# Patient Record
Sex: FEMALE | Race: WHITE | NOT HISPANIC OR LATINO | Employment: OTHER | URBAN - METROPOLITAN AREA
[De-identification: names, ages, dates, MRNs, and addresses within clinical notes are randomized per-mention and may not be internally consistent; named-entity substitution may affect disease eponyms.]

---

## 2017-01-04 ENCOUNTER — OFFICE VISIT (OUTPATIENT)
Dept: FAMILY MEDICINE | Facility: CLINIC | Age: 42
End: 2017-01-04
Payer: COMMERCIAL

## 2017-01-04 VITALS
WEIGHT: 176 LBS | OXYGEN SATURATION: 98 % | BODY MASS INDEX: 26.67 KG/M2 | DIASTOLIC BLOOD PRESSURE: 62 MMHG | TEMPERATURE: 98 F | RESPIRATION RATE: 12 BRPM | HEART RATE: 82 BPM | SYSTOLIC BLOOD PRESSURE: 110 MMHG | HEIGHT: 68 IN

## 2017-01-04 DIAGNOSIS — R23.2 HOT FLASHES: ICD-10-CM

## 2017-01-04 DIAGNOSIS — R61 GENERALIZED HYPERHIDROSIS: Primary | ICD-10-CM

## 2017-01-04 PROCEDURE — 99214 OFFICE O/P EST MOD 30 MIN: CPT | Performed by: FAMILY MEDICINE

## 2017-01-04 RX ORDER — PROPRANOLOL HCL 60 MG
60 CAPSULE, EXTENDED RELEASE 24HR ORAL DAILY
Qty: 30 CAPSULE | Refills: 1 | Status: SHIPPED | OUTPATIENT
Start: 2017-01-04 | End: 2017-03-02

## 2017-01-04 NOTE — PROGRESS NOTES
"  SUBJECTIVE:                                                    Deandra Lewis is a 41 year old female who presents to clinic today for the following health issues:      Concern - Consult /Perspiration - diffuse hyperhidrosis that causes her to sometimes leave work - worse in the summertime - 3x/week where she drenches her clothing and becomes quite odorous.  Strongly interfering with her work-life now that she's working outside the home.  Worse with emotional stress as well.  Doesn't eat anything with spice at all.         Stressful job as a [employer] - family/home  .  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  Was in HR previously and had the same problem with sweating since her early childhood.        We reviewed together today notes from Dr. Islas - endocrinology , and from ob/gyn specialists as well. All 24 hr urines for cortisol, metanephrines and catecholamines = negative.        Onset: Since a teen   \"runs Hot\" - not just hot, but primary complaint is actually generalized sweating where pt will soak her clothing multiple times a week.      Has seen Endocrinology - Dr. Islas and had normal 24 hr urine for  Catecholamines, metanephrines, cortisol.     Has 3 current kidney stones - stable position - has microscopic hematuria - sees female urologist for this.  Pt's Mother and daughter with similar generalized hyperhidrosis and lack of feelings of appetite satiety since early childhood.  Pt noticed similar hyperhidrosis in her daughter since infancy - she is now age 9.      Mother not really helpful with medical history in family secondary to her bipolar disorder and chronically scattered thoughts and doesn't always take her medications.     No hx of icthyosis or chronic skin problems.  No other autonomic nervous system dysfunction.         Therapies Tried and outcome: none.       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 related to previous surgeries - 4 in 3 months for sinus issues/infection     Family history of breast cancer- mother's identical twin, maternal aunt, paternal GM and dad with breast lumps      Hot flashes     Generalized hyperhidrosis- since earliest recollections - soaks clothing multiple times/week - very disruptive to life       Current Outpatient Prescriptions   Medication Sig Dispense Refill     propranolol (INDERAL LA) 60 MG 24 hr capsule Take 1 capsule (60 mg) by mouth daily 30 capsule 1     levonorgestrel-ethinyl estradiol (SEASONALE) 0.15-0.03 MG per tablet TAKE 1 ACTIVE TABLET BY ORAL ROUTE ONCE DAILY CONTINUOUSLY  4     albuterol (PROAIR HFA, PROVENTIL HFA, VENTOLIN HFA) 108 (90 BASE) MCG/ACT inhaler Inhale 2 puffs into the lungs every 6 hours as needed for shortness of breath / dyspnea or wheezing 1 Inhaler 0     cetirizine (ZYRTEC) 10 MG tablet Take 1 tablet (10 mg) by mouth every evening 30 tablet 1     sodium chloride (SODIUM CHLORIDE) 0.65 % nasal spray Spray 2 sprays in nostril 4 times daily. (Patient taking differently: Spray 2 sprays in nostril daily as needed ) 1 Bottle 0     ibuprofen (ADVIL) 200 MG tablet Take 200 mg by mouth as needed.            Allergies   Allergen Reactions     Adhesive Tape Blisters     Seasonal Allergies        Problem list and histories reviewed & adjusted, as indicated.  Additional history: as documented    Labs reviewed in EPIC  Problem list, Medication list, Allergies, and Medical/Social/Surgical histories reviewed in [facility] and updated as appropriate.    ROS:  C: NEGATIVE for fever, chills, change in weight  E/M: NEGATIVE for ear, mouth and throat problems  R: NEGATIVE for significant  "cough or SOB  CV: NEGATIVE for chest pain, palpitations or peripheral edema    OBJECTIVE:                                                    /62 mmHg  Pulse 82  Temp(Src) 98  F (36.7  C) (Tympanic)  Resp 12  Ht 5' 8\" (1.727 m)  Wt 176 lb (79.833 kg)  BMI 26.77 kg/m2  SpO2 98%  LMP 11/04/2016  Breastfeeding? No  Body mass index is 26.77 kg/(m^2).   GENERAL: healthy, alert, well nourished, well hydrated, no distress  HENT: ear canals- normal; TMs- normal; Nose- normal; Mouth- no ulcers, no lesions  NECK: no tenderness, no adenopathy, no asymmetry, no masses, no stiffness; thyroid- normal to palpation  RESP: lungs clear to auscultation - no rales, no rhonchi, no wheezes  CV: regular rates and rhythm, normal S1 S2, no S3 or S4 and no murmur, no click or rub -  ABDOMEN: soft, no tenderness, no  hepatosplenomegaly, no masses, normal bowel sounds  MS: extremities- no gross deformities noted, no edema  Neuro:PERRL, EOMI. Fundoscopic exam reveals sharp discs bilaterally, no evidence of papilledema. Visual fields are fully intact by confrontation.   Cranial nerves 2-12 are fully intact.     Muscle strength is 5/5 at the biceps, triceps, brachioradialis,  strength and finger spread as well as hip flexors and extendors, quadriceps, hamstrings, foot dorsi- and plantar flexion as well as extensor hallucis longus bilaterally.   Reflexes are brisk and symmetric at the biceps, triceps, brachioradialis, patellar, and Achilles tendons bilaterally.  Toes are down going bilaterally.   Gait is normal. Rhomberg is negative. No pronator drift.  Fine finger movements and finger-nose-finger fully intact. Visual fields fully intact by confrontation.      Diagnostic test results:  See [facility] orders.     TSH   Date Value Ref Range Status   11/16/2016 1.45 0.40 - 4.00 mU/L Final        ASSESSMENT/PLAN:                                                        ICD-10-CM    1. Generalized hyperhidrosis- since earliest " recollections - soaks clothing multiple times/week - very disruptive to life R61 NEUROLOGY ADULT REFERRAL     GENETICS REFERRAL     propranolol (INDERAL LA) 60 MG 24 hr capsule   2. Hot flashes R23.2 NEUROLOGY ADULT REFERRAL     GENETICS REFERRAL     propranolol (INDERAL LA) 60 MG 24 hr capsule     discussed risks, benefits, and alternatives of meds prescribed and pt agrees to accept possible side effects and risks, including, but not limited to slow heart rate, dizziness, fainting and shortness of breath with exercise.  If she experiences any of the latter 3, she will call right away.  This seems more like a genetic/familial syndrome to me.        See Patient Instructions  Also try turmeric otc as well to see if this helps. Consider accupuncture for this as well. Gets really bad migraines or headaches assoc with the sweating/increased feelings of body heat as well.  Mother with bipolar disorder.     Spent approx. 35+  minutes on pt care today .  All face to face time from 12:31pm  to 1:07pm.  Greater than 50% of time spent in coordination of care/counseling today re:  1. Generalized hyperhidrosis- since earliest recollections - soaks clothing multiple times/week - very disruptive to life    2. Hot flashes       Linda Soto MD  [facility]

## 2017-01-04 NOTE — MR AVS SNAPSHOT
After Visit Summary   1/4/2017    Deandra Lewis    MRN: [id number]           Patient Information     Date Of Birth          1975        Visit Information        Provider Department      1/4/2017 11:30 AM Linda Soto MD [facility]        Today's Diagnoses     Generalized hyperhidrosis- since earliest recollections - soaks clothing multiple times/week - very disruptive to life    -  1     Hot flashes           Care Instructions    Also try turmeric otc as well to see if this helps. rry the turmeric after being on the propranolol for 1 week to see if that helps . Consider accupuncture for this as well.               Thank you for choosing [facility]  for your Health Care. It was a pleasure seeing you at your visit today. Please contact us with any questions or concerns you may have.                   Linda Soto MD                                  To reach your [facility] care team after hours call:   [phone]    Our clinic hours are:     Monday- 7:30 am - 7:00 pm                             Tuesday through Friday- 7:30 am - 5:00 pm                                        Saturday- 8:00 am - 12:00 pm                  Phone:  [phone]    Our pharmacy hours are:     Monday  8:00 am to 7:00 pm      Tuesday through Friday 8:00am to 6:00pm                        Saturday - 9:00 am to 1:00 pm      Sunday : Closed.              Phone:  [phone]      There is also information available at our web site:  [email or web address]    If your provider ordered any lab tests and you do not receive the results within 10 business days, please call the clinic.    If you need a medication refill please contact your pharmacy.  Please allow 2 business days for your refill to be completed.    Our clinic offers telephone visits and e visits.  Please ask one of your team members to explain more.      Use [employer] (secure email communication and  access to your chart) to send your primary care provider a message or make an appointment. Ask someone on your Team how to sign up for MyChart.                     Follow-ups after your visit        Additional Services     GENETICS REFERRAL       Your provider has referred you to: [facility]: Adult Genetics [facility] [phone]   [email or web address]    [facility]: Adult Metabolism [facility] [phone]   [email or web address]    Please be aware that coverage of these services is subject to the terms and limitations of your health insurance plan.  Call member services at your health plan with any benefit or coverage questions.      Please bring the following with you to your appointment:    (1) Any X-Rays, CTs or MRIs which have been performed.  Contact the facility where they were done to arrange for  prior to your scheduled appointment.   (2) List of current medications   (3) This referral request   (4) Any documents/labs given to you for this referral            NEUROLOGY ADULT REFERRAL       Your provider has referred you to: [facility]: Neurology [facility] [phone]   [email or web address]  -  Diffuse hyperhidrosis  - severe - ? Autonomic process - familial     Reason for Referral: Consult    Please be aware that coverage of these services is subject to the terms and limitations of your health insurance plan.  Call member services at your health plan with any benefit or coverage questions.      Please bring the following with you to your appointment:    (1) Any X-Rays, CTs or MRIs which have been performed.  Contact the facility where they were done to arrange for  prior to your scheduled appointment.    (2) List of current medications  (3) This referral request   (4) Any documents/labs given to you for this referral                  Who to  "contact     If you have questions or need follow up information about today's clinic visit or your schedule please contact [facility] directly at [phone].  Normal or non-critical lab and imaging results will be communicated to you by MyChart, letter or phone within 4 business days after the clinic has received the results. If you do not hear from us within 7 days, please contact the clinic through MyChart or phone. If you have a critical or abnormal lab result, we will notify you by phone as soon as possible.  Submit refill requests through [employer] or call your pharmacy and they will forward the refill request to us. Please allow 3 business days for your refill to be completed.          Additional Information About Your Visit        [facility] Information     [employer] lets you send messages to your doctor, view your test results, renew your prescriptions, schedule appointments and more. To sign up, go to [email or web address] . Click on \"Log in\" on the left side of the screen, which will take you to the Welcome page. Then click on \"Sign up Now\" on the right side of the page.     You will be asked to enter the access code listed below, as well as some personal information. Please follow the directions to create your username and password.     Your access code is: [email or web address]  Expires: 2017 10:29 AM     Your access code will  in 90 days. If you need help or a new code, please call your [facility] clinic or [phone].        Care EveryWhere ID     This is your Care EveryWhere ID. This could be used by other organizations to access your [facility] medical records  [id number]        Your Vitals Were     Pulse Temperature Respirations    82 98  F (36.7  C) (Tympanic) 12    Height BMI (Body Mass Index) Pulse Oximetry    5' 8\" (1.727 m) 26.77 kg/m2 98%    Last Period Breastfeeding?       2016 No        Blood Pressure from Last 3 Encounters:   17 110/62   16 128/88 "   05/18/16 116/80    Weight from Last 3 Encounters:   01/04/17 176 lb (79.833 kg)   11/16/16 173 lb 8 oz (78.699 kg)   05/18/16 162 lb (73.483 kg)              We Performed the Following     GENETICS REFERRAL     NEUROLOGY ADULT REFERRAL          Today's Medication Changes          These changes are accurate as of: 1/4/17  1:07 PM.  If you have any questions, ask your nurse or doctor.               Start taking these medicines.        Dose/Directions    propranolol 60 MG 24 hr capsule   Commonly known as:  INDERAL LA   Used for:  Generalized hyperhidrosis, Hot flashes   Started by:  Linda Soto MD        Dose:  60 mg   Take 1 capsule (60 mg) by mouth daily   Quantity:  30 capsule   Refills:  1         These medicines have changed or have updated prescriptions.        Dose/Directions    sodium chloride 0.65 % nasal spray   Commonly known as:  OCEAN   This may have changed:    - when to take this  - reasons to take this   Used for:  Chronic sinusitis        Dose:  2 spray   Spray 2 sprays in nostril 4 times daily.   Quantity:  1 Bottle   Refills:  0            Where to get your medicines      These medications were sent to [place] [address] [place] [address]  [address] [place] [address]     Phone:  [phone]    - propranolol 60 MG 24 hr capsule             Primary Care Provider Office Phone # Fax #    Linda Soto [phone]       [address] [address]        Thank you!     Thank you for choosing [facility]  for your care. Our goal is always to provide you with excellent care. Hearing back from our patients is one way we can continue to improve our services. Please take a few minutes to complete the written survey that you may receive in the mail after your visit with us. Thank you!             Your Updated Medication List - Protect others around you: Learn how to safely use, store  and throw away your medicines at www.disposemymeds.org.          This list is accurate as of: 1/4/17  1:07 PM.  Always use your most recent med list.                   Brand Name Dispense Instructions for use    ADVIL 200 MG tablet   Generic drug:  ibuprofen      Take 200 mg by mouth as needed.       albuterol 108 (90 BASE) MCG/ACT Inhaler    PROAIR HFA/PROVENTIL HFA/VENTOLIN HFA    1 Inhaler    Inhale 2 puffs into the lungs every 6 hours as needed for shortness of breath / dyspnea or wheezing       cetirizine 10 MG tablet    zyrTEC    30 tablet    Take 1 tablet (10 mg) by mouth every evening       levonorgestrel-ethinyl estradiol 0.15-0.03 MG per tablet    SEASONALE     TAKE 1 ACTIVE TABLET BY ORAL ROUTE ONCE DAILY CONTINUOUSLY       propranolol 60 MG 24 hr capsule    INDERAL LA    30 capsule    Take 1 capsule (60 mg) by mouth daily       sodium chloride 0.65 % nasal spray    OCEAN    1 Bottle    Spray 2 sprays in nostril 4 times daily.

## 2017-01-04 NOTE — PATIENT INSTRUCTIONS
Also try turmeric otc as well to see if this helps. rry the turmeric after being on the propranolol for 1 week to see if that helps . Consider accupuncture for this as well.               Thank you for choosing [facility]  for your Health Care. It was a pleasure seeing you at your visit today. Please contact us with any questions or concerns you may have.                   Linda Soto MD                                  To reach your [facility] care team after hours call:   [phone]    Our clinic hours are:     Monday- 7:30 am - 7:00 pm                             Tuesday through Friday- 7:30 am - 5:00 pm                                        Saturday- 8:00 am - 12:00 pm                  Phone:  [phone]    Our pharmacy hours are:     Monday  8:00 am to 7:00 pm      Tuesday through Friday 8:00am to 6:00pm                        Saturday - 9:00 am to 1:00 pm      Sunday : Closed.              Phone:  [phone]      There is also information available at our web site:  [email or web address]    If your provider ordered any lab tests and you do not receive the results within 10 business days, please call the clinic.    If you need a medication refill please contact your pharmacy.  Please allow 2 business days for your refill to be completed.    Our clinic offers telephone visits and e visits.  Please ask one of your team members to explain more.      Use [employer] (secure email communication and access to your chart) to send your primary care provider a message or make an appointment. Ask someone on your Team how to sign up for [employer].

## 2017-01-04 NOTE — NURSING NOTE
"Chief Complaint   Patient presents with     Consult     Perspiration       Initial /62 mmHg  Pulse 82  Temp(Src) 98  F (36.7  C) (Tympanic)  Resp 12  Ht 5' 8\" (1.727 m)  Wt 176 lb (79.833 kg)  BMI 26.77 kg/m2  SpO2 98%  LMP 11/04/2016  Breastfeeding? No Estimated body mass index is 26.77 kg/(m^2) as calculated from the following:    Height as of this encounter: 5' 8\" (1.727 m).    Weight as of this encounter: 176 lb (79.833 kg).  BP completed using cuff size: large  "

## 2017-01-04 NOTE — NURSING NOTE
"Chief Complaint   Patient presents with     Consult       Initial /62 mmHg  Pulse 82  Temp(Src) 98  F (36.7  C) (Tympanic)  Resp 12  Ht 5' 8\" (1.727 m)  Wt 176 lb (79.833 kg)  BMI 26.77 kg/m2  SpO2 98%  LMP 11/04/2016  Breastfeeding? No Estimated body mass index is 26.77 kg/(m^2) as calculated from the following:    Height as of this encounter: 5' 8\" (1.727 m).    Weight as of this encounter: 176 lb (79.833 kg).  BP completed using cuff size: large  "

## 2017-01-23 ENCOUNTER — TRANSFERRED RECORDS (OUTPATIENT)
Dept: HEALTH INFORMATION MANAGEMENT | Facility: CLINIC | Age: 42
End: 2017-01-23

## 2017-03-02 DIAGNOSIS — R61 GENERALIZED HYPERHIDROSIS: ICD-10-CM

## 2017-03-02 DIAGNOSIS — R23.2 HOT FLASHES: ICD-10-CM

## 2017-03-02 RX ORDER — PROPRANOLOL HCL 60 MG
CAPSULE, EXTENDED RELEASE 24HR ORAL
Qty: 30 CAPSULE | Refills: 1 | Status: SHIPPED | OUTPATIENT
Start: 2017-03-02 | End: 2017-04-06

## 2017-03-02 NOTE — TELEPHONE ENCOUNTER
Prescription approved per [facility] Refill Protocol.  Stella Summers RN

## 2017-03-02 NOTE — TELEPHONE ENCOUNTER
PROPRANOLOL ER 60 MG CAPSULE      Last Written Prescription Date: 01/04/2017  Last Fill Quantity: 30, # refills: 1    Last Office Visit with G, P or [facility] prescribing provider:  01/04/2017   Future Office Visit:        BP Readings from Last 3 Encounters:   01/04/17 110/62   11/16/16 128/88   05/18/16 116/80

## 2017-04-06 ENCOUNTER — OFFICE VISIT (OUTPATIENT)
Dept: FAMILY MEDICINE | Facility: CLINIC | Age: 42
End: 2017-04-06
Payer: COMMERCIAL

## 2017-04-06 VITALS
HEART RATE: 100 BPM | OXYGEN SATURATION: 100 % | BODY MASS INDEX: 28.43 KG/M2 | WEIGHT: 187 LBS | TEMPERATURE: 98.3 F | DIASTOLIC BLOOD PRESSURE: 76 MMHG | SYSTOLIC BLOOD PRESSURE: 124 MMHG

## 2017-04-06 DIAGNOSIS — J20.9 ACUTE BRONCHITIS, UNSPECIFIED ORGANISM: ICD-10-CM

## 2017-04-06 DIAGNOSIS — J01.00 ACUTE NON-RECURRENT MAXILLARY SINUSITIS: ICD-10-CM

## 2017-04-06 DIAGNOSIS — R05.9 COUGH: Primary | ICD-10-CM

## 2017-04-06 LAB
FLUAV+FLUBV AG SPEC QL: NEGATIVE
FLUAV+FLUBV AG SPEC QL: NORMAL
SPECIMEN SOURCE: NORMAL

## 2017-04-06 PROCEDURE — 99214 OFFICE O/P EST MOD 30 MIN: CPT | Performed by: FAMILY MEDICINE

## 2017-04-06 PROCEDURE — 87804 INFLUENZA ASSAY W/OPTIC: CPT | Performed by: FAMILY MEDICINE

## 2017-04-06 RX ORDER — CODEINE PHOSPHATE AND GUAIFENESIN 10; 100 MG/5ML; MG/5ML
1 SOLUTION ORAL EVERY 4 HOURS PRN
Qty: 120 ML | Refills: 0 | Status: SHIPPED | OUTPATIENT
Start: 2017-04-06 | End: 2017-09-21

## 2017-04-06 RX ORDER — ALBUTEROL SULFATE 90 UG/1
2 AEROSOL, METERED RESPIRATORY (INHALATION) EVERY 6 HOURS PRN
Qty: 1 INHALER | Refills: 1 | Status: SHIPPED | OUTPATIENT
Start: 2017-04-06 | End: 2019-10-14

## 2017-04-06 NOTE — PROGRESS NOTES
SUBJECTIVE:                                                    Deandra Lewis is a 41 year old female who presents to clinic today for the following health issues:    Acute Illness   Acute illness concerns: Cough- started as influenza-like illness about 2 weeks ago - fever, chills, sweats, body aches, cough, then worsened   Onset: x10 days    Fever: YES- no noted fever but has had waves of feeling warmer    Chills/Sweats: YES- both    Headache (location?): YES- frontal    Sinus Pressure:YES- tender, post-nasal drainage, facial pain and teeth pain    Conjunctivitis:  YES- watery    Ear Pain: no    Rhinorrhea: no    Congestion: YES- more nasal congestion but feels it is moving onto her chest now    Sore Throat: no    Missed 3 days of work last week and 2 days this week.      Cough: YES-productive of grayish-green sputum, improving over time, but mucus thinning and becoming more clear -  cough keeping her up at night.     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     In-laws were in [place] and had some zithromax from there.   Son with asthma.      Therapies Tried and outcome: On Saturday, started taking a Z-pack (from [place]) - has helped      Problem list and histories reviewed & adjusted, as indicated.  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 related to previous surgeries - 4 in 3 months for sinus issues/infection     Family history of breast cancer- mother's identical twin, maternal aunt, paternal GM and dad with breast lumps      Hot flashes     Generalized hyperhidrosis- since earliest recollections - soaks clothing multiple times/week - very disruptive to life       Current Outpatient Prescriptions   Medication Sig Dispense Refill     levonorgestrel-ethinyl estradiol (SEASONALE) 0.15-0.03 MG per tablet TAKE 1 ACTIVE TABLET BY ORAL ROUTE ONCE DAILY CONTINUOUSLY  4     albuterol (PROAIR HFA, PROVENTIL HFA, VENTOLIN HFA) 108 (90 BASE) MCG/ACT inhaler Inhale 2 puffs into the lungs every 6 hours as needed for shortness of breath / dyspnea or wheezing 1 Inhaler 0     cetirizine (ZYRTEC) 10 MG tablet Take 1 tablet (10 mg) by mouth every evening 30 tablet 1     sodium chloride (SODIUM CHLORIDE) 0.65 % nasal spray Spray 2 sprays in nostril 4 times daily. (Patient taking differently: Spray 2 sprays in nostril daily as needed ) 1 Bottle 0     ibuprofen (ADVIL) 200 MG tablet Take 200 mg by mouth as needed.            Allergies   Allergen Reactions     Adhesive Tape Blisters     Seasonal Allergies          ROS:   ROS: 12 point ROS neg other than the symptoms noted above    OBJECTIVE:                                                    /76 (BP Location: Right arm, Patient Position: Chair, Cuff Size: Adult Regular)  Pulse 100  Temp 98.3  F (36.8  C) (Oral)  Wt 187 lb (84.8 kg)  SpO2 100%  BMI 28.43 kg/m2  Body mass index is 28.43 kg/(m^2).   GENERAL: healthy, alert, well nourished, well hydrated, no distress  HENT: ear canals- normal; TMs- normal; Nose- congested,  Mouth- no ulcers, no lesions  NECK: no tenderness, no adenopathy, no asymmetry, no masses, no stiffness; thyroid- normal to palpation  RESP: lungs clear to auscultation - no rales, no rhonchi, no wheezes  CV: regular rates and rhythm, normal S1 S2, no  S3 or S4 and no murmur, no click or rub -  ABDOMEN: soft, no tenderness, no  hepatosplenomegaly, no masses, normal bowel sounds  MS: extremities- no gross deformities noted, no edema.     Diagnostic test results:  none      ASSESSMENT/PLAN:                                                        ICD-10-CM    1. Cough R05 Influenza A/B antigen     albuterol (PROAIR HFA/PROVENTIL HFA/VENTOLIN HFA) 108 (90 BASE) MCG/ACT Inhaler     guaiFENesin-codeine (ROBITUSSIN AC) 100-10 MG/5ML SOLN solution   2. Acute bronchitis, unspecified organism J20.9 albuterol (PROAIR HFA/PROVENTIL HFA/VENTOLIN HFA) 108 (90 BASE) MCG/ACT Inhaler     guaiFENesin-codeine (ROBITUSSIN AC) 100-10 MG/5ML SOLN solution   3. Acute non-recurrent maxillary sinusitis J01.00      Pt declines any further antibiotic or xray at this time.   See Patient Instructions.  Please, call or return to clinic or go to the ER immediately if signs or symptoms worsen or fail to improve as anticipated.           Linda Soto MD  [facility] [place]

## 2017-04-06 NOTE — NURSING NOTE
"Chief Complaint   Patient presents with     Cough       Initial /76 (BP Location: Right arm, Patient Position: Chair, Cuff Size: Adult Regular)  Pulse 100  Temp 98.3  F (36.8  C) (Oral)  Wt 187 lb (84.8 kg)  SpO2 100%  BMI 28.43 kg/m2 Estimated body mass index is 28.43 kg/(m^2) as calculated from the following:    Height as of 1/4/17: 5' 8\" (1.727 m).    Weight as of this encounter: 187 lb (84.8 kg).  Medication Reconciliation: complete   Kalpana Waddell CMA  "

## 2017-04-06 NOTE — PATIENT INSTRUCTIONS
Acute Bronchitis                  What is acute bronchitis?   Bronchitis is an infection of the air passages--that is, the tubes that connect the windpipe to the lungs. It causes swelling and irritation of the airways. With acute bronchitis you usually have a cough that produces phlegm and pain behind the breastbone when you breathe deeply or cough.   How does it occur?   Bronchitis often occurs with viral infections of the respiratory tract, such as colds and flu. Bronchitis may also be caused by bacterial infections. It may occur with childhood illnesses such as measles and whooping cough.   Attacks are most frequent during the winter or when the level of air pollution is high.   Infants, young children, older adults, smokers, and people with heart disease or lung disease (including asthma and allergies) are most likely to get acute bronchitis.   What are the symptoms?   Symptoms may include:   a deep cough that produces yellowish or greenish phlegm   pain behind the breastbone when you breathe deeply or cough   wheezing   feeling short of breath   fever   chills   headache   sore muscles.   How is it diagnosed?   Your healthcare provider will ask about your symptoms and examine you. You may have tests, such as:   a test of phlegm to look for bacteria   chest X-ray   blood tests.   How is it treated?   Acute bronchitis often does not require medical treatment. Resting at home and drinking plenty of fluids to keep the mucus loose may be all you need to do to get better in a few days. If your symptoms are severe or you have other health problems (such as heart or lung disease or diabetes), you may need to take antibiotics.   How long will the effects last?   Most of the time acute bronchitis clears up in a few days. Your cough may slowly get better in 1 to 2 weeks.   It may take you longer to recover if:   You are a smoker.   You live in an area where air pollution is a problem.   You have a heart  or lung disease.   You have any other continuing health problems.   How can I take care of myself?   You can help yourself by:   following the full treatment your healthcare provider recommends   using a vaporizer, humidifier, or steam from hot water to add moisture to the air   drinking plenty of liquids   taking cough medicine if recommended by your healthcare provider   resting in bed   taking aspirin or acetaminophen to reduce fever and relieve headache and muscle pain (Check with your healthcare provider before you give any medicine that contains aspirin or salicylates to a child or teen. This includes medicines like baby aspirin, some cold medicines, and Pepto Bismol. Children and teens who take aspirin are at risk for a serious illness called Reye's syndrome.)   eating healthy meals.   Call your healthcare provider if:   You have trouble breathing.   You have a fever of 101.5?F (38.6?C) or higher.   You cough up blood.   Your symptoms are getting worse instead of better.   You don't start to feel better after 3 days of treatment.   You have any symptoms that concern you.   How can I help prevent acute bronchitis?   To reduce your risk of getting a respiratory infection:   Do not smoke.   Wash your hands often, especially when you are around people with colds (upper respiratory infections).   If you have asthma or allergies, keep your symptoms under good control.   Get regular exercise.   Eat healthy foods.       Published by [employer].  This content is reviewed periodically and is subject to change as new health information becomes available. The information is intended to inform and educate and is not a replacement for medical evaluation, advice, diagnosis or treatment by a healthcare professional.   Developed by [employer].   ? 2010 [facility] and/or its affiliates. All Rights Reserved.   Copyright   Clinical Reference Systems 2011                   Thank you for choosing [facility]   for your Health Care. It was a pleasure seeing you at your visit today. Please contact us with any questions or concerns you may have.                   Linda Soto MD                                  To reach your [facility] team after hours call:   [phone]    Our clinic hours are:     Monday- 7:30 am - 7:00 pm                             Tuesday through Friday- 7:30 am - 5:00 pm                                        Saturday- 8:00 am - 12:00 pm                  Phone:  [phone]    Our pharmacy hours are:     Monday  8:00 am to 7:00 pm      Tuesday through Friday 8:00am to 6:00pm                        Saturday - 9:00 am to 1:00 pm      Sunday : Closed.              Phone:  [phone]      There is also information available at our web site:  [email or web address]    If your provider ordered any lab tests and you do not receive the results within 10 business days, please call the clinic.    If you need a medication refill please contact your pharmacy.  Please allow 2 business days for your refill to be completed.    Our clinic offers telephone visits and e visits.  Please ask one of your team members to explain more.      Use PEPperPRINThart (secure email communication and access to your chart) to send your primary care provider a message or make an appointment. Ask someone on your Team how to sign up for QThrut.

## 2017-04-06 NOTE — MR AVS SNAPSHOT
After Visit Summary   4/6/2017    Deandra Lewis    MRN: [id number]           Patient Information     Date Of Birth          1975        Visit Information        Provider Department      4/6/2017 2:30 PM Linda Soto MD [facility]        Today's Diagnoses     Cough    -  1    Acute bronchitis, unspecified organism        Acute non-recurrent maxillary sinusitis          Care Instructions                       Acute Bronchitis                  What is acute bronchitis?   Bronchitis is an infection of the air passages--that is, the tubes that connect the windpipe to the lungs. It causes swelling and irritation of the airways. With acute bronchitis you usually have a cough that produces phlegm and pain behind the breastbone when you breathe deeply or cough.   How does it occur?   Bronchitis often occurs with viral infections of the respiratory tract, such as colds and flu. Bronchitis may also be caused by bacterial infections. It may occur with childhood illnesses such as measles and whooping cough.   Attacks are most frequent during the winter or when the level of air pollution is high.   Infants, young children, older adults, smokers, and people with heart disease or lung disease (including asthma and allergies) are most likely to get acute bronchitis.   What are the symptoms?   Symptoms may include:   a deep cough that produces yellowish or greenish phlegm   pain behind the breastbone when you breathe deeply or cough   wheezing   feeling short of breath   fever   chills   headache   sore muscles.   How is it diagnosed?   Your healthcare provider will ask about your symptoms and examine you. You may have tests, such as:   a test of phlegm to look for bacteria   chest X-ray   blood tests.   How is it treated?   Acute bronchitis often does not require medical treatment. Resting at home and drinking plenty of fluids to keep the mucus loose may be all you need to do to get  better in a few days. If your symptoms are severe or you have other health problems (such as heart or lung disease or diabetes), you may need to take antibiotics.   How long will the effects last?   Most of the time acute bronchitis clears up in a few days. Your cough may slowly get better in 1 to 2 weeks.   It may take you longer to recover if:   You are a smoker.   You live in an area where air pollution is a problem.   You have a heart or lung disease.   You have any other continuing health problems.   How can I take care of myself?   You can help yourself by:   following the full treatment your healthcare provider recommends   using a vaporizer, humidifier, or steam from hot water to add moisture to the air   drinking plenty of liquids   taking cough medicine if recommended by your healthcare provider   resting in bed   taking aspirin or acetaminophen to reduce fever and relieve headache and muscle pain (Check with your healthcare provider before you give any medicine that contains aspirin or salicylates to a child or teen. This includes medicines like baby aspirin, some cold medicines, and Pepto Bismol. Children and teens who take aspirin are at risk for a serious illness called Reye's syndrome.)   eating healthy meals.   Call your healthcare provider if:   You have trouble breathing.   You have a fever of 101.5?F (38.6?C) or higher.   You cough up blood.   Your symptoms are getting worse instead of better.   You don't start to feel better after 3 days of treatment.   You have any symptoms that concern you.   How can I help prevent acute bronchitis?   To reduce your risk of getting a respiratory infection:   Do not smoke.   Wash your hands often, especially when you are around people with colds (upper respiratory infections).   If you have asthma or allergies, keep your symptoms under good control.   Get regular exercise.   Eat healthy foods.       Published by [employer].  This content is reviewed  periodically and is subject to change as new health information becomes available. The information is intended to inform and educate and is not a replacement for medical evaluation, advice, diagnosis or treatment by a healthcare professional.   Developed by [employer].   ? 2010 [facility] and/or its affiliates. All Rights Reserved.   Copyright   Clinical Reference Systems 2011                   Thank you for choosing [facility]  for your Health Care. It was a pleasure seeing you at your visit today. Please contact us with any questions or concerns you may have.                   Linda Soto MD                                  To reach your [facility] care team after hours call:   [phone]    Our clinic hours are:     Monday- 7:30 am - 7:00 pm                             Tuesday through Friday- 7:30 am - 5:00 pm                                        Saturday- 8:00 am - 12:00 pm                  Phone:  [phone]    Our pharmacy hours are:     Monday  8:00 am to 7:00 pm      Tuesday through Friday 8:00am to 6:00pm                        Saturday - 9:00 am to 1:00 pm      Sunday : Closed.              Phone:  [phone]      There is also information available at our web site:  [email or web address]    If your provider ordered any lab tests and you do not receive the results within 10 business days, please call the clinic.    If you need a medication refill please contact your pharmacy.  Please allow 2 business days for your refill to be completed.    Our clinic offers telephone visits and e visits.  Please ask one of your team members to explain more.      Use Imm[employer] Recordshart (secure email communication and access to your chart) to send your primary care provider a message or make an appointment. Ask someone on your Team how to sign up for Imm[employer] Recordshart.                     Follow-ups after your visit        Who to contact     If you have questions or need follow up information  "about today's clinic visit or your schedule please contact [facility] directly at [phone].  Normal or non-critical lab and imaging results will be communicated to you by MyChart, letter or phone within 4 business days after the clinic has received the results. If you do not hear from us within 7 days, please contact the clinic through Matchbookhart or phone. If you have a critical or abnormal lab result, we will notify you by phone as soon as possible.  Submit refill requests through PopSeal or call your pharmacy and they will forward the refill request to us. Please allow 3 business days for your refill to be completed.          Additional Information About Your Visit        Matchbookhart Information     PopSeal lets you send messages to your doctor, view your test results, renew your prescriptions, schedule appointments and more. To sign up, go to [email or web address] . Click on \"Log in\" on the left side of the screen, which will take you to the Welcome page. Then click on \"Sign up Now\" on the right side of the page.     You will be asked to enter the access code listed below, as well as some personal information. Please follow the directions to create your username and password.     Your access code is: [email or web address]  Expires: 2017  3:18 PM     Your access code will  in 90 days. If you need help or a new code, please call your [place] clinic or [phone].        Care EveryWhere ID     This is your Care EveryWhere ID. This could be used by other organizations to access your [place] medical records  [id number]        Your Vitals Were     Pulse Temperature Pulse Oximetry BMI (Body Mass Index)          100 98.3  F (36.8  C) (Oral) 100% 28.43 kg/m2         Blood Pressure from Last 3 Encounters:   17 124/76   17 110/62   16 128/88    Weight from Last 3 Encounters:   17 187 lb (84.8 kg)   17 176 lb (79.8 kg)   16 173 lb 8 oz (78.7 kg)              We " Performed the Following     Influenza A/B antigen          Today's Medication Changes          These changes are accurate as of: 4/6/17  3:18 PM.  If you have any questions, ask your nurse or doctor.               Start taking these medicines.        Dose/Directions    guaiFENesin-codeine 100-10 MG/5ML Soln solution   Commonly known as:  ROBITUSSIN AC   Used for:  Cough, Acute bronchitis, unspecified organism   Started by:  Linda Soto MD        Dose:  1 tsp.   Take 5 mLs by mouth every 4 hours as needed for cough   Quantity:  120 mL   Refills:  0         These medicines have changed or have updated prescriptions.        Dose/Directions    * albuterol 108 (90 BASE) MCG/ACT Inhaler   Commonly known as:  PROAIR HFA/PROVENTIL HFA/VENTOLIN HFA   This may have changed:  Another medication with the same name was added. Make sure you understand how and when to take each.   Used for:  Acute bronchitis, unspecified organism   Changed by:  Joaquín Santoyo PA-C        Dose:  2 puff   Inhale 2 puffs into the lungs every 6 hours as needed for shortness of breath / dyspnea or wheezing   Quantity:  1 Inhaler   Refills:  0       * albuterol 108 (90 BASE) MCG/ACT Inhaler   Commonly known as:  PROAIR HFA/PROVENTIL HFA/VENTOLIN HFA   This may have changed:  You were already taking a medication with the same name, and this prescription was added. Make sure you understand how and when to take each.   Used for:  Acute bronchitis, unspecified organism, Cough   Changed by:  Linda Soto MD        Dose:  2 puff   Inhale 2 puffs into the lungs every 6 hours as needed for shortness of breath / dyspnea or wheezing   Quantity:  1 Inhaler   Refills:  1       sodium chloride 0.65 % nasal spray   Commonly known as:  OCEAN   This may have changed:    - when to take this  - reasons to take this   Used for:  Chronic sinusitis        Dose:  2 spray   Spray 2 sprays in nostril 4 times daily.   Quantity:  1 Bottle    Refills:  0       * Notice:  This list has 2 medication(s) that are the same as other medications prescribed for you. Read the directions carefully, and ask your doctor or other care provider to review them with you.      Stop taking these medicines if you haven't already. Please contact your care team if you have questions.     propranolol 60 MG 24 hr capsule   Commonly known as:  INDERAL LA   Stopped by:  Linda Soto MD                Where to get your medicines      These medications were sent to [place] [address] [place] [facility] [address]  [address] [facility] [address]     Phone:  [phone]     albuterol 108 (90 BASE) MCG/ACT Inhaler         Some of these will need a paper prescription and others can be bought over the counter.  Ask your nurse if you have questions.     Bring a paper prescription for each of these medications     guaiFENesin-codeine 100-10 MG/5ML Soln solution                Primary Care Provider Office Phone # Fax #    Linda Soto [phone]       [facility] [address] [place] [id number]        Thank you!     Thank you for choosing [facility]  for your care. Our goal is always to provide you with excellent care. Hearing back from our patients is one way we can continue to improve our services. Please take a few minutes to complete the written survey that you may receive in the mail after your visit with us. Thank you!             Your Updated Medication List - Protect others around you: Learn how to safely use, store and throw away your medicines at www.disposemymeds.org.          This list is accurate as of: 4/6/17  3:18 PM.  Always use your most recent med list.                   Brand Name Dispense Instructions for use    ADVIL 200 MG tablet   Generic drug:  ibuprofen      Take 200 mg by mouth as needed.       * albuterol 108 (90 BASE) MCG/ACT Inhaler    PROAIR HFA/PROVENTIL  HFA/VENTOLIN HFA    1 Inhaler    Inhale 2 puffs into the lungs every 6 hours as needed for shortness of breath / dyspnea or wheezing       * albuterol 108 (90 BASE) MCG/ACT Inhaler    PROAIR HFA/PROVENTIL HFA/VENTOLIN HFA    1 Inhaler    Inhale 2 puffs into the lungs every 6 hours as needed for shortness of breath / dyspnea or wheezing       cetirizine 10 MG tablet    zyrTEC    30 tablet    Take 1 tablet (10 mg) by mouth every evening       guaiFENesin-codeine 100-10 MG/5ML Soln solution    ROBITUSSIN AC    120 mL    Take 5 mLs by mouth every 4 hours as needed for cough       levonorgestrel-ethinyl estradiol 0.15-0.03 MG per tablet    SEASONALE     TAKE 1 ACTIVE TABLET BY ORAL ROUTE ONCE DAILY CONTINUOUSLY       sodium chloride 0.65 % nasal spray    OCEAN    1 Bottle    Spray 2 sprays in nostril 4 times daily.       * Notice:  This list has 2 medication(s) that are the same as other medications prescribed for you. Read the directions carefully, and ask your doctor or other care provider to review them with you.

## 2017-04-24 ENCOUNTER — TRANSFERRED RECORDS (OUTPATIENT)
Dept: HEALTH INFORMATION MANAGEMENT | Facility: CLINIC | Age: 42
End: 2017-04-24

## 2017-08-16 ENCOUNTER — RADIANT APPOINTMENT (OUTPATIENT)
Dept: MAMMOGRAPHY | Facility: CLINIC | Age: 42
End: 2017-08-16
Attending: FAMILY MEDICINE
Payer: COMMERCIAL

## 2017-08-16 DIAGNOSIS — Z12.31 VISIT FOR SCREENING MAMMOGRAM: ICD-10-CM

## 2017-08-16 PROCEDURE — G0202 SCR MAMMO BI INCL CAD: HCPCS | Mod: TC

## 2017-09-21 ENCOUNTER — OFFICE VISIT (OUTPATIENT)
Dept: FAMILY MEDICINE | Facility: CLINIC | Age: 42
End: 2017-09-21
Payer: COMMERCIAL

## 2017-09-21 VITALS
HEART RATE: 90 BPM | TEMPERATURE: 98.2 F | DIASTOLIC BLOOD PRESSURE: 78 MMHG | OXYGEN SATURATION: 96 % | SYSTOLIC BLOOD PRESSURE: 108 MMHG | BODY MASS INDEX: 27.13 KG/M2 | WEIGHT: 178.4 LBS

## 2017-09-21 DIAGNOSIS — Z23 NEED FOR TDAP VACCINATION: ICD-10-CM

## 2017-09-21 DIAGNOSIS — T78.1XXA ADVERSE REACTION TO FOOD, INITIAL ENCOUNTER: Primary | ICD-10-CM

## 2017-09-21 DIAGNOSIS — Z30.9 ENCOUNTER FOR CONTRACEPTIVE MANAGEMENT, UNSPECIFIED TYPE: ICD-10-CM

## 2017-09-21 PROCEDURE — 82785 ASSAY OF IGE: CPT | Performed by: FAMILY MEDICINE

## 2017-09-21 PROCEDURE — 99213 OFFICE O/P EST LOW 20 MIN: CPT | Mod: 25 | Performed by: FAMILY MEDICINE

## 2017-09-21 PROCEDURE — 86003 ALLG SPEC IGE CRUDE XTRC EA: CPT | Mod: 59 | Performed by: FAMILY MEDICINE

## 2017-09-21 PROCEDURE — 90471 IMMUNIZATION ADMIN: CPT | Performed by: FAMILY MEDICINE

## 2017-09-21 PROCEDURE — 90715 TDAP VACCINE 7 YRS/> IM: CPT | Performed by: FAMILY MEDICINE

## 2017-09-21 PROCEDURE — 36415 COLL VENOUS BLD VENIPUNCTURE: CPT | Performed by: FAMILY MEDICINE

## 2017-09-21 PROCEDURE — 86003 ALLG SPEC IGE CRUDE XTRC EA: CPT | Performed by: FAMILY MEDICINE

## 2017-09-21 NOTE — LETTER
[address] [place] [address]                  [phone]   September 25, 2017    Deandra Lewis  [place] [address]      Dear Deandra,    Here is a summary of your recent test results:    -All of your labs are normal.  No signs of distinct allergies to the tested foods/substances.  As we discussed, this doesn't mean that you don't have some nonallergic sensitivities to these things.  If you feel better not eating them, then don't.     For additional lab test information, [email or web address] is an excellent reference.     Your test results are enclosed.      Please contact me if you have any questions.    In addition, here is a list of due or overdue Health Maintenance reminders.    Health Maintenance Due   Topic Date Due     Flu Vaccine - yearly  09/01/2017       Please call us at [phone] (or use [employer]) to address the above recommendations.            Thank you very much for trusting [facility]..     Healthy regards,       Linda Soto M.D.          Results for orders placed or performed in visit on 09/21/17   Allergen tomato IgE   Result Value Ref Range    Allergen Tomato <0.10 <0.10 KU(A)/L   Allergen egg yolk IgE   Result Value Ref Range    Allergen Egg Yolk <0.10 <0.10 KU(A)/L   Allergen oat IgE   Result Value Ref Range    Allergen Oats <0.10 <0.10 KU(A)/L   Allergy pediatric march profile IgE   Result Value Ref Range    IGE <2 0 - 114 KIU/L    Allergen Cat Dander <0.10 <0.10 KU(A)/L    Allergen Dog Dander <0.10 <0.10 KU(A)/L    Allergen Fish(Cod) <0.10 <0.10 KU(A)/L    Allergen Egg White <0.10 <0.10 KU(A)/L    Allergen Milk <0.10 <0.10 KU/L    Allergen Peanut <0.10 <0.10 KU(A)/L    Allergen Soybean IgE <0.10 <0.10 KU(A)/L    Allergen Wheat <0.10 <0.10 KU(A)/L    Allergen Cockroach <0.10 <0.10 KU(A)/L    Allergen D farinae <0.10 <0.10 KU(A)/L    Allergen A alternata <0.10 <0.10 KU(A)/L     Allergen, D Pteronyssinus <0.10 <0.10 KU(A)/L   Allergen clam IgE   Result Value Ref Range    Allergen Clam <0.10 <0.10 KU(A)/L   Allergen shrimp IgE   Result Value Ref Range    Allergen Shrimp <0.10 <0.10 KU(A)/L   Allergen walnuts IgE   Result Value Ref Range    Allergen, [place] <0.10 <0.10 KU(A)/L   Allergen scallop IgE   Result Value Ref Range    Allergen Scallop <0.10 <0.10 KU(A)/L   Allergen corn IgE   Result Value Ref Range    Allergen [place] <0.10 <0.10 KU(A)/L

## 2017-09-21 NOTE — PATIENT INSTRUCTIONS
Please, call or return to clinic or go to the ER immediately if signs or symptoms worsen or fail to improve as anticipated.                Thank you for choosing [facility]  for your Health Care. It was a pleasure seeing you at your visit today. Please contact us with any questions or concerns you may have.                   Linda Soto MD                                  To reach your [facility] care team after hours call:   [phone]    Our clinic hours are:     Monday- 7:30 am - 7:00 pm                             Tuesday through Friday- 7:30 am - 5:00 pm                                        Saturday- 8:00 am - 12:00 pm                  Phone:  [phone]    Our pharmacy hours are:     Monday  8:00 am to 7:00 pm      Tuesday through Friday 8:00am to 6:00pm                        Saturday - 9:00 am to 1:00 pm      Sunday : Closed.              Phone:  [phone]      There is also information available at our web site:  [email or web address]    If your provider ordered any lab tests and you do not receive the results within 10 business days, please call the clinic.    If you need a medication refill please contact your pharmacy.  Please allow 2 business days for your refill to be completed.    Our clinic offers telephone visits and e visits.  Please ask one of your team members to explain more.      Use Kivahart (secure email communication and access to your chart) to send your primary care provider a message or make an appointment. Ask someone on your Team how to sign up for Vantrixt.

## 2017-09-21 NOTE — NURSING NOTE
"Chief Complaint   Patient presents with     Allergies       Initial /78 (BP Location: Left arm, Patient Position: Chair, Cuff Size: Adult Large)  Pulse 90  Temp 98.2  F (36.8  C) (Oral)  Wt 178 lb 6.4 oz (80.9 kg)  LMP 08/31/2017 (Approximate)  SpO2 96%  BMI 27.13 kg/m2 Estimated body mass index is 27.13 kg/(m^2) as calculated from the following:    Height as of 1/4/17: 5' 8\" (1.727 m).    Weight as of this encounter: 178 lb 6.4 oz (80.9 kg).  Medication Reconciliation: complete   Kalpana Waddell CMA  "

## 2017-09-21 NOTE — PROGRESS NOTES
"  SUBJECTIVE:                                                    Deandra Lewis is a 41 year old female who presents to clinic today for the following health issues:    ALLERGIES - she never feels good, always having stomach aches and feels warm.     Onset: xfew years    Description:   Nasal congestion: no  Sneezing: YES- off and on throughout the day  Red, itchy eyes: YES- starting to get a little more itchy    Progression of Symptoms:  Worse, but feels like she is more in tune with it    Accompanying Signs & Symptoms:  Cough: no, but a tickle in her throat  Wheezing: no  Rash: no  Sinus/facial pain: YES- off and on  Has a really irritable bowel - can get diarrhea \"at the drop of a hat.\"   Gets sweats on and off - all testing, including endocrine      History:   Is it seasonal: in the fall and in the spring   History of Asthma: not diagnosed but she was told that she might have borderline asthma. Her son and mother have asthma.  Has allergy testing been done: YES- in high school    Precipitating factors:   Fall, Spring, and certain foods    Alleviating factors:  Cutting out dairy, eggs, tomatoes, garlic, pepper.      Son, Elvin , diagnosed with multiple  food allergies since age 2 - new allergy = oats.  His allergist suggested testing.        Therapies Tried and outcome: Cutting out certain foods has helped.    Requesting specific foods for allergy testing today - see below.       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 related to previous surgeries - 4 in 3 months for sinus issues/infection     Family history of breast cancer- mother's identical twin, maternal aunt, paternal GM and dad with breast lumps      Hot flashes     Generalized hyperhidrosis- since earliest recollections - soaks clothing multiple times/week - " very disruptive to life       Current Outpatient Prescriptions   Medication Sig Dispense Refill     albuterol (PROAIR HFA/PROVENTIL HFA/VENTOLIN HFA) 108 (90 BASE) MCG/ACT Inhaler Inhale 2 puffs into the lungs every 6 hours as needed for shortness of breath / dyspnea or wheezing 1 Inhaler 1     cetirizine (ZYRTEC) 10 MG tablet Take 1 tablet (10 mg) by mouth every evening 30 tablet 1     sodium chloride (SODIUM CHLORIDE) 0.65 % nasal spray Spray 2 sprays in nostril 4 times daily. (Patient taking differently: Spray 2 sprays in nostril daily as needed ) 1 Bottle 0     ibuprofen (ADVIL) 200 MG tablet Take 200 mg by mouth as needed.       [DISCONTINUED] albuterol (PROAIR HFA, PROVENTIL HFA, VENTOLIN HFA) 108 (90 BASE) MCG/ACT inhaler Inhale 2 puffs into the lungs every 6 hours as needed for shortness of breath / dyspnea or wheezing 1 Inhaler 0          Allergies   Allergen Reactions     Adhesive Tape Blisters     Seasonal Allergies           Problem list and histories reviewed & adjusted, as indicated.  Additional history: as documented    Reviewed and updated as needed this visit by clinical staffTobacco  Allergies  Meds  Med Hx  Surg Hx  Fam Hx  Soc Hx      Reviewed and updated as needed this visit by Provider         ROS:   ROS: 12 point ROS neg other than the symptoms noted above    OBJECTIVE:                                                    /78 (BP Location: Left arm, Patient Position: Chair, Cuff Size: Adult Large)  Pulse 90  Temp 98.2  F (36.8  C) (Oral)  Wt 178 lb 6.4 oz (80.9 kg)  LMP 08/31/2017 (Approximate)  SpO2 96%  BMI 27.13 kg/m2  Body mass index is 27.13 kg/(m^2).   GENERAL: healthy, alert, well nourished, well hydrated, no distress  HENT: ear canals- normal; TMs- normal; Nose- normal; Mouth- no ulcers, no lesions  NECK: no tenderness, no adenopathy, no asymmetry, no masses, no stiffness; thyroid- normal to palpation  RESP: lungs clear to auscultation - no rales, no rhonchi, no  wheezes  CV: regular rates and rhythm, normal S1 S2, no S3 or S4 and no murmur, no click or rub -  ABDOMEN: soft, no tenderness, no  hepatosplenomegaly, no masses, normal bowel sounds  MS: extremities- no gross deformities noted, no edema    Diagnostic test results:  See [facility]Care orders.        ASSESSMENT/PLAN:                                                        ICD-10-CM    1. Adverse reaction to food, initial encounter T78.1XXA Allergen tomato IgE     ALLERGY/ASTHMA ADULT REFERRAL     Allergen egg yolk IgE     Allergen oat IgE     Allergy pediatric march profile IgE     Allergen clam IgE     Allergen shrimp IgE     Allergen walnuts IgE     Allergen scallop IgE     Allergen corn IgE     CANCELED: Allergy adult food panel   2. Need for Tdap vaccination Z23      Pt getting her flu shot at [facility].   See Patient Instructions. Please, call or return to clinic or go to the ER immediately if signs or symptoms worsen or fail to improve as anticipated.          Linda Soto MD    [facility]

## 2017-09-21 NOTE — MR AVS SNAPSHOT
After Visit Summary   9/21/2017    Deandra Lewis    MRN: [id number]           Patient Information     Date Of Birth          1975        Visit Information        Provider Department      9/21/2017 2:00 PM Linda Soto MD [facility]        Today's Diagnoses     Adverse reaction to food, initial encounter    -  1    Need for Tdap vaccination          Care Instructions    Please, call or return to clinic or go to the ER immediately if signs or symptoms worsen or fail to improve as anticipated.                Thank you for choosing [facility]  for your Health Care. It was a pleasure seeing you at your visit today. Please contact us with any questions or concerns you may have.                   Linda Soto MD                                  To reach your [facility] care team after hours call:   [phone]    Our clinic hours are:     Monday- 7:30 am - 7:00 pm                             Tuesday through Friday- 7:30 am - 5:00 pm                                        Saturday- 8:00 am - 12:00 pm                  Phone:  [phone]    Our pharmacy hours are:     Monday  8:00 am to 7:00 pm      Tuesday through Friday 8:00am to 6:00pm                        Saturday - 9:00 am to 1:00 pm      Sunday : Closed.              Phone:  [phone]      There is also information available at our web site:  [email or web address]    If your provider ordered any lab tests and you do not receive the results within 10 business days, please call the clinic.    If you need a medication refill please contact your pharmacy.  Please allow 2 business days for your refill to be completed.    Our clinic offers telephone visits and e visits.  Please ask one of your team members to explain more.      Use ClassOwl (secure email communication and access to your chart) to send your primary care provider a message or make an appointment. Ask someone on your  Team how to sign up for MyChart.                       Follow-ups after your visit        Additional Services     ALLERGY/ASTHMA ADULT REFERRAL       Your provider has referred you to: N: [facility] [place] [phone]   [email or web address]  [place] [phone]   [email or web address]  Or     N: [facility] - [place] [phone]   [email or web address]  [place] [phone]   http://WebAction  [place] [phone]   [email or web address]  Or  [facility]: [place] [facility] [place] [phone]   [email or web address]  [place] [phone]   [email or web address]  [place] [phone]   [email or web address]    Please be aware that coverage of these services is subject to the terms and limitations of your health insurance plan.  Call member services at your health plan with any benefit or coverage questions.      Please bring the following with you to your appointment:    (1) Any X-Rays, CTs or MRIs which have been performed.  Contact the facility where they were done to arrange for  prior to your scheduled appointment.    (2) List of current medications  (3) This referral request   (4) Any documents/labs given to you for this referral                  Who to contact     If you have questions or need follow up information about today's clinic visit or your schedule please contact [facility] directly at [phone].  Normal or non-critical lab and imaging results will be communicated to you by MyChart, letter or phone within 4 business days after the clinic has received the results. If you do not hear from us within 7 days, please contact the clinic through MyChart or phone. If you have a critical or abnormal lab result, we will notify you by phone as soon as possible.  Submit refill requests through [employer] or call your pharmacy and they will forward the refill  "request to us. Please allow 3 business days for your refill to be completed.          Additional Information About Your Visit        YesWeAdharJostle Information     Mape lets you send messages to your doctor, view your test results, renew your prescriptions, schedule appointments and more. To sign up, go to [email or web address] . Click on \"Log in\" on the left side of the screen, which will take you to the Welcome page. Then click on \"Sign up Now\" on the right side of the page.     You will be asked to enter the access code listed below, as well as some personal information. Please follow the directions to create your username and password.     Your access code is: [email or web address]  Expires: 2017  2:59 PM     Your access code will  in 90 days. If you need help or a new code, please call your [facility] clinic or [phone].        Care EveryWhere ID     This is your Care EveryWhere ID. This could be used by other organizations to access your [facility] medical records  [id number]        Your Vitals Were     Pulse Temperature Last Period Pulse Oximetry BMI (Body Mass Index)       90 98.2  F (36.8  C) (Oral) 2017 (Approximate) 96% 27.13 kg/m2        Blood Pressure from Last 3 Encounters:   17 108/78   17 124/76   17 110/62    Weight from Last 3 Encounters:   17 178 lb 6.4 oz (80.9 kg)   17 187 lb (84.8 kg)   17 176 lb (79.8 kg)              We Performed the Following          ADMIN VACCINE, FIRST     Allergen clam IgE     Allergen corn IgE     Allergen egg yolk IgE     Allergen oat IgE     Allergen scallop IgE     Allergen shrimp IgE     Allergen tomato IgE     Allergen walnuts IgE     Allergy pediatric march profile IgE     ALLERGY/ASTHMA ADULT REFERRAL     TDAP VACCINE (ADACEL)          Today's Medication Changes          These changes are accurate as of: 17  3:00 PM.  If you have any questions, ask your nurse or doctor.               These medicines have changed " or have updated prescriptions.        Dose/Directions    albuterol 108 (90 BASE) MCG/ACT Inhaler   Commonly known as:  PROAIR HFA/PROVENTIL HFA/VENTOLIN HFA   This may have changed:  Another medication with the same name was removed. Continue taking this medication, and follow the directions you see here.   Used for:  Acute bronchitis, unspecified organism, Cough   Changed by:  Linda Soto MD        Dose:  2 puff   Inhale 2 puffs into the lungs every 6 hours as needed for shortness of breath / dyspnea or wheezing   Quantity:  1 Inhaler   Refills:  1       sodium chloride 0.65 % nasal spray   Commonly known as:  OCEAN   This may have changed:    - when to take this  - reasons to take this   Used for:  Chronic sinusitis        Dose:  2 spray   Spray 2 sprays in nostril 4 times daily.   Quantity:  1 Bottle   Refills:  0         Stop taking these medicines if you haven't already. Please contact your care team if you have questions.     guaiFENesin-codeine 100-10 MG/5ML Soln solution   Commonly known as:  ROBITUSSIN AC   Stopped by:  Linda Soto MD                    Primary Care Provider Office Phone # Fax #    Linda Soto [phone]       [address] [address]        Equal Access to Services     ALETHEA ENGEL : Hadii jannie ku hadasho Soomaali, waaxda luqadaha, qaybta kaalmada adeegyada, waxay elaine corona. So [facility] [phone].    ATENCIÓN: Si habla español, tiene a moeller disposición servicios gratuitos de asistencia lingüística. Llame al [phone].    We comply with applicable federal civil rights laws and [place] laws. We do not discriminate on the basis of race, color, national origin, age, disability sex, sexual orientation or gender identity.            Thank you!     Thank you for choosing [facility]  for your care. Our goal is always to provide you with excellent care. Hearing back from our patients  is one way we can continue to improve our services. Please take a few minutes to complete the written survey that you may receive in the mail after your visit with us. Thank you!             Your Updated Medication List - Protect others around you: Learn how to safely use, store and throw away your medicines at www.disposemymeds.org.          This list is accurate as of: 9/21/17  3:00 PM.  Always use your most recent med list.                   Brand Name Dispense Instructions for use Diagnosis    ADVIL 200 MG tablet   Generic drug:  ibuprofen      Take 200 mg by mouth as needed.        albuterol 108 (90 BASE) MCG/ACT Inhaler    PROAIR HFA/PROVENTIL HFA/VENTOLIN HFA    1 Inhaler    Inhale 2 puffs into the lungs every 6 hours as needed for shortness of breath / dyspnea or wheezing    Acute bronchitis, unspecified organism, Cough       cetirizine 10 MG tablet    zyrTEC    30 tablet    Take 1 tablet (10 mg) by mouth every evening        sodium chloride 0.65 % nasal spray    OCEAN    1 Bottle    Spray 2 sprays in nostril 4 times daily.    Chronic sinusitis

## 2017-09-24 LAB
A ALTERNATA IGE QN: <0.1 KU(A)/L
CAT DANDER IGG QN: <0.1 KU(A)/L
CLAM IGE QN: <0.1 KU(A)/L
CODFISH IGE QN: <0.1 KU(A)/L
CORN IGE QN: <0.1 KU(A)/L
COW MILK IGE QN: <0.1 KU/L
D FARINAE IGE QN: <0.1 KU(A)/L
D PTERONYSS IGE QN: <0.1 KU(A)/L
DOG DANDER+EPITH IGE QN: <0.1 KU(A)/L
EGG WHITE IGE QN: <0.1 KU(A)/L
IGE SERPL-ACNC: <2 KIU/L (ref 0–114)
OAT IGE QN: <0.1 KU(A)/L
PEANUT IGE QN: <0.1 KU(A)/L
ROACH IGE QN: <0.1 KU(A)/L
SCALLOP IGE QN: <0.1 KU(A)/L
SHRIMP IGE QN: <0.1 KU(A)/L
SOYBEAN IGE QN: <0.1 KU(A)/L
TOMATO IGE QN: <0.1 KU(A)/L
WALNUT IGE QN: <0.1 KU(A)/L
WHEAT IGE QN: <0.1 KU(A)/L
WHOLE EGG IGE QN: <0.1 KU(A)/L

## 2017-11-06 ENCOUNTER — TRANSFERRED RECORDS (OUTPATIENT)
Dept: HEALTH INFORMATION MANAGEMENT | Facility: CLINIC | Age: 42
End: 2017-11-06

## 2017-12-17 ENCOUNTER — TRANSFERRED RECORDS (OUTPATIENT)
Dept: HEALTH INFORMATION MANAGEMENT | Facility: CLINIC | Age: 42
End: 2017-12-17

## 2017-12-27 ENCOUNTER — TRANSFERRED RECORDS (OUTPATIENT)
Dept: HEALTH INFORMATION MANAGEMENT | Facility: CLINIC | Age: 42
End: 2017-12-27

## 2018-01-16 ENCOUNTER — TRANSFERRED RECORDS (OUTPATIENT)
Dept: HEALTH INFORMATION MANAGEMENT | Facility: CLINIC | Age: 43
End: 2018-01-16

## 2018-03-12 ENCOUNTER — TRANSFERRED RECORDS (OUTPATIENT)
Dept: HEALTH INFORMATION MANAGEMENT | Facility: CLINIC | Age: 43
End: 2018-03-12

## 2018-04-18 ENCOUNTER — TRANSFERRED RECORDS (OUTPATIENT)
Dept: HEALTH INFORMATION MANAGEMENT | Facility: CLINIC | Age: 43
End: 2018-04-18

## 2018-05-29 ENCOUNTER — TELEPHONE (OUTPATIENT)
Dept: FAMILY MEDICINE | Facility: CLINIC | Age: 43
End: 2018-05-29

## 2018-05-29 DIAGNOSIS — N64.4 BREAST PAIN, LEFT: Primary | ICD-10-CM

## 2018-05-29 NOTE — TELEPHONE ENCOUNTER
Reason for Call:  Patient has shooting pain on left side going to armpit.  Wondering if she should be seen or have Mammogram.  Please advise.  Sx x1 week    Best phone number to reach pt at is: cell first [phone] - if no answer try work [phone]  Ok to leave a message with medical info? Yes on either phone    Pharmacy preferred (if calling for a refill): [facility] - [place]

## 2018-05-30 NOTE — TELEPHONE ENCOUNTER
Message handled by Nurse Triage with H[employer] - provider name: PHILIPPE OSBORN     Called pt ordered mammogram and US. And a follow up appointment.    The patient indicates understanding of these issues and agrees with the plan.     Trista Burt RN    [facility]

## 2018-05-31 ENCOUNTER — TELEPHONE (OUTPATIENT)
Dept: FAMILY MEDICINE | Facility: CLINIC | Age: 43
End: 2018-05-31

## 2018-05-31 ENCOUNTER — HOSPITAL ENCOUNTER (OUTPATIENT)
Dept: MAMMOGRAPHY | Facility: CLINIC | Age: 43
End: 2018-05-31
Attending: FAMILY MEDICINE
Payer: COMMERCIAL

## 2018-05-31 ENCOUNTER — HOSPITAL ENCOUNTER (OUTPATIENT)
Dept: MAMMOGRAPHY | Facility: CLINIC | Age: 43
Discharge: HOME | End: 2018-05-31
Attending: FAMILY MEDICINE | Admitting: FAMILY MEDICINE
Payer: COMMERCIAL

## 2018-05-31 DIAGNOSIS — N64.4 BREAST PAIN, LEFT: ICD-10-CM

## 2018-05-31 PROCEDURE — 77066 DX MAMMO INCL CAD BI: CPT

## 2018-05-31 PROCEDURE — 76642 ULTRASOUND BREAST LIMITED: CPT | Mod: LT

## 2018-05-31 NOTE — TELEPHONE ENCOUNTER
Pt calling back about left breast pain. Pt had an US and Mammogram.    Looked fine to them. Pt is curious if they should still come in tomorrow.     Per JV pt should still come and be evaluated if they are still having pain.  Pt reports they are and will keep their appt.    The patient indicates understanding of these issues and agrees with the plan.  Stella Summers RN  [facility]

## 2018-06-01 ENCOUNTER — OFFICE VISIT (OUTPATIENT)
Dept: FAMILY MEDICINE | Facility: CLINIC | Age: 43
End: 2018-06-01
Payer: COMMERCIAL

## 2018-06-01 VITALS
OXYGEN SATURATION: 98 % | HEIGHT: 68 IN | BODY MASS INDEX: 25.22 KG/M2 | WEIGHT: 166.4 LBS | SYSTOLIC BLOOD PRESSURE: 116 MMHG | DIASTOLIC BLOOD PRESSURE: 82 MMHG | HEART RATE: 78 BPM | TEMPERATURE: 98.1 F

## 2018-06-01 DIAGNOSIS — F41.9 MILD ANXIETY: ICD-10-CM

## 2018-06-01 DIAGNOSIS — N64.4 BREAST PAIN, LEFT: Primary | ICD-10-CM

## 2018-06-01 DIAGNOSIS — R61 GENERALIZED HYPERHIDROSIS: ICD-10-CM

## 2018-06-01 DIAGNOSIS — Z62.21 FOSTER CARE CHILD: ICD-10-CM

## 2018-06-01 PROCEDURE — 99214 OFFICE O/P EST MOD 30 MIN: CPT | Performed by: FAMILY MEDICINE

## 2018-06-01 RX ORDER — PAROXETINE 10 MG/1
10 TABLET, FILM COATED ORAL AT BEDTIME
Qty: 30 TABLET | Refills: 1 | Status: SHIPPED | OUTPATIENT
Start: 2018-06-01 | End: 2018-07-27 | Stop reason: SINTOL

## 2018-06-01 NOTE — MR AVS SNAPSHOT
After Visit Summary   6/1/2018    Deandra Lewis    MRN: [id number]           Patient Information     Date Of Birth          1975        Visit Information        Provider Department      6/1/2018 9:30 AM Linda Soto MD [facility] [place]        Today's Diagnoses     Breast pain, left - with normal exam, Mammo, and US     -  1    Generalized hyperhidrosis        Foster care child        Mild anxiety          Care Instructions      Wear double sports bras for running. Try to leave that area alone on the left breast.  If still having pain in 1 month, then see one of our breast surgeons to ensure you don't need any further work-up or treatment.     Please, call or return to clinic or go to the ER immediately if signs or symptoms worsen or fail to improve as anticipated.     See Patient Instructions               Thank you for choosing [facility]  for your Health Care. It was a pleasure seeing you at your visit today. Please contact us with any questions or concerns you may have.                   Linda Soto MD                                  To reach your [facility] care team after hours call:   [phone]    Our clinic hours are:     Monday- 7:30 am - 7:00 pm                             Tuesday through Friday- 7:30 am - 5:00 pm                                        Saturday- 8:00 am - 12:00 pm                  Phone:  [phone]    Our pharmacy hours are:     Monday  8:00 am to 7:00 pm      Tuesday through Friday 8:00am to 6:00pm                        Saturday - 9:00 am to 1:00 pm      Sunday : Closed.              Phone:  [phone]      There is also information available at our web site:  [email or web address]    If your provider ordered any lab tests and you do not receive the results within 10 business days, please call the clinic.    If you need a medication refill please contact your pharmacy.  Please allow 2 business  days for your refill to be completed.    Our clinic offers telephone visits and e visits.  Please ask one of your team members to explain more.      Use Ifeelgoodshart (secure email communication and access to your chart) to send your primary care provider a message or make an appointment. Ask someone on your Team how to sign up for Ifeelgoodshart.                       Follow-ups after your visit        Additional Services     GENERAL SURG ADULT REFERRAL       Your provider has referred you to: HEMAG: [facility] [place] [phone]   [email or web address]    Please be aware that coverage of these services is subject to the terms and limitations of your health insurance plan.  Call member services at your health plan with any benefit or coverage questions.      Please bring the following with you to your appointment:    (1) Any X-Rays, CTs or MRIs which have been performed.  Contact the facility where they were done to arrange for  prior to your scheduled appointment.   (2) List of current medications   (3) This referral request   (4) Any documents/labs given to you for this referral                  Follow-up notes from your care team     Return in about 1 month (around 7/1/2018) for recheck breast pain with surgery consultation if not gone .      Future tests that were ordered for you today     Open Future Orders        Priority Expected Expires Ordered    MA Diagnostic Bilateral w/Connor Routine  5/30/2019 5/30/2018            Who to contact     If you have questions or need follow up information about today's clinic visit or your schedule please contact [facility] directly at [phone].  Normal or non-critical lab and imaging results will be communicated to you by MyChart, letter or phone within 4 business days after the clinic has received the results. If you do not hear from us within 7 days, please contact the clinic through MyChart or phone.  "If you have a critical or abnormal lab result, we will notify you by phone as soon as possible.  Submit refill requests through [employer] or call your pharmacy and they will forward the refill request to us. Please allow 3 business days for your refill to be completed.          Additional Information About Your Visit        Care EveryWhere ID     This is your Care EveryWhere ID. This could be used by other organizations to access your [facility] medical records  [id number]        Your Vitals Were     Pulse Temperature Height Last Period Pulse Oximetry BMI (Body Mass Index)    78 98.1  F (36.7  C) (Oral) 5' 8\" (1.727 m) 05/18/2018 (Within Days) 98% 25.3 kg/m2       Blood Pressure from Last 3 Encounters:   06/01/18 116/82   09/21/17 108/78   04/06/17 124/76    Weight from Last 3 Encounters:   06/01/18 166 lb 6.4 oz (75.5 kg)   09/21/17 178 lb 6.4 oz (80.9 kg)   04/06/17 187 lb (84.8 kg)              We Performed the Following     GENERAL SURG ADULT REFERRAL          Today's Medication Changes          These changes are accurate as of 6/1/18 10:50 AM.  If you have any questions, ask your nurse or doctor.               Start taking these medicines.        Dose/Directions    PARoxetine 10 MG tablet   Commonly known as:  PAXIL   Used for:  Generalized hyperhidrosis, Foster care child, Mild anxiety   Started by:  Linda Soto MD        Dose:  10 mg   Take 1 tablet (10 mg) by mouth At Bedtime   Quantity:  30 tablet   Refills:  1         These medicines have changed or have updated prescriptions.        Dose/Directions    sodium chloride 0.65 % nasal spray   Commonly known as:  OCEAN   This may have changed:    - when to take this  - reasons to take this   Used for:  Chronic sinusitis        Dose:  2 spray   Spray 2 sprays in nostril 4 times daily.   Quantity:  1 Bottle   Refills:  0            Where to get your medicines      [facility] [id number] [place] [facility] [address]  " [address] [place], [place] [address] [address]     Phone:  [phone]     PARoxetine 10 MG tablet                Primary Care Provider Office Phone # Fax #    Linda Soto [phone]       [address] [place] [address]        Equal Access to Services     AGUILAR ENGEL : Hadii aad ku hadasho Soomaali, waaxda luqadaha, qaybta kaalmada adeegyada, waxay idiin hayaan adeeg kharash la'aan . So [facility] [phone].    ATENCIÓN: Si habla español, tiene a moeller disposición servicios gratuitos de asistencia lingüística. Llame al [phone].    We comply with applicable federal civil rights laws and [place] laws. We do not discriminate on the basis of race, color, national origin, age, disability, sex, sexual orientation, or gender identity.            Thank you!     Thank you for choosing [facility]  for your care. Our goal is always to provide you with excellent care. Hearing back from our patients is one way we can continue to improve our services. Please take a few minutes to complete the written survey that you may receive in the mail after your visit with us. Thank you!             Your Updated Medication List - Protect others around you: Learn how to safely use, store and throw away your medicines at www.disposemymeds.org.          This list is accurate as of 6/1/18 10:50 AM.  Always use your most recent med list.                   Brand Name Dispense Instructions for use Diagnosis    ADVIL 200 MG tablet   Generic drug:  ibuprofen      Take 200 mg by mouth as needed.        albuterol 108 (90 Base) MCG/ACT Inhaler    PROAIR HFA/PROVENTIL HFA/VENTOLIN HFA    1 Inhaler    Inhale 2 puffs into the lungs every 6 hours as needed for shortness of breath / dyspnea or wheezing    Acute bronchitis, unspecified organism, Cough       cetirizine 10 MG tablet    zyrTEC    30 tablet    Take 1 tablet (10 mg) by mouth every evening        PARoxetine 10 MG tablet    PAXIL    30 tablet     Take 1 tablet (10 mg) by mouth At Bedtime    Generalized hyperhidrosis, Foster care child, Mild anxiety       PROBIOTIC ACIDOPHILUS PO           sodium chloride 0.65 % nasal spray    OCEAN    1 Bottle    Spray 2 sprays in nostril 4 times daily.    Chronic sinusitis

## 2018-06-01 NOTE — PROGRESS NOTES
SUBJECTIVE:                                                    Deandra Lewis is a 42 year old female who presents to clinic today for the following health issues:    Patient has had pain in her left breast, noticed it 1-1 1/2 weeks ago after wearing a sports bra over night.  The pain can be sharp if she is showering or putting on a bra and dull pain otherwise. She hasn't noticed a lump as of yet.  Her last pregnancy she noticed a lump in her right breast that she was told was a clogged milk duct, it lasted for a couple years.  She has a mammogram and an ultrasound yesterday.    Mammogram and US Left Breast:    Examination: Bilateral digital diagnostic mammography and digital  breast tomosynthesis with computer aided detection, and focused  ultrasound of the left breast, 5/31/2018.     Comparison: 8/16/2017 and 5/28/2015.     History: Intermittent pain in the lateral left breast and left axilla.     BREAST DENSITY: Heterogeneously dense     Findings: No concerning mammographic findings in either breast.     Focussed ultrasound by radiologist and technologist of the upper outer  quadrant of the left breast and left axilla was performed. No  concerning findings identified.          IMPRESSION: BI-RADS CATEGORY: 1 -  NEGATIVE.      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 related to previous surgeries - 4 in 3 months for sinus issues/infection     Family history of breast cancer- mother's identical twin, maternal aunt, paternal GM and dad with breast lumps      Hot flashes     Generalized hyperhidrosis- since earliest recollections - soaks clothing multiple times/week - very disruptive to life     Contraception       Current Outpatient Prescriptions   Medication Sig Dispense Refill     albuterol (PROAIR HFA/PROVENTIL  "HFA/VENTOLIN HFA) 108 (90 BASE) MCG/ACT Inhaler Inhale 2 puffs into the lungs every 6 hours as needed for shortness of breath / dyspnea or wheezing 1 Inhaler 1     cetirizine (ZYRTEC) 10 MG tablet Take 1 tablet (10 mg) by mouth every evening 30 tablet 1     ibuprofen (ADVIL) 200 MG tablet Take 200 mg by mouth as needed.       Lactobacillus (PROBIOTIC ACIDOPHILUS PO)        sodium chloride (SODIUM CHLORIDE) 0.65 % nasal spray Spray 2 sprays in nostril 4 times daily. (Patient taking differently: Spray 2 sprays in nostril daily as needed ) 1 Bottle 0          Allergies   Allergen Reactions     Adhesive Tape Blisters     Seasonal Allergies        Problem list and histories reviewed & adjusted, as indicated.  Additional history: as documented    Reviewed and updated as needed this visit by clinical staff       Reviewed and updated as needed this visit by Provider         ROS:hx of plugged milk duct RIGHT breast in the past when breastfeeding her last child 7 years ago    ROS: 12 point ROS neg other than the symptoms noted above    Pt still getting some feelings of getting flushed and getting hyperhidrosis - work up to date = negative.  Was raised in foster care. Has been seeing a counselor. Wonders if restarting an SSRI would be helpful . Paxil ? worked well for her in the past - about 20 years ago , she thinks.      OBJECTIVE:                                                    /82 (BP Location: Left arm, Patient Position: Chair, Cuff Size: Adult Regular)  Pulse 78  Temp 98.1  F (36.7  C) (Oral)  Ht 5' 8\" (1.727 m)  Wt 166 lb 6.4 oz (75.5 kg)  LMP 05/18/2018 (Within Days)  SpO2 98%  BMI 25.3 kg/m2  Body mass index is 25.3 kg/(m^2).   GENERAL: healthy, alert, well nourished, well hydrated, no distress  HENT:  Mouth- no ulcers, no lesions  NECK: no tenderness, no adenopathy, no asymmetry, no masses, no stiffness; thyroid- normal to palpation  RESP: lungs clear to auscultation - no rales, no rhonchi, no " wheezes  CV: regular rates and rhythm, normal S1 S2, no S3 or S4 and no murmur, no click or rub -  ABDOMEN: soft, no tenderness, no  hepatosplenomegaly, no masses, normal bowel sounds  MS: extremities- no gross deformities noted, no edema  Alert and oriented. No acute distress. Appears well-groomed and casually dressed. Affect is normal, not particularly depressed. In good humor and laughs appropriately. Not particularly anxious. No evidence of psychosis.     Diagnostic test results:  See above.      ASSESSMENT/PLAN:                                                        ICD-10-CM    1. Breast pain, left - with normal exam, Mammo, and US  N64.4 GENERAL SURG ADULT REFERRAL   2. Generalized hyperhidrosis R61 PARoxetine (PAXIL) 10 MG tablet   3. Foster care child Z62.21 PARoxetine (PAXIL) 10 MG tablet   4. Mild anxiety F41.9 PARoxetine (PAXIL) 10 MG tablet        Wear double sports bras for running. Try to leave that area alone on the left breast.  If still having pain in 1 month, then see one of our breast surgeons to ensure you don't need any further work-up or treatment.     Please, call or return to clinic or go to the ER immediately if signs or symptoms worsen or fail to improve as anticipated.     See Patient Instructions    Recheck telephone visit in 1 month re: mood. Recheck for routine px as well in the next 1-2 months.          Linda Soto MD    [facility]

## 2018-06-01 NOTE — PATIENT INSTRUCTIONS
Wear double sports bras for running. Try to leave that area alone on the left breast.  If still having pain in 1 month, then see one of our breast surgeons to ensure you don't need any further work-up or treatment.     Please, call or return to clinic or go to the ER immediately if signs or symptoms worsen or fail to improve as anticipated.     See Patient Instructions               Thank you for choosing [facility]  for your Health Care. It was a pleasure seeing you at your visit today. Please contact us with any questions or concerns you may have.                   Linda Soto MD                                  To reach your [facility] care team after hours call:   [phone]    Our clinic hours are:     Monday- 7:30 am - 7:00 pm                             Tuesday through Friday- 7:30 am - 5:00 pm                                        Saturday- 8:00 am - 12:00 pm                  Phone:  [phone]    Our pharmacy hours are:     Monday  8:00 am to 7:00 pm      Tuesday through Friday 8:00am to 6:00pm                        Saturday - 9:00 am to 1:00 pm      Sunday : Closed.              Phone:  [phone]      There is also information available at our web site:  [email or web address]    If your provider ordered any lab tests and you do not receive the results within 10 business days, please call the clinic.    If you need a medication refill please contact your pharmacy.  Please allow 2 business days for your refill to be completed.    Our clinic offers telephone visits and e visits.  Please ask one of your team members to explain more.      Use [employer]art (secure email communication and access to your chart) to send your primary care provider a message or make an appointment. Ask someone on your Team how to sign up for [employer].

## 2018-07-15 ENCOUNTER — HEALTH MAINTENANCE LETTER (OUTPATIENT)
Age: 43
End: 2018-07-15

## 2018-07-27 ENCOUNTER — OFFICE VISIT (OUTPATIENT)
Dept: FAMILY MEDICINE | Facility: CLINIC | Age: 43
End: 2018-07-27
Payer: COMMERCIAL

## 2018-07-27 VITALS
TEMPERATURE: 98 F | BODY MASS INDEX: 25.16 KG/M2 | DIASTOLIC BLOOD PRESSURE: 60 MMHG | WEIGHT: 166 LBS | SYSTOLIC BLOOD PRESSURE: 118 MMHG | OXYGEN SATURATION: 99 % | HEART RATE: 108 BPM | HEIGHT: 68 IN

## 2018-07-27 DIAGNOSIS — R23.2 HOT FLASHES: ICD-10-CM

## 2018-07-27 DIAGNOSIS — F41.9 MILD ANXIETY: ICD-10-CM

## 2018-07-27 DIAGNOSIS — L57.8 SUN-DAMAGED SKIN: ICD-10-CM

## 2018-07-27 DIAGNOSIS — Z13.6 CARDIOVASCULAR SCREENING; LDL GOAL LESS THAN 160: ICD-10-CM

## 2018-07-27 DIAGNOSIS — D50.8 OTHER IRON DEFICIENCY ANEMIA: ICD-10-CM

## 2018-07-27 DIAGNOSIS — Z00.01 ENCOUNTER FOR ROUTINE ADULT HEALTH EXAMINATION WITH ABNORMAL FINDINGS: Primary | ICD-10-CM

## 2018-07-27 DIAGNOSIS — Z12.4 SCREENING FOR MALIGNANT NEOPLASM OF CERVIX: ICD-10-CM

## 2018-07-27 DIAGNOSIS — E73.9 LACTOSE INTOLERANCE: ICD-10-CM

## 2018-07-27 DIAGNOSIS — D22.9 MULTIPLE PIGMENTED NEVI: ICD-10-CM

## 2018-07-27 DIAGNOSIS — R35.0 URINARY FREQUENCY: ICD-10-CM

## 2018-07-27 DIAGNOSIS — F41.9 ANXIETY: ICD-10-CM

## 2018-07-27 DIAGNOSIS — N20.0 KIDNEY STONE: ICD-10-CM

## 2018-07-27 LAB
ALBUMIN UR-MCNC: NEGATIVE MG/DL
APPEARANCE UR: CLEAR
BACTERIA #/AREA URNS HPF: ABNORMAL /HPF
BILIRUB UR QL STRIP: NEGATIVE
COLOR UR AUTO: YELLOW
GLUCOSE UR STRIP-MCNC: NEGATIVE MG/DL
HGB UR QL STRIP: ABNORMAL
KETONES UR STRIP-MCNC: NEGATIVE MG/DL
LEUKOCYTE ESTERASE UR QL STRIP: NEGATIVE
NITRATE UR QL: NEGATIVE
NON-SQ EPI CELLS #/AREA URNS LPF: ABNORMAL /LPF
PH UR STRIP: 5.5 PH (ref 5–7)
RBC #/AREA URNS AUTO: ABNORMAL /HPF
SOURCE: ABNORMAL
SP GR UR STRIP: 1.02 (ref 1–1.03)
UROBILINOGEN UR STRIP-ACNC: 0.2 EU/DL (ref 0.2–1)
WBC #/AREA URNS AUTO: ABNORMAL /HPF

## 2018-07-27 PROCEDURE — G0145 SCR C/V CYTO,THINLAYER,RESCR: HCPCS | Performed by: FAMILY MEDICINE

## 2018-07-27 PROCEDURE — 87086 URINE CULTURE/COLONY COUNT: CPT | Performed by: FAMILY MEDICINE

## 2018-07-27 PROCEDURE — 99214 OFFICE O/P EST MOD 30 MIN: CPT | Mod: 25 | Performed by: FAMILY MEDICINE

## 2018-07-27 PROCEDURE — 99396 PREV VISIT EST AGE 40-64: CPT | Performed by: FAMILY MEDICINE

## 2018-07-27 PROCEDURE — 81001 URINALYSIS AUTO W/SCOPE: CPT | Performed by: FAMILY MEDICINE

## 2018-07-27 PROCEDURE — 87624 HPV HI-RISK TYP POOLED RSLT: CPT | Performed by: FAMILY MEDICINE

## 2018-07-27 RX ORDER — ESCITALOPRAM OXALATE 5 MG/1
5 TABLET ORAL DAILY
Qty: 30 TABLET | Refills: 1 | Status: SHIPPED | OUTPATIENT
Start: 2018-07-27 | End: 2018-12-12 | Stop reason: ALTCHOICE

## 2018-07-27 ASSESSMENT — ANXIETY QUESTIONNAIRES
5. BEING SO RESTLESS THAT IT IS HARD TO SIT STILL: NOT AT ALL
6. BECOMING EASILY ANNOYED OR IRRITABLE: SEVERAL DAYS
IF YOU CHECKED OFF ANY PROBLEMS ON THIS QUESTIONNAIRE, HOW DIFFICULT HAVE THESE PROBLEMS MADE IT FOR YOU TO DO YOUR WORK, TAKE CARE OF THINGS AT HOME, OR GET ALONG WITH OTHER PEOPLE: NOT DIFFICULT AT ALL
GAD7 TOTAL SCORE: 2
1. FEELING NERVOUS, ANXIOUS, OR ON EDGE: SEVERAL DAYS
2. NOT BEING ABLE TO STOP OR CONTROL WORRYING: NOT AT ALL
3. WORRYING TOO MUCH ABOUT DIFFERENT THINGS: NOT AT ALL
7. FEELING AFRAID AS IF SOMETHING AWFUL MIGHT HAPPEN: NOT AT ALL

## 2018-07-27 ASSESSMENT — PATIENT HEALTH QUESTIONNAIRE - PHQ9: 5. POOR APPETITE OR OVEREATING: NOT AT ALL

## 2018-07-27 NOTE — LETTER
August 7, 2018    Deandra Lewis  [address] [place] [address]    Dear Deandra,  We are happy to inform you that your PAP smear result from 07/27/18 is normal.  We are now able to do a follow up test on PAP smears. The DNA test is for HPV (Human Papilloma Virus). Cervical cancer is closely linked with certain types of HPV. Your results showed no evidence of high risk HPV.  Therefore we recommend you return in 3 years for your next pap smear.  You will still need to return to the clinic every year for an annual exam and other preventive tests.  Please contact the clinic at [phone] with any questions.  Sincerely,    Linda Soto MD/[facility]

## 2018-07-27 NOTE — MR AVS SNAPSHOT
After Visit Summary   7/27/2018    Deandra Lewis    MRN: [id number]           Patient Information     Date Of Birth          1975        Visit Information        Provider Department      7/27/2018 9:00 AM Linda Soto MD [facility] [place]        Today's Diagnoses     Encounter for routine adult health examination with abnormal findings    -  1    Urinary frequency        Anxiety        Mild anxiety        Hot flashes        Lactose intolerance        Other iron deficiency anemia - Anemia - ? related to previous surgeries - 4 in 3 months for sinus issues/infection        Kidney stone        Sun-damaged skin        Multiple pigmented nevi        Screening for malignant neoplasm of cervix        CARDIOVASCULAR SCREENING; LDL GOAL LESS THAN 160          Care Instructions    Let's stop the paxil - be off that for 3 days, then start lexapro - escitalopram- 5mg daily and then let's do a telephone visit in 1 month to see how that's working.        Preventive Health Recommendations  Female Ages 40 to 49    Yearly exam:     See your health care provider every year in order to  1. Review health changes.   2. Discuss preventive care.    3. Review your medicines if your doctor prescribed any.      Get a Pap test every three years (unless you have an abnormal result and your provider advises testing more often).      If you get Pap tests with HPV test, you only need to test every 5 years, unless you have an abnormal result. You do not need a Pap test if your uterus was removed (hysterectomy) and you have not had cancer.      You should be tested each year for STDs (sexually transmitted diseases), if you're at risk.     Ask your doctor if you should have a mammogram.      Have a colonoscopy (test for colon cancer) if someone in your family has had colon cancer or polyps before age 50.       Have a cholesterol test every 5 years.       Have a diabetes test (fasting glucose) after age 45. If  you are at risk for diabetes, you should have this test every 3 years.    Shots: Get a flu shot each year. Get a tetanus shot every 10 years.     Nutrition:     Eat at least 5 servings of fruits and vegetables each day.    Eat whole-grain bread, whole-wheat pasta and brown rice instead of white grains and rice.    Get adequate Calcium and Vitamin D.      Lifestyle    Exercise at least 150 minutes a week (an average of 30 minutes a day, 5 days a week). This will help you control your weight and prevent disease.    Limit alcohol to one drink per day.    No smoking.     Wear sunscreen to prevent skin cancer.    See your dentist every six months for an exam and cleaning.               Thank you for choosing [facility]  for your Health Care. It was a pleasure seeing you at your visit today. Please contact us with any questions or concerns you may have.                   Linda Soto MD                                  To reach your [facility] care team after hours call:   [phone]    Our clinic hours are:     Monday- 7:30 am - 7:00 pm                             Tuesday through Friday- 7:30 am - 5:00 pm                                        Saturday- 8:00 am - 12:00 pm                  Phone:  [phone]    Our pharmacy hours are:     Monday  8:00 am to 7:00 pm      Tuesday through Friday 8:00am to 6:00pm                        Saturday - 9:00 am to 1:00 pm      Sunday : Closed.              Phone:  [phone]      There is also information available at our web site:  [email or web address]    If your provider ordered any lab tests and you do not receive the results within 10 business days, please call the clinic.    If you need a medication refill please contact your pharmacy.  Please allow 2 business days for your refill to be completed.    Our clinic offers telephone visits and e visits.  Please ask one of your team members to explain more.      Use MyChart (secure  email communication and access to your chart) to send your primary care provider a message or make an appointment. Ask someone on your Team how to sign up for MyChart.                       Follow-ups after your visit        Additional Services     DERMATOLOGY REFERRAL       Your provider has referred you to: FMJEANINE: [facility] [place] [phone]   [email or web address] .for full body skin exam     Please be aware that coverage of these services is subject to the terms and limitations of your health insurance plan.  Call member services at your health plan with any benefit or coverage questions.      Please bring the following with you to your appointment:    (1) Any X-Rays, CTs or MRIs which have been performed.  Contact the facility where they were done to arrange for  prior to your scheduled appointment.  Any new CT, MRI or other procedures ordered by your specialist must be performed at a [place] facility or coordinated by your clinic's referral office.  (2) List of current medications  (3) This referral request   (4) Any documents/labs given to you for this referral                  Follow-up notes from your care team     Return in about 1 month (around 8/27/2018) for depression/anxiety follow up- telephone visit .      Future tests that were ordered for you today     Open Future Orders        Priority Expected Expires Ordered    Comprehensive metabolic panel Routine 8/4/2018 10/27/2018 7/27/2018    Lipid panel reflex to direct LDL Fasting Routine 8/4/2018 10/27/2018 7/27/2018    CBC with platelets differential Routine 8/4/2018 10/27/2018 7/27/2018    TSH with free T4 reflex Routine 8/4/2018 10/27/2018 7/27/2018            Who to contact     If you have questions or need follow up information about today's clinic visit or your schedule please contact [facility] [place] directly at [phone].  Normal or non-critical lab and imaging  "results will be communicated to you by MyChart, letter or phone within 4 business days after the clinic has received the results. If you do not hear from us within 7 days, please contact the clinic through MyChart or phone. If you have a critical or abnormal lab result, we will notify you by phone as soon as possible.  Submit refill requests through [facility] or call your pharmacy and they will forward the refill request to us. Please allow 3 business days for your refill to be completed.          Additional Information About Your Visit        Care EveryWhere ID     This is your Care EveryWhere ID. This could be used by other organizations to access your [place] medical records  [id number]        Your Vitals Were     Pulse Temperature Height Last Period Pulse Oximetry Breastfeeding?    108 98  F (36.7  C) (Tympanic) 5' 8\" (1.727 m) 07/06/2018 99% No    BMI (Body Mass Index)                   25.24 kg/m2            Blood Pressure from Last 3 Encounters:   07/27/18 118/60   06/01/18 116/82   09/21/17 108/78    Weight from Last 3 Encounters:   07/27/18 166 lb (75.3 kg)   06/01/18 166 lb 6.4 oz (75.5 kg)   09/21/17 178 lb 6.4 oz (80.9 kg)              We Performed the Following     DERMATOLOGY REFERRAL     HPV High Risk Types DNA Cervical     OFFICE/OUTPT VISIT,EST,LEVL IV     Pap imaged thin layer screen with HPV - recommended age 30 - 65 years (select HPV order below)     UA reflex to Microscopic     Urine Culture Aerobic Bacterial     Urine Microscopic          Today's Medication Changes          These changes are accurate as of 7/27/18 10:04 AM.  If you have any questions, ask your nurse or doctor.               Start taking these medicines.        Dose/Directions    escitalopram 5 MG tablet   Commonly known as:  LEXAPRO   Used for:  Anxiety, Mild anxiety   Started by:  Linda Soto MD        Dose:  5 mg   Take 1 tablet (5 mg) by mouth daily   Quantity:  30 tablet   Refills:  1         These medicines " have changed or have updated prescriptions.        Dose/Directions    sodium chloride 0.65 % nasal spray   Commonly known as:  OCEAN   This may have changed:    - when to take this  - reasons to take this   Used for:  Chronic sinusitis        Dose:  2 spray   Spray 2 sprays in nostril 4 times daily.   Quantity:  1 Bottle   Refills:  0         Stop taking these medicines if you haven't already. Please contact your care team if you have questions.     PARoxetine 10 MG tablet   Commonly known as:  PAXIL   Stopped by:  Linda Soto MD                Where to get your medicines      These medications were sent to [id number] [facility] - [place], [place] - [address] [facility]  [address] [facility] [address]     Phone:  [phone]     escitalopram 5 MG tablet                Primary Care Provider Office Phone # Fax #    Linda Soto [phone]       [address] [place] [address]        Equal Access to Services     [facility]: Hadii jannie ku hadasho Soomaali, waaxda luqadaha, qaybta kaalmada adeegyada, waxay courtneyin hayjessican heather sanders . So [facility] [phone].    ATENCIÓN: Si renetta espalba, tiene a moeller disposición servicios gratuitos de asistencia lingüística. Llame al [phone].    We comply with applicable federal civil rights laws and [place] laws. We do not discriminate on the basis of race, color, national origin, age, disability, sex, sexual orientation, or gender identity.            Thank you!     Thank you for choosing [facility]  for your care. Our goal is always to provide you with excellent care. Hearing back from our patients is one way we can continue to improve our services. Please take a few minutes to complete the written survey that you may receive in the mail after your visit with us. Thank you!             Your Updated Medication List - Protect others around you: Learn how to safely use, store and throw away your medicines  at www.disposemymeds.org.          This list is accurate as of 7/27/18 10:04 AM.  Always use your most recent med list.                   Brand Name Dispense Instructions for use Diagnosis    ADVIL 200 MG tablet   Generic drug:  ibuprofen      Take 200 mg by mouth as needed.        albuterol 108 (90 Base) MCG/ACT Inhaler    PROAIR HFA/PROVENTIL HFA/VENTOLIN HFA    1 Inhaler    Inhale 2 puffs into the lungs every 6 hours as needed for shortness of breath / dyspnea or wheezing    Acute bronchitis, unspecified organism, Cough       cetirizine 10 MG tablet    zyrTEC    30 tablet    Take 1 tablet (10 mg) by mouth every evening        escitalopram 5 MG tablet    LEXAPRO    30 tablet    Take 1 tablet (5 mg) by mouth daily    Anxiety, Mild anxiety       PROBIOTIC ACIDOPHILUS PO           sodium chloride 0.65 % nasal spray    OCEAN    1 Bottle    Spray 2 sprays in nostril 4 times daily.    Chronic sinusitis

## 2018-07-27 NOTE — PATIENT INSTRUCTIONS
Let's stop the paxil - be off that for 3 days, then start lexapro - escitalopram- 5mg daily and then let's do a telephone visit in 1 month to see how that's working.        Preventive Health Recommendations  Female Ages 40 to 49    Yearly exam:     See your health care provider every year in order to  1. Review health changes.   2. Discuss preventive care.    3. Review your medicines if your doctor prescribed any.      Get a Pap test every three years (unless you have an abnormal result and your provider advises testing more often).      If you get Pap tests with HPV test, you only need to test every 5 years, unless you have an abnormal result. You do not need a Pap test if your uterus was removed (hysterectomy) and you have not had cancer.      You should be tested each year for STDs (sexually transmitted diseases), if you're at risk.     Ask your doctor if you should have a mammogram.      Have a colonoscopy (test for colon cancer) if someone in your family has had colon cancer or polyps before age 50.       Have a cholesterol test every 5 years.       Have a diabetes test (fasting glucose) after age 45. If you are at risk for diabetes, you should have this test every 3 years.    Shots: Get a flu shot each year. Get a tetanus shot every 10 years.     Nutrition:     Eat at least 5 servings of fruits and vegetables each day.    Eat whole-grain bread, whole-wheat pasta and brown rice instead of white grains and rice.    Get adequate Calcium and Vitamin D.      Lifestyle    Exercise at least 150 minutes a week (an average of 30 minutes a day, 5 days a week). This will help you control your weight and prevent disease.    Limit alcohol to one drink per day.    No smoking.     Wear sunscreen to prevent skin cancer.    See your dentist every six months for an exam and cleaning.               Thank you for choosing [facility]  for your Health Care. It was a pleasure seeing you at your visit today. Please  contact us with any questions or concerns you may have.                   Linda Soto MD                                  To reach your [facility] team after hours call:   [phone]    Our clinic hours are:     Monday- 7:30 am - 7:00 pm                             Tuesday through Friday- 7:30 am - 5:00 pm                                        Saturday- 8:00 am - 12:00 pm                  Phone:  [phone]    Our pharmacy hours are:     Monday  8:00 am to 7:00 pm      Tuesday through Friday 8:00am to 6:00pm                        Saturday - 9:00 am to 1:00 pm      Sunday : Closed.              Phone:  [phone]      There is also information available at our web site:  [email or web address]    If your provider ordered any lab tests and you do not receive the results within 10 business days, please call the clinic.    If you need a medication refill please contact your pharmacy.  Please allow 2 business days for your refill to be completed.    Our clinic offers telephone visits and e visits.  Please ask one of your team members to explain more.      Use CompareNetwork[employer] (secure email communication and access to your chart) to send your primary care provider a message or make an appointment. Ask someone on your Team how to sign up for [facility].

## 2018-07-27 NOTE — PROGRESS NOTES
SUBJECTIVE:   CC: Deandra Lewis is an 42 year old woman who presents for preventive health visit.     Healthy Habits:    Do you get at least three servings of calcium containing foods daily (dairy, green leafy vegetables, etc.)? yes    Amount of exercise or daily activities, outside of work: 3 day(s) per week    Problems taking medications regularly No    Medication side effects: No    Have you had an eye exam in the past two years? yes    Do you see a dentist twice per year? no    Do you have sleep apnea, excessive snoring or daytime drowsiness?yes Snores during allergy season      Anxiety Follow-Up:     Status since last visit: Improved     Other associated symptoms:Headaches from paxil - more frequent pressure in her head.     Complicating factors:   Significant life event:  Was a foster child  Current substance abuse: None  Depression symptoms: No  Having some mild bladder retention. - feels like she never quite empties her bladder since starting the paxil.     LISA-7 SCORE 7/27/2018   Total Score 2       LISA-7    Today's PHQ-2 Score:   PHQ-2 ( 1999 [employer]) 11/16/2016 5/18/2016   Q1: Little interest or pleasure in doing things 0 0   Q2: Feeling down, depressed or hopeless 0 0   PHQ-2 Score 0 0       Abuse: Current or Past(Physical, Sexual or Emotional)- No  Do you feel safe in your environment - Yes    Social History   Substance Use Topics     Smoking status: Former Smoker     Packs/day: 0.10     Years: 5.00     Types: Cigarettes     Smokeless tobacco: Never Used     Alcohol use No      Comment: rarely      If you drink alcohol do you typically have >3 drinks per day or >7 drinks per week? No                     Reviewed orders with patient.  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 related to previous surgeries - 4 in 3 months for sinus issues/infection     Family history of breast cancer- mother's identical twin, maternal aunt, paternal GM and dad with breast lumps      Hot flashes     Generalized hyperhidrosis- since earliest recollections - soaks clothing multiple times/week - very disruptive to life     Contraception     Mild anxiety     Foster care child     Anxiety     Past Surgical History:   Procedure Laterality Date     APPENDECTOMY       ARTHROSCOPY, ARTHROPLASTY TEMPOROMANDIBULAR JOINT, COMBINED  age 14     TMJ surgery as an 7th grader     CYSTOSCOPY,URETEROSCOPY,STONE REMV  9/11/2002     with stent placements - since removed      ENDOSCOPIC BALLOON SINUPLASTY, OPTICAL TRACKING SYSTEM ENDOSCOPIC SINUS SURGERY, COMBINED  1/8/2013    Procedure: COMBINED ENDOSCOPIC BALLOON SINUPLASTY, OPTICAL TRACKING SYSTEM ENDOSCOPIC SINUS SURGERY;  Left Endo Sinus  Surgery  with Septoplasty       ENDOSCOPIC SINUS SURGERY  1/15/2013    Procedure: ENDOSCOPIC SINUS SURGERY;  removal of nasal packing with endocopic debridement of left paranasal sinuses;  Surgeon: Fabian Driver MD;  Location:  OR     ENDOSCOPIC SINUS SURGERY  4/22/2013    Procedure: ENDOSCOPIC SINUS SURGERY;  Functional Endoscopic Sinus Surgery With Biopsy ;  Surgeon: Jcarlos Hogan MD;  Location:  OR       Social History   Substance Use Topics     Smoking status: Former Smoker     Packs/day: 0.10     Years: 5.00     Types: Cigarettes     Smokeless tobacco: Never Used     Alcohol use No      Comment: rarely      Family History   Problem Relation Age of Onset     Asthma Mother      Depression Mother      Thyroid Disease  Mother      Blood Disease Mother      Bleeding disorder     HEART DISEASE Father      s/p pacemakers x 2      Diabetes Father      Cancer Father      Skin, Colon-dx'd mid-late 40's     Asthma Child      Alcohol/Drug Other      Sibling     Breast Cancer Paternal Grandmother      Breast Cancer Maternal Aunt      another of mom's sisters      Breast Cancer Maternal Aunt      mother's identical twin      Allergy (Severe) Son      Dairy, Eggs, and Oatmeal         Current Outpatient Prescriptions   Medication Sig Dispense Refill     cetirizine (ZYRTEC) 10 MG tablet Take 1 tablet (10 mg) by mouth every evening 30 tablet 1     Lactobacillus (PROBIOTIC ACIDOPHILUS PO)        PARoxetine (PAXIL) 10 MG tablet Take 1 tablet (10 mg) by mouth At Bedtime 30 tablet 1     albuterol (PROAIR HFA/PROVENTIL HFA/VENTOLIN HFA) 108 (90 BASE) MCG/ACT Inhaler Inhale 2 puffs into the lungs every 6 hours as needed for shortness of breath / dyspnea or wheezing 1 Inhaler 1     ibuprofen (ADVIL) 200 MG tablet Take 200 mg by mouth as needed.       sodium chloride (SODIUM CHLORIDE) 0.65 % nasal spray Spray 2 sprays in nostril 4 times daily. (Patient taking differently: Spray 2 sprays in nostril daily as needed ) 1 Bottle 0     Allergies   Allergen Reactions     Adhesive Tape Blisters     Seasonal Allergies      Recent Labs   Lab Test  11/16/16   1038  05/18/16   1141 01/16/16  05/23/15   0829   01/08/13   1040   LDL   --    --    --   64   --    --    HDL   --    --    --   57   --    --    TRIG   --    --    --   55   --    --    CR   --    --   0.73   --    --   0.58   GFRESTIMATED   --    --   >60   --    --   >90   GFRESTBLACK   --    --   >60   --    --   >90   POTASSIUM   --    --   4.3   --    --    --    TSH  1.45  1.26   --    --    < >   --     < > = values in this interval not displayed.        Patient under age 50, mutual decision reflected in health maintenance.      Pertinent mammograms are reviewed under the imaging tab.  History of  abnormal Pap smear: NO - age 30- 65 PAP every 3 years recommended  PAP / HPV 5/14/2015   PAP NIL     Reviewed and updated as needed this visit by clinical staff         Reviewed and updated as needed this visit by Provider        Health Maintenance   Topic Date Due     HIV SCREEN (SYSTEM ASSIGNED)  12/27/1994     PHQ-2 Q1 YR  11/16/2017     PAP SCREENING Q3 YR (SYSTEM ASSIGNED)  05/14/2018     INFLUENZA VACCINE (1) 09/01/2018     TETANUS IMMUNIZATION (SYSTEM ASSIGNED)  09/21/2027       Patient Active Problem List   Diagnosis     Chronic sinusitis     Bleeding, nose     Sinusitis     Lactose intolerance     Kidney stones     Maxillary bone loss     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 related to previous surgeries - 4 in 3 months for sinus issues/infection     Family history of breast cancer- mother's identical twin, maternal aunt, paternal GM and dad with breast lumps      Hot flashes     Generalized hyperhidrosis- since earliest recollections - soaks clothing multiple times/week - very disruptive to life     Contraception     Mild anxiety     Foster care child     Anxiety       Past Medical History:   Diagnosis Date     Anemia      Contraception     ocp's some = mood swings ; seasonale: frequent vaginal bleeding and weight gain     Hot flashes 11/16/2016     Kidney stones     [facility]. Blank [facility]      Lactose intolerance      Maxillary bone loss 1/14    left - secondary to severe oral/sinus infection and bleeding  - [facility] [place] s/p multiple surgeries      Night sweats      Sleep problems        Past Surgical History:   Procedure Laterality Date     APPENDECTOMY       ARTHROSCOPY, ARTHROPLASTY TEMPOROMANDIBULAR JOINT, COMBINED  age 14     TMJ surgery as an 7th grader     CYSTOSCOPY,URETEROSCOPY,STONE REMV  9/11/2002     with stent placements - since removed      ENDOSCOPIC BALLOON SINUPLASTY, OPTICAL TRACKING SYSTEM ENDOSCOPIC SINUS  SURGERY, COMBINED  1/8/2013    Procedure: COMBINED ENDOSCOPIC BALLOON SINUPLASTY, OPTICAL TRACKING SYSTEM ENDOSCOPIC SINUS SURGERY;  Left Endo Sinus  Surgery  with Septoplasty       ENDOSCOPIC SINUS SURGERY  1/15/2013    Procedure: ENDOSCOPIC SINUS SURGERY;  removal of nasal packing with endocopic debridement of left paranasal sinuses;  Surgeon: Fabian Driver MD;  Location:  OR     ENDOSCOPIC SINUS SURGERY  4/22/2013    Procedure: ENDOSCOPIC SINUS SURGERY;  Functional Endoscopic Sinus Surgery With Biopsy ;  Surgeon: Jcarlos Hogan MD;  Location:  OR       Social History     Social History     Marital status:      Spouse name: Noam     Number of children: 3     Years of education: 16     Occupational History     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     Bachelor's in psychology & family studies           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     Social History Main Topics     Smoking status: Former Smoker     Packs/day: 0.10     Years: 5.00     Types: Cigarettes     Smokeless tobacco: Never Used     Alcohol use No      Comment: rarely      Drug use: No      Comment: no herbal meds except for acidophilus      Sexual activity: Yes     Partners: Male      Comment: condoms - natural family planning      Other Topics Concern      Service No     Blood Transfusions No     Caffeine Concern Yes     2 sodas/day      Exercise Yes     started recently again      Seat Belt Yes     always      Self-Exams Yes     SBE encouraged monthly      Social History Narrative       Family History   Problem Relation Age of Onset     Asthma Mother      Depression Mother      Thyroid Disease Mother      Blood Disease Mother      Bleeding disorder     HEART DISEASE Father      s/p pacemakers x 2      Diabetes Father      Cancer Father      Skin, Colon-dx'd mid-late 40's     Asthma Child      Alcohol/Drug Other      Sibling     Breast Cancer Paternal Grandmother      Breast Cancer Maternal Aunt       another of mom's sisters      Breast Cancer Maternal Aunt      mother's identical twin      Allergy (Severe) Son      Dairy, Eggs, and Oatmeal     Current Outpatient Prescriptions   Medication Sig Dispense Refill     cetirizine (ZYRTEC) 10 MG tablet Take 1 tablet (10 mg) by mouth every evening 30 tablet 1     Lactobacillus (PROBIOTIC ACIDOPHILUS PO)        PARoxetine (PAXIL) 10 MG tablet Take 1 tablet (10 mg) by mouth At Bedtime 30 tablet 1     albuterol (PROAIR HFA/PROVENTIL HFA/VENTOLIN HFA) 108 (90 BASE) MCG/ACT Inhaler Inhale 2 puffs into the lungs every 6 hours as needed for shortness of breath / dyspnea or wheezing 1 Inhaler 1     ibuprofen (ADVIL) 200 MG tablet Take 200 mg by mouth as needed.       sodium chloride (SODIUM CHLORIDE) 0.65 % nasal spray Spray 2 sprays in nostril 4 times daily. (Patient taking differently: Spray 2 sprays in nostril daily as needed ) 1 Bottle 0        Allergies   Allergen Reactions     Adhesive Tape Blisters     Seasonal Allergies         ROS:her breast pain went away and got newer , better fitting bras and that left breast went away.   Recent diagnostic mammo US on 5/31/2018:   Examination: Bilateral digital diagnostic mammography and digital  breast tomosynthesis with computer aided detection, and focused  ultrasound of the left breast, 5/31/2018.     Comparison: 8/16/2017 and 5/28/2015.     History: Intermittent pain in the lateral left breast and left axilla.     BREAST DENSITY: Heterogeneously dense     Findings: No concerning mammographic findings in either breast.     Focussed ultrasound by radiologist and technologist of the upper outer  quadrant of the left breast and left axilla was performed. No  concerning findings identified.          IMPRESSION: BI-RADS CATEGORY: 1 -  NEGATIVE.     RECOMMENDED FOLLOW-UP: Annual Mammography.     Clinical follow-up. Given the lack of imaging findings, further  management of breast pain should be based on clinical  "grounds.     CONSTITUTIONAL: NEGATIVE for fever, chills, change in weight  INTEGUMENTARU/SKIN: NEGATIVE for worrisome rashes, moles or lesions  EYES: NEGATIVE for vision changes or irritation  ENT: NEGATIVE for ear, mouth and throat problems  RESP: NEGATIVE for significant cough or SOB  BREAST: NEGATIVE for masses, tenderness or discharge  CV: NEGATIVE for chest pain, palpitations or peripheral edema  GI: NEGATIVE for nausea, abdominal pain, heartburn, or change in bowel habits  : NEGATIVE for unusual urinary or vaginal symptoms. Periods are regular.  MUSCULOSKELETAL: NEGATIVE for significant arthralgias or myalgia  NEURO: NEGATIVE for weakness, dizziness or paresthesias  PSYCHIATRIC: NEGATIVE for changes in mood or affect    TSH   Date Value Ref Range Status   11/16/2016 1.45 0.40 - 4.00 mU/L Final        OBJECTIVE:   /60  Pulse 108  Temp 98  F (36.7  C) (Tympanic)  Ht 5' 8\" (1.727 m)  Wt 166 lb (75.3 kg)  LMP 07/06/2018  SpO2 99%  Breastfeeding? No  BMI 25.24 kg/m2  EXAM:  GENERAL: healthy, alert and no distress  EYES: Eyes grossly normal to inspection, PERRL and conjunctivae and sclerae normal  HENT: ear canals and TM's normal, nose and mouth without ulcers or lesions  NECK: no adenopathy, no asymmetry, masses, or scars and thyroid normal to palpation  RESP: lungs clear to auscultation - no rales, rhonchi or wheezes  BREAST: normal without masses, tenderness or nipple discharge and no palpable axillary masses or adenopathy  CV: regular rate and rhythm, normal S1 S2, no S3 or S4, no murmur, click or rub, no peripheral edema and peripheral pulses strong  ABDOMEN: soft, nontender, no hepatosplenomegaly, no masses and bowel sounds normal   (female): normal female external genitalia, normal urethral meatus, vaginal mucosa pink, moist, well rugated, and normal cervix/adnexa/uterus without masses or discharge  MS: no gross musculoskeletal defects noted, no edema  SKIN: multiple pigmented  lesions , but " no rashes, Araiza Type 2 skin. Recommend full body skin exam   NEURO: Normal strength and tone, mentation intact and speech normal  PSYCH: mentation appears normal, affect normal/bright  LYMPH: no cervical, supraclavicular, axillary, or inguinal adenopathy    Diagnostic Test Results:  See [facility] orders.     Recent Labs   Lab Test  05/23/15   0829   CHOL  132   HDL  57   LDL  64   TRIG  55   CHOLHDLRATIO  2.3        UA RESULTS:  Recent Labs   Lab Test  07/27/18   0922   COLOR  Yellow   APPEARANCE  Clear   URINEGLC  Negative   URINEBILI  Negative   URINEKETONE  Negative   SG  1.025   UBLD  Small*   URINEPH  5.5   PROTEIN  Negative   UROBILINOGEN  0.2   NITRITE  Negative   LEUKEST  Negative   RBCU  2-5*   WBCU  0 - 5       ASSESSMENT/PLAN:       ICD-10-CM    1. Encounter for routine adult health examination with abnormal findings Z00.01    2. Urinary frequency R35.0 UA reflex to Microscopic     Urine Microscopic     Urine Culture Aerobic Bacterial     OFFICE/OUTPT VISIT,EST,LEVL IV   3. Anxiety F41.9 escitalopram (LEXAPRO) 5 MG tablet     OFFICE/OUTPT VISIT,EST,LEVL IV   4. Mild anxiety F41.9 escitalopram (LEXAPRO) 5 MG tablet     OFFICE/OUTPT VISIT,EST,LEVL IV   5. Hot flashes R23.2 TSH with free T4 reflex     OFFICE/OUTPT VISIT,EST,LEVL IV   6. Lactose intolerance E73.9 OFFICE/OUTPT VISIT,EST,LEVL IV   7. Other iron deficiency anemia - Anemia - ? related to previous surgeries - 4 in 3 months for sinus issues/infection D50.8 CBC with platelets differential     OFFICE/OUTPT VISIT,EST,LEVL IV   8. Kidney stone N20.0 Comprehensive metabolic panel     OFFICE/OUTPT VISIT,EST,LEVL IV   9. Sun-damaged skin L57.8 DERMATOLOGY REFERRAL     OFFICE/OUTPT VISIT,EST,LEVL IV   10. Multiple pigmented nevi D22.9 DERMATOLOGY REFERRAL     OFFICE/OUTPT VISIT,EST,LEVL IV   11. Screening for malignant neoplasm of cervix Z12.4 Pap imaged thin layer screen with HPV - recommended age 30 - 65 years (select HPV order below)     HPV High  "Risk Types DNA Cervical   12. CARDIOVASCULAR SCREENING; LDL GOAL LESS THAN 160 Z13.6 Lipid panel reflex to direct LDL Fasting     Hot flashes/sweating stabilized.     COUNSELING: seeing a great counselor for Life Coaching - Debra Ramires -really positive . Found her through work [employer].    Reviewed preventive health counseling, as reflected in patient instructions    BP Readings from Last 1 Encounters:   06/01/18 116/82     Estimated body mass index is 25.3 kg/(m^2) as calculated from the following:    Height as of 6/1/18: 5' 8\" (1.727 m).    Weight as of 6/1/18: 166 lb 6.4 oz (75.5 kg).       reports that she has quit smoking. Her smoking use included Cigarettes. She has a 0.50 pack-year smoking history. She has never used smokeless tobacco.     Let's stop the paxil - be off that for 3 days, then start lexapro - escitalopram- 5mg daily and then let's do a telephone visit in 1 month to see how that's working.     Counseling Resources:  ATP IV Guidelines  Pooled Cohorts Equation Calculator  Breast Cancer Risk Calculator  FRAX Risk Assessment  ICSI Preventive Guidelines  Dietary Guidelines for Americans, 2010  USDA's MyPlate  ASA Prophylaxis  Lung CA Screening    Linda Soto MD  [facility] [place]  "

## 2018-07-28 DIAGNOSIS — R61 GENERALIZED HYPERHIDROSIS: ICD-10-CM

## 2018-07-28 DIAGNOSIS — Z62.21 FOSTER CARE CHILD: ICD-10-CM

## 2018-07-28 DIAGNOSIS — F41.9 MILD ANXIETY: ICD-10-CM

## 2018-07-28 LAB
BACTERIA SPEC CULT: NO GROWTH
SPECIMEN SOURCE: NORMAL

## 2018-07-28 ASSESSMENT — PATIENT HEALTH QUESTIONNAIRE - PHQ9: SUM OF ALL RESPONSES TO PHQ QUESTIONS 1-9: 2

## 2018-07-28 ASSESSMENT — ANXIETY QUESTIONNAIRES: GAD7 TOTAL SCORE: 2

## 2018-07-30 RX ORDER — PAROXETINE 10 MG/1
TABLET, FILM COATED ORAL
Qty: 30 TABLET | Refills: 0 | Status: SHIPPED | OUTPATIENT
Start: 2018-07-30 | End: 2018-08-02

## 2018-07-30 NOTE — TELEPHONE ENCOUNTER
"Requested Prescriptions   Pending Prescriptions Disp Refills     PARoxetine (PAXIL) 10 MG tablet [Pharmacy Med Name: PAROXETINE HCL 10 MG TABLET]  Last Written Prescription Date:  6/1/2018-7/27/2018  Last Fill Quantity: 30 tablet,  # refills: 1   Last Office Visit: 7/27/2018   Future Office Visit:    Next 5 appointments (look out 90 days)     Aug 24, 2018  3:40 PM CDT   Office Visit with Linda Soto MD   [facility] ([address] [id number] [phone]                30 tablet 1     Sig: TAKE 1 TABLET (10 MG) BY MOUTH AT BEDTIME    SSRIs Protocol Passed    7/28/2018  1:15 AM       Passed - Recent (12 mo) or future (30 days) visit within the authorizing provider's specialty    Patient had office visit in the last 12 months or has a visit in the next 30 days with authorizing provider or within the authorizing provider's specialty.  See \"Patient Info\" tab in inbasket, or \"Choose Columns\" in Meds & Orders section of the refill encounter.           Passed - Patient is age 18 or older       Passed - No active pregnancy on record       Passed - No positive pregnancy test in last 12 months          "

## 2018-07-30 NOTE — TELEPHONE ENCOUNTER
PHQ-9 SCORE 7/27/2018   Total Score 2     LISA-7 SCORE 7/27/2018   Total Score 2       Prescription approved per [facility] [place].  Stella Summers RN  [facility]

## 2018-07-31 ENCOUNTER — TELEPHONE (OUTPATIENT)
Dept: NURSING | Facility: CLINIC | Age: 43
End: 2018-07-31

## 2018-07-31 ENCOUNTER — NURSE TRIAGE (OUTPATIENT)
Dept: NURSING | Facility: CLINIC | Age: 43
End: 2018-07-31

## 2018-07-31 DIAGNOSIS — F41.9 ANXIETY: Primary | ICD-10-CM

## 2018-07-31 DIAGNOSIS — R61 GENERALIZED HYPERHIDROSIS: ICD-10-CM

## 2018-07-31 DIAGNOSIS — F41.9 MILD ANXIETY: ICD-10-CM

## 2018-07-31 DIAGNOSIS — Z62.21 FOSTER CARE CHILD: ICD-10-CM

## 2018-07-31 LAB
COPATH REPORT: NORMAL
PAP: NORMAL

## 2018-07-31 NOTE — TELEPHONE ENCOUNTER
Reason for Call: Alison calls and says that she was switched from Paxil, to Lexapro, but cannot stop sweating, from the Lexapro.  Pt. Says that she does not want to take the Lexapro anymore. Pt. Says that she can go back on the Paxil or can take a different medication, that Dr. Soto suggests. Pt. Says that she wants this message left, for Dr. Soto. Pt. Says that she can be called back, at: [phone]. RN then left this message in EPIC, as requested.  Routed to: Dr. Soto & elena Hauser RN   [facility]  [phone]

## 2018-07-31 NOTE — TELEPHONE ENCOUNTER
Additional Information    Negative: [1] Caller is not with the adult (patient) AND [2] reporting urgent symptoms    Negative: Lab result questions    Negative: Medication questions    Negative: Caller cannot be reached by phone    Negative: Caller has already spoken to PCP or another triager    Negative: RN needs further essential information from caller in order to complete triage    Negative: Requesting regular office appointment    Negative: [1] Caller requesting NON-URGENT health information AND [2] PCP's office is the best resource    Negative: Health Information question, no triage required and triager able to answer question    Negative: General information question, no triage required and triager able to answer question    Negative: Question about upcoming scheduled test, no triage required and triager able to answer question    Negative: [1] Caller is not with the adult (patient) AND [2] probable NON-URGENT symptoms    [1] Follow-up call to recent contact AND [2] information only call, no triage required    Protocols used: INFORMATION ONLY CALL-ADULT-AH    Alison calls and says that she was switched from Paxil, to Lexapro, but cannot stop sweating, from the Lexapro.  Pt. Says that she does not want to take the Lexapro anymore. Pt. Says that she can go back on the Paxil or can take a different medication, that Dr. Soto suggests. Pt. Says that she wants this message left, for Dr. Soto. Pt. Says that she can be called back, at: [phone]. RN then left this message in [facility], as requested.

## 2018-08-01 NOTE — TELEPHONE ENCOUNTER
Encounter was already routed to MD PHILIPPE. Will await response.     Katelynn Flynn RN  [place] [facility]

## 2018-08-02 LAB
FINAL DIAGNOSIS: NORMAL
HPV HR 12 DNA CVX QL NAA+PROBE: NEGATIVE
HPV16 DNA SPEC QL NAA+PROBE: NEGATIVE
HPV18 DNA SPEC QL NAA+PROBE: NEGATIVE
SPECIMEN DESCRIPTION: NORMAL
SPECIMEN SOURCE CVX/VAG CYTO: NORMAL

## 2018-08-02 RX ORDER — PAROXETINE 10 MG/1
10 TABLET, FILM COATED ORAL EVERY MORNING
Qty: 90 TABLET | Refills: 0 | Status: SHIPPED | OUTPATIENT
Start: 2018-08-02 | End: 2018-12-02

## 2018-08-02 RX ORDER — VENLAFAXINE HYDROCHLORIDE 75 MG/1
75 CAPSULE, EXTENDED RELEASE ORAL DAILY
Qty: 30 CAPSULE | Refills: 1 | Status: SHIPPED | OUTPATIENT
Start: 2018-08-02 | End: 2018-12-12 | Stop reason: ALTCHOICE

## 2018-08-02 NOTE — TELEPHONE ENCOUNTER
Patient notified by phone.  Patient said she prefers to restart the Paxil as she knows it works well for her symptoms.  She said the only side effect is that she has to urinate more frequently which she is willing to deal with.  She does not want to start a new medication when she knows the Paxil works well.      Paxil order pending with note to cancel Effexor order.      Routing to  to review and approve.    ELIAS Ellis, RN, N  [facility]) [phone]

## 2018-08-02 NOTE — TELEPHONE ENCOUNTER
Let's try effexor xr instead.  Works for some women for hot flashes as well as anxiety - may not see improvement in anxiety at the 150mg dose. May need to increase to 300mg after 1-2 weeks.   Please inform patient.

## 2018-08-03 NOTE — TELEPHONE ENCOUNTER
Patient already aware of change.  No need to call back.    Trista Zendejas, ELIAS, RN, N  [facility]) [phone]

## 2018-08-03 NOTE — TELEPHONE ENCOUNTER
Please call pt during normal business hours to advise on below  Stella Summers RN  [place] [facility]

## 2018-08-06 NOTE — PROGRESS NOTES
Please call pt with results below:     Your recent urine wasn't entirely a clean catch, which is sometimes difficult to obtain, but with your urine culture being negative, there is no sign of true infection.  No need for abx.     For additional lab test information, labtestsonline.org is an excellent reference.

## 2018-08-22 DIAGNOSIS — D50.8 OTHER IRON DEFICIENCY ANEMIA: ICD-10-CM

## 2018-08-22 DIAGNOSIS — N20.0 KIDNEY STONE: ICD-10-CM

## 2018-08-22 DIAGNOSIS — Z13.6 CARDIOVASCULAR SCREENING; LDL GOAL LESS THAN 160: ICD-10-CM

## 2018-08-22 DIAGNOSIS — R23.2 HOT FLASHES: ICD-10-CM

## 2018-08-22 LAB
ALBUMIN SERPL-MCNC: 3.8 G/DL (ref 3.4–5)
ALP SERPL-CCNC: 72 U/L (ref 40–150)
ALT SERPL W P-5'-P-CCNC: 13 U/L (ref 0–50)
ANION GAP SERPL CALCULATED.3IONS-SCNC: 6 MMOL/L (ref 3–14)
AST SERPL W P-5'-P-CCNC: 12 U/L (ref 0–45)
BASOPHILS # BLD AUTO: 0 10E9/L (ref 0–0.2)
BASOPHILS NFR BLD AUTO: 0.5 %
BILIRUB SERPL-MCNC: 0.7 MG/DL (ref 0.2–1.3)
BUN SERPL-MCNC: 12 MG/DL (ref 7–30)
CALCIUM SERPL-MCNC: 8.2 MG/DL (ref 8.5–10.1)
CHLORIDE SERPL-SCNC: 109 MMOL/L (ref 94–109)
CHOLEST SERPL-MCNC: 134 MG/DL
CO2 SERPL-SCNC: 28 MMOL/L (ref 20–32)
CREAT SERPL-MCNC: 0.67 MG/DL (ref 0.52–1.04)
DIFFERENTIAL METHOD BLD: ABNORMAL
EOSINOPHIL # BLD AUTO: 0.1 10E9/L (ref 0–0.7)
EOSINOPHIL NFR BLD AUTO: 1.3 %
ERYTHROCYTE [DISTWIDTH] IN BLOOD BY AUTOMATED COUNT: 12.8 % (ref 10–15)
GFR SERPL CREATININE-BSD FRML MDRD: >90 ML/MIN/1.7M2
GLUCOSE SERPL-MCNC: 82 MG/DL (ref 70–99)
HCT VFR BLD AUTO: 38.5 % (ref 35–47)
HDLC SERPL-MCNC: 54 MG/DL
HGB BLD-MCNC: 12.6 G/DL (ref 11.7–15.7)
LDLC SERPL CALC-MCNC: 69 MG/DL
LYMPHOCYTES # BLD AUTO: 1.5 10E9/L (ref 0.8–5.3)
LYMPHOCYTES NFR BLD AUTO: 38.1 %
MCH RBC QN AUTO: 28.6 PG (ref 26.5–33)
MCHC RBC AUTO-ENTMCNC: 32.7 G/DL (ref 31.5–36.5)
MCV RBC AUTO: 87 FL (ref 78–100)
MONOCYTES # BLD AUTO: 0.3 10E9/L (ref 0–1.3)
MONOCYTES NFR BLD AUTO: 7.5 %
NEUTROPHILS # BLD AUTO: 2 10E9/L (ref 1.6–8.3)
NEUTROPHILS NFR BLD AUTO: 52.6 %
NONHDLC SERPL-MCNC: 80 MG/DL
PLATELET # BLD AUTO: 212 10E9/L (ref 150–450)
POTASSIUM SERPL-SCNC: 4.1 MMOL/L (ref 3.4–5.3)
PROT SERPL-MCNC: 6.1 G/DL (ref 6.8–8.8)
RBC # BLD AUTO: 4.41 10E12/L (ref 3.8–5.2)
SODIUM SERPL-SCNC: 143 MMOL/L (ref 133–144)
TRIGL SERPL-MCNC: 55 MG/DL
TSH SERPL DL<=0.005 MIU/L-ACNC: 0.99 MU/L (ref 0.4–4)
WBC # BLD AUTO: 3.9 10E9/L (ref 4–11)

## 2018-08-22 PROCEDURE — 84443 ASSAY THYROID STIM HORMONE: CPT | Performed by: FAMILY MEDICINE

## 2018-08-22 PROCEDURE — 80061 LIPID PANEL: CPT | Performed by: FAMILY MEDICINE

## 2018-08-22 PROCEDURE — 85025 COMPLETE CBC W/AUTO DIFF WBC: CPT | Performed by: FAMILY MEDICINE

## 2018-08-22 PROCEDURE — 36415 COLL VENOUS BLD VENIPUNCTURE: CPT | Performed by: FAMILY MEDICINE

## 2018-08-22 PROCEDURE — 80053 COMPREHEN METABOLIC PANEL: CPT | Performed by: FAMILY MEDICINE

## 2018-10-03 ENCOUNTER — TRANSFERRED RECORDS (OUTPATIENT)
Dept: HEALTH INFORMATION MANAGEMENT | Facility: CLINIC | Age: 43
End: 2018-10-03

## 2018-12-02 DIAGNOSIS — R61 GENERALIZED HYPERHIDROSIS: ICD-10-CM

## 2018-12-02 DIAGNOSIS — Z62.21 FOSTER CARE CHILD: ICD-10-CM

## 2018-12-02 DIAGNOSIS — F41.9 MILD ANXIETY: ICD-10-CM

## 2018-12-03 NOTE — TELEPHONE ENCOUNTER
"Requested Prescriptions   Pending Prescriptions Disp Refills     PARoxetine (PAXIL) 10 MG tablet [Pharmacy Med Name: PAROXETINE HCL 10 MG TABLET] 90 tablet 0        Last Written Prescription Date:  8.2.18  Last Fill Quantity: 90,  # refills: 0   Last Office Visit: 7/27/2018   Future Office Visit:       PHQ-9 SCORE 7/27/2018   PHQ-9 Total Score 2     LISA-7 SCORE 7/27/2018   Total Score 2          Sig: TAKE 1 TABLET (10 MG) BY MOUTH EVERY MORNING    SSRIs Protocol Passed    12/2/2018 12:35 PM       Passed - Recent (12 mo) or future (30 days) visit within the authorizing provider's specialty    Patient had office visit in the last 12 months or has a visit in the next 30 days with authorizing provider or within the authorizing provider's specialty.  See \"Patient Info\" tab in inbasket, or \"Choose Columns\" in Meds & Orders section of the refill encounter.             Passed - Patient is age 18 or older       Passed - No active pregnancy on record       Passed - No positive pregnancy test in last 12 months          "

## 2018-12-05 RX ORDER — PAROXETINE 10 MG/1
TABLET, FILM COATED ORAL
Qty: 30 TABLET | Refills: 0 | Status: SHIPPED | OUTPATIENT
Start: 2018-12-05 | End: 2018-12-12

## 2018-12-05 NOTE — TELEPHONE ENCOUNTER
Routing refill request to provider for review/approval because:  Patient needs to be seen because:  Last OV 07/27/18- please refill and send to  to schedule OV      Linda Soto MD  [facility] [place]      Instructions     Return in about 1 month (around 8/27/2018) for depression/anxiety follow up- telephone visit .

## 2018-12-05 NOTE — TELEPHONE ENCOUNTER
Called #   Telephone Information:   Mobile [phone]         Made telephone visit for med check     Refill per RN protocol     Gris Calloway RN, BSN  [facility]

## 2018-12-12 ENCOUNTER — VIRTUAL VISIT (OUTPATIENT)
Dept: FAMILY MEDICINE | Facility: CLINIC | Age: 43
End: 2018-12-12
Payer: COMMERCIAL

## 2018-12-12 DIAGNOSIS — Z62.21 FOSTER CARE CHILD: ICD-10-CM

## 2018-12-12 DIAGNOSIS — R61 GENERALIZED HYPERHIDROSIS: ICD-10-CM

## 2018-12-12 DIAGNOSIS — F41.9 MILD ANXIETY: ICD-10-CM

## 2018-12-12 PROCEDURE — 99442 ZZC PHYSICIAN TELEPHONE EVALUATION 11-20 MIN: CPT | Performed by: FAMILY MEDICINE

## 2018-12-12 RX ORDER — PAROXETINE 10 MG/1
15 TABLET, FILM COATED ORAL EVERY MORNING
Qty: 135 TABLET | Refills: 1 | Status: SHIPPED | OUTPATIENT
Start: 2018-12-12 | End: 2019-09-20

## 2018-12-12 ASSESSMENT — ANXIETY QUESTIONNAIRES
3. WORRYING TOO MUCH ABOUT DIFFERENT THINGS: NOT AT ALL
6. BECOMING EASILY ANNOYED OR IRRITABLE: NOT AT ALL
1. FEELING NERVOUS, ANXIOUS, OR ON EDGE: NOT AT ALL
GAD7 TOTAL SCORE: 0
5. BEING SO RESTLESS THAT IT IS HARD TO SIT STILL: NOT AT ALL
7. FEELING AFRAID AS IF SOMETHING AWFUL MIGHT HAPPEN: NOT AT ALL
IF YOU CHECKED OFF ANY PROBLEMS ON THIS QUESTIONNAIRE, HOW DIFFICULT HAVE THESE PROBLEMS MADE IT FOR YOU TO DO YOUR WORK, TAKE CARE OF THINGS AT HOME, OR GET ALONG WITH OTHER PEOPLE: NOT DIFFICULT AT ALL
GAD7 TOTAL SCORE: 0
3. WORRYING TOO MUCH ABOUT DIFFERENT THINGS: NOT AT ALL
1. FEELING NERVOUS, ANXIOUS, OR ON EDGE: NOT AT ALL
2. NOT BEING ABLE TO STOP OR CONTROL WORRYING: NOT AT ALL
5. BEING SO RESTLESS THAT IT IS HARD TO SIT STILL: NOT AT ALL
6. BECOMING EASILY ANNOYED OR IRRITABLE: NOT AT ALL
2. NOT BEING ABLE TO STOP OR CONTROL WORRYING: NOT AT ALL
7. FEELING AFRAID AS IF SOMETHING AWFUL MIGHT HAPPEN: NOT AT ALL

## 2018-12-12 ASSESSMENT — PATIENT HEALTH QUESTIONNAIRE - PHQ9
SUM OF ALL RESPONSES TO PHQ QUESTIONS 1-9: 2
5. POOR APPETITE OR OVEREATING: NOT AT ALL
5. POOR APPETITE OR OVEREATING: NOT AT ALL

## 2018-12-12 NOTE — PROGRESS NOTES
"  SUBJECTIVE:                                                    Deandra Lewis is a 42 year old female who is being evaluated via a telephone visit.      The patient has been notified of following:     \"This telephone visit will be conducted via a call between you and your physician/provider. We have found that certain health care needs can be provided without the need for a physical exam.  This service lets us provide the care you need with a short phone conversation.  If a prescription is necessary we can send it directly to your pharmacy.  If lab work is needed we can place an order for that and you can then stop by our lab to have the test done at a later time.    We will bill your insurance company for this service.  Please check with your medical insurance if this type of visit is covered. You may be responsible for the cost of this type of visit if insurance coverage is denied.  The typical cost is $30 (10min), $59 (11-20min) and $85 (21-30min).  Most often these visits are shorter than 10 minutes.    If during the course of the call the physician/provider feels a telephone visit is not appropriate, you will not be charged for this service.\"       Consent has been obtained for this service by care team member: yes.   See the scanned image in the medical record.    Deandra Lewis complains of  Recheck Medication      I have reviewed and updated the patient's Past Medical History, Social History, Family History and Medication List.  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 related to previous surgeries - 4 in 3 months for sinus issues/infection     Family history of breast cancer- mother's identical twin, maternal aunt, paternal GM and dad with breast lumps      Hot flashes     Generalized hyperhidrosis- since " earliest recollections - soaks clothing multiple times/week - very disruptive to life     Contraception     Mild anxiety     Foster care child     Anxiety       Current Outpatient Medications   Medication Sig Dispense Refill     albuterol (PROAIR HFA/PROVENTIL HFA/VENTOLIN HFA) 108 (90 BASE) MCG/ACT Inhaler Inhale 2 puffs into the lungs every 6 hours as needed for shortness of breath / dyspnea or wheezing 1 Inhaler 1     cetirizine (ZYRTEC) 10 MG tablet Take 1 tablet (10 mg) by mouth every evening 30 tablet 1     ibuprofen (ADVIL) 200 MG tablet Take 200 mg by mouth as needed.       Lactobacillus (PROBIOTIC ACIDOPHILUS PO)        PARoxetine (PAXIL) 10 MG tablet TAKE 1 TABLET (10 MG) BY MOUTH EVERY MORNING 30 tablet 0     sodium chloride (SODIUM CHLORIDE) 0.65 % nasal spray Spray 2 sprays in nostril 4 times daily. (Patient taking differently: Spray 2 sprays in nostril daily as needed ) 1 Bottle 0       Allergies   Allergen Reactions     Adhesive Tape Blisters     Seasonal Allergies        ALLERGIES  Adhesive tape and Seasonal allergies    Kalpana Waddell CMA (MA signature)    Additional provider notes:     Anxiety Follow-Up:     Status since last visit: Improved - since starting Paxil    Other associated symptoms:None    Complicating factors:   Significant life event: Yes-  Mother might have colon cancer.  Had a positive FIT test. Lives in assisted living for bipolar disorder.   Current substance abuse: None  Depression symptoms: No  LISA-7 SCORE 7/27/2018 12/12/2018   Total Score 2 0   LISA-7  PHQ-9 score:    PHQ-9 SCORE 12/12/2018   PHQ-9 Total Score 2       Found out son has been cutting - having him and family  go for counseling - he's a type A student in 3 advanced classes and has been putting a lot of pressure on himself. She is very hopeful that reducing his school /work stressors are going to help him as well.     Sweating during the day has much decreased.no night sweats the last 2 days = slept through the night  without difficulty and felt much better rested during the days.   Recently passed a kidney stone. No complications.     Amount of exercise or physical activity: running and walking.     Problems taking medications regularly: No    Medication side effects: none    Diet: regular (no restrictions)      Assessment/Plan:    ICD-10-CM    1. Generalized hyperhidrosis R61 PARoxetine (PAXIL) 10 MG tablet   2. Foster care child Z62.21 PARoxetine (PAXIL) 10 MG tablet   3. Mild anxiety F41.9 PARoxetine (PAXIL) 10 MG tablet      Pt would like to increase her paroxetine dose from 10mg daily to 15mg daily to see if that will help the sweats even more.   Please, call or return to clinic or go to the ER immediately if signs or symptoms worsen or fail to improve as anticipated.     I have reviewed the note as documented above.  This accurately captures the substance of my conversation with the patient, Deandra Lewis.       Total time of call between patient and provider was 13 minutes 1 second.            Linda Soto MD    [facility]

## 2018-12-13 ASSESSMENT — ANXIETY QUESTIONNAIRES: GAD7 TOTAL SCORE: 0

## 2018-12-13 NOTE — PATIENT INSTRUCTIONS
Return in about 3 months (around 3/12/2019) for depression/anxiety follow up.     Please, call or return to clinic or go to the ER immediately if signs or symptoms worsen or fail to improve as anticipated.                Thank you for choosing [facility]  for your Health Care. It was a pleasure seeing you at your visit today. Please contact us with any questions or concerns you may have.                   Linda Soto MD                                  To reach your [facility] care team after hours call:   [phone]    Our clinic hours are:     Monday- 7:30 am - 7:00 pm                             Tuesday through Friday- 7:30 am - 5:00 pm                                        Saturday- 8:00 am - 12:00 pm                  Phone:  [phone]    Our pharmacy hours are:     Monday  8:00 am to 7:00 pm      Tuesday through Friday 8:00am to 6:00pm                        Saturday - 9:00 am to 1:00 pm      Sunday : Closed.              Phone:  [phone]      There is also information available at our web site:  [email or web address]    If your provider ordered any lab tests and you do not receive the results within 10 business days, please call the clinic.    If you need a medication refill please contact your pharmacy.  Please allow 2 business days for your refill to be completed.    Our clinic offers telephone visits and e visits.  Please ask one of your team members to explain more.      Use [employer] (secure email communication and access to your chart) to send your primary care provider a message or make an appointment. Ask someone on your Team how to sign up for [employer].

## 2018-12-31 DIAGNOSIS — F41.9 MILD ANXIETY: ICD-10-CM

## 2018-12-31 DIAGNOSIS — R61 GENERALIZED HYPERHIDROSIS: ICD-10-CM

## 2018-12-31 DIAGNOSIS — Z62.21 FOSTER CARE CHILD: ICD-10-CM

## 2019-01-02 DIAGNOSIS — R61 GENERALIZED HYPERHIDROSIS: ICD-10-CM

## 2019-01-02 DIAGNOSIS — F41.9 MILD ANXIETY: ICD-10-CM

## 2019-01-02 DIAGNOSIS — Z62.21 FOSTER CARE CHILD: ICD-10-CM

## 2019-01-02 RX ORDER — PAROXETINE 10 MG/1
TABLET, FILM COATED ORAL
Qty: 30 TABLET | Refills: 0 | OUTPATIENT
Start: 2019-01-02

## 2019-01-02 RX ORDER — PAROXETINE 10 MG/1
10 TABLET, FILM COATED ORAL EVERY MORNING
Qty: 90 TABLET | Refills: 0 | Status: SHIPPED | OUTPATIENT
Start: 2019-01-02 | End: 2019-03-27

## 2019-01-02 NOTE — TELEPHONE ENCOUNTER
"Requested Prescriptions   Pending Prescriptions Disp Refills     PARoxetine (PAXIL) 10 MG tablet [Pharmacy Med Name: PAROXETINE HCL 10 MG TABLET] 90 tablet 0     Sig: TAKE 1 TABLET (10 MG) BY MOUTH EVERY MORNING    SSRIs Protocol Passed - 12/31/2018  7:34 AM       Passed - Recent (12 mo) or future (30 days) visit within the authorizing provider's specialty    Patient had office visit in the last 12 months or has a visit in the next 30 days with authorizing provider or within the authorizing provider's specialty.  See \"Patient Info\" tab in inbasket, or \"Choose Columns\" in Meds & Orders section of the refill encounter.             Passed - Patient is age 18 or older       Passed - No active pregnancy on record       Passed - No positive pregnancy test in last 12 months        Prescription approved per [facility] Refill Protocol.  Stella Summers RN  [place] [facility]    "

## 2019-03-27 DIAGNOSIS — Z62.21 FOSTER CARE CHILD: ICD-10-CM

## 2019-03-27 DIAGNOSIS — F41.9 MILD ANXIETY: ICD-10-CM

## 2019-03-27 DIAGNOSIS — R61 GENERALIZED HYPERHIDROSIS: ICD-10-CM

## 2019-03-27 RX ORDER — PAROXETINE 10 MG/1
TABLET, FILM COATED ORAL
Qty: 90 TABLET | Refills: 0 | Status: SHIPPED | OUTPATIENT
Start: 2019-03-27 | End: 2019-10-14

## 2019-03-27 NOTE — TELEPHONE ENCOUNTER
"Requested Prescriptions   Pending Prescriptions Disp Refills     PARoxetine (PAXIL) 10 MG tablet [Pharmacy Med Name: PAROXETINE HCL 10 MG TABLET] 90 tablet 0     Sig: TAKE 1 TABLET BY MOUTH EVERY MORNING    SSRIs Protocol Passed - 3/27/2019 12:31 PM       Passed - Recent (12 mo) or future (30 days) visit within the authorizing provider's specialty    Patient had office visit in the last 12 months or has a visit in the next 30 days with authorizing provider or within the authorizing provider's specialty.  See \"Patient Info\" tab in inbasket, or \"Choose Columns\" in Meds & Orders section of the refill encounter.             Passed - Medication is active on med list       Passed - Patient is age 18 or older       Passed - No active pregnancy on record       Passed - No positive pregnancy test in last 12 months        PHQ-9 SCORE 7/27/2018 12/12/2018 12/12/2018   PHQ-9 Total Score 2 6 2     LISA-7 SCORE 7/27/2018 12/12/2018 12/12/2018   Total Score 2 0 0       Prescription approved per [facility] Refill Protocol.  Stella Summers RN  [place] [facility]    "

## 2019-09-20 ENCOUNTER — TELEPHONE (OUTPATIENT)
Dept: FAMILY MEDICINE | Facility: CLINIC | Age: 44
End: 2019-09-20

## 2019-09-20 DIAGNOSIS — Z62.21 FOSTER CARE CHILD: ICD-10-CM

## 2019-09-20 DIAGNOSIS — R61 GENERALIZED HYPERHIDROSIS: ICD-10-CM

## 2019-09-20 DIAGNOSIS — F41.9 MILD ANXIETY: ICD-10-CM

## 2019-09-20 NOTE — LETTER
[address] [address]                                                                                                       [phone]    September 27, 2019    Deandra Lewis  [facility]  [place] [address]      To Whom it May Concern:    Thank you for your refill request for your PARoxetine (PAXIL) 10 MG tablet.   After reviewing your chart, you are due for an updated PHQ-9 and LISA-7, which are questionnaires regarding your mood over the last 2 weeks.     Please fill them out and send it back to me.     Thank you for your time.           Sincerely,       Linda Soto M.D.

## 2019-09-20 NOTE — TELEPHONE ENCOUNTER
Patient has 2 different Rx's on file:       12/12/2018 OV Notes:   Pt would like to increase her paroxetine dose from 10mg daily to 15mg daily to see if that will help the sweats even more.     Which dose is patient taking?    Attempt #1  Called patient @ [phone] - Left a non-detailed message with a male to have patient call back and speak with any triage nurse.    Katelynn Flynn RN  [place] [facility]

## 2019-09-20 NOTE — TELEPHONE ENCOUNTER
"Requested Prescriptions   Pending Prescriptions Disp Refills     PARoxetine (PAXIL) 10 MG tablet [Pharmacy Med Name: PAROXETINE HCL 10 MG TABLET]          Last Written Prescription Date:  3.27.19  Last Fill Quantity: 90 tablet,  # refills: 0   Last office visit: 12/12/2018 with prescribing provider:  Linda Soto MD             Future Office Visit:       135 tablet 1     Sig: TAKE 1.5 TABLETS (15 MG) BY MOUTH EVERY MORNING       SSRIs Protocol Passed - 9/20/2019  8:16 AM           PHQ-9 SCORE 7/27/2018 12/12/2018 12/12/2018   PHQ-9 Total Score 2 6 2     LISA-7 SCORE 7/27/2018 12/12/2018 12/12/2018   Total Score 2 0 0              Passed - Recent (12 mo) or future (30 days) visit within the authorizing provider's specialty     Patient had office visit in the last 12 months or has a visit in the next 30 days with authorizing provider or within the authorizing provider's specialty.  See \"Patient Info\" tab in inbasket, or \"Choose Columns\" in Meds & Orders section of the refill encounter.              Passed - Medication is active on med list        Passed - Patient is age 18 or older        Passed - No active pregnancy on record        Passed - No positive pregnancy test in last 12 months        "

## 2019-09-24 NOTE — TELEPHONE ENCOUNTER
Attempt # 2    Called #   Telephone Information:   Mobile [phone]         Left a non detailed VM     Gris Calloway RN, BSN  [place] [facility]

## 2019-09-25 NOTE — TELEPHONE ENCOUNTER
Called # below     Pt stated that she increased the medication to 2 tabs daily and it has helped with the sweats in general.     Has helped a lot with the anxiety side of things     Please advised on refill     Pt will be seeing MD PHILIPPE on 2/6/2020 for a fasting PX     Thank you     Gris Calloway RN, BSN  [facility]

## 2019-09-26 RX ORDER — PAROXETINE 10 MG/1
20 TABLET, FILM COATED ORAL EVERY MORNING
Qty: 180 TABLET | Refills: 1 | Status: SHIPPED | OUTPATIENT
Start: 2019-09-26 | End: 2019-10-14

## 2019-09-26 NOTE — TELEPHONE ENCOUNTER
Attempt #1  Called patient @ [phone] -     Patient stated she is at an appt, will call back to do PHQ9/GAD7    Katelynn Flynn RN  [place] [facility]

## 2019-09-26 NOTE — TELEPHONE ENCOUNTER
PHQ-9 score:    PHQ-9 SCORE 12/12/2018   PHQ-9 Total Score 2   please do phq9 and gad7 with pt over the phone to document current scores.  Refill approved.  Ok for telephone visit prior to 2/2020 , if needed to follow up on symptoms.

## 2019-09-27 NOTE — TELEPHONE ENCOUNTER
Attempt #2  Called patient @ # below - Unable to LM    Attempt #3  Letter sent with PHQ9/GAD7 and self-addressed, stamped, return envelope    Katelynn Flynn RN  [place] [facility]

## 2019-10-02 ASSESSMENT — ANXIETY QUESTIONNAIRES
6. BECOMING EASILY ANNOYED OR IRRITABLE: NOT AT ALL
2. NOT BEING ABLE TO STOP OR CONTROL WORRYING: NOT AT ALL
IF YOU CHECKED OFF ANY PROBLEMS ON THIS QUESTIONNAIRE, HOW DIFFICULT HAVE THESE PROBLEMS MADE IT FOR YOU TO DO YOUR WORK, TAKE CARE OF THINGS AT HOME, OR GET ALONG WITH OTHER PEOPLE: NOT DIFFICULT AT ALL
1. FEELING NERVOUS, ANXIOUS, OR ON EDGE: MORE THAN HALF THE DAYS
5. BEING SO RESTLESS THAT IT IS HARD TO SIT STILL: NOT AT ALL
7. FEELING AFRAID AS IF SOMETHING AWFUL MIGHT HAPPEN: NOT AT ALL
GAD7 TOTAL SCORE: 2
3. WORRYING TOO MUCH ABOUT DIFFERENT THINGS: NOT AT ALL

## 2019-10-02 ASSESSMENT — PATIENT HEALTH QUESTIONNAIRE - PHQ9
5. POOR APPETITE OR OVEREATING: NOT AT ALL
SUM OF ALL RESPONSES TO PHQ QUESTIONS 1-9: 1

## 2019-10-02 NOTE — TELEPHONE ENCOUNTER
Pt calling back and review PHQ-9 and LISA-7 over phone.    Freddy Quinones RN   [facility]

## 2019-10-03 ASSESSMENT — ANXIETY QUESTIONNAIRES: GAD7 TOTAL SCORE: 2

## 2019-10-14 ENCOUNTER — OFFICE VISIT (OUTPATIENT)
Dept: FAMILY MEDICINE | Facility: CLINIC | Age: 44
End: 2019-10-14
Payer: COMMERCIAL

## 2019-10-14 VITALS
TEMPERATURE: 98.6 F | HEIGHT: 68 IN | BODY MASS INDEX: 29.3 KG/M2 | SYSTOLIC BLOOD PRESSURE: 118 MMHG | WEIGHT: 193.3 LBS | DIASTOLIC BLOOD PRESSURE: 78 MMHG | HEART RATE: 123 BPM | OXYGEN SATURATION: 97 %

## 2019-10-14 DIAGNOSIS — Z12.31 VISIT FOR SCREENING MAMMOGRAM: ICD-10-CM

## 2019-10-14 DIAGNOSIS — J20.9 ACUTE BRONCHITIS, UNSPECIFIED ORGANISM: Primary | ICD-10-CM

## 2019-10-14 DIAGNOSIS — F41.9 MILD ANXIETY: ICD-10-CM

## 2019-10-14 DIAGNOSIS — R61 GENERALIZED HYPERHIDROSIS: ICD-10-CM

## 2019-10-14 DIAGNOSIS — R05.9 COUGH: ICD-10-CM

## 2019-10-14 DIAGNOSIS — J32.9 SINUSITIS, UNSPECIFIED CHRONICITY, UNSPECIFIED LOCATION: ICD-10-CM

## 2019-10-14 DIAGNOSIS — Z62.21 FOSTER CARE CHILD: ICD-10-CM

## 2019-10-14 PROCEDURE — 99213 OFFICE O/P EST LOW 20 MIN: CPT | Performed by: FAMILY MEDICINE

## 2019-10-14 RX ORDER — PAROXETINE 20 MG/1
20 TABLET, FILM COATED ORAL EVERY MORNING
Qty: 90 TABLET | Refills: 1 | Status: SHIPPED | OUTPATIENT
Start: 2019-10-14 | End: 2020-04-09

## 2019-10-14 RX ORDER — ALBUTEROL SULFATE 90 UG/1
2 AEROSOL, METERED RESPIRATORY (INHALATION) EVERY 6 HOURS PRN
Qty: 1 INHALER | Refills: 3 | Status: SHIPPED | OUTPATIENT
Start: 2019-10-14 | End: 2020-10-09

## 2019-10-14 RX ORDER — AZITHROMYCIN 250 MG/1
TABLET, FILM COATED ORAL
Qty: 6 TABLET | Refills: 0 | Status: SHIPPED | OUTPATIENT
Start: 2019-10-14 | End: 2019-11-05

## 2019-10-14 ASSESSMENT — MIFFLIN-ST. JEOR: SCORE: 1585.3

## 2019-10-14 NOTE — PROGRESS NOTES
"Subjective   Deandra Lewis is a 43 year old female who presents to clinic today for the following health issues:    HPI   Acute Illness   Acute illness concerns: headache and coughing   Onset: 10/8/19 - six days ago    Fever: YES    Chills/Sweats: YES    Headache (location?): YES    Sinus Pressure:YES    Conjunctivitis:  YES: both    Ear Pain: YES: both    Rhinorrhea: YES    Congestion: YES    Sore Throat: YES  Started with body aches and sore throat. It then moved to chest. Ears have been aching. Throat is slightly better today.    Cough: YES    Wheeze: YES    Decreased Appetite: YES    Nausea: no     Vomiting: no     Diarrhea:  no     Dysuria/Freq.: no     Fatigue/Achiness: YES    Sick/Strep Exposure: YES, son had similar sx going on second week.       Therapies Tried and outcome: Advil     She blames the flu shot for her symptoms. She got the flu shot a week ago.      Reviewed and updated as needed this visit by provider:  Tobacco  Allergies  Meds  Problems  Med Hx  Surg Hx  Fam Hx       Review of Systems   Constitutional, HEENT, cardiovascular, pulmonary, GI, , musculoskeletal, neuro, skin, endocrine and psych systems are negative, except as otherwise noted.    This document serves as a record of the services and decisions personally performed and made by Avery Noyola MD. It was created on his behalf by Noe Resendez, a trained medical scribe. The creation of this document is based the provider's statements to the medical scribe.  Scribgaby Resendez 9:29 AM, October 14, 2019    Objective   /78 (BP Location: Left arm, Cuff Size: Adult Regular)   Pulse 123   Temp 98.6  F (37  C) (Oral)   Ht 1.727 m (5' 8\")   Wt 87.7 kg (193 lb 4.8 oz)   SpO2 97%   BMI 29.39 kg/m   Body mass index is 29.39 kg/m .  Physical Exam   GENERAL: healthy, alert, well nourished, well hydrated, no distress  EYES: Eyes grossly normal to inspection, extraocular movements - intact, and PERRL  HENT: ear canals- normal; " TMs- normal; Nose- normal; Mouth- no ulcers, no lesions  NECK: no tenderness, no adenopathy, no asymmetry, no masses, no stiffness; thyroid- normal to palpation  RESP: lungs clear to auscultation - no rales, no rhonchi, no wheezes  BREAST: no masses, no tenderness, no nipple discharge, no palpable axillary masses or adenopathy  CV: regular rates and rhythm, normal S1 S2, no S3 or S4 and no murmur, no click or rub -  ABDOMEN: soft, no tenderness, no  hepatosplenomegaly, no masses, normal bowel sounds  MS: extremities- no gross deformities noted, no edema  SKIN: no suspicious lesions, no rashes  NEURO: strength and tone- normal, sensory exam- grossly normal, mentation- intact, speech- normal, reflexes- symmetric  BACK: no CVA tenderness, no paralumbar tenderness  PSYCH: Alert and oriented times 3; speech- coherent , normal rate and volume; able to articulate logical thoughts, able to abstract reason, no tangential thoughts, no hallucinations or delusions, affect- normal  LYMPHATICS: ant. cervical- normal, post. cervical- normal, axillary- normal, supraclavicular- normal, inguinal- normal      Assessment & Plan   Deandra was seen today for headache, uri and fever.    Diagnoses and all orders for this visit:    Acute bronchitis, unspecified organism - likely viral infection, but antibiotics given to rule out bacterial infection due to the patient wanting an antibiotic.   -     albuterol (PROAIR HFA/PROVENTIL HFA/VENTOLIN HFA) 108 (90 Base) MCG/ACT inhaler; Inhale 2 puffs into the lungs every 6 hours as needed for shortness of breath / dyspnea or wheezing  -     azithromycin (ZITHROMAX) 250 MG tablet; Two tablets first day, then one tablet daily for four days    Sinusitis, unspecified chronicity, unspecified location - patient was obviously congested. She had a runny nose with a productive cough. Start antibiotic to ensure the infection is not bacterial for the reassurance to the patient.   -     azithromycin (ZITHROMAX)  250 MG tablet; Two tablets first day, then one tablet daily for four days    Cough - albuterol prescribed to aid in reported history of non-diagnosed asthma.   -     albuterol (PROAIR HFA/PROVENTIL HFA/VENTOLIN HFA) 108 (90 Base) MCG/ACT inhaler; Inhale 2 puffs into the lungs every 6 hours as needed for shortness of breath / dyspnea or wheezing    Generalized hyperhidrosis / Mild anxiety / Foster care child - past history   -     PARoxetine (PAXIL) 20 MG tablet; Take 1 tablet (20 mg) by mouth every morning    Visit for screening mammogram  -     MA Screen Bilateral w/Connor; Future    See Patient Instructions    No follow-ups on file.          Prateek Noyola MD      [address] [address]  [email or web address]   Office: [phone]  Pager: [phone]

## 2019-10-14 NOTE — LETTER
[address] [address]                                                                                                       [phone]    October 14, 2019    Deandra Lewis  [facility] [facility] [address]      To Whom it May Concern:    The above patient is unable to attend work for 10/9/19 through 10/16/19 (if needed) due to a medical issue. Please contact me with questions or concerns.      Sincerely,                Prateek Noyola M.D.

## 2019-10-22 ENCOUNTER — TELEPHONE (OUTPATIENT)
Dept: FAMILY MEDICINE | Facility: CLINIC | Age: 44
End: 2019-10-22

## 2019-10-22 DIAGNOSIS — J20.9 ACUTE BRONCHITIS: Primary | ICD-10-CM

## 2019-10-22 RX ORDER — PREDNISONE 20 MG/1
TABLET ORAL
Qty: 14 TABLET | Refills: 0 | Status: SHIPPED | OUTPATIENT
Start: 2019-10-22 | End: 2019-11-05

## 2019-10-22 RX ORDER — AMOXICILLIN AND CLAVULANATE POTASSIUM 500; 125 MG/1; MG/1
1 TABLET, FILM COATED ORAL 2 TIMES DAILY
Qty: 20 TABLET | Refills: 0 | Status: SHIPPED | OUTPATIENT
Start: 2019-10-22 | End: 2019-11-05

## 2019-10-22 NOTE — TELEPHONE ENCOUNTER
Reason for Call:  Other returning call    Detailed comments: Deandra is returning a call left for her.    Phone Number Patient can be reached at: Work number on file:  [phone]    Best Time: Until 4:30    Can we leave a detailed message on this number? NO    Call taken on 10/22/2019 at 1:41 PM by Nancy Collins

## 2019-10-22 NOTE — TELEPHONE ENCOUNTER
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    Gris Calloway RN, BSN  [facility]

## 2019-10-22 NOTE — TELEPHONE ENCOUNTER
Attempt # 1     Called # below     Left a non detailed VM     Gris Calloway RN, BSN  [place] [facility]

## 2019-10-22 NOTE — TELEPHONE ENCOUNTER
Reason for call:  Patient reporting a symptom    Symptom or request: bronchitis    Duration (how long have symptoms been present): over a week    Have you been treated for this before? Yes    Additional comments: Deandra saw Dr. Noyola 10/14 and stated she is still not feeling well. She is wondering if she should make another appt or what to do. Please give her a call back when possible. Thank you!    Phone Number patient can be reached at:  Other phone number:  [phone] work phone - will be at work until 4:30pm and then can call cell at [phone]    Best Time:  today    Can we leave a detailed message on this number:  YES    Call taken on 10/22/2019 at 1:17 PM by Vanesa Jay

## 2019-10-29 ENCOUNTER — ANCILLARY PROCEDURE (OUTPATIENT)
Dept: MAMMOGRAPHY | Facility: CLINIC | Age: 44
End: 2019-10-29
Payer: COMMERCIAL

## 2019-10-29 DIAGNOSIS — Z12.31 VISIT FOR SCREENING MAMMOGRAM: ICD-10-CM

## 2019-10-29 PROCEDURE — 77063 BREAST TOMOSYNTHESIS BI: CPT | Mod: TC

## 2019-10-29 PROCEDURE — 77067 SCR MAMMO BI INCL CAD: CPT | Mod: TC

## 2019-11-04 ENCOUNTER — TELEPHONE (OUTPATIENT)
Dept: FAMILY MEDICINE | Facility: CLINIC | Age: 44
End: 2019-11-04

## 2019-11-04 NOTE — TELEPHONE ENCOUNTER
Reason for call:  Patient reporting a symptom    Symptom or request: cough is not improving- seen 10/14/19.     Duration (how long have symptoms been present): several weeks, see 10/14/19 and 10/22/19 encounter    Have you been treated for this before? Yes    Additional comments: none    Phone Number patient can be reached at:  Cell number on file:    Telephone Information:   Mobile [phone]       Best Time:  anytime    Can we leave a detailed message on this number:  YES    Call taken on 11/4/2019 at 4:19 PM by Rehan ALARCON

## 2019-11-05 ENCOUNTER — OFFICE VISIT (OUTPATIENT)
Dept: FAMILY MEDICINE | Facility: CLINIC | Age: 44
End: 2019-11-05
Payer: COMMERCIAL

## 2019-11-05 ENCOUNTER — ANCILLARY PROCEDURE (OUTPATIENT)
Dept: GENERAL RADIOLOGY | Facility: CLINIC | Age: 44
End: 2019-11-05
Attending: PHYSICIAN ASSISTANT
Payer: COMMERCIAL

## 2019-11-05 VITALS
HEIGHT: 68 IN | TEMPERATURE: 98.2 F | SYSTOLIC BLOOD PRESSURE: 108 MMHG | DIASTOLIC BLOOD PRESSURE: 68 MMHG | BODY MASS INDEX: 30.16 KG/M2 | HEART RATE: 102 BPM | OXYGEN SATURATION: 98 % | WEIGHT: 199 LBS

## 2019-11-05 DIAGNOSIS — R05.9 COUGH: Primary | ICD-10-CM

## 2019-11-05 PROCEDURE — 71046 X-RAY EXAM CHEST 2 VIEWS: CPT | Mod: FY

## 2019-11-05 PROCEDURE — 99213 OFFICE O/P EST LOW 20 MIN: CPT | Performed by: PHYSICIAN ASSISTANT

## 2019-11-05 RX ORDER — FLUTICASONE PROPIONATE 44 UG/1
1 AEROSOL, METERED RESPIRATORY (INHALATION) 2 TIMES DAILY
Qty: 1 INHALER | Refills: 0 | Status: SHIPPED | OUTPATIENT
Start: 2019-11-05 | End: 2020-01-06

## 2019-11-05 ASSESSMENT — MIFFLIN-ST. JEOR: SCORE: 1611.16

## 2019-11-05 NOTE — PATIENT INSTRUCTIONS
Reassuring CXR and lung exam.  Given history that this occurs every few years - maybe more reactive airway/asthmatic in origin.  Start flovent and continue albuterol to see if we can quiet things down.  Recheck if cough is persistent and could consider treating from GI standpoint versus asthma/allergy consult.

## 2019-11-05 NOTE — TELEPHONE ENCOUNTER
Called # below     Pt stated that her chest feels heavy still and she cant catch her breath at times she has gone through two antibiotics and steroid taper     RN advised that she needs to be re seen and assessed today     Patient stated an understanding and agreed with plan.    Gris Calloway RN, BSN  [facility]

## 2019-11-05 NOTE — PROGRESS NOTES
Subjective     Deandra Lewis is a 43 year old female who presents to clinic today for the following health issues:    HPI   Acute Illness   Acute illness concerns: cough  Onset: just over a month - has improved some with all of the antibiotics but the cough is still there.  Reports she has already by treated with azithromycin, augmentin and prednisone.  Cough was originally productive with green mucous and now the cough is just more deep and wheezy. Feels pressure in there chest. Using albuterol without improvement.   No longer having the say degree or rhinorrhea.  Non-smoker.  Hx of some asthma in the past - and reports she'll get a cough that hangs onto her like this every couple years.  Hx of pneumonia, but not for a few years.  Leaving to go out of town so wanted to have things double checked before she left.  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    No recent surgery, estrogen, or prolonged car ride. Is an active person.   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   [facility]      Fever: no    Chills/Sweats: no    Headache (location?): YES    Sinus Pressure:no    Conjunctivitis:  no    Ear Pain: no    Rhinorrhea: no    Congestion: yes, but nothing like it used to be    Sore Throat: no     Cough: YES    Wheeze: YES    Decreased Appetite: no    Nausea: no    Vomiting: no    Diarrhea:  no    Dysuria/Freq.: no    Fatigue/Achiness: no    Sick/Strep Exposure: no     Therapies Tried and outcome: has been on Augmentin, zpak and prednisone - slight relief from her albuterol inhaler    Patient Active Problem List   Diagnosis     Chronic sinusitis     Bleeding, nose     Sinusitis     Lactose intolerance     Kidney stones     Maxillary bone loss     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 related to previous surgeries - 4 in 3 months for sinus issues/infection     Family history of breast cancer-  mother's identical twin, maternal aunt, paternal GM and dad with breast lumps      Hot flashes     Generalized hyperhidrosis- since earliest recollections - soaks clothing multiple times/week - very disruptive to life     Contraception     Mild anxiety     Foster care child     Anxiety     Past Surgical History:   Procedure Laterality Date     APPENDECTOMY       ARTHROSCOPY, ARTHROPLASTY TEMPOROMANDIBULAR JOINT, COMBINED  age 14     TMJ surgery as an 7th grader     CYSTOSCOPY,URETEROSCOPY,STONE REMV  9/11/2002     with stent placements - since removed      ENDOSCOPIC BALLOON SINUPLASTY, OPTICAL TRACKING SYSTEM ENDOSCOPIC SINUS SURGERY, COMBINED  1/8/2013    Procedure: COMBINED ENDOSCOPIC BALLOON SINUPLASTY, OPTICAL TRACKING SYSTEM ENDOSCOPIC SINUS SURGERY;  Left Endo Sinus  Surgery  with Septoplasty       ENDOSCOPIC SINUS SURGERY  1/15/2013    Procedure: ENDOSCOPIC SINUS SURGERY;  removal of nasal packing with endocopic debridement of left paranasal sinuses;  Surgeon: Fabian Driver MD;  Location:  OR     ENDOSCOPIC SINUS SURGERY  4/22/2013    Procedure: ENDOSCOPIC SINUS SURGERY;  Functional Endoscopic Sinus Surgery With Biopsy ;  Surgeon: Jcarlos Hogan MD;  Location:  OR       Social History     Tobacco Use     Smoking status: Former Smoker     Packs/day: 0.10     Years: 5.00     Pack years: 0.50     Types: Cigarettes     Smokeless tobacco: Never Used   Substance Use Topics     Alcohol use: No     Alcohol/week: 0.0 standard drinks     Comment: rarely      Family History   Problem Relation Age of Onset     Asthma Mother      Depression Mother      Thyroid Disease Mother      Blood Disease Mother         Bleeding disorder     Bipolar Disorder Mother      Heart Disease Father         s/p pacemakers x 2      Diabetes Father      Cancer Father         Skin, Colon-dx'd mid-late 40's     Asthma Child      Alcohol/Drug Other         Sibling     Breast Cancer Paternal Grandmother      Breast Cancer  "Maternal Aunt         another of mom's sisters      Breast Cancer Maternal Aunt         mother's identical twin      Allergy (Severe) Son         Dairy, Eggs, and Oatmeal         Current Outpatient Medications   Medication Sig Dispense Refill     albuterol (PROAIR HFA/PROVENTIL HFA/VENTOLIN HFA) 108 (90 Base) MCG/ACT inhaler Inhale 2 puffs into the lungs every 6 hours as needed for shortness of breath / dyspnea or wheezing 1 Inhaler 3     cetirizine (ZYRTEC) 10 MG tablet Take 1 tablet (10 mg) by mouth every evening 30 tablet 1     ibuprofen (ADVIL) 200 MG tablet Take 200 mg by mouth as needed.       Lactobacillus (PROBIOTIC ACIDOPHILUS PO)        PARoxetine (PAXIL) 20 MG tablet Take 1 tablet (20 mg) by mouth every morning 90 tablet 1     sodium chloride (SODIUM CHLORIDE) 0.65 % nasal spray Spray 2 sprays in nostril 4 times daily. (Patient taking differently: Spray 2 sprays in nostril daily as needed ) 1 Bottle 0     Allergies   Allergen Reactions     Adhesive Tape Blisters     Seasonal Allergies        Reviewed and updated as needed this visit by Provider  Tobacco  Allergies  Meds  Med Hx  Surg Hx  Fam Hx  Soc Hx        Review of Systems   ROS COMP: Constitutional, HEENT, cardiovascular, pulmonary, gi and gu systems are negative, except as otherwise noted.      Objective    /68 (BP Location: Right arm, Cuff Size: Adult Regular)   Pulse 102   Temp 98.2  F (36.8  C) (Oral)   Ht 1.727 m (5' 8\")   Wt 90.3 kg (199 lb)   SpO2 98%   BMI 30.26 kg/m    Body mass index is 30.26 kg/m .  Physical Exam   GENERAL: healthy, alert and no distress  EYES: Eyes grossly normal to inspection, PERRL and conjunctivae and sclerae normal  HENT: normal cephalic/atraumatic, ear canals and TM's normal, nose and mouth without ulcers or lesions, oropharynx clear and oral mucous membranes moist  NECK: no adenopathy and no asymmetry, masses, or scars  RESP: lungs clear to auscultation - no rales, rhonchi or wheezes. Mildly " protracted expiratory phase.  CV: regular rates and rhythm and no murmur, click or rub    Diagnostic Test Results:  CXR personal reading shows no acute infiltrate, pleural effusion, cardiomegaly or pneumothorax. Will await final reading by radiology.        Assessment & Plan       ICD-10-CM    1. Cough R05 XR Chest 2 Views     fluticasone (FLOVENT HFA) 44 MCG/ACT inhaler   See Patient Instructions  Patient in agreement with plan.     Patient Instructions   Reassuring CXR and lung exam.  Given history that this occurs every few years - maybe more reactive airway/asthmatic in origin.  Start flovent and continue albuterol to see if we can quiet things down.  Recheck if cough is persistent and could consider treating from GI standpoint versus asthma/allergy consult.    Return in about 29 days (around 12/4/2019) for Routine Visit.    Liz Junior PA-C  [facility]

## 2019-11-05 NOTE — LETTER
November 11, 2019      Deandra Lewis  [address] [place] [address]        Dear ,    We are writing to inform you of your test results.    -Final chest xray reading by radiology was normal. Follow-up with plan developed in clinic.    Resulted Orders   XR Chest 2 Views    Narrative    CHEST TWO VIEWS  11/5/2019 3:40 PM     HISTORY: Cough for one month, non-smoker.    COMPARISON: 12/12/2014.      Impression    IMPRESSION: No acute cardiopulmonary disease.    LUCY ESQUEDA MD       If you have any questions or concerns, please call the clinic at the number listed above.       Sincerely,        Liz Junior PA-C

## 2019-11-10 NOTE — RESULT ENCOUNTER NOTE
Please call or write patient with the following results:    Dear Deandra,    Your recent lab results are noted below:    -Final chest xray reading by radiology was normal. Follow-up with plan developed in clinic.    For additional lab test information, [email or web address] is an excellent reference.  Please contact the clinic at [phone] with any further questions or concerns.      Thank you,      Liz Junior PA-C  [facility]

## 2019-11-30 ENCOUNTER — TRANSFERRED RECORDS (OUTPATIENT)
Dept: HEALTH INFORMATION MANAGEMENT | Facility: CLINIC | Age: 44
End: 2019-11-30

## 2019-11-30 DIAGNOSIS — R61 GENERALIZED HYPERHIDROSIS: ICD-10-CM

## 2019-11-30 DIAGNOSIS — Z62.21 FOSTER CARE CHILD: ICD-10-CM

## 2019-11-30 DIAGNOSIS — F41.9 MILD ANXIETY: ICD-10-CM

## 2019-11-30 NOTE — TELEPHONE ENCOUNTER
"Requested Prescriptions   Pending Prescriptions Disp Refills     PARoxetine (PAXIL) 10 MG tablet [Pharmacy Med Name: PAROXETINE HCL 10 MG  Last Written Prescription Date:  10/14/19  Last Fill Quantity: 90,  # refills: 1   Last Office Visit: 11/5/2019   Future Office Visit:    Next 5 appointments (look out 90 days)    Dec 04, 2019  8:40 AM CST  PHYSICAL with Lidna Soto MD  [facility] ([address] [id number] [phone]          TABLET] 90 tablet 0     Sig: TAKE 1 TABLET BY MOUTH EVERY MORNING       SSRIs Protocol Passed - 11/30/2019 12:36 AM        Passed - Recent (12 mo) or future (30 days) visit within the authorizing provider's specialty     Patient has had an office visit with the authorizing provider or a provider within the authorizing providers department within the previous 12 mos or has a future within next 30 days. See \"Patient Info\" tab in inbasket, or \"Choose Columns\" in Meds & Orders section of the refill encounter.            Passed - Medication is active on med list        Passed - Patient is age 18 or older        Passed - No active pregnancy on record        Passed - No positive pregnancy test in last 12 months          "

## 2019-12-02 RX ORDER — PAROXETINE 10 MG/1
TABLET, FILM COATED ORAL
Qty: 90 TABLET | Refills: 0 | Status: SHIPPED | OUTPATIENT
Start: 2019-12-02 | End: 2019-12-04 | Stop reason: DRUGHIGH

## 2019-12-02 NOTE — TELEPHONE ENCOUNTER
PHQ-9 SCORE 12/12/2018 12/12/2018 10/2/2019   PHQ-9 Total Score 6 2 1     Prescription approved per [facility] Refill Protocol.      Freddy Quinones RN   [facility]

## 2019-12-04 ENCOUNTER — OFFICE VISIT (OUTPATIENT)
Dept: FAMILY MEDICINE | Facility: CLINIC | Age: 44
End: 2019-12-04
Payer: COMMERCIAL

## 2019-12-04 ENCOUNTER — ANCILLARY PROCEDURE (OUTPATIENT)
Dept: GENERAL RADIOLOGY | Facility: CLINIC | Age: 44
End: 2019-12-04
Attending: FAMILY MEDICINE
Payer: COMMERCIAL

## 2019-12-04 VITALS
OXYGEN SATURATION: 97 % | TEMPERATURE: 97 F | SYSTOLIC BLOOD PRESSURE: 110 MMHG | HEIGHT: 68 IN | WEIGHT: 198.2 LBS | DIASTOLIC BLOOD PRESSURE: 78 MMHG | BODY MASS INDEX: 30.04 KG/M2 | HEART RATE: 108 BPM

## 2019-12-04 DIAGNOSIS — J18.9 PNEUMONIA OF RIGHT LOWER LOBE DUE TO INFECTIOUS ORGANISM: ICD-10-CM

## 2019-12-04 DIAGNOSIS — R51.9 PERIORBITAL HEADACHE: ICD-10-CM

## 2019-12-04 DIAGNOSIS — Z00.01 ENCOUNTER FOR ROUTINE ADULT HEALTH EXAMINATION WITH ABNORMAL FINDINGS: Primary | ICD-10-CM

## 2019-12-04 DIAGNOSIS — Z30.011 ENCOUNTER FOR INITIAL PRESCRIPTION OF CONTRACEPTIVE PILLS: ICD-10-CM

## 2019-12-04 DIAGNOSIS — D50.8 OTHER IRON DEFICIENCY ANEMIA: ICD-10-CM

## 2019-12-04 DIAGNOSIS — R61 GENERALIZED HYPERHIDROSIS: ICD-10-CM

## 2019-12-04 DIAGNOSIS — Z13.6 CARDIOVASCULAR SCREENING; LDL GOAL LESS THAN 160: ICD-10-CM

## 2019-12-04 DIAGNOSIS — J98.01 COUGH DUE TO BRONCHOSPASM: ICD-10-CM

## 2019-12-04 DIAGNOSIS — R05.9 COUGH: ICD-10-CM

## 2019-12-04 DIAGNOSIS — G44.221 CHRONIC TENSION-TYPE HEADACHE, INTRACTABLE: ICD-10-CM

## 2019-12-04 DIAGNOSIS — R09.89: ICD-10-CM

## 2019-12-04 DIAGNOSIS — R69 TRAVEL-RELATED ILLNESS: ICD-10-CM

## 2019-12-04 DIAGNOSIS — J32.0 LEFT MAXILLARY SINUSITIS: ICD-10-CM

## 2019-12-04 LAB
BASOPHILS # BLD AUTO: 0 10E9/L (ref 0–0.2)
BASOPHILS NFR BLD AUTO: 0.5 %
DIFFERENTIAL METHOD BLD: NORMAL
EOSINOPHIL # BLD AUTO: 0.2 10E9/L (ref 0–0.7)
EOSINOPHIL NFR BLD AUTO: 3.5 %
ERYTHROCYTE [DISTWIDTH] IN BLOOD BY AUTOMATED COUNT: 13 % (ref 10–15)
HCG UR QL: NEGATIVE
HCT VFR BLD AUTO: 35.9 % (ref 35–47)
HGB BLD-MCNC: 11.9 G/DL (ref 11.7–15.7)
LYMPHOCYTES # BLD AUTO: 1.1 10E9/L (ref 0.8–5.3)
LYMPHOCYTES NFR BLD AUTO: 18.5 %
MCH RBC QN AUTO: 28 PG (ref 26.5–33)
MCHC RBC AUTO-ENTMCNC: 33.1 G/DL (ref 31.5–36.5)
MCV RBC AUTO: 85 FL (ref 78–100)
MONOCYTES # BLD AUTO: 0.6 10E9/L (ref 0–1.3)
MONOCYTES NFR BLD AUTO: 10.5 %
NEUTROPHILS # BLD AUTO: 4 10E9/L (ref 1.6–8.3)
NEUTROPHILS NFR BLD AUTO: 67 %
PLATELET # BLD AUTO: 285 10E9/L (ref 150–450)
RBC # BLD AUTO: 4.25 10E12/L (ref 3.8–5.2)
WBC # BLD AUTO: 5.9 10E9/L (ref 4–11)

## 2019-12-04 PROCEDURE — 80050 GENERAL HEALTH PANEL: CPT | Performed by: FAMILY MEDICINE

## 2019-12-04 PROCEDURE — 99214 OFFICE O/P EST MOD 30 MIN: CPT | Mod: 25 | Performed by: FAMILY MEDICINE

## 2019-12-04 PROCEDURE — 86615 BORDETELLA ANTIBODY: CPT | Mod: 90 | Performed by: FAMILY MEDICINE

## 2019-12-04 PROCEDURE — 99000 SPECIMEN HANDLING OFFICE-LAB: CPT | Performed by: FAMILY MEDICINE

## 2019-12-04 PROCEDURE — 36415 COLL VENOUS BLD VENIPUNCTURE: CPT | Performed by: FAMILY MEDICINE

## 2019-12-04 PROCEDURE — 71046 X-RAY EXAM CHEST 2 VIEWS: CPT | Mod: FY

## 2019-12-04 PROCEDURE — 99396 PREV VISIT EST AGE 40-64: CPT | Mod: 25 | Performed by: FAMILY MEDICINE

## 2019-12-04 PROCEDURE — 70210 X-RAY EXAM OF SINUSES: CPT

## 2019-12-04 PROCEDURE — 86635 COCCIDIOIDES ANTIBODY: CPT | Mod: 90 | Performed by: FAMILY MEDICINE

## 2019-12-04 PROCEDURE — 81025 URINE PREGNANCY TEST: CPT | Performed by: FAMILY MEDICINE

## 2019-12-04 PROCEDURE — 86580 TB INTRADERMAL TEST: CPT | Performed by: FAMILY MEDICINE

## 2019-12-04 PROCEDURE — 80061 LIPID PANEL: CPT | Performed by: FAMILY MEDICINE

## 2019-12-04 RX ORDER — ACETAMINOPHEN AND CODEINE PHOSPHATE 120; 12 MG/5ML; MG/5ML
0.35 SOLUTION ORAL DAILY
Qty: 84 TABLET | Refills: 3 | Status: SHIPPED | OUTPATIENT
Start: 2019-12-04 | End: 2020-03-05

## 2019-12-04 RX ORDER — CODEINE PHOSPHATE AND GUAIFENESIN 10; 100 MG/5ML; MG/5ML
1-2 SOLUTION ORAL EVERY 4 HOURS PRN
Qty: 236 ML | Refills: 1 | Status: SHIPPED | OUTPATIENT
Start: 2019-12-04 | End: 2020-01-14

## 2019-12-04 RX ORDER — BUTALBITAL, ASPIRIN, AND CAFFEINE 325; 50; 40 MG/1; MG/1; MG/1
1 CAPSULE ORAL EVERY 4 HOURS PRN
Qty: 20 CAPSULE | Refills: 1 | Status: SHIPPED | OUTPATIENT
Start: 2019-12-04 | End: 2021-04-14

## 2019-12-04 RX ORDER — BENZONATATE 200 MG/1
200 CAPSULE ORAL 3 TIMES DAILY PRN
Qty: 21 CAPSULE | Refills: 0 | Status: SHIPPED | OUTPATIENT
Start: 2019-12-04 | End: 2020-01-14

## 2019-12-04 RX ORDER — AZITHROMYCIN 250 MG/1
TABLET, FILM COATED ORAL
Qty: 6 TABLET | Refills: 0 | Status: SHIPPED | OUTPATIENT
Start: 2019-12-04 | End: 2020-01-14

## 2019-12-04 RX ORDER — LEVOFLOXACIN 500 MG/1
500 TABLET, FILM COATED ORAL DAILY
Qty: 10 TABLET | Refills: 0 | Status: SHIPPED | OUTPATIENT
Start: 2019-12-04 | End: 2019-12-04 | Stop reason: ALTCHOICE

## 2019-12-04 RX ORDER — CEFDINIR 300 MG/1
600 CAPSULE ORAL DAILY
Qty: 20 CAPSULE | Refills: 0 | Status: SHIPPED | OUTPATIENT
Start: 2019-12-04 | End: 2019-12-19

## 2019-12-04 ASSESSMENT — ANXIETY QUESTIONNAIRES
GAD7 TOTAL SCORE: 1
5. BEING SO RESTLESS THAT IT IS HARD TO SIT STILL: NOT AT ALL
2. NOT BEING ABLE TO STOP OR CONTROL WORRYING: NOT AT ALL
3. WORRYING TOO MUCH ABOUT DIFFERENT THINGS: NOT AT ALL
1. FEELING NERVOUS, ANXIOUS, OR ON EDGE: SEVERAL DAYS
7. FEELING AFRAID AS IF SOMETHING AWFUL MIGHT HAPPEN: NOT AT ALL
6. BECOMING EASILY ANNOYED OR IRRITABLE: NOT AT ALL
IF YOU CHECKED OFF ANY PROBLEMS ON THIS QUESTIONNAIRE, HOW DIFFICULT HAVE THESE PROBLEMS MADE IT FOR YOU TO DO YOUR WORK, TAKE CARE OF THINGS AT HOME, OR GET ALONG WITH OTHER PEOPLE: NOT DIFFICULT AT ALL

## 2019-12-04 ASSESSMENT — PATIENT HEALTH QUESTIONNAIRE - PHQ9
5. POOR APPETITE OR OVEREATING: NOT AT ALL
SUM OF ALL RESPONSES TO PHQ QUESTIONS 1-9: 1

## 2019-12-04 ASSESSMENT — MIFFLIN-ST. JEOR: SCORE: 1607.53

## 2019-12-04 NOTE — PROGRESS NOTES
"   SUBJECTIVE:   CC: Deandra Lewis is an 43 year old woman who presents for preventive health visit.     Healthy Habits:    Getting at least 3 servings of Calcium per day:  NO    Bi-annual eye exam:  NO    Dental care twice a year:  NO    Sleep apnea or symptoms of sleep apnea:  None    Diet:  Regular (no restrictions)    Frequency of exercise:  None    Duration of exercise:  N/A    Taking medications regularly:  Yes    Barriers to taking medications:  None    Medication side effects:  None    PHQ-2 Total Score:    Additional concerns today:  Yes    Pt wanted to discuss starting depo - discussed might increasing migraines.      Cough- started 2.5 months ago - then decreased for a while Then cough came back about 1 week ago \"with a vengeance\" - deep dry cough mostly. Paroxysmal.   Feels \"infected.\"  Signif. Soaking night sweats.  Was seen in the ED - no chest xray done. Scantly productive of clear sputum tinged with blood on and off throughout the day.  Using the flovent and albuterol inhalers and neither helps. Treated with azithromycin and prednisone in 10/14/2019.  Hx of asthma. Was in bed pretty much  the last 5-6 days secondary to the cough.   Missed Thanksgiving with her family secondary to her illness.  Was in [place] in 11/2019 for work.      Had a solid migraine for 5 days, now more of a frontal tension headache and  Left periorbital area. Hx of sinus headaches and sinus surgery  in the past.  Has been blowing blood clots from the left nare. No purulent drainage.  Last bad sinus infection didn't show up on xray.         Anxiety Follow-Up:     How are you doing with your anxiety since your last visit? Improved     Are you having other symptoms that might be associated with anxiety? No    Have you had a significant life event? No     Are you feeling depressed? No    Do you have any concerns with your use of alcohol or other drugs? No     Paroxetine 20mg daily =working well.     GAD7 today = 1  phq9s today  = 1 "     Social History     Tobacco Use     Smoking status: Former Smoker     Packs/day: 0.10     Years: 5.00     Pack years: 0.50     Types: Cigarettes     Smokeless tobacco: Never Used   Substance Use Topics     Alcohol use: No     Alcohol/week: 0.0 standard drinks     Comment: rarely      Drug use: No     Comment: no herbal meds except for acidophilus      LISA-7 SCORE 12/12/2018 12/12/2018 10/2/2019   Total Score 0 0 2     PHQ 12/12/2018 12/12/2018 10/2/2019   PHQ-9 Total Score 6 2 1   Q9: Thoughts of better off dead/self-harm past 2 weeks Not at all Not at all Not at all     Last PHQ-9 10/2/2019   1.  Little interest or pleasure in doing things 0   2.  Feeling down, depressed, or hopeless 0   3.  Trouble falling or staying asleep, or sleeping too much 0   4.  Feeling tired or having little energy 1   5.  Poor appetite or overeating 0   6.  Feeling bad about yourself 0   7.  Trouble concentrating 0   8.  Moving slowly or restless 0   Q9: Thoughts of better off dead/self-harm past 2 weeks 0   PHQ-9 Total Score 1   Difficulty at work, home, or with people Not difficult at all     LISA-7  10/2/2019   1. Feeling nervous, anxious, or on edge 2   2. Not being able to stop or control worrying 0   3. Worrying too much about different things 0   4. Trouble relaxing 0   5. Being so restless that it is hard to sit still 0   6. Becoming easily annoyed or irritable 0   7. Feeling afraid, as if something awful might happen 0   LISA-7 Total Score 2   If you checked any problems, how difficult have they made it for you to do your work, take care of things at home, or get along with other people? Not difficult at all         Today's PHQ-2 Score:   PHQ-2 ( 1999 Pfizer) 12/12/2018   Q1: Little interest or pleasure in doing things 0   Q2: Feeling down, depressed or hopeless 0   PHQ-2 Score 0       Abuse: Current or Past(Physical, Sexual or Emotional)- No  Do you feel safe in your environment? No        Social History     Tobacco Use      Smoking status: Former Smoker     Packs/day: 0.10     Years: 5.00     Pack years: 0.50     Types: Cigarettes     Smokeless tobacco: Never Used   Substance Use Topics     Alcohol use: No     Alcohol/week: 0.0 standard drinks     Comment: rarely      If you drink alcohol do you typically have >3 drinks per day or >7 drinks per week? No    Alcohol Use 5/18/2016   Prescreen: >3 drinks/day or >7 drinks/week? The patient does not drink >3 drinks per day nor >7 drinks per week.       Reviewed orders with patient.  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 related to previous surgeries - 4 in 3 months for sinus issues/infection     Family history of breast cancer- mother's identical twin, maternal aunt, paternal GM and dad with breast lumps      Hot flashes     Generalized hyperhidrosis- since earliest recollections - soaks clothing multiple times/week - very disruptive to life     Contraception     Mild anxiety     Foster care child     Anxiety     Past Surgical History:   Procedure Laterality Date     APPENDECTOMY       ARTHROSCOPY, ARTHROPLASTY TEMPOROMANDIBULAR JOINT, COMBINED  age 14     TMJ surgery as an 9th grader     CYSTOSCOPY,URETEROSCOPY,STONE REMV  9/11/2002     with stent placements - since removed      ENDOSCOPIC BALLOON SINUPLASTY, OPTICAL TRACKING SYSTEM ENDOSCOPIC SINUS SURGERY, COMBINED  1/8/2013    Procedure: COMBINED ENDOSCOPIC BALLOON SINUPLASTY, OPTICAL TRACKING SYSTEM ENDOSCOPIC  SINUS SURGERY;  Left Endo Sinus  Surgery  with Septoplasty       ENDOSCOPIC SINUS SURGERY  1/15/2013    Procedure: ENDOSCOPIC SINUS SURGERY;  removal of nasal packing with endocopic debridement of left paranasal sinuses;  Surgeon: Fabian Driver MD;  Location:  OR     ENDOSCOPIC SINUS SURGERY  4/22/2013    Procedure: ENDOSCOPIC SINUS SURGERY;  Functional Endoscopic Sinus Surgery With Biopsy ;  Surgeon: Jcarlos Hogan MD;  Location:  OR       Social History     Tobacco Use     Smoking status: Former Smoker     Packs/day: 0.10     Years: 5.00     Pack years: 0.50     Types: Cigarettes     Smokeless tobacco: Never Used   Substance Use Topics     Alcohol use: No     Alcohol/week: 0.0 standard drinks     Comment: rarely      Family History   Problem Relation Age of Onset     Asthma Mother      Depression Mother      Thyroid Disease Mother      Blood Disease Mother         Bleeding disorder     Bipolar Disorder Mother      Heart Disease Father         s/p pacemakers x 2      Diabetes Father      Cancer Father         Skin, Colon-dx'd mid-late 40's     Asthma Child      Alcohol/Drug Other         Sibling     Breast Cancer Paternal Grandmother      Breast Cancer Maternal Aunt         another of mom's sisters      Breast Cancer Maternal Aunt         mother's identical twin      Allergy (Severe) Son         Dairy, Eggs, and Oatmeal         Current Outpatient Medications   Medication Sig Dispense Refill     albuterol (PROAIR HFA/PROVENTIL HFA/VENTOLIN HFA) 108 (90 Base) MCG/ACT inhaler Inhale 2 puffs into the lungs every 6 hours as needed for shortness of breath / dyspnea or wheezing 1 Inhaler 3     fluticasone (FLOVENT HFA) 44 MCG/ACT inhaler Inhale 1 puff into the lungs 2 times daily 1 Inhaler 0     ibuprofen (ADVIL) 200 MG tablet Take 200 mg by mouth as needed.       Lactobacillus (PROBIOTIC ACIDOPHILUS PO)        PARoxetine (PAXIL) 20 MG tablet Take 1 tablet (20 mg) by mouth every morning 90 tablet 1      cetirizine (ZYRTEC) 10 MG tablet Take 1 tablet (10 mg) by mouth every evening 30 tablet 1     sodium chloride (SODIUM CHLORIDE) 0.65 % nasal spray Spray 2 sprays in nostril 4 times daily. (Patient taking differently: Spray 2 sprays in nostril daily as needed ) 1 Bottle 0     Allergies   Allergen Reactions     Adhesive Tape Blisters     Seasonal Allergies      Recent Labs   Lab Test 18  0832 16  1038  01/16/16 05/23/15  0829   LDL 69  --   --   --  64   HDL 54  --   --   --  57   TRIG 55  --   --   --  55   ALT 13  --   --   --   --    CR 0.67  --   --  0.73  --    GFRESTIMATED >90  --   --  >60  --    GFRESTBLACK >90  --   --  >60  --    POTASSIUM 4.1  --   --  4.3  --    TSH 0.99 1.45   < >  --   --     < > = values in this interval not displayed.        Mammogram Screening: Patient under age 50, mutual decision reflected in health maintenance.      Pertinent mammograms are reviewed under the imaging tab.  History of abnormal Pap smear: NO - age 30- 65 PAP every 3 years recommended  PAP / HPV Latest Ref Rng & Units 2018   PAP - NIL NIL   HPV 16 DNA NEG:Negative Negative -   HPV 18 DNA NEG:Negative Negative -   OTHER HR HPV NEG:Negative Negative -     Reviewed and updated as needed this visit by clinical staff         Reviewed and updated as needed this visit by Provider        OB History    Para Term  AB Living   3 3 3 0 0 3   SAB TAB Ectopic Multiple Live Births   0 0 0 0 0      # Outcome Date GA Lbr Alvaro/2nd Weight Sex Delivery Anes PTL Lv   3 Term            2 Term            1 Term               Obstetric Comments   Hx of kidney stones with last 2 pregnancies -  x 3 uncomplicated        Review of Systems- still getting: Generalized hyperhidrosis- since earliest recollections - soaks clothing multiple times/week - very disruptive to life - worse at night when sick.     CONSTITUTIONAL: NEGATIVE for fever, chills, change in weight  INTEGUMENTARU/SKIN: NEGATIVE for  "worrisome rashes, moles or lesions  EYES: NEGATIVE for vision changes or irritation  ENT: NEGATIVE for ear, mouth and throat problems  RESP: positive for significant cough or SOB  BREAST: NEGATIVE for masses, tenderness or discharge  CV: NEGATIVE for chest pain, palpitations or peripheral edema  GI: NEGATIVE for nausea, abdominal pain, heartburn, or change in bowel habits  : NEGATIVE for unusual urinary or vaginal symptoms. Periods are regular.  MUSCULOSKELETAL: NEGATIVE for significant arthralgias or myalgia  NEURO: NEGATIVE for weakness, dizziness or paresthesias  ENDOCRINE: NEGATIVE for temperature intolerance, skin/hair changes  HEME/ALLERGY/IMMUNE: NEGATIVE for bleeding problems  PSYCHIATRIC: NEGATIVE for changes in mood or affect     OBJECTIVE:   /78   Pulse 108   Temp 97  F (36.1  C)   Ht 1.727 m (5' 8\")   Wt 89.9 kg (198 lb 3.2 oz)   LMP 11/04/2019   SpO2 97%   Breastfeeding No   BMI 30.14 kg/m    Physical Exam:   GENERAL: appears mildly ill,  alert and no distress. Paroxysmal cough noted.   EYES: Eyes grossly normal to inspection, PERRL and conjunctivae and sclerae normal  HENT: ear canals and TM's normal, nose - congested; with left maxillary sinus tenderness to palpation;and mouth without ulcers or lesions  NECK: no adenopathy, no asymmetry, masses, or scars and thyroid normal to palpation  RESP: lungs clear to auscultation - no rhonchi or wheezes, really good inspiratory and expiratory airflow, but dry bronchospastic cough noted and Respiratory crackles 1/4 way up posterior chest wall on right side.   BREAST: normal without masses, tenderness or nipple discharge and no palpable axillary masses or adenopathy  CV: regular rate and rhythm, normal S1 S2, no S3 or S4, no murmur, click or rub, no peripheral edema and peripheral pulses strong  ABDOMEN: soft, nontender, no hepatosplenomegaly, no masses and bowel sounds normal  No pelvic exam done today.   MS: no gross musculoskeletal defects " noted, no edema  SKIN: no suspicious lesions or rashes  NEURO: Normal strength and tone, mentation intact and speech normal  PSYCH: mentation appears normal, affect normal/bright.       Diagnostic Test Results: from [facility] 11/30/2019:    INFLUENZA  Component Name  11/30/2019     Negative fo...   INFLUENZA ANTIGEN            PREGNANCY  Component Name  11/30/2019 3/12/2018 1/18/2016     Negative Negative Negative   PREGNANCY,URINE              RESP PANEL, MULTIPLEX PCR  Component Name  11/30/2019     Negative   Negative   Bordetella Pertussis   Bordetella Parapertussis       Labs reviewed in Epic    Results for orders placed or performed in visit on 12/04/19 (from the past 24 hour(s))   HCG Qual, Urine ([id number])   Result Value Ref Range    HCG Qual Urine Negative NEG^Negative   CBC with platelets differential   Result Value Ref Range    WBC 5.9 4.0 - 11.0 10e9/L    RBC Count 4.25 3.8 - 5.2 10e12/L    Hemoglobin 11.9 11.7 - 15.7 g/dL    Hematocrit 35.9 35.0 - 47.0 %    MCV 85 78 - 100 fl    MCH 28.0 26.5 - 33.0 pg    MCHC 33.1 31.5 - 36.5 g/dL    RDW 13.0 10.0 - 15.0 %    Platelet Count 285 150 - 450 10e9/L    % Neutrophils 67.0 %    % Lymphocytes 18.5 %    % Monocytes 10.5 %    % Eosinophils 3.5 %    % Basophils 0.5 %    Absolute Neutrophil 4.0 1.6 - 8.3 10e9/L    Absolute Lymphocytes 1.1 0.8 - 5.3 10e9/L    Absolute Monocytes 0.6 0.0 - 1.3 10e9/L    Absolute Eosinophils 0.2 0.0 - 0.7 10e9/L    Absolute Basophils 0.0 0.0 - 0.2 10e9/L    Diff Method Automated Method      CXR - retrocardiac fussiness seen on xray correlates with area of right lower lobe crackles on exam.   Xray - of sinuses is clear.     ASSESSMENT/PLAN:       ICD-10-CM    1. Encounter for routine adult health examination with abnormal findings Z00.01    2. Encounter for initial prescription of contraceptive pills Z30.011 HCG Qual, Urine ([id number])     norethindrone (MICRONOR) 0.35 MG tablet     CANCELED: Beta HCG qual IFA urine   3. Cough R05 XR  Chest 2 Views     TB INTRADERMAL TEST     Coccidioides antibody     CBC with platelets differential     Bordetalla pertussis Linda IgG with Reflex     azithromycin (ZITHROMAX) 250 MG tablet     benzonatate (TESSALON) 200 MG capsule     guaiFENesin-codeine (ROBITUSSIN AC) 100-10 MG/5ML solution     mometasone (ASMANEX) 220 MCG/INH inhaler     OFFICE/OUTPT VISIT,EST,LEVL V     CANCELED: B. pertussis/parapertussis PCR-NP   4. Travel-related illness T75.3XXA Coccidioides antibody     OFFICE/OUTPT VISIT,EST,LEVL V   5. Periorbital headache R51 XR Sinus 1/2 Views     OFFICE/OUTPT VISIT,EST,LEVL V   6. Other iron deficiency anemia - Anemia - ? related to previous surgeries - 4 in 3 months for sinus issues/infection D50.8    7. CARDIOVASCULAR SCREENING; LDL GOAL LESS THAN 160 Z13.6 Comprehensive metabolic panel     Lipid panel reflex to direct LDL Fasting     TSH with free T4 reflex   8. Respiratory crackles 1/4 way up posterior chest wall on right side R09.89 OFFICE/OUTPT VISIT,EST,LEVL V   9. Generalized hyperhidrosis- since earliest recollections - soaks clothing multiple times/week - very disruptive to life R61 OFFICE/OUTPT VISIT,EST,LEVL V   10. Pneumonia of right lower lobe due to infectious organism (H) J18.1 azithromycin (ZITHROMAX) 250 MG tablet     cefdinir (OMNICEF) 300 MG capsule     benzonatate (TESSALON) 200 MG capsule     guaiFENesin-codeine (ROBITUSSIN AC) 100-10 MG/5ML solution     mometasone (ASMANEX) 220 MCG/INH inhaler     OFFICE/OUTPT VISIT,EST,LEVL V     DISCONTINUED: levofloxacin (LEVAQUIN) 500 MG tablet   11. Left maxillary sinusitis J32.0 OFFICE/OUTPT VISIT,EST,LEVL V     DISCONTINUED: levofloxacin (LEVAQUIN) 500 MG tablet   12. Cough due to bronchospasm J98.01 mometasone (ASMANEX) 220 MCG/INH inhaler     OFFICE/OUTPT VISIT,EST,LEVL V   13. Chronic tension-type headache, intractable G44.221 butalbital-aspirin-caffeine (FIORINAL) -40 MG capsule     See pt instructions for ocp start.  With  "possible migraine hx  can't do combination ocp's.  Ok to try micronor- progestin only.       Will cover for possible pertussis with another course of azithromycin and add omnicef. For pneumonia coverage.     COUNSELING:  Reviewed preventive health counseling, as reflected in patient instructions    Estimated body mass index is 30.26 kg/m  as calculated from the following:    Height as of 11/5/19: 1.727 m (5' 8\").    Weight as of 11/5/19: 90.3 kg (199 lb).    Weight management plan: Discussed healthy diet and exercise guidelines     reports that she has quit smoking. Her smoking use included cigarettes. She has a 0.50 pack-year smoking history. She has never used smokeless tobacco.     Wait until your next period to start your pills/ patches - first day of next menstrual period will be day #1,  Take your pill or Put on your patch on day #5, then next cycle take your pill or put your patch on day #7 and always restart active pills/patches on Day#7 of your placebo  Pills in  pack regardless of whether your period is done or no.     If you spot immediately prior to your period, then start your pill pack on the second day of your heavy flow.     Please, call or return to clinic or go to the ER immediately if signs or symptoms worsen or fail to improve as anticipated.     Don't overlap the new headache medication with the robitussin with codeine within 4 hours of each other , please.   Counseling Resources:  ATP IV Guidelines  Pooled Cohorts Equation Calculator  Breast Cancer Risk Calculator  FRAX Risk Assessment  ICSI Preventive Guidelines  Dietary Guidelines for Americans, 2010  USDA's MyPlate  ASA Prophylaxis  Lung CA Screening    Linda Soto MD  [facility]  "

## 2019-12-04 NOTE — PATIENT INSTRUCTIONS
Wait until your next period to start your pills/ patches - first day of next menstrual period will be day #1,  Take your pill or Put on your patch on day #5, then next cycle take your pill or put your patch on day #7 and always restart active pills/patches on Day#7 of your placebo  Pills in  pack regardless of whether your period is done or no.     If you spot immediately prior to your period, then start your pill pack on the second day of your heavy flow.     Please, call or return to clinic or go to the ER immediately if signs or symptoms worsen or fail to improve as anticipated.     Don't overlap the new headache medication with the robitussin with codeine within 4 hours of each other , please.        Preventive Health Recommendations  Female Ages 40 to 49    Yearly exam:     See your health care provider every year in order to  1. Review health changes.   2. Discuss preventive care.    3. Review your medicines if your doctor prescribed any.      Get a Pap test every three years (unless you have an abnormal result and your provider advises testing more often).      If you get Pap tests with HPV test, you only need to test every 5 years, unless you have an abnormal result. You do not need a Pap test if your uterus was removed (hysterectomy) and you have not had cancer.      You should be tested each year for STDs (sexually transmitted diseases), if you're at risk.     Ask your doctor if you should have a mammogram.      Have a colonoscopy (test for colon cancer) if someone in your family has had colon cancer or polyps before age 50.       Have a cholesterol test every 5 years.       Have a diabetes test (fasting glucose) after age 45. If you are at risk for diabetes, you should have this test every 3 years.    Shots: Get a flu shot each year. Get a tetanus shot every 10 years.     Nutrition:     Eat at least 5 servings of fruits and vegetables each day.    Eat whole-grain bread, whole-wheat pasta and brown rice  instead of white grains and rice.    Get adequate Calcium and Vitamin D.      Lifestyle    Exercise at least 150 minutes a week (an average of 30 minutes a day, 5 days a week). This will help you control your weight and prevent disease.    Limit alcohol to one drink per day.    No smoking.     Wear sunscreen to prevent skin cancer.    See your dentist every six months for an exam and cleaning.  Preventive Health Recommendations  Female Ages 40 to 49    Yearly exam:   See your health care provider every year in order to  Review health changes.   Discuss preventive care.    Review your medicines if your doctor prescribed any.    Get a Pap test every three years (unless you have an abnormal result and your provider advises testing more often).    If you get Pap tests with HPV test, you only need to test every 5 years, unless you have an abnormal result. You do not need a Pap test if your uterus was removed (hysterectomy) and you have not had cancer.    You should be tested each year for STDs (sexually transmitted diseases), if you're at risk.   Ask your doctor if you should have a mammogram.    Have a colonoscopy (test for colon cancer) if someone in your family has had colon cancer or polyps before age 50.     Have a cholesterol test every 5 years.     Have a diabetes test (fasting glucose) after age 45. If you are at risk for diabetes, you should have this test every 3 years.    Shots: Get a flu shot each year. Get a tetanus shot every 10 years.     Nutrition:   Eat at least 5 servings of fruits and vegetables each day.  Eat whole-grain bread, whole-wheat pasta and brown rice instead of white grains and rice.  Get adequate Calcium and Vitamin D.      Lifestyle  Exercise at least 150 minutes a week (an average of 30 minutes a day, 5 days a week). This will help you control your weight and prevent disease.  Limit alcohol to one drink per day.  No smoking.   Wear sunscreen to prevent skin cancer.  See your dentist every  six months for an exam and cleaning.    Thank you so much or choosing [facility]  for your Health Care. It was a pleasure seeing you at your visit today! Please contact us with any questions or concerns you may have.                   Linda Soto MD                              To reach your [facility] care team after hours call:   [phone]    Our clinic hours are:     Monday- 7:30 am - 7:00 pm                             Tuesday through Friday- 7:30 am - 5:00 pm                                        Saturday- 8:00 am - 12:00 pm                  Phone:  [phone]    Our pharmacy hours are:     Monday  8:00 am to 7:00 pm      Tuesday through Friday 8:00am to 6:00pm                        Saturday - 9:00 am to 1:00 pm      Sunday : Closed.              Phone:  [phone]      There is also information available at our web site:  [email or web address]    If your provider ordered any lab tests and you do not receive the results within 10 business days, please call the clinic.    If you need a medication refill please contact your pharmacy.  Please allow 2 business days for your refill to be completed.    Our clinic offers telephone visits and e visits.  Please ask one of your team members to explain more.      Use [employer] (secure email communication and access to your chart) to send your primary care provider a message or make an appointment. Ask someone on your Team how to sign up for [employer].               Patient Education     Whooping Cough (Pertussis) (Adult)  Whooping cough (pertussis) is a bacterial infection in the respiratory tract. It can be a very serious infection in infants and older adults. In healthy older children and adults, it is generally mild.  Pertussis is highly contagious. The infection is spread through the air by coughing or sneezing. The illness starts like an ordinary cold with mild cough, congestion, and low fever.  "Symptoms then develop that may include:    Coughing spells that cause a \"whooping\" sound when breathing in    Gagging or vomiting after coughing    Poor appetite    Feeling very tired    Thick mucus in the nose and throat  Antibiotics are used to treat this illness. Even with treatment, it may take up to 3 months for the cough to go away completely.  Vaccination prevents pertussis in children. The vaccine effect lessens after 5 to 10 years. So a booster vaccine is often needed. Teens and adults who were vaccinated as children and have not had a booster can be infected and may spread the infection to unvaccinated infants and children. Be sure to ask your healthcare provider whether anyone in your household needs a booster vaccination.  Home care    Take all medicine as prescribed by the healthcare provider. Be sure to take antibiotics as directed until they are gone, even if you feel better. If you don't finish the antibiotics, the infection may come back and be harder to treat.    Rest and get plenty of sleep.    Stay home from work or school until you have completed at least 5 days of antibiotic treatment. If antibiotics are not used, stay home until 21 days after you first had symptoms of a cough. When resuming activity, go back to your normal routine gradually.    Avoid exposure to cigarette smoke.    Ask your healthcare provider before taking over-the-counter medicine for fever, pain, and coughing.    To avoid loss of fluids (dehydration), try drinking 6 to 8 glasses of fluids (water, juice, tea, soup) a day. Fluids will help loosen secretions in the nose and lungs.    Cover your mouth and nose when you sneeze or cough. Dispose of any tissues properly.    Wash your hands well with soap and water after you cough or sneeze, and frequently throughout the day.  Follow-up care  Follow up with your healthcare provider as advised.  When to seek medical advice  Call your healthcare provider right away for any of the " following:    Trouble breathing or painful breathing    Cough with repeated gagging or vomiting    Coughing up colored or bloody mucus    Severe headache or face, neck, or ear pain    Fever over 100.4 F (38.0 C) for more than 3 days    You don't start improving within 1 week  Date Last Reviewed: 3/1/2018    3516-0658 [employer]. [address] [place] [address]. All rights reserved. This information is not intended as a substitute for professional medical care. Always follow your healthcare professional's instructions.           Patient Education     Pneumonia (Adult)  Pneumonia is an infection deep within the lungs. It is in the small air sacs (alveoli). Pneumonia may be caused by a virus or bacteria. Pneumonia caused by bacteria is usually treated with an antibiotic. Severe cases may need to be treated in the hospital. Milder cases can be treated at home. Symptoms usually start to get better during the first 2 days of treatment.    Home care  Follow these guidelines when caring for yourself at home:    Rest at home for the first 2 to 3 days, or until you feel stronger. Don t let yourself get overly tired when you go back to your activities.    Stay away from cigarette smoke - yours or other people s.    You may use acetaminophen or ibuprofen to control fever or pain, unless another medicine was prescribed. If you have chronic liver or kidney disease, talk with your healthcare provider before using these medicines. Also talk with your provider if you ve had a stomach ulcer or gastrointestinal bleeding. Don t give aspirin to anyone younger than 18 years of age who is ill with a fever. It may cause severe liver damage.    Your appetite may be poor, so a light diet is fine.    Drink 6 to 8 glasses of fluids every day to make sure you are getting enough fluids. Beverages can include water, sport drinks, sodas without caffeine, juices, tea, or soup. Fluids will help loosen secretions in the lung.  This will make it easier for you to cough up the phlegm (sputum). If you also have heart or kidney disease, check with your healthcare provider before you drink extra fluids.    Take antibiotic medicine prescribed until it is all gone, even if you are feeling better after a few days.  Follow-up care  Follow up with your healthcare provider in the next 2 to 3 days, or as advised. This is to be sure the medicine is helping you get better.  If you are 65 or older, you should get a pneumococcal vaccine and a yearly flu (influenza) shot. You should also get these vaccines if you have chronic lung disease like asthma, emphysema, or COPD. Recently, a second type of pneumonia vaccine has become available for everyone over 65 years old. This is in addition to the previous vaccine. Ask your provider about this.  When to seek medical advice  Call your healthcare provider right away if any of these occur:    You don t get better within the first 48 hours of treatment    Shortness of breath gets worse    Rapid breathing (more than 25 breaths per minute)    Coughing up blood    Chest pain gets worse with breathing    Fever of 100.4 F (38 C) or higher that doesn t get better with fever medicine    Weakness, dizziness, or fainting that gets worse    Thirst or dry mouth that gets worse    Sinus pain, headache, or a stiff neck    Chest pain not caused by coughing  Date Last Reviewed: 1/1/2017 2000-2018 [employer]. [address] [place], [place] [id number]. All rights reserved. This information is not intended as a substitute for professional medical care. Always follow your healthcare professional's instructions.         Thank you so much or choosing [facility]  for your Health Care. It was a pleasure seeing you at your visit today! Please contact us with any questions or concerns you may have.                   Linda Soto MD                              To reach your [facility] team after hours call:   [phone]    Our clinic hours are:     Monday- 7:30 am - 7:00 pm                             Tuesday through Friday- 7:30 am - 5:00 pm                                        Saturday- 8:00 am - 12:00 pm                  Phone:  [phone]    Our pharmacy hours are:     Monday  8:00 am to 7:00 pm      Tuesday through Friday 8:00am to 6:00pm                        Saturday - 9:00 am to 1:00 pm      Sunday : Closed.              Phone:  [phone]      There is also information available at our web site:  [email or web address]    If your provider ordered any lab tests and you do not receive the results within 10 business days, please call the clinic.    If you need a medication refill please contact your pharmacy.  Please allow 2 business days for your refill to be completed.    Our clinic offers telephone visits and e visits.  Please ask one of your team members to explain more.      Use [employer] (secure email communication and access to your chart) to send your primary care provider a message or make an appointment. Ask someone on your Team how to sign up for [employer].

## 2019-12-04 NOTE — LETTER
[address] [place] [address]                                                                                                       [phone]    December 4, 2019    Deandra Lewis  [facility] [facility] [place] [address]      To Whom it May Concern:    The above patient is unable to attend work for today December 4, 2019 through Friday, December 6, 2019,  due to a possible contagious cough with possible pneumonia. Please contact me with questions or concerns.      Sincerely,       Linda Soto M.D.

## 2019-12-04 NOTE — LETTER
[facility]  [address] [place] [address], [place] [address]                  [phone]   December 12, 2019    Deandra Lewis  [place] [address]      Dear Deandra,    Here is a summary of your recent test results:    Tb skin test is negative.  No signs of tuberculosis.       For additional lab test information, [email or web address] is an excellent reference.     Your test results are enclosed.      Please contact me if you have any questions.    In addition, here is a list of due or overdue Health Maintenance reminders.    There are no preventive care reminders to display for this patient.    Please call us at [phone] (or use [email or web address]) to address the above recommendations.            Thank you very much for trusting [facility]..     Healthy regards,       Linda Soto M.D.          Results for orders placed or performed in visit on 12/04/19   TB INTRADERMAL TEST     Status: None   Result Value Ref Range    PPD Induration 0 0 - 5 mm    PPD Redness 0 mm   HCG Qual, Urine ([id number])     Status: None   Result Value Ref Range    HCG Qual Urine Negative NEG^Negative   Coccidioides antibody     Status: None   Result Value Ref Range    Coccidioides Linda <1:2 <1:2   CBC with platelets differential     Status: None   Result Value Ref Range    WBC 5.9 4.0 - 11.0 10e9/L    RBC Count 4.25 3.8 - 5.2 10e12/L    Hemoglobin 11.9 11.7 - 15.7 g/dL    Hematocrit 35.9 35.0 - 47.0 %    MCV 85 78 - 100 fl    MCH 28.0 26.5 - 33.0 pg    MCHC 33.1 31.5 - 36.5 g/dL    RDW 13.0 10.0 - 15.0 %    Platelet Count 285 150 - 450 10e9/L    % Neutrophils 67.0 %    % Lymphocytes 18.5 %    % Monocytes 10.5 %    % Eosinophils 3.5 %    % Basophils 0.5 %    Absolute Neutrophil 4.0 1.6 - 8.3 10e9/L    Absolute Lymphocytes 1.1 0.8 - 5.3 10e9/L    Absolute Monocytes 0.6 0.0 - 1.3 10e9/L    Absolute Eosinophils 0.2 0.0 - 0.7 10e9/L    Absolute Basophils 0.0 0.0 - 0.2 10e9/L     Diff Method Automated Method    Comprehensive metabolic panel     Status: Abnormal   Result Value Ref Range    Sodium 140 133 - 144 mmol/L    Potassium 4.0 3.4 - 5.3 mmol/L    Chloride 108 94 - 109 mmol/L    Carbon Dioxide 24 20 - 32 mmol/L    Anion Gap 8 3 - 14 mmol/L    Glucose 81 70 - 99 mg/dL    Urea Nitrogen 11 7 - 30 mg/dL    Creatinine 0.66 0.52 - 1.04 mg/dL    GFR Estimate >90 >60 mL/min/[1.73_m2]    GFR Estimate If Black >90 >60 mL/min/[1.73_m2]    Calcium 8.7 8.5 - 10.1 mg/dL    Bilirubin Total 0.5 0.2 - 1.3 mg/dL    Albumin 3.2 (L) 3.4 - 5.0 g/dL    Protein Total 6.7 (L) 6.8 - 8.8 g/dL    Alkaline Phosphatase 82 40 - 150 U/L    ALT 28 0 - 50 U/L    AST 19 0 - 45 U/L   Lipid panel reflex to direct LDL Fasting     Status: Abnormal   Result Value Ref Range    Cholesterol 139 <200 mg/dL    Triglycerides 68 <150 mg/dL    HDL Cholesterol 45 (L) >49 mg/dL    LDL Cholesterol Calculated 80 <100 mg/dL    Non HDL Cholesterol 94 <130 mg/dL   TSH with free T4 reflex     Status: None   Result Value Ref Range    TSH 0.65 0.40 - 4.00 mU/L   Bordetalla pertussis Linda IgG with Reflex     Status: None   Result Value Ref Range    B pertussis Ab IgG HREA 1.03 <=1.04 IV

## 2019-12-05 LAB
ALBUMIN SERPL-MCNC: 3.2 G/DL (ref 3.4–5)
ALP SERPL-CCNC: 82 U/L (ref 40–150)
ALT SERPL W P-5'-P-CCNC: 28 U/L (ref 0–50)
ANION GAP SERPL CALCULATED.3IONS-SCNC: 8 MMOL/L (ref 3–14)
AST SERPL W P-5'-P-CCNC: 19 U/L (ref 0–45)
B PERT IGG SER IA-ACNC: 1.03 IV
BILIRUB SERPL-MCNC: 0.5 MG/DL (ref 0.2–1.3)
BUN SERPL-MCNC: 11 MG/DL (ref 7–30)
CALCIUM SERPL-MCNC: 8.7 MG/DL (ref 8.5–10.1)
CHLORIDE SERPL-SCNC: 108 MMOL/L (ref 94–109)
CHOLEST SERPL-MCNC: 139 MG/DL
CO2 SERPL-SCNC: 24 MMOL/L (ref 20–32)
CREAT SERPL-MCNC: 0.66 MG/DL (ref 0.52–1.04)
GFR SERPL CREATININE-BSD FRML MDRD: >90 ML/MIN/{1.73_M2}
GLUCOSE SERPL-MCNC: 81 MG/DL (ref 70–99)
HDLC SERPL-MCNC: 45 MG/DL
LDLC SERPL CALC-MCNC: 80 MG/DL
NONHDLC SERPL-MCNC: 94 MG/DL
POTASSIUM SERPL-SCNC: 4 MMOL/L (ref 3.4–5.3)
PROT SERPL-MCNC: 6.7 G/DL (ref 6.8–8.8)
SODIUM SERPL-SCNC: 140 MMOL/L (ref 133–144)
TRIGL SERPL-MCNC: 68 MG/DL
TSH SERPL DL<=0.005 MIU/L-ACNC: 0.65 MU/L (ref 0.4–4)

## 2019-12-05 ASSESSMENT — ANXIETY QUESTIONNAIRES: GAD7 TOTAL SCORE: 1

## 2019-12-06 ENCOUNTER — TELEPHONE (OUTPATIENT)
Dept: FAMILY MEDICINE | Facility: CLINIC | Age: 44
End: 2019-12-06

## 2019-12-06 ENCOUNTER — ANTICOAGULATION THERAPY VISIT (OUTPATIENT)
Dept: NURSING | Facility: CLINIC | Age: 44
End: 2019-12-06
Payer: COMMERCIAL

## 2019-12-06 LAB
COCCIDIOIDES AB SPEC QL ID: NORMAL
PPDINDURATION: 0 MM (ref 0–5)
PPDREDNESS: 0 MM

## 2019-12-06 NOTE — TELEPHONE ENCOUNTER
Patient requesting lab results from 12/04/2019    Routing to PCP for further review/recommendations/orders.  Please review and advise    Katelynn Flynn RN  [facility]

## 2019-12-06 NOTE — PROGRESS NOTES
Mantoux result:  Lab Results   Component Value Date    PPDREDNESS 0 12/06/2019    PPDINDURATIO 0 12/06/2019     Is induration greater than 5mm?  No     Mantoux Negative      Katelynn Flynn RN  [facility]

## 2019-12-09 ENCOUNTER — TELEPHONE (OUTPATIENT)
Dept: FAMILY MEDICINE | Facility: CLINIC | Age: 44
End: 2019-12-09

## 2019-12-09 NOTE — TELEPHONE ENCOUNTER
Pt calling in to inquire about the test result for Pertussis, Pt was advised of the level/lab result. 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 Pt was advised if she is still symptomatic it would be best not to be around the children that could be compromised. Pt would like input of PCP as well.   Bordetalla pertussis Linda IgG with Reflex   Order: [id number]   Status:  Final result   Visible to patient:  No (Inaccessible in MyChart) Next appt:  None Dx:  Cough    Ref Range & Units 5d ago   B pertussis Ab IgG RHEA <=1.04 IV 1.03    Comment: (Note)   When Bordetella pertussis Antibodies by RHEA testing is   negative, no further testing is performed.   INTERPRETIVE INFORMATION: B. pertussis Ab, IgG    0.94 IV or less:    Negative - No significant level of                        detectable B. pertussis IgG antibody.    0.95-1.04 IV:       Equivocal - Repeat testing in 10-14                        days may be helpful.    1.05 IV or greater: Positive - IgG antibody to B.                        pertussis detected, which may indicate                        a current or recent                        exposure/immunization to B. pertussis            Pt would like follow up, advised if lab visit or OV.    Freddy Quinones RN   [facility]

## 2019-12-11 NOTE — TELEPHONE ENCOUNTER
See result note and other phone encounter as well.   Pt is no longer contagious after she's been on azithromycin x 5 days - this was rx'd on 12/4/2019.

## 2019-12-11 NOTE — TELEPHONE ENCOUNTER
Pt advised of result note and agreed to come back for lab only  Pt had not further questions at this time    Freddy Quinones RN   [facility]

## 2019-12-11 NOTE — RESULT ENCOUNTER NOTE
Please call pt with results below:     -All of your labs are normal, except protein levels in blood a little low - recommend including at least 3 oz of protein with each meal .   TSH - thyroid stimulating hormone = normal thyroid function.  CBC with hemoglobin, white blood cells , and platelets all normal   The testing of your blood sugar, kidney function, liver function and electrolytes was normal.  coccidiomycosis marta - valley fever germ - negative.   But: Bordetella Pertussis marta = 1.03 and if it is 0.95-1.04 IV =     Equivocal - Repeat testing in 10-14                        days may be helpful.  Recommend repeating the same pertussis test in 10 days  - Please  enter future orders for this and pt  can do this as a lab only appointment.   Please inform patient. They can have this done at any [facility] without appt during regular outpt lab hours or by lab only appt at any [place] clinic.    Dx: equivocal pertussis test       For additional lab test information, [email or web address] is an excellent reference.

## 2019-12-12 DIAGNOSIS — R05.9 COUGH: Primary | ICD-10-CM

## 2019-12-12 PROCEDURE — 36415 COLL VENOUS BLD VENIPUNCTURE: CPT | Performed by: FAMILY MEDICINE

## 2019-12-12 PROCEDURE — 86615 BORDETELLA ANTIBODY: CPT | Mod: 90 | Performed by: FAMILY MEDICINE

## 2019-12-12 PROCEDURE — 99000 SPECIMEN HANDLING OFFICE-LAB: CPT | Performed by: FAMILY MEDICINE

## 2019-12-15 LAB — B PERT IGG SER IA-ACNC: 0.91 IV

## 2019-12-18 NOTE — RESULT ENCOUNTER NOTE
Please call pt with results below:     Repeat testing for pertussis was negative.  Check status of pt's cough symptoms, please.     For additional lab test information, [email or web address] is an excellent reference.

## 2019-12-19 DIAGNOSIS — J18.9 PNEUMONIA OF RIGHT LOWER LOBE DUE TO INFECTIOUS ORGANISM: ICD-10-CM

## 2019-12-19 RX ORDER — CEFDINIR 300 MG/1
600 CAPSULE ORAL DAILY
Qty: 14 CAPSULE | Refills: 0 | Status: SHIPPED | OUTPATIENT
Start: 2019-12-19 | End: 2020-01-14

## 2019-12-26 DIAGNOSIS — J98.01 COUGH DUE TO BRONCHOSPASM: ICD-10-CM

## 2019-12-26 DIAGNOSIS — J18.9 PNEUMONIA OF RIGHT LOWER LOBE DUE TO INFECTIOUS ORGANISM: ICD-10-CM

## 2019-12-26 DIAGNOSIS — R05.9 COUGH: ICD-10-CM

## 2019-12-26 NOTE — TELEPHONE ENCOUNTER
"Requested Prescriptions   Pending Prescriptions Disp Refills     ASMANEX, 60 METERED DOSES, 220 MCG/INH inhaler [Pharmacy Med Name: ASMANEX TWISTHALER(60) 220MCG]          Last Written Prescription Date:  12.4.19  Last Fill Quantity: 1 inhaler,  # refills: 1   Last office visit: 12/4/2019 with prescribing provider:  Linda Soto MD           Future Office Visit:        1     Sig: INHALE 1 PUFF BY MOUTH INTO THE LUNGS TWICE A DAY       Inhaled Steroids Protocol Passed - 12/26/2019  1:33 PM        Passed - Patient is age 12 or older        Passed - Recent (12 mo) or future (30 days) visit within the authorizing provider's specialty     Patient has had an office visit with the authorizing provider or a provider within the authorizing providers department within the previous 12 mos or has a future within next 30 days. See \"Patient Info\" tab in inbasket, or \"Choose Columns\" in Meds & Orders section of the refill encounter.              Passed - Medication is active on med list        "

## 2019-12-27 NOTE — TELEPHONE ENCOUNTER
Prescription approved per [facility] Refill Protocol.    Freddy Quinones RN   [facility]

## 2020-01-05 DIAGNOSIS — R05.9 COUGH: ICD-10-CM

## 2020-01-06 RX ORDER — FLUTICASONE PROPIONATE 44 MCG
AEROSOL WITH ADAPTER (GRAM) INHALATION
Qty: 10.6 INHALER | Refills: 0 | Status: SHIPPED | OUTPATIENT
Start: 2020-01-06 | End: 2020-09-01

## 2020-01-10 DIAGNOSIS — J18.9 PNEUMONIA OF RIGHT LOWER LOBE DUE TO INFECTIOUS ORGANISM: ICD-10-CM

## 2020-01-10 DIAGNOSIS — R05.9 COUGH: ICD-10-CM

## 2020-01-10 DIAGNOSIS — J98.01 COUGH DUE TO BRONCHOSPASM: ICD-10-CM

## 2020-01-10 NOTE — TELEPHONE ENCOUNTER
"Requested Prescriptions   Pending Prescriptions Disp Refills     mometasone (ASMANEX, 60 METERED DOSES,) 220 MCG/INH inhaler      Last Written Prescription Date:  12.27.19  Last Fill Quantity: 1 inhaler,  # refills:  0  Last office visit: 12/4/2019 with prescribing provider:  Linda Soto MD             Future Office Visit:       1 Inhaler 0       Inhaled Steroids Protocol Passed - 1/10/2020  2:14 PM        Passed - Patient is age 12 or older        Passed - Recent (12 mo) or future (30 days) visit within the authorizing provider's specialty     Patient has had an office visit with the authorizing provider or a provider within the authorizing providers department within the previous 12 mos or has a future within next 30 days. See \"Patient Info\" tab in inbasket, or \"Choose Columns\" in Meds & Orders section of the refill encounter.              Passed - Medication is active on med list        "

## 2020-01-13 NOTE — TELEPHONE ENCOUNTER
Prescription approved per [facility] [place].    Freddy Quinones RN   [facility]

## 2020-01-14 ENCOUNTER — OFFICE VISIT (OUTPATIENT)
Dept: FAMILY MEDICINE | Facility: CLINIC | Age: 45
End: 2020-01-14
Payer: COMMERCIAL

## 2020-01-14 ENCOUNTER — NURSE TRIAGE (OUTPATIENT)
Dept: FAMILY MEDICINE | Facility: CLINIC | Age: 45
End: 2020-01-14

## 2020-01-14 ENCOUNTER — ANCILLARY PROCEDURE (OUTPATIENT)
Dept: GENERAL RADIOLOGY | Facility: CLINIC | Age: 45
End: 2020-01-14
Attending: NURSE PRACTITIONER
Payer: COMMERCIAL

## 2020-01-14 VITALS
OXYGEN SATURATION: 97 % | HEIGHT: 68 IN | SYSTOLIC BLOOD PRESSURE: 130 MMHG | HEART RATE: 89 BPM | TEMPERATURE: 98.7 F | DIASTOLIC BLOOD PRESSURE: 80 MMHG | BODY MASS INDEX: 30.14 KG/M2

## 2020-01-14 DIAGNOSIS — R06.00 DYSPNEA, UNSPECIFIED TYPE: ICD-10-CM

## 2020-01-14 DIAGNOSIS — R05.8 COUGH PRESENT FOR GREATER THAN 3 WEEKS: ICD-10-CM

## 2020-01-14 DIAGNOSIS — R07.89 CHEST HEAVINESS: ICD-10-CM

## 2020-01-14 DIAGNOSIS — R06.00 DYSPNEA, UNSPECIFIED TYPE: Primary | ICD-10-CM

## 2020-01-14 PROCEDURE — 93000 ELECTROCARDIOGRAM COMPLETE: CPT | Performed by: NURSE PRACTITIONER

## 2020-01-14 PROCEDURE — 71046 X-RAY EXAM CHEST 2 VIEWS: CPT | Mod: FY

## 2020-01-14 PROCEDURE — 99214 OFFICE O/P EST MOD 30 MIN: CPT | Mod: 25 | Performed by: NURSE PRACTITIONER

## 2020-01-14 RX ORDER — IPRATROPIUM BROMIDE AND ALBUTEROL SULFATE 2.5; .5 MG/3ML; MG/3ML
1 SOLUTION RESPIRATORY (INHALATION) EVERY 4 HOURS PRN
Qty: 1 BOX | Refills: 1 | Status: SHIPPED | OUTPATIENT
Start: 2020-01-14 | End: 2021-04-14

## 2020-01-14 RX ORDER — BENZONATATE 200 MG/1
200 CAPSULE ORAL 3 TIMES DAILY PRN
Qty: 30 CAPSULE | Refills: 0 | Status: SHIPPED | OUTPATIENT
Start: 2020-01-14 | End: 2021-04-14

## 2020-01-14 RX ORDER — IPRATROPIUM BROMIDE AND ALBUTEROL SULFATE 2.5; .5 MG/3ML; MG/3ML
3 SOLUTION RESPIRATORY (INHALATION) ONCE
Status: COMPLETED | OUTPATIENT
Start: 2020-01-14 | End: 2020-01-14

## 2020-01-14 RX ORDER — PREDNISONE 20 MG/1
TABLET ORAL
Qty: 18 TABLET | Refills: 0 | Status: SHIPPED | OUTPATIENT
Start: 2020-01-14 | End: 2020-01-24

## 2020-01-14 RX ADMIN — IPRATROPIUM BROMIDE AND ALBUTEROL SULFATE 3 ML: 2.5; .5 SOLUTION RESPIRATORY (INHALATION) at 15:25

## 2020-01-14 NOTE — PATIENT INSTRUCTIONS
Patient Education     Chronic Cough with Uncertain Cause (Adult)    Everyone has had a cough as part of the common cold, flu, or bronchitis. This kind of cough occurs along with an achy feeling, low-grade fever, nasal and sinus congestion, and a scratchy or sore throat. This usually gets better in 2 to 3 weeks. A cough that lasts longer than 3 weeks may be due to other causes. Your healthcare provider may refer to this as a chronic cough.  If your cough does not improve over the next 2 weeks, further testing may be needed. Follow up with your healthcare provider as advised. Cough suppressants may be recommended. Based on your exam today, the exact cause of your cough is not certain. Below are some common causes for persistent cough.  Smoker's cough  Smoker s cough doesn t go away. If you continue to smoke, it only gets worse. The cough is from irritation in the air passages. Talk to your healthcare provider about quitting. Medicines or nicotine-replacement products, like gum or the patch, may make quitting easier.  Postnasal drip  A cough that is worse at night may be due to postnasal drip. Excess mucus in the nose drains from the back of your nose to your throat. This triggers the cough reflex. Postnasal drip may be due to a sinus infection or allergy. Common allergens include dust, tobacco smoke (both inhaled and secondhand smoke), environmental pollutants, pollen, mold, pets, cleaning agents, room deodorizers, and chemical fumes. Over-the-counter antihistamines or decongestants may be helpful for allergies. A sinus infection may requires antibiotic treatment. See your healthcare provider if symptoms continue.  Medicines  Certain prescribed medicines can cause a chronic cough in some people:    ACE inhibitors for high blood pressure. These include benazepril, captopril, enalapril, fosinopril, lisinopril, quinapril, ramipril, and others.    Beta-blockers for high blood pressure and other conditions. These include  propranolol, atenolol, metoprolol, nadolol, and others.  Let your healthcare provider know if you are taking any of these. The chronic cough may mean your medicine needs to be changed.  Asthma  Cough may be the only sign of mild asthma. You may have tests to find out if asthma is causing your cough. You may also take asthma medicine on a trial basis.  Acid reflux (heartburn, GERD)  The esophagus is the tube that carries food from the mouth to the stomach. A valve at its lower end prevents stomach acids from flowing upward. If this valve does not work properly, acid from the stomach enters the esophagus. This may cause a burning pain in the upper abdomen or lower chest, belching, or cough. Symptoms are often worse when lying flat. Avoid eating or drinking before bedtime. Try using extra pillows to raise your upper body, or place 4-inch blocks under the head of your bed. You may try an over-the-counter (OTC) antacid or an acid-blocking medicine such as famotidine, cimetidine, ranitidine, esomeprazole, lansoprazole, or omeprazole. Stronger medicines for this condition can be prescribed by your healthcare provider. Ask your healthcare provider which OTC medicine to use. Depending on your current medicines, some OTC medicines may cause drug interactions and should be avoided.  Follow-up care  Follow up with your healthcare provider, or as advised, if your cough does not improve. Further testing may be needed.  Note: If an X-ray was taken, a specialist will review it. You will be notified of any new findings that may affect your care.  When to seek medical advice  Call your healthcare provider right away if any of these occur:    Mild wheezing or difficulty breathing    Fever of 100.4 F (38 C) or higher, or as directed by your healthcare provider    Unexpected weight loss    Coughing up large amounts of colored sputum or blood-tinged sputum    Night sweats (sheets and pajamas get soaking wet)  Call 911  Call 911 if any of  these occur:    Coughing up blood    Moderate to severe trouble breathing or wheezing  Date Last Reviewed: 6/1/2018 2000-2019 [employer]. [address] [place] [address], [place] [address]. All rights reserved. This information is not intended as a substitute for professional medical care. Always follow your healthcare professional's instructions.

## 2020-01-14 NOTE — RESULT ENCOUNTER NOTE
Results discussed directly with patient while patient was present. Any further details documented in the note.   ANEESH Carrizales CNP

## 2020-01-14 NOTE — PROGRESS NOTES
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  States she has been ill since approximately September.  Reports she has a possible undiagnosed asthma has not had this accurately assessed in the past.    Reviewed and updated as needed this visit by provider:  Tobacco  Allergies  Meds  Problems  Med Hx  Surg Hx  Fam Hx         Review of Systems   Constitutional, HEENT, cardiovascular, pulmonary, GI, , musculoskeletal, neuro, skin, endocrine and psych systems are negative, except as otherwise noted in the HPI.          Objective   /80   Pulse 89   Temp 98.7  F (37.1  C) (Oral)   Ht 1.727 m (5' 8\")   SpO2 97%   BMI 30.14 kg/m   Body mass index is 30.14 kg/m .  Physical Exam   GENERAL: healthy, alert, well nourished, well hydrated, no distress  EYES: Eyes grossly normal to inspection, extraocular movements - intact, and PERRL  HENT: ear " canals- normal; TMs- normal; Nose- normal; Mouth- no ulcers, no lesions  NECK: no tenderness, no adenopathy, no asymmetry, no masses, no stiffness; thyroid- normal to palpation  RESP: lungs clear to auscultation - no rales, no rhonchi, no wheezes  CV: regular rates and rhythm, normal S1 S2, no S3 or S4 and no murmur, no click or rub -  ABDOMEN: soft, no tenderness, no  hepatosplenomegaly, no masses, normal bowel sounds  LYMPHATICS: ant. cervical- normal, post. cervical- normal, axillary- normal, supraclavicular- normal, inguinal- normal    Diagnostic Test Results  CXR - unremarkable  EKG - appears normal, NSR, normal axis, normal intervals, no acute ST/T changes c/w ischemia, no LVH by voltage criteria, there are no prior tracings available      Assessment & Plan   Deandra was seen today for cough.    Diagnoses and all orders for this visit:    Dyspnea, unspecified type  Chest x-ray repeated today due to worsening symptoms however it is very similar to last x-ray in December and is unremarkable.  DuoNeb in clinic with improvement of symptoms; patient has son with machine at home.  Will start DuoNeb as needed every 4 hours for shortness of breath or wheezing.  Pulmonology referral.  Patient is to be seen if symptoms persist or worsen.  -     XR Chest 2 Views; Future  -     ipratropium - albuterol 0.5 mg/2.5 mg/3 mL (DUONEB) neb solution 3 mL  -     EKG 12-lead complete w/read - Clinics  -     ipratropium - albuterol 0.5 mg/2.5 mg/3 mL (DUONEB) 0.5-2.5 (3) MG/3ML neb solution; Take 1 vial (3 mLs) by nebulization every 4 hours as needed for shortness of breath / dyspnea or wheezing  -     PULMONARY MEDICINE REFERRAL    Chest heaviness  Reassuring exam and EKG is within normal limits.  Improvement of sensation after DuoNeb.  This is likely pulmonary related however did discuss with her cardiac red flags to present to the ER for.  -     EKG 12-lead complete w/read - Clinics  -     ipratropium - albuterol 0.5 mg/2.5 mg/3  mL (DUONEB) 0.5-2.5 (3) MG/3ML neb solution; Take 1 vial (3 mLs) by nebulization every 4 hours as needed for shortness of breath / dyspnea or wheezing    Cough present for greater than 3 weeks  Steroid taper and Tessalon Perles.  Pulmonology referral.  Patient is to be seen if symptoms persist or worsen.  Deandra verbalizes understanding of plan of care and is in agreement.   -     predniSONE (DELTASONE) 20 MG tablet; Take 3 tablets (60 mg) by mouth daily for 3 days, THEN 2 tablets (40 mg) daily for 3 days, THEN 1 tablet (20 mg) daily for 2 days, THEN 0.5 tablets (10 mg) daily for 2 days.  -     PULMONARY MEDICINE REFERRAL  -     benzonatate (TESSALON) 200 MG capsule; Take 1 capsule (200 mg) by mouth 3 times daily as needed for cough    See Patient Instructions    Return if symptoms worsen or fail to improve.     Jerrica Patricia, FNP-BC     [address] [address]  [email or web address]   Office: [phone]

## 2020-01-14 NOTE — TELEPHONE ENCOUNTER
"Pt calling to report Chest tight, deep pressure in lungs, productive cough with green phlegm. Pt stated this has been ongoing since December. Pt was seen and treated with antibiotics, started to get better but then digressed.   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 ONSET: \"When did the cough begin?\"       Back in december  2. SEVERITY: \"How bad is the cough today?\"       moderate  3. RESPIRATORY DISTRESS: \"Describe your breathing.\"       Lung tightness  4. FEVER: \"Do you have a fever?\" If so, ask: \"What is your temperature, how was it measured, and when did it start?\"      no  5. SPUTUM: \"Describe the color of your sputum\" (clear, white, yellow, green)      Yes, green thick  6. HEMOPTYSIS: \"Are you coughing up any blood?\" If so ask: \"How much?\" (flecks, streaks, tablespoons, etc.)      no  7. CARDIAC HISTORY: \"Do you have any history of heart disease?\" (e.g., heart attack, congestive heart failure)       no  8. LUNG HISTORY: \"Do you have any history of lung disease?\"  (e.g., pulmonary " "embolus, asthma, emphysema)      Pneumonia in December 9. PE RISK FACTORS: \"Do you have a history of blood clots?\" (or: recent major surgery, recent prolonged travel, bedridden )      None    10. OTHER SYMPTOMS: \"Do you have any other symptoms?\" (e.g., runny nose, wheezing, chest pain)        None    11. PREGNANCY: \"Is there any chance you are pregnant?\" \"When was your last menstrual period?\"        no  12. TRAVEL: \"Have you traveled out of the country in the last month?\" (e.g., travel history, exposures)        No    Protocols used: COUGH - ACUTE [id number]    Freddy Quinones RN   [facility] [place] Triage    "

## 2020-01-21 ENCOUNTER — TELEPHONE (OUTPATIENT)
Dept: FAMILY MEDICINE | Facility: CLINIC | Age: 45
End: 2020-01-21

## 2020-01-21 DIAGNOSIS — J32.9 SINUSITIS, UNSPECIFIED CHRONICITY, UNSPECIFIED LOCATION: Primary | ICD-10-CM

## 2020-01-21 NOTE — TELEPHONE ENCOUNTER
Reason for call:  Patient reporting a symptom    Symptom or request: Sinus infection     Duration (how long have symptoms been present): ongoing     Have you been treated for this before? Yes    Additional comments: Would like to know if she could get an antibiotic for her sinus infection.     Phone Number patient can be reached at:  Cell number on file:    Telephone Information:   Mobile [phone]       Best Time:  Anytime     Can we leave a detailed message on this number:  YES    Call taken on 1/21/2020 at 12:07 PM by Kimberly Evans

## 2020-01-21 NOTE — TELEPHONE ENCOUNTER
Patient needs to be seen for this.  She can do office visit or e-visit if she has mychart or On[facility].        OnCare: Available 24/7 with a response from an available provider within an hour of completing your questionnaire. Just visit [email or web address].        Please notify patient and assist her with scheduling if needed.      ELIAS Ellis, RN, PHN  [facility]  Office: [phone]  Fax: [phone]

## 2020-01-22 ENCOUNTER — TELEPHONE (OUTPATIENT)
Dept: FAMILY MEDICINE | Facility: CLINIC | Age: 45
End: 2020-01-22

## 2020-01-22 RX ORDER — CEFPROZIL 500 MG/1
500 TABLET, FILM COATED ORAL 2 TIMES DAILY
Qty: 20 TABLET | Refills: 0 | Status: SHIPPED | OUTPATIENT
Start: 2020-01-22 | End: 2020-02-01

## 2020-01-22 NOTE — TELEPHONE ENCOUNTER
Reviewed with Jerrica Patricia CNP . Agree now time for abx.   Ok for cefzil. 500mg 1 tab po bid x 10 days and use generic flonase nasal spray otc . 2 spray to each nostril daily x 14 days.      Allergies   Allergen Reactions     Adhesive Tape Blisters     Seasonal Allergies      Please inform patient. And place order for abx abvoe.  Dx: recurrent maxillary sinusitis.

## 2020-01-22 NOTE — TELEPHONE ENCOUNTER
Pt advised of noted for Rx fill. See other encounter.  Patient stated an understanding and agreed with plan.    Freddy Quinones RN   [facility]

## 2020-01-22 NOTE — TELEPHONE ENCOUNTER
LOV: 1/14/20  Discussed sinus issues at this OV as well    Called patient @ # below -     Patient stated she has a hx of sinus pressure, and leaves on a plane for [place] next Wednesday. Is hoping to get an abx.     Acute Illness   Acute illness concerns: Sinus Sx  Onset: 2 Weeks    Fever: no    Chills/Sweats: no    Headache (location?): YES- frontal    Sinus Pressure:YES- tender, post-nasal drainage, facial pain and teeth pain    Conjunctivitis:  No, just pressure    Ear Pain: Yes - some ache, but no pain, feels full    Rhinorrhea: no    Congestion: YES- Head, Chest     Sore Throat: YES- from post nasal drainage     Cough: YES-productive of green sputum    Wheeze: no    Decreased Appetite: no    Nausea: no    Vomiting: no    Diarrhea:  no    Dysuria/Freq.: no    Fatigue/Achiness: YES- fatigue    Sick/Strep Exposure: no     Therapies Tried and outcome: Neb, Prednisone - does help     DENIES: CP, SOB, Difficulty Breathing, Dizziness/Lightheadedness, Numbness/Tingling, Vision/Hearing Changes, N/V, Palpitations    Pharmacy: [facility] in [place], [place]  (Patient stated she does NOT like penicillin)    Routing to PCP for further review/recommendations/orders.  Patient would like this sent to Dr. Charles Flynn RN  [facility]

## 2020-02-05 ENCOUNTER — TRANSFERRED RECORDS (OUTPATIENT)
Dept: HEALTH INFORMATION MANAGEMENT | Facility: CLINIC | Age: 45
End: 2020-02-05

## 2020-02-24 ENCOUNTER — TELEPHONE (OUTPATIENT)
Dept: FAMILY MEDICINE | Facility: CLINIC | Age: 45
End: 2020-02-24

## 2020-02-24 ENCOUNTER — TRANSFERRED RECORDS (OUTPATIENT)
Dept: HEALTH INFORMATION MANAGEMENT | Facility: CLINIC | Age: 45
End: 2020-02-24

## 2020-02-24 NOTE — TELEPHONE ENCOUNTER
Deandra fell and hurt her left foot.  She twisted and fell on ice.  Very painful 0-10 10 on pain scale.  She has a .       I advised [facility] for immediate care of her needs.    Liz Drake RN- Triage [employer]

## 2020-03-05 DIAGNOSIS — Z30.011 ENCOUNTER FOR INITIAL PRESCRIPTION OF CONTRACEPTIVE PILLS: ICD-10-CM

## 2020-03-05 RX ORDER — ACETAMINOPHEN AND CODEINE PHOSPHATE 120; 12 MG/5ML; MG/5ML
0.35 SOLUTION ORAL DAILY
Qty: 84 TABLET | Refills: 3 | Status: SHIPPED | OUTPATIENT
Start: 2020-03-05 | End: 2021-04-14

## 2020-03-05 NOTE — TELEPHONE ENCOUNTER
Reason for Call:  Medication or medication refill:    Do you use a [facility]?  Name of the pharmacy and phone number for the current request: [facility] [id number] [place], [place] - [facility]     Name of the medication requested: norethindrone (MICRONOR) 0.35 MG tablet    Can we leave a detailed message on this number? YES    Phone number patient can be reached at: Cell number on file:    Telephone Information:   Mobile [phone]     Best Time: Anytime    Call taken on 3/5/2020 at 11:25 AM by Nancy Collins

## 2020-03-09 ENCOUNTER — TRANSFERRED RECORDS (OUTPATIENT)
Dept: HEALTH INFORMATION MANAGEMENT | Facility: CLINIC | Age: 45
End: 2020-03-09

## 2020-04-29 ENCOUNTER — TRANSFERRED RECORDS (OUTPATIENT)
Dept: HEALTH INFORMATION MANAGEMENT | Facility: CLINIC | Age: 45
End: 2020-04-29

## 2020-06-02 ENCOUNTER — TELEPHONE (OUTPATIENT)
Dept: FAMILY MEDICINE | Facility: CLINIC | Age: 45
End: 2020-06-02

## 2020-06-02 DIAGNOSIS — Z11.59 SCREENING FOR VIRAL DISEASE: Primary | ICD-10-CM

## 2020-06-02 NOTE — TELEPHONE ENCOUNTER
"Patient is calling requesting COVID serologic antibody testing.  NOTE: Serologic testing is a blood test for 'antibodies' which are made at 10-14 days after you have had symptoms of COVID or were exposed and had an asymptomatic infection.  This does NOT test you for 'active' infection or tell you if you are contagious.    Are you a healthcare worker?  Yes  Do you work for [employer]?   No  Do you currently have a cough, fever, body aches, shortness of breath, or difficulty breathing?  No  Have you been exposed to (or come into close contact with) someone who tested POSITIVE for COVID-19 or someone who had a possible case of COVID-19?  Possible exposure  Back in December 2019 and was traveling a lot before that.   Possible exposure > 14 days ago.  Lab order placed per SARS-CoV-2 Serology test Standing Order using indication \"Possible exposure >14 days ago\" and diagnosis code  \"Exposure to COVID-19 Virus\" (Z20.828)    The patient was informed: \"Testing is limited each day and it may take time for testing to be available to everyone who has called. You will receive a call within 48-72 hours to schedule the serology testing. Please confirm the best number to reach you is [phone]. If you have any questions about scheduling, call [phone].\"       "

## 2020-06-03 DIAGNOSIS — Z11.59 SCREENING FOR VIRAL DISEASE: ICD-10-CM

## 2020-06-03 PROCEDURE — 99000 SPECIMEN HANDLING OFFICE-LAB: CPT | Performed by: FAMILY MEDICINE

## 2020-06-03 PROCEDURE — 36415 COLL VENOUS BLD VENIPUNCTURE: CPT | Performed by: FAMILY MEDICINE

## 2020-06-03 PROCEDURE — 86769 SARS-COV-2 COVID-19 ANTIBODY: CPT | Mod: 90 | Performed by: FAMILY MEDICINE

## 2020-06-05 LAB
COVID-19 SPIKE RBD ABY TITER: NORMAL
COVID-19 SPIKE RBD ABY: NEGATIVE

## 2020-06-12 ENCOUNTER — VIRTUAL VISIT (OUTPATIENT)
Dept: FAMILY MEDICINE | Facility: OTHER | Age: 45
End: 2020-06-12

## 2020-06-12 NOTE — PROGRESS NOTES
"Date: 2020 11:13:50  Clinician: Alonzo Arana  Clinician NPI: [id number]  Patient: Deandra Lewis  Patient : 1975  Patient Address: [place] [address]  Patient Phone: [phone]  Visit Protocol: URI  Patient Summary:  Deandra is a 44 year old ( : 1975 ) female who initiated a Visit for COVID-19 (Coronavirus) evaluation and screening. When asked the question \"Please sign me up to receive news, health information and promotions from [employer] [facility].\", Deandra responded \"No\".    Deandra states her symptoms started gradually 3-4 days ago.   Her symptoms consist of a sore throat, malaise, myalgia, a cough, rhinitis, and a headache. She is experiencing mild difficulty breathing with activities but can speak normally in full sentences. Deandra also feels feverish.   Symptom details     Nasal secretions: The color of her mucus is clear.    Cough: Deandra coughs a few times an hour and her cough is more bothersome at night. Phlegm does not come into her throat when she coughs. She does not believe her cough is caused by post-nasal drip.     Sore throat: Deandra reports having moderate throat pain (4-6 on a 10 point pain scale), does not have exudate on her tonsils, and can swallow liquids. She is not sure if the lymph nodes in her neck are enlarged. A rash has not appeared on the skin since the sore throat started.     Temperature: Her current temperature is 100.6 degrees Fahrenheit. Deandra has had a temperature over 100 degrees Fahrenheit for 1-2 days.     Headache: She states the headache is moderate (4-6 on a 10 point pain scale).      Deandra denies having wheezing, nausea, teeth pain, ageusia, diarrhea, anosmia, facial pain or pressure, nasal congestion, vomiting, ear pain, and chills. She also denies having recent facial or sinus surgery in the past 60 days, double sickening (worsening symptoms after initial improvement), and taking antibiotic medication for the symptoms.   Precipitating " events  Deandra is not sure if she has been exposed to someone with strep throat. She has not recently been exposed to someone with influenza. Deandra has been in close contact with the following high risk individuals: adults 65 or older, children under the age of 5, and people with asthma, heart disease or diabetes.   Pertinent COVID-19 (Coronavirus) information  In the past 14 days, Deandra has not worked in a congregate living setting.   She does not work or volunteer as healthcare worker or a  and does not work or volunteer in a healthcare facility.   Deandra also has not lived in a congregate living setting in the past 14 days. She does not live with a healthcare worker.   Deandra has not had a close contact with a laboratory-confirmed COVID-19 patient within 14 days of symptom onset.   Pertinent medical history  Deandra had 1 sinus infection within the past year.   Deandra does not get yeast infections when she takes antibiotics.   Deandra needs a return to work/school note.   Weight: 220 lbs   Deandra does not smoke or use smokeless tobacco.   She denies pregnancy and denies breastfeeding. She has menstruated in the past month.   Additional information as reported by the patient (free text): I recently moved my mom from one nursing home to another, transporting her in my vehicle to the new one. That same day, I was in contact with approximately 6 various nursing home and assisted living staff. I cleaned out her assisted living facility apartment. I did wear a mask.   Weight: 220 lbs    MEDICATIONS: Paxil CR oral, ALLERGIES: NKDA  Clinician Response:  Dear Deandra,   Your symptoms show that you may have coronavirus (COVID-19). This illness can cause fever, cough and trouble breathing. Many people get a mild case and get better on their own. Some people can get very sick.  What should I do?  We would like to test you for this virus. This will be a curbside test done outside the clinic.   1. Please call  "[phone] to schedule your visit. Explain that you were referred by [facility] to have a COVID-19 test. Be ready to share your [facility] visit ID number.  The following will serve as your written order for this COVID Test, ordered by me, for the indication of suspected COVID [Z20.828]: The test will be ordered in [facility], our electronic health record, after you are scheduled. It will show as ordered and authorized by Raymundo Altamirano MD.  Order: COVID-19 (Coronavirus) PCR for SYMPTOMATIC testing from [facility].      2. When it's time for your COVID test:  Stay at least 6 feet away from others. (If someone will drive you to your test, stay in the backseat, as far away from the  as you can.)   Cover your mouth and nose with a mask, tissue or washcloth.  Go straight to the testing site. Don't make any stops on the way there or back.      3.Starting now: Stay home and away from others (self-isolate) until:   You've had no fever---and no medicine that reduces fever---for 3 full days (72 hours). And...   Your other symptoms have gotten better. For example, your cough or breathing has improved. And...   At least 10 days have passed since your symptoms started.       During this time, don't leave the house except for testing or medical care.   Stay in your own room, even for meals. Use your own bathroom if you can.   Stay away from others in your home. No hugging, kissing or shaking hands. No visitors.  Don't go to work, school or anywhere else.    Clean \"high touch\" surfaces often (doorknobs, counters, handles, etc.). Use a household cleaning spray or wipes. You'll find a full list of  on the EPA website: www.epa.gov/pesticide-registration/list-n-disinfectants-use-against-sars-cov-2.   Cover your mouth and nose with a mask, tissue or washcloth to avoid spreading germs.  Wash your hands and face often. Use soap and water.  Caregivers in these groups are at risk for severe illness due to COVID-19:  o People 65 years and older "  o People who live in a nursing home or long-term care facility  o People with chronic disease (lung, heart, cancer, diabetes, kidney, liver, immunologic)  o People who have a weakened immune system, including those who:   Are in cancer treatment  Take medicine that weakens the immune system, such as corticosteroids  Had a bone marrow or organ transplant  Have an immune deficiency  Have poorly controlled HIV or AIDS  Are obese (body mass index of 40 or higher)  Smoke regularly   o Caregivers should wear gloves while washing dishes, handling laundry and cleaning bedrooms and bathrooms.  o Use caution when washing and drying laundry: Don't shake dirty laundry, and use the warmest water setting that you can.  o For more tips, go to www.cdc.gov/coronavirus/2019-ncov/downloads/10Things.pdf.      How can I take care of myself?   Get lots of rest. Drink extra fluids (unless a doctor has told you not to).   Take Tylenol (acetaminophen) for fever or pain. If you have liver or kidney problems, ask your family doctor if it's okay to take Tylenol.   Adults can take either:    650 mg (two 325 mg pills) every 4 to 6 hours, or...   1,000 mg (two 500 mg pills) every 8 hours as needed.    Note: Don't take more than 3,000 mg in one day. Acetaminophen is found in many medicines (both prescribed and over-the-counter medicines). Read all labels to be sure you don't take too much.   For children, check the Tylenol bottle for the right dose. The dose is based on the child's age or weight.    If you have other health problems (like cancer, heart failure, an organ transplant or severe kidney disease): Call your specialty clinic if you don't feel better in the next 2 days.       Know when to call 911. Emergency warning signs include:    Trouble breathing or shortness of breath Pain or pressure in the chest that doesn't go away Feeling confused like you haven't felt before, or not being able to wake up Bluish-colored lips or face.  Where can  I get more information?   [facility] -- About COVID-19: [email or web address]   CDC -- What to Do If You're Sick: www.cdc.gov/coronavirus/2019-ncov/about/steps-when-sick.html   CDC -- Ending Home Isolation: www.cdc.gov/coronavirus/2019-ncov/hcp/disposition-in-home-patients.html   [facility] -- Caring for Someone: www.cdc.gov/coronavirus/2019-ncov/if-you-are-sick/care-for-someone.html   [facility] -- Interim Guidance for Hospital Discharge to Home: www.[facility].[place]/diseases/coronavirus/hcp/hospdischarge.pdf   [facility] clinical trials (COVID-19 research studies): clinicalaffairs.[facility]-clinical-trials    Below are the COVID-19 hotlines at the [place] Department of Health ([facility]). Interpreters are available.    For health questions: Call 029-409-9035 or 1-943.869.6208 (7 a.m. to 7 p.m.) For questions about schools and childcare: Call 167-965-1145 or 1-160.646.8249 (7 a.m. to 7 p.m.)    Diagnosis: Other malaise  Diagnosis ICD: R53.81

## 2020-06-16 DIAGNOSIS — Z20.822 SUSPECTED COVID-19 VIRUS INFECTION: Primary | ICD-10-CM

## 2020-06-16 LAB
SARS-COV-2 RNA SPEC QL NAA+PROBE: NOT DETECTED
SPECIMEN SOURCE: NORMAL

## 2020-06-16 PROCEDURE — 99000 SPECIMEN HANDLING OFFICE-LAB: CPT | Performed by: FAMILY MEDICINE

## 2020-06-16 PROCEDURE — U0003 INFECTIOUS AGENT DETECTION BY NUCLEIC ACID (DNA OR RNA); SEVERE ACUTE RESPIRATORY SYNDROME CORONAVIRUS 2 (SARS-COV-2) (CORONAVIRUS DISEASE [COVID-19]), AMPLIFIED PROBE TECHNIQUE, MAKING USE OF HIGH THROUGHPUT TECHNOLOGIES AS DESCRIBED BY CMS-2020-01-R: HCPCS | Mod: 90 | Performed by: FAMILY MEDICINE

## 2020-06-16 PROCEDURE — 99207 ZZC NO BILLABLE SERVICE THIS VISIT: CPT

## 2020-07-26 ENCOUNTER — TRANSFERRED RECORDS (OUTPATIENT)
Dept: HEALTH INFORMATION MANAGEMENT | Facility: CLINIC | Age: 45
End: 2020-07-26

## 2020-07-27 ENCOUNTER — TELEPHONE (OUTPATIENT)
Dept: FAMILY MEDICINE | Facility: CLINIC | Age: 45
End: 2020-07-27

## 2020-07-27 NOTE — TELEPHONE ENCOUNTER
Patient calling, she would like to have a strep test done. She had covid testing done on 7/26/20, but the results take up to 10 days to come back. Patient would like to know if she can get a strep test done? And how would this be handled? Virtual visit? Lab only? Please advise  [phone]  Ok to leave detailed message: yes    Thank you  Vilma Quach

## 2020-07-27 NOTE — TELEPHONE ENCOUNTER
"Returned call to patient.  Patient started getting a sore throat last Wednesday, feels like she is swallowing glass.  Patient is drinking fluids (drink water), Advil and cough drops.  Headache with some stomach upset, tired.  Patient states she has a cough that will come in \"bursts\" non-productive, afebrile.    Advised patient to drink warm liquids, alternate tylenol with ibuprofen, place cough drops in the freezer, await COVID-19 results.    Patient stated understanding and was agreeable with plan.    ELIAS MarshallN, RN  [employer] Triage        "

## 2020-07-29 ENCOUNTER — TRANSFERRED RECORDS (OUTPATIENT)
Dept: HEALTH INFORMATION MANAGEMENT | Facility: CLINIC | Age: 45
End: 2020-07-29

## 2020-08-28 ENCOUNTER — MYC MEDICAL ADVICE (OUTPATIENT)
Dept: FAMILY MEDICINE | Facility: CLINIC | Age: 45
End: 2020-08-28

## 2020-08-28 DIAGNOSIS — R05.9 COUGH: ICD-10-CM

## 2020-08-28 RX ORDER — FLUTICASONE PROPIONATE 44 MCG
AEROSOL WITH ADAPTER (GRAM) INHALATION
Qty: 10.6 INHALER | Refills: 0 | OUTPATIENT
Start: 2020-08-28

## 2020-08-28 NOTE — TELEPHONE ENCOUNTER
Pt saw pulmonologist in February and was given flovent from Dr. Conte from [facility].  Pt states she went to Dr. Conte again yesterday for having issues with breathing.  Pt states the flovent was replaced with Breo inhaler.  Yesterday pt was given rx for Breo inhaler, montelukast, ventolin inhaler, and prednisone.    Refill for flovent denied since pt is no longer using and switched to Breo inhaler.  Pt states understanding.    Beatriz OLMOS RN  EP Triage

## 2020-08-28 NOTE — TELEPHONE ENCOUNTER
No VM on cell number. Left non-detailed message on home phone to call the clinic back at [phone] and ask to speak with a  triage nurse.   Bunkr also sent to the patient asking about why inhaler refill is requested at this time. Eulalia Parkinson RN

## 2020-09-01 DIAGNOSIS — R05.9 COUGH: ICD-10-CM

## 2020-09-01 RX ORDER — FLUTICASONE PROPIONATE 44 MCG
AEROSOL WITH ADAPTER (GRAM) INHALATION
Qty: 10.6 INHALER | Refills: 0 | OUTPATIENT
Start: 2020-09-01

## 2020-09-02 NOTE — TELEPHONE ENCOUNTER
See 8/28/20 refill encounter. Patient is no longer prescribed fluticasone. Removed from current medication list. Rx refused. Eulalia Parkinson RN

## 2020-10-06 DIAGNOSIS — F41.9 MILD ANXIETY: ICD-10-CM

## 2020-10-06 DIAGNOSIS — Z62.21 FOSTER CARE CHILD: ICD-10-CM

## 2020-10-06 DIAGNOSIS — R61 GENERALIZED HYPERHIDROSIS: ICD-10-CM

## 2020-10-09 DIAGNOSIS — R05.9 COUGH: ICD-10-CM

## 2020-10-09 DIAGNOSIS — J20.9 ACUTE BRONCHITIS, UNSPECIFIED ORGANISM: ICD-10-CM

## 2020-10-09 RX ORDER — ALBUTEROL SULFATE 90 UG/1
AEROSOL, METERED RESPIRATORY (INHALATION)
Qty: 18 G | Refills: 0 | Status: SHIPPED | OUTPATIENT
Start: 2020-10-09 | End: 2020-10-29

## 2020-10-09 RX ORDER — PAROXETINE 20 MG/1
TABLET, FILM COATED ORAL
Qty: 90 TABLET | Refills: 0 | Status: SHIPPED | OUTPATIENT
Start: 2020-10-09 | End: 2020-11-24

## 2020-10-09 NOTE — TELEPHONE ENCOUNTER
Medication is being filled for 1 time refill only due to:  Due for appointment in January.      Due for appointment in January - routing to patient care team to assist patient with scheduling. Thanks!

## 2020-10-29 DIAGNOSIS — J20.9 ACUTE BRONCHITIS, UNSPECIFIED ORGANISM: ICD-10-CM

## 2020-10-29 DIAGNOSIS — R05.9 COUGH: ICD-10-CM

## 2020-10-29 RX ORDER — ALBUTEROL SULFATE 90 UG/1
AEROSOL, METERED RESPIRATORY (INHALATION)
Qty: 18 INHALER | Refills: 1 | Status: SHIPPED | OUTPATIENT
Start: 2020-10-29 | End: 2021-04-14

## 2020-11-23 DIAGNOSIS — R61 GENERALIZED HYPERHIDROSIS: ICD-10-CM

## 2020-11-23 DIAGNOSIS — Z62.21 FOSTER CARE CHILD: ICD-10-CM

## 2020-11-23 DIAGNOSIS — F41.9 MILD ANXIETY: ICD-10-CM

## 2020-11-23 NOTE — LETTER
[facility] - [address], [place] [address] [phone]  December 7, 2020    Deandra Lewis  [facility] [place] [place] [address]    Dear Deandra,    We have attempted to contact you via [facility] regarding a prescription refill request we have on file. We recently received a refill request for your PARoxetine (PAXIL) 20 MG tablet. We have sent in a refill for you to your pharmacy.     Our records show you are due to schedule your physical sometime in January.      Please call the clinic at [phone] to schedule as the providers are booking out.      Here is a list of Health Maintenance topics that are due now or due soon:  Health Maintenance Due   Topic Date Due     ANNUAL REVIEW OF HM ORDERS  1975     Hepatitis C Screening  12/27/1994     Flu Vaccine (1) 09/01/2020     Mammogram  10/29/2020     Preventive Care Visit  12/04/2020     Thank you for trusting [facility] and we appreciate the opportunity to serve you.  We look forward to supporting your healthcare needs in the future.    ANNA Almazan [facility] Care Team

## 2020-11-24 RX ORDER — PAROXETINE 20 MG/1
TABLET, FILM COATED ORAL
Qty: 90 TABLET | Refills: 0 | Status: SHIPPED | OUTPATIENT
Start: 2020-11-24 | End: 2021-02-16

## 2020-11-24 NOTE — TELEPHONE ENCOUNTER
Medication is being filled for 1 time refill only due to:  Patient needs to be seen because due for yearly appointment with PCP 1/14/21. Eulalia Parkinson RN

## 2020-11-29 ENCOUNTER — HEALTH MAINTENANCE LETTER (OUTPATIENT)
Age: 45
End: 2020-11-29

## 2020-12-08 ENCOUNTER — ANCILLARY PROCEDURE (OUTPATIENT)
Dept: MAMMOGRAPHY | Facility: CLINIC | Age: 45
End: 2020-12-08
Payer: COMMERCIAL

## 2020-12-08 DIAGNOSIS — Z12.31 VISIT FOR SCREENING MAMMOGRAM: ICD-10-CM

## 2020-12-08 PROCEDURE — 77063 BREAST TOMOSYNTHESIS BI: CPT | Performed by: RADIOLOGY

## 2020-12-08 PROCEDURE — 77067 SCR MAMMO BI INCL CAD: CPT | Performed by: RADIOLOGY

## 2021-01-15 ENCOUNTER — HEALTH MAINTENANCE LETTER (OUTPATIENT)
Age: 46
End: 2021-01-15

## 2021-02-16 DIAGNOSIS — Z62.21 FOSTER CARE CHILD: ICD-10-CM

## 2021-02-16 DIAGNOSIS — R61 GENERALIZED HYPERHIDROSIS: ICD-10-CM

## 2021-02-16 DIAGNOSIS — F41.9 MILD ANXIETY: ICD-10-CM

## 2021-02-16 RX ORDER — PAROXETINE 20 MG/1
TABLET, FILM COATED ORAL
Qty: 30 TABLET | Refills: 1 | Status: SHIPPED | OUTPATIENT
Start: 2021-02-16 | End: 2021-03-14

## 2021-02-16 NOTE — TELEPHONE ENCOUNTER
My chart message sent for updated PHQ/LISA    RX approved for 60 days.    Future Appointments   Date Time Provider Department Center   4/14/2021  4:40 PM Linda Soto MD [facility]      ELIAS LobatoN, RN  [facility]

## 2021-04-09 DIAGNOSIS — Z62.21 FOSTER CARE CHILD: ICD-10-CM

## 2021-04-09 DIAGNOSIS — F41.9 MILD ANXIETY: ICD-10-CM

## 2021-04-09 DIAGNOSIS — R61 GENERALIZED HYPERHIDROSIS: ICD-10-CM

## 2021-04-09 RX ORDER — PAROXETINE 20 MG/1
TABLET, FILM COATED ORAL
Qty: 30 TABLET | Refills: 0 | Status: SHIPPED | OUTPATIENT
Start: 2021-04-09 | End: 2021-04-14

## 2021-04-09 NOTE — TELEPHONE ENCOUNTER
Next 5 appointments (look out 90 days)    Apr 14, 2021  4:40 PM  PHYSICAL with Linda Soto MD  [facility] ([facility] ) [address] [place] [address] [phone]        Medication is being filled for 1 time refill only due to:  Patient needs to be seen because it has been more than one year since last visit.      Katelynn Flynn RN  [facility]

## 2021-04-14 ENCOUNTER — OFFICE VISIT (OUTPATIENT)
Dept: FAMILY MEDICINE | Facility: CLINIC | Age: 46
End: 2021-04-14
Payer: COMMERCIAL

## 2021-04-14 VITALS
RESPIRATION RATE: 12 BRPM | HEIGHT: 68 IN | BODY MASS INDEX: 33.65 KG/M2 | OXYGEN SATURATION: 98 % | WEIGHT: 222 LBS | DIASTOLIC BLOOD PRESSURE: 82 MMHG | HEART RATE: 88 BPM | SYSTOLIC BLOOD PRESSURE: 122 MMHG | TEMPERATURE: 98.9 F

## 2021-04-14 DIAGNOSIS — J98.01 COUGH DUE TO BRONCHOSPASM: ICD-10-CM

## 2021-04-14 DIAGNOSIS — J20.9 ACUTE BRONCHITIS, UNSPECIFIED ORGANISM: ICD-10-CM

## 2021-04-14 DIAGNOSIS — Z80.0 FAMILY HISTORY OF COLON CANCER: ICD-10-CM

## 2021-04-14 DIAGNOSIS — Z80.3 FAMILY HISTORY OF BREAST CANCER: ICD-10-CM

## 2021-04-14 DIAGNOSIS — J18.9 PNEUMONIA OF RIGHT LOWER LOBE DUE TO INFECTIOUS ORGANISM: ICD-10-CM

## 2021-04-14 DIAGNOSIS — Z12.4 CERVICAL CANCER SCREENING: ICD-10-CM

## 2021-04-14 DIAGNOSIS — R61 GENERALIZED HYPERHIDROSIS: ICD-10-CM

## 2021-04-14 DIAGNOSIS — Z11.59 NEED FOR HEPATITIS C SCREENING TEST: ICD-10-CM

## 2021-04-14 DIAGNOSIS — D50.8 OTHER IRON DEFICIENCY ANEMIA: ICD-10-CM

## 2021-04-14 DIAGNOSIS — G44.211 INTRACTABLE EPISODIC TENSION-TYPE HEADACHE: ICD-10-CM

## 2021-04-14 DIAGNOSIS — E55.9 VITAMIN D DEFICIENCY: ICD-10-CM

## 2021-04-14 DIAGNOSIS — J01.21 ACUTE RECURRENT ETHMOIDAL SINUSITIS: ICD-10-CM

## 2021-04-14 DIAGNOSIS — Z13.6 CARDIOVASCULAR SCREENING; LDL GOAL LESS THAN 160: ICD-10-CM

## 2021-04-14 DIAGNOSIS — F41.9 MILD ANXIETY: ICD-10-CM

## 2021-04-14 DIAGNOSIS — Z62.21 FOSTER CARE CHILD: ICD-10-CM

## 2021-04-14 DIAGNOSIS — N92.0 MENORRHAGIA WITH REGULAR CYCLE: ICD-10-CM

## 2021-04-14 DIAGNOSIS — Z30.011 ENCOUNTER FOR INITIAL PRESCRIPTION OF CONTRACEPTIVE PILLS: ICD-10-CM

## 2021-04-14 DIAGNOSIS — S16.1XXA STRAIN OF NECK MUSCLE, INITIAL ENCOUNTER: ICD-10-CM

## 2021-04-14 DIAGNOSIS — Z00.01 ENCOUNTER FOR ROUTINE ADULT HEALTH EXAMINATION WITH ABNORMAL FINDINGS: Primary | ICD-10-CM

## 2021-04-14 DIAGNOSIS — S92.902D CLOSED MULTIPLE FRACTURES OF LEFT FOOT WITH ROUTINE HEALING, SUBSEQUENT ENCOUNTER: ICD-10-CM

## 2021-04-14 DIAGNOSIS — F41.9 ANXIETY: ICD-10-CM

## 2021-04-14 DIAGNOSIS — R05.9 COUGH: ICD-10-CM

## 2021-04-14 DIAGNOSIS — L57.8 SUN-DAMAGED SKIN: ICD-10-CM

## 2021-04-14 DIAGNOSIS — J01.01 ACUTE RECURRENT MAXILLARY SINUSITIS: ICD-10-CM

## 2021-04-14 DIAGNOSIS — D48.5 NEOPLASM OF UNCERTAIN BEHAVIOR OF SKIN: ICD-10-CM

## 2021-04-14 PROCEDURE — 99396 PREV VISIT EST AGE 40-64: CPT | Performed by: FAMILY MEDICINE

## 2021-04-14 PROCEDURE — G0145 SCR C/V CYTO,THINLAYER,RESCR: HCPCS | Performed by: FAMILY MEDICINE

## 2021-04-14 PROCEDURE — 99213 OFFICE O/P EST LOW 20 MIN: CPT | Mod: 25 | Performed by: FAMILY MEDICINE

## 2021-04-14 PROCEDURE — 87624 HPV HI-RISK TYP POOLED RSLT: CPT | Performed by: FAMILY MEDICINE

## 2021-04-14 RX ORDER — AZITHROMYCIN 250 MG/1
TABLET, FILM COATED ORAL
Qty: 6 TABLET | Refills: 1 | Status: SHIPPED | OUTPATIENT
Start: 2021-04-14 | End: 2022-02-10

## 2021-04-14 RX ORDER — PAROXETINE 20 MG/1
TABLET, FILM COATED ORAL
Qty: 135 TABLET | Refills: 3 | Status: SHIPPED | OUTPATIENT
Start: 2021-04-14 | End: 2022-04-15

## 2021-04-14 RX ORDER — ACETAMINOPHEN AND CODEINE PHOSPHATE 120; 12 MG/5ML; MG/5ML
0.35 SOLUTION ORAL DAILY
Qty: 84 TABLET | Refills: 4 | Status: SHIPPED | OUTPATIENT
Start: 2021-04-14 | End: 2022-02-10

## 2021-04-14 RX ORDER — ALBUTEROL SULFATE 90 UG/1
AEROSOL, METERED RESPIRATORY (INHALATION)
Qty: 18 G | Refills: 11 | Status: SHIPPED | OUTPATIENT
Start: 2021-04-14 | End: 2022-01-24

## 2021-04-14 ASSESSMENT — PAIN SCALES - GENERAL: PAINLEVEL: NO PAIN (0)

## 2021-04-14 ASSESSMENT — MIFFLIN-ST. JEOR: SCORE: 1705.49

## 2021-04-14 NOTE — PROGRESS NOTES
SUBJECTIVE:   CC: Deandra Lewis is an 45 year old woman who presents for preventive health visit.     {Split Bill scripting  The purpose of this visit is to discuss your medical history and prevent health problems before you are sick. You may be responsible for a co-pay, coinsurance, or deductible if your visit today includes services such as checking on a sore throat, having an x-ray or lab test, or treating and evaluating a new or existing condition :658424}  Patient has been advised of split billing requirements and indicates understanding: Yes  Healthy Habits:     Getting at least 3 servings of Calcium per day:  NO    Bi-annual eye exam:  Yes    Dental care twice a year:  NO    Sleep apnea or symptoms of sleep apnea:  Daytime drowsiness    Diet:  Regular (no restrictions)    Frequency of exercise:  6-7 days/week    Duration of exercise:  45-60 minutes    Taking medications regularly:  Yes    PHQ-2 Total Score: 0    Additional concerns today:  No    {Add if <65 person on Medicare  - Required Questions (Optional):256967}  {Outside tests to abstract? :619135}    {additional problems to add (Optional):513504}    Today's PHQ-2 Score:   PHQ-2 ( 1999 Pfizer) 4/14/2021   Q1: Little interest or pleasure in doing things 0   Q2: Feeling down, depressed or hopeless 0   PHQ-2 Score 0   Q1: Little interest or pleasure in doing things Not at all   Q2: Feeling down, depressed or hopeless Not at all   PHQ-2 Score 0       Abuse: Current or Past (Physical, Sexual or Emotional) - { :683629}  Do you feel safe in your environment? { :932945}    Have you ever done Advance Care Planning? (For example, a Health Directive, POLST, or a discussion with a medical provider or your loved ones about your wishes): { :229900}    Social History     Tobacco Use     Smoking status: Former Smoker     Packs/day: 0.10     Years: 5.00     Pack years: 0.50     Types: Cigarettes     Smokeless tobacco: Never Used   Substance Use Topics     Alcohol  "use: No     Alcohol/week: 0.0 standard drinks     Comment: rarely      {Rooming Staff- Complete this question if Prescreen response is not shown below for today's visit. If you drink alcohol do you typically have >3 drinks per day or >7 drinks per week? (Optional):755114}    Alcohol Use 4/14/2021   Prescreen: >3 drinks/day or >7 drinks/week? No   Prescreen: >3 drinks/day or >7 drinks/week? -   {add AUDIT responses (Optional) (A score of 7 for adult men is an indication of hazardous drinking; a score of 8 or more is an indication of an alcohol use disorder.  A score of 7 or more for adult women is an indication of hazardous drinking or an alchohol use disorder):382888}    Reviewed orders with patient.  Reviewed health maintenance and updated orders accordingly - { :875915::\"Yes\"}  {Chronicprobdata (optional):281176}    Breast Cancer Screening:    FSH-7:   Breast CA Risk Assessment (FHS-7) 4/14/2021   Did any of your first-degree relatives have breast or ovarian cancer? Yes   Did any of your relatives have bilateral breast cancer? Unknown   Did any man in your family have breast cancer? Yes   Did any woman in your family have breast and ovarian cancer? Unknown   Did any woman in your family have breast cancer before age 50 y? Unknown   Do you have 2 or more relatives with breast and/or ovarian cancer? Yes   Do you have 2 or more relatives with breast and/or bowel cancer? No     {If any of the questions to the BCRA (FHS-7) are answered yes, consider ordering referral for genetic counseling (Optional) :197781::\"click delete button to remove this line now\"}  {AMB Mammogram Decision Support (Optional) :435747}  Pertinent mammograms are reviewed under the imaging tab.    History of abnormal Pap smear: { :531491}  PAP / HPV Latest Ref Rng & Units 7/27/2018 5/14/2015   PAP - NIL NIL   HPV 16 DNA NEG:Negative Negative -   HPV 18 DNA NEG:Negative Negative -   OTHER HR HPV NEG:Negative Negative -     Reviewed and updated as " "needed this visit by clinical staff                 Reviewed and updated as needed this visit by Provider                {HISTORY OPTIONS (Optional):723464}    Review of Systems  {FEMALE ROS (Optional):375617}     OBJECTIVE:   There were no vitals taken for this visit.  Physical Exam  {Exam Choices (Optional):534144}    {Diagnostic Test Results (Optional):065951::\"Diagnostic Test Results:\",\"Labs reviewed in Epic\"}    ASSESSMENT/PLAN:   {Diag Picklist:124430}    Patient has been advised of split billing requirements and indicates understanding: {YES / NO:983401::\"Yes\"}  COUNSELING:  {FEMALE COUNSELING MESSAGES:810683::\"Reviewed preventive health counseling, as reflected in patient instructions\"}    Estimated body mass index is 30.14 kg/m  as calculated from the following:    Height as of 1/14/20: 1.727 m (5' 8\").    Weight as of 12/4/19: 89.9 kg (198 lb 3.2 oz).    {Weight Management Plan (ACO) Complete if BMI is abnormal-  Ages 18-64  BMI >24.9.  Age 65+ with BMI <23 or >30 (Optional):324588}    She reports that she has quit smoking. Her smoking use included cigarettes. She has a 0.50 pack-year smoking history. She has never used smokeless tobacco.      Counseling Resources:  ATP IV Guidelines  Pooled Cohorts Equation Calculator  Breast Cancer Risk Calculator  BRCA-Related Cancer Risk Assessment: FHS-7 Tool  FRAX Risk Assessment  ICSI Preventive Guidelines  Dietary Guidelines for Americans, 2010  USDA's MyPlate  ASA Prophylaxis  Lung CA Screening    Linda Soto MD  [facility]  "

## 2021-04-14 NOTE — PROGRESS NOTES
SUBJECTIVE:   CC: Deandra Lewis is an 45 year old woman who presents for preventive health visit and the following other medical problems:      1. Encounter for routine adult health examination with abnormal findings    2. Need for hepatitis C screening test    3. Generalized hyperhidrosis- since earliest recollections - soaks clothing multiple times/week - very disruptive to life    4. Other iron deficiency anemia - Anemia - ? related to previous surgeries - 4 in 3 months for sinus issues/infection    5. Anxiety    6. Closed multiple fractures of left foot with routine healing, subsequent encounter    7. Encounter for initial prescription of contraceptive pills    8. Generalized hyperhidrosis    9. Mild anxiety    10. Foster care child    11. Cough    12. Pneumonia of right lower lobe due to infectious organism- tends to recur 1-2x/year if bronchitis progresses    13. Cough due to bronchospasm- needs refills on meds     14. Acute bronchitis, unspecified organism - needs refills on meds - not exacerbated right now     15. Family history of colon cancer- father dx'd in mid 40's - pt's first colonoscopy in 4/24/2017 - repeat in 2022     16. Family history of breast cancer- mother's identical twin, maternal aunt, paternal GM and dad with breast lumps     17. Menorrhagia with regular cycle- ? cause of iron deficiency anemia- pt would like to see ob/gyn specialists    18. Acute recurrent ethmoidal sinusitis    19. Acute recurrent maxillary sinusitis    20. CARDIOVASCULAR SCREENING; LDL GOAL LESS THAN 160    21. Cervical cancer screening    22. Neoplasm of uncertain behavior of skin    23. Sun-damaged skin    24. Strain of neck muscle, initial encounter    25. Intractable episodic tension-type headache         Patient has been advised of split billing requirements and indicates understanding: Yes     Healthy Habits:     Getting at least 3 servings of Calcium per day:  NO    Bi-annual eye exam:  Yes    Dental care twice  a year:  NO    Sleep apnea or symptoms of sleep apnea:  Daytime drowsiness    Diet:  Regular (no restrictions)    Frequency of exercise:  6-7 days/week    Duration of exercise:  45-60 minutes    Taking medications regularly:  Yes    PHQ-2 Total Score: 0    Additional concerns today:  YES    HEADACHES: Recent increase in HA's maybe stress related - hx of recurrent tension headaches in the past.  Eyesight has been gradually getting worse over time - getting a lot of eyestrain with working remotely and on computer all the time. Lots of neck muscle tension with that as well.  Just started going back to chiropractor - Dr. Arleen Abarca - [facility] in [place].  Doesn't do big manipulations.  Gets migraine headaches with aura and migraines worse on combination ocp's.  Currently on micronor - progesterone only.  Needs to have her eyes checked.      [employer] for her workouts - doing that now and loves it . Discussed adding in Yoga to help with flexibility and stress relief.     Seeing pulmonology wondering about inhaler was given different brand this time.   Discuss BC not happy with still getting periods - menorrhagia - would like to tt ob/gyn specialists about her options.         Today's PHQ-2 Score:   PHQ-2 ( 1999 Pfizer) 4/14/2021 4/14/2021   Q1: Little interest or pleasure in doing things 0 0   Q2: Feeling down, depressed or hopeless 0 0   PHQ-2 Score 0 0   Q1: Little interest or pleasure in doing things Not at all -   Q2: Feeling down, depressed or hopeless Not at all -   PHQ-2 Score 0 -       Abuse: Current or Past(Physical, Sexual or Emotional)- NO  Do you feel safe in your environment? YES    Have you ever done Advance Care Planning? (For example, a Health Directive, POLST, or a discussion with a medical provider or your loved ones about your wishes): Yes, advance care planning is on file.    Social History     Tobacco Use     Smoking status: Former Smoker     Packs/day: 0.10     Years: 5.00      Pack years: 0.50     Types: Cigarettes     Smokeless tobacco: Never Used   Substance Use Topics     Alcohol use: No     Alcohol/week: 0.0 standard drinks     Comment: rarely      If you drink alcohol do you typically have >3 drinks per day or >7 drinks per week? No                     Reviewed orders with patient.  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 related to previous surgeries - 4 in 3 months for sinus issues/infection     Family history of breast cancer- mother's identical twin, maternal aunt, paternal GM and dad with breast lumps      Hot flashes     Generalized hyperhidrosis- since earliest recollections - soaks clothing multiple times/week - very disruptive to life     Contraception     Mild anxiety     Foster care child     Anxiety     Multiple fractures of left foot with routine healing     Family history of colon cancer- father dx'd in mid 40's - pt's first colonoscopy in 4/24/2017 - repeat in 2022      Menorrhagia with regular cycle- ? cause of iron deficiency anemia- pt would like to see ob/gyn specialists     Intractable episodic tension-type headache     Strain of neck muscle, initial encounter     Sun-damaged skin     Cough due to bronchospasm- needs refills on meds      Past Surgical History:   Procedure Laterality Date     APPENDECTOMY       ARTHROSCOPY, ARTHROPLASTY TEMPOROMANDIBULAR JOINT, COMBINED  age 14     TMJ surgery as an 7th grader      CYSTOSCOPY,URETEROSCOPY,STONE REMV  9/11/2002     with stent placements - since removed      ENDOSCOPIC BALLOON SINUPLASTY, OPTICAL TRACKING SYSTEM ENDOSCOPIC SINUS SURGERY, COMBINED  1/8/2013    Procedure: COMBINED ENDOSCOPIC BALLOON SINUPLASTY, OPTICAL TRACKING SYSTEM ENDOSCOPIC SINUS SURGERY;  Left Endo Sinus  Surgery  with Septoplasty       ENDOSCOPIC SINUS SURGERY  1/15/2013    Procedure: ENDOSCOPIC SINUS SURGERY;  removal of nasal packing with endocopic debridement of left paranasal sinuses;  Surgeon: Fabian Driver MD;  Location:  OR     ENDOSCOPIC SINUS SURGERY  4/22/2013    Procedure: ENDOSCOPIC SINUS SURGERY;  Functional Endoscopic Sinus Surgery With Biopsy ;  Surgeon: Jcarlos Hogan MD;  Location:  OR       Social History     Tobacco Use     Smoking status: Former Smoker     Packs/day: 0.10     Years: 5.00     Pack years: 0.50     Types: Cigarettes     Smokeless tobacco: Never Used   Substance Use Topics     Alcohol use: No     Alcohol/week: 0.0 standard drinks     Comment: rarely      Family History   Problem Relation Age of Onset     Asthma Mother      Depression Mother      Thyroid Disease Mother      Blood Disease Mother         Bleeding disorder     Bipolar Disorder Mother      Parkinsonism Mother      Dementia Mother      Heart Disease Father         s/p pacemakers x 2      Diabetes Father      Cancer Father         Skin, Colon-dx'd mid-late 40's     Asthma Child      Alcohol/Drug Other         Sibling     Breast Cancer Paternal Grandmother      Breast Cancer Maternal Aunt         another of mom's sisters      Breast Cancer Maternal Aunt         mother's identical twin      Allergy (Severe) Son         Dairy, Eggs, and Oatmeal         Current Outpatient Medications   Medication Sig Dispense Refill     albuterol (VENTOLIN HFA) 108 (90 Base) MCG/ACT inhaler INHALE 2 PUFFS BY MOUTH EVERY 6 HOURS AS NEEDED FOR WHEEZE OR FOR SHORTNESS OF BREATH 18 g 11     azithromycin (ZITHROMAX) 250  MG tablet 2 tabs first day, then 1 tab by mouth daily for 4 additional days 6 tablet 1     cetirizine (ZYRTEC) 10 MG tablet Take 1 tablet (10 mg) by mouth every evening 30 tablet 1     fluticasone-vilanterol (BREO ELLIPTA) 200-25 MCG/INH inhaler Inhale 1 puff into the lungs daily       ibuprofen (ADVIL) 200 MG tablet Take 200 mg by mouth as needed.       Lactobacillus (PROBIOTIC ACIDOPHILUS PO)        norethindrone (MICRONOR) 0.35 MG tablet Take 1 tablet (0.35 mg) by mouth daily 84 tablet 4     PARoxetine (PAXIL) 20 MG tablet TAKE 1.5  TABLETs BY MOUTH EVERY DAY IN THE MORNING 135 tablet 3     Allergies   Allergen Reactions     Adhesive Tape Blisters     Melon      Mouth gets itchy      Seasonal Allergies      Recent Labs   Lab Test 12/04/19  1001 08/22/18  0832 05/23/15  0829 05/23/15  0829   LDL 80 69  --  64   HDL 45* 54  --  57   TRIG 68 55  --  55   ALT 28 13  --   --    CR 0.66 0.67   < >  --    GFRESTIMATED >90 >90   < >  --    GFRESTBLACK >90 >90   < >  --    POTASSIUM 4.0 4.1   < >  --    TSH 0.65 0.99   < >  --     < > = values in this interval not displayed.        FSH-7:   Breast CA Risk Assessment (FHS-7) 4/14/2021   Did any of your first-degree relatives have breast or ovarian cancer? Yes   Did any of your relatives have bilateral breast cancer? Unknown   Did any man in your family have breast cancer? Yes   Did any woman in your family have breast and ovarian cancer? Unknown   Did any woman in your family have breast cancer before age 50 y? Unknown   Do you have 2 or more relatives with breast and/or ovarian cancer? Yes   Do you have 2 or more relatives with breast and/or bowel cancer? No       Mammogram Screening: Recommended annual mammography  Pertinent mammograms are reviewed under the imaging tab.    Pertinent mammograms are reviewed under the imaging tab.  History of abnormal Pap smear:  NO - age 30- 65 PAP every 3 years recommended  PAP / HPV Latest Ref Rng & Units 4/14/2021 7/27/2018 5/14/2015    PAP - NIL NIL NIL   HPV 16 DNA NEG:Negative Negative Negative -   HPV 18 DNA NEG:Negative Negative Negative -   OTHER HR HPV NEG:Negative Negative Negative -     Reviewed and updated as needed this visit by clinical staff  Tobacco  Allergies  Meds  Problems  Med Hx  Surg Hx  Fam Hx          Reviewed and updated as needed this visit by Provider  Tobacco  Allergies  Meds  Problems  Med Hx  Surg Hx  Fam Hx         Past Medical History:   Diagnosis Date     Anemia      Anxiety     paxil 10mg= mild bladder retention, increased headaches; lexapro = bad sweating     Contraception     ocp's some = mood swings ; seasonale: frequent vaginal bleeding and weight gain     Hot flashes 11/16/2016     Kidney stones     Dr. 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    left - secondary to severe oral/sinus infection and bleeding  - [facility] s/p multiple surgeries      Multiple fractures of left foot with routine healing 02/25/2021     Night sweats      Sleep problems       Past Surgical History:   Procedure Laterality Date     APPENDECTOMY       ARTHROSCOPY, ARTHROPLASTY TEMPOROMANDIBULAR JOINT, COMBINED  age 14     TMJ surgery as an 7th grader     CYSTOSCOPY,URETEROSCOPY,STONE REMV  9/11/2002     with stent placements - since removed      ENDOSCOPIC BALLOON SINUPLASTY, OPTICAL TRACKING SYSTEM ENDOSCOPIC SINUS SURGERY, COMBINED  1/8/2013    Procedure: COMBINED ENDOSCOPIC BALLOON SINUPLASTY, OPTICAL TRACKING SYSTEM ENDOSCOPIC SINUS SURGERY;  Left Endo Sinus  Surgery  with Septoplasty       ENDOSCOPIC SINUS SURGERY  1/15/2013    Procedure: ENDOSCOPIC SINUS SURGERY;  removal of nasal packing with endocopic debridement of left paranasal sinuses;  Surgeon: Fabian Driver MD;  Location:  OR     ENDOSCOPIC SINUS SURGERY  4/22/2013    Procedure: ENDOSCOPIC SINUS SURGERY;  Functional Endoscopic Sinus Surgery With Biopsy ;  Surgeon: Samira  "Jcarlos Cnatu MD;  Location: [facility] OR     OB History    Para Term  AB Living   3 3 3 0 0 3   SAB TAB Ectopic Multiple Live Births   0 0 0 0 0      # Outcome Date GA Lbr Alvaro/2nd Weight Sex Delivery Anes PTL Lv   3 Term            2 Term            1 Term               Obstetric Comments   Hx of kidney stones with last 2 pregnancies -  x 3 uncomplicated        ROS:  CONSTITUTIONAL: NEGATIVE for fever, chills, change in weight  INTEGUMENTARU/SKIN: NEGATIVE for worrisome rashes, moles or lesions  EYES: NEGATIVE for vision changes or irritation  ENT: NEGATIVE for ear, mouth and throat problems, but having some sinus infection symptoms right now - thick , green, mucus right now and left ethmoid sinus tenderness and pressure. And left upper teeth pain.   RESP: NEGATIVE for significant cough or SOB  BREAST: NEGATIVE for masses, tenderness or discharge  CV: NEGATIVE for chest pain, palpitations or peripheral edema  GI: NEGATIVE for nausea, abdominal pain, heartburn, or change in bowel habits  : NEGATIVE for unusual urinary or vaginal symptoms. Periods are regular , but heavy period monthly - changing super plus tampons every 2 hours and leaking through at night.   MUSCULOSKELETAL: NEGATIVE for significant arthralgias or myalgia  NEURO: NEGATIVE for weakness, dizziness or paresthesias  PSYCHIATRIC: NEGATIVE for changes in mood or affect    OBJECTIVE:   /82   Pulse 88   Temp 98.9  F (37.2  C) (Tympanic)   Resp 12   Ht 1.727 m (5' 8\")   Wt 100.7 kg (222 lb)   LMP 2021   SpO2 98%   BMI 33.75 kg/m    EXAM:  GENERAL: healthy, alert and no distress  EYES: Eyes grossly normal to inspection, PERRL and conjunctivae and sclerae normal  HENT: ear canals and TM's normal, nose and mouth without ulcers or lesions  NECK: no adenopathy, no asymmetry, masses, or scars and thyroid normal to palpation  RESP: lungs clear to auscultation - no rales, rhonchi or wheezes  BREAST: normal without masses, tenderness " or nipple discharge and no palpable axillary masses or adenopathy  CV: regular rate and rhythm, normal S1 S2, no S3 or S4, no murmur, click or rub, no peripheral edema and peripheral pulses strong  ABDOMEN: soft, nontender, no hepatosplenomegaly, no masses and bowel sounds normal  MS: no gross musculoskeletal defects noted, no edema  SKIN:  suspicious lesion left anterior thigh - >10mm with  irregular borders irregular pigmentation - pt states has been changing the last couple of months , no suspicious  rashes  NEURO: Normal strength and tone, mentation intact and speech normal  PSYCH: mentation appears normal, affect normal/bright    Diagnostic Test Results:  Labs reviewed in Epic    ASSESSMENT/PLAN:       ICD-10-CM    1. Encounter for routine adult health examination with abnormal findings  Z00.01    2. Need for hepatitis C screening test  Z11.59 Hepatitis C Screen Reflex to HCV RNA Quant and Genotype   3. Generalized hyperhidrosis- since earliest recollections - soaks clothing multiple times/week - very disruptive to life  R61 OFFICE/OUTPT VISIT,EST,LEVL IV   4. Other iron deficiency anemia - Anemia - ? related to previous surgeries - 4 in 3 months for sinus issues/infection  D50.8 REVIEW OF HEALTH MAINTENANCE PROTOCOL ORDERS     REVIEW OF HEALTH MAINTENANCE PROTOCOL ORDERS     OFFICE/OUTPT VISIT,EST,LEVL IV   5. Anxiety  F41.9 REVIEW OF HEALTH MAINTENANCE PROTOCOL ORDERS     OFFICE/OUTPT VISIT,EST,LEVL IV   6. Closed multiple fractures of left foot with routine healing, subsequent encounter  S92.902D    7. Encounter for initial prescription of contraceptive pills  Z30.011 norethindrone (MICRONOR) 0.35 MG tablet   8. Generalized hyperhidrosis  R61 PARoxetine (PAXIL) 20 MG tablet   9. Mild anxiety  F41.9 PARoxetine (PAXIL) 20 MG tablet     OFFICE/OUTPT VISIT,EST,LEVL IV   10. Foster care child  Z62.21 PARoxetine (PAXIL) 20 MG tablet   11. Cough  R05 albuterol (VENTOLIN HFA) 108 (90 Base) MCG/ACT inhaler      OFFICE/OUTPT VISIT,EST,LEVL IV   12. Pneumonia of right lower lobe due to infectious organism- tends to recur 1-2x/year if bronchitis progresses  J18.9 OFFICE/OUTPT VISIT,EST,LEVL IV   13. Cough due to bronchospasm- needs refills on meds   J98.01 fluticasone-vilanterol (BREO ELLIPTA) 200-25 MCG/INH inhaler     OFFICE/OUTPT VISIT,EST,LEVL IV   14. Acute bronchitis, unspecified organism - needs refills on meds - not exacerbated right now   J20.9 albuterol (VENTOLIN HFA) 108 (90 Base) MCG/ACT inhaler     fluticasone-vilanterol (BREO ELLIPTA) 200-25 MCG/INH inhaler   15. Family history of colon cancer- father dx'd in mid 40's - pt's first colonoscopy in 4/24/2017 - repeat in 2022   Z80.0 CANCER RISK MGMT/CANCER GENETIC COUNSELING REFERRAL   16. Family history of breast cancer- mother's identical twin, maternal aunt, paternal GM and dad with breast lumps   Z80.3    17. Menorrhagia with regular cycle- ? cause of iron deficiency anemia- pt would like to see ob/gyn specialists  N92.0 OB/GYN REFERRAL     TSH with free T4 reflex     OFFICE/OUTPT VISIT,EST,LEVL IV   18. Acute recurrent ethmoidal sinusitis  J01.21 azithromycin (ZITHROMAX) 250 MG tablet   19. Acute recurrent maxillary sinusitis  J01.01 azithromycin (ZITHROMAX) 250 MG tablet   20. CARDIOVASCULAR SCREENING; LDL GOAL LESS THAN 160  Z13.6 CBC with platelets     Comprehensive metabolic panel     Lipid panel reflex to direct LDL Fasting     TSH with free T4 reflex     Vitamin D Deficiency   21. Cervical cancer screening  Z12.4 Pap imaged thin layer screen with HPV - recommended age 30 - 65 years (select HPV order below)     HPV High Risk Types DNA Cervical   22. Neoplasm of uncertain behavior of skin  D48.5 SKIN CARE REFERRAL     OFFICE/OUTPT VISIT,EST,LEVL IV   23. Sun-damaged skin  L57.8 SKIN CARE REFERRAL     OFFICE/OUTPT VISIT,EST,LEVL IV   24. Strain of neck muscle, initial encounter  S16.1XXA OFFICE/OUTPT VISIT,EST,LEVL IV   25. Intractable episodic tension-type  "headache  G44.211 OFFICE/OUTPT VISIT,THOMPSON BROWN IV        Patient has been advised of split billing requirements and indicates understanding: Yes  COUNSELING:   Reviewed preventive health counseling, as reflected in patient instructions    Estimated body mass index is 33.75 kg/m  as calculated from the following:    Height as of this encounter: 1.727 m (5' 8\").    Weight as of this encounter: 100.7 kg (222 lb).        She reports that she has quit smoking. Her smoking use included cigarettes. She has a 0.50 pack-year smoking history. She has never used smokeless tobacco.     Pt will call for skin care clinic referral asap - we spoke about suspicious appearance of this for possible skin cancer - pt will call asap.            Counseling Resources:  ATP IV Guidelines  Pooled Cohorts Equation Calculator  Breast Cancer Risk Calculator  BRCA-Related Cancer Risk Assessment: FHS-7 Tool  FRAX Risk Assessment  ICSI Preventive Guidelines  Dietary Guidelines for Americans, 2010  USDA's MyPlate  ASA Prophylaxis  Lung CA Screening          Linda Soto MD  [facility]  "

## 2021-04-19 ENCOUNTER — TELEPHONE (OUTPATIENT)
Dept: ONCOLOGY | Facility: CLINIC | Age: 46
End: 2021-04-19

## 2021-04-20 LAB
COPATH REPORT: NORMAL
PAP: NORMAL

## 2021-05-02 PROBLEM — N92.0 MENORRHAGIA WITH REGULAR CYCLE: Status: ACTIVE | Noted: 2021-05-02

## 2021-05-02 PROBLEM — G44.211 INTRACTABLE EPISODIC TENSION-TYPE HEADACHE: Status: ACTIVE | Noted: 2021-05-02

## 2021-05-02 PROBLEM — S16.1XXA STRAIN OF NECK MUSCLE, INITIAL ENCOUNTER: Status: ACTIVE | Noted: 2021-05-02

## 2021-05-02 PROBLEM — J98.01 COUGH DUE TO BRONCHOSPASM: Status: ACTIVE | Noted: 2021-05-02

## 2021-05-02 PROBLEM — L57.8 SUN-DAMAGED SKIN: Status: ACTIVE | Noted: 2021-05-02

## 2021-05-04 ENCOUNTER — MYC MEDICAL ADVICE (OUTPATIENT)
Dept: FAMILY MEDICINE | Facility: CLINIC | Age: 46
End: 2021-05-04

## 2021-05-04 DIAGNOSIS — Z13.6 CARDIOVASCULAR SCREENING; LDL GOAL LESS THAN 160: ICD-10-CM

## 2021-05-04 DIAGNOSIS — N92.0 MENORRHAGIA WITH REGULAR CYCLE: ICD-10-CM

## 2021-05-04 DIAGNOSIS — Z11.59 NEED FOR HEPATITIS C SCREENING TEST: ICD-10-CM

## 2021-05-04 LAB
DEPRECATED CALCIDIOL+CALCIFEROL SERPL-MC: 20 UG/L (ref 20–75)
ERYTHROCYTE [DISTWIDTH] IN BLOOD BY AUTOMATED COUNT: 12.9 % (ref 10–15)
HCT VFR BLD AUTO: 42.4 % (ref 35–47)
HCV AB SERPL QL IA: NONREACTIVE
HGB BLD-MCNC: 14.2 G/DL (ref 11.7–15.7)
MCH RBC QN AUTO: 28.3 PG (ref 26.5–33)
MCHC RBC AUTO-ENTMCNC: 33.5 G/DL (ref 31.5–36.5)
MCV RBC AUTO: 85 FL (ref 78–100)
PLATELET # BLD AUTO: 288 10E9/L (ref 150–450)
RBC # BLD AUTO: 5.02 10E12/L (ref 3.8–5.2)
WBC # BLD AUTO: 6.4 10E9/L (ref 4–11)

## 2021-05-04 PROCEDURE — 80061 LIPID PANEL: CPT | Performed by: FAMILY MEDICINE

## 2021-05-04 PROCEDURE — 84443 ASSAY THYROID STIM HORMONE: CPT | Performed by: FAMILY MEDICINE

## 2021-05-04 PROCEDURE — 85027 COMPLETE CBC AUTOMATED: CPT | Performed by: FAMILY MEDICINE

## 2021-05-04 PROCEDURE — 80053 COMPREHEN METABOLIC PANEL: CPT | Performed by: FAMILY MEDICINE

## 2021-05-04 PROCEDURE — 82306 VITAMIN D 25 HYDROXY: CPT | Performed by: FAMILY MEDICINE

## 2021-05-04 PROCEDURE — 86803 HEPATITIS C AB TEST: CPT | Performed by: FAMILY MEDICINE

## 2021-05-04 PROCEDURE — 36415 COLL VENOUS BLD VENIPUNCTURE: CPT | Performed by: FAMILY MEDICINE

## 2021-05-05 ENCOUNTER — MYC MEDICAL ADVICE (OUTPATIENT)
Dept: FAMILY MEDICINE | Facility: CLINIC | Age: 46
End: 2021-05-05

## 2021-05-05 LAB
ALBUMIN SERPL-MCNC: 3.8 G/DL (ref 3.4–5)
ALP SERPL-CCNC: 102 U/L (ref 40–150)
ALT SERPL W P-5'-P-CCNC: 15 U/L (ref 0–50)
ANION GAP SERPL CALCULATED.3IONS-SCNC: 5 MMOL/L (ref 3–14)
AST SERPL W P-5'-P-CCNC: 9 U/L (ref 0–45)
BILIRUB SERPL-MCNC: 0.7 MG/DL (ref 0.2–1.3)
BUN SERPL-MCNC: 13 MG/DL (ref 7–30)
CALCIUM SERPL-MCNC: 8.5 MG/DL (ref 8.5–10.1)
CHLORIDE SERPL-SCNC: 109 MMOL/L (ref 94–109)
CHOLEST SERPL-MCNC: 155 MG/DL
CO2 SERPL-SCNC: 25 MMOL/L (ref 20–32)
CREAT SERPL-MCNC: 0.76 MG/DL (ref 0.52–1.04)
GFR SERPL CREATININE-BSD FRML MDRD: >90 ML/MIN/{1.73_M2}
GLUCOSE SERPL-MCNC: 84 MG/DL (ref 70–99)
HDLC SERPL-MCNC: 54 MG/DL
LDLC SERPL CALC-MCNC: 84 MG/DL
NONHDLC SERPL-MCNC: 101 MG/DL
POTASSIUM SERPL-SCNC: 4.5 MMOL/L (ref 3.4–5.3)
PROT SERPL-MCNC: 6.9 G/DL (ref 6.8–8.8)
SODIUM SERPL-SCNC: 139 MMOL/L (ref 133–144)
TRIGL SERPL-MCNC: 86 MG/DL
TSH SERPL DL<=0.005 MIU/L-ACNC: 1.49 MU/L (ref 0.4–4)

## 2021-05-05 NOTE — TELEPHONE ENCOUNTER
My chart message sent     Routing to provider to review of results   Thank you     Gris Calloway RN, BSN  [facility]

## 2021-05-05 NOTE — TELEPHONE ENCOUNTER
Please review labs from 5/4/2021 - please see message below     Thank you     Gris Calloway RN, BSN  [facility]

## 2021-05-05 NOTE — RESULT ENCOUNTER NOTE
All your labs are normal or stable from previous except your vitamin D level which is a little low.  Recommend taking 2000 international units of vitamin D (Cholecalciferol) daily and rechecking this again with a lab only visit in 3 months time.  We've entered future orders for this and you can do this as a lab only appointment.   You can have this done at any [facility] without appt during regular outpt lab hours or by lab only appt at any [facility] clinic.       Please, continue your current medications and/or supplements.   Follow up as we discussed at your last office visit.     Thank you so much for choosing [facility].  Please contact us with any questions that you may have.   We appreciate the opportunity to serve you now and look forward to supporting your healthcare needs for a long time to come!    Most Sincerely,     Linda Soto MD

## 2021-05-05 NOTE — TELEPHONE ENCOUNTER
Please see my chart message below as with the labs from 5/4/2021    Thank you     Gris Calloway RN, BSN  [facility]

## 2021-05-05 NOTE — TELEPHONE ENCOUNTER
Please see my chart message below     Please review and advise     Thank you     Gris Calloway RN, BSN  [place] [facility]

## 2021-05-07 DIAGNOSIS — F41.9 MILD ANXIETY: ICD-10-CM

## 2021-05-07 DIAGNOSIS — R61 GENERALIZED HYPERHIDROSIS: ICD-10-CM

## 2021-05-07 DIAGNOSIS — Z62.21 FOSTER CARE CHILD: ICD-10-CM

## 2021-05-10 RX ORDER — PAROXETINE 20 MG/1
TABLET, FILM COATED ORAL
Qty: 30 TABLET | OUTPATIENT
Start: 2021-05-10

## 2021-05-10 NOTE — TELEPHONE ENCOUNTER
RX refused. Patient has refills available    Shana Chambers RN  [facility]

## 2021-09-19 ENCOUNTER — HEALTH MAINTENANCE LETTER (OUTPATIENT)
Age: 46
End: 2021-09-19

## 2021-10-11 DIAGNOSIS — R61 GENERALIZED HYPERHIDROSIS: ICD-10-CM

## 2021-10-11 DIAGNOSIS — F41.9 MILD ANXIETY: ICD-10-CM

## 2021-10-11 DIAGNOSIS — Z62.21 FOSTER CARE CHILD: ICD-10-CM

## 2021-10-12 RX ORDER — PAROXETINE 20 MG/1
TABLET, FILM COATED ORAL
Qty: 30 TABLET | OUTPATIENT
Start: 2021-10-12

## 2021-10-13 ENCOUNTER — MYC MEDICAL ADVICE (OUTPATIENT)
Dept: FAMILY MEDICINE | Facility: CLINIC | Age: 46
End: 2021-10-13

## 2021-10-15 NOTE — TELEPHONE ENCOUNTER
PARoxetine (PAXIL) 20 MG tablet 135 tablet 3 4/14/2021  No   Sig: TAKE 1.5  TABLETs BY MOUTH EVERY DAY IN THE MORNING     Mychart sent back to patient.     Also called pharmacy. They have refill ready for her to .     Shana Chambers RN  [facility]

## 2021-12-29 ENCOUNTER — MYC MEDICAL ADVICE (OUTPATIENT)
Dept: FAMILY MEDICINE | Facility: CLINIC | Age: 46
End: 2021-12-29
Payer: COMMERCIAL

## 2021-12-29 NOTE — TELEPHONE ENCOUNTER
Please see my chart message below     Please review and advise     Thank you     Gris Calloway RN, BSN  [facility] [facility]

## 2022-01-05 NOTE — TELEPHONE ENCOUNTER
My chart message sent     Gris Calloway RN, BSN  [facility]

## 2022-01-20 ENCOUNTER — TELEPHONE (OUTPATIENT)
Dept: FAMILY MEDICINE | Facility: CLINIC | Age: 47
End: 2022-01-20
Payer: COMMERCIAL

## 2022-01-20 DIAGNOSIS — J98.01 COUGH DUE TO BRONCHOSPASM: ICD-10-CM

## 2022-01-20 DIAGNOSIS — R05.9 COUGH: ICD-10-CM

## 2022-01-20 DIAGNOSIS — J20.9 ACUTE BRONCHITIS, UNSPECIFIED ORGANISM: ICD-10-CM

## 2022-01-20 NOTE — TELEPHONE ENCOUNTER
Patient has an appointment on 2/3 but needs her inhaler and medication filled before that.  Has had covid and is still having some respiratory issues and wants to have the Breo and the montelukast ( 10 mg 1 time per day) fiilled until she can be seen by you on 2/3    Please send over to the [facility] in [facility] in [place]     Any issues please call patient

## 2022-01-24 RX ORDER — ALBUTEROL SULFATE 90 UG/1
AEROSOL, METERED RESPIRATORY (INHALATION)
Qty: 36 G | Refills: 11 | Status: SHIPPED | OUTPATIENT
Start: 2022-01-24 | End: 2022-12-01

## 2022-02-09 NOTE — PROGRESS NOTES
"Deandra is a 46 year old who is being evaluated via a billable video visit.      How would you like to obtain your AVS? MyChart  If the video visit is dropped, the invitation should be resent by: Send to e-mail at: [email or web address]  Will anyone else be joining your video visit? No      Video Start Time: unable to connect sound-changed to telephone visit    Assessment & Plan     Cough due to bronchospasm  Refill provided.  - montelukast (SINGULAIR) 10 MG tablet  Dispense: 90 tablet; Refill: 3    Visit for screening mammogram  - MA SCREENING DIGITAL BILAT - Future  (s+30)    Mild anxiety  Patient increased slightly and mood doing better. Refill given for 40 mg daily.  - PARoxetine (PAXIL) 20 MG tablet  Dispense: 180 tablet; Refill: 1      Prescription drug management  No LOS data to display   Time spent doing chart review, history and exam, documentation and further activities per the note       BMI:   Estimated body mass index is 33.75 kg/m  as calculated from the following:    Height as of 4/14/21: 1.727 m (5' 8\").    Weight as of 4/14/21: 100.7 kg (222 lb).       Return in about 3 months (around 5/10/2022) for Routine preventive.    Jerrica Banegas [facility]    Subjective   Deandra is a 46 year old who presents for the following health issues     HPI     Asthma:  She presents for follow up of asthma.  She has some cough, some wheezing, and no shortness of breath. She is using a relief medication every 4 hours. She does not miss any doses of her controller medication throughout the week.Patient is aware of the following triggers: exercise or sports. The patient has not had a visit to the Emergency Room, Urgent Care or Hospital due to asthma since the last clinic visit.      Mental Health Follow-up:  Patient presents to follow-up on Anxiety.     Patient's anxiety since last visit has been:  No change  The patient is having other symptoms associated with anxiety.  Any significant life " events: relationship concerns, financial concerns and health concerns  Patient is feeling anxious or having panic attacks.  Patient has no concerns about alcohol or drug use.     Social History  Tobacco Use    Smoking status: Former Smoker      Packs/day: 0.10      Years: 5.00      Pack years: .5      Types: Cigarettes    Smokeless tobacco: Never Used  Alcohol use: No    Alcohol/week: 0.0 standard drinks    Comment: rarely   Drug use: No    Comment: no herbal meds except for acidophilus        Today's PHQ-9         PHQ-9 Total Score:     (P) 3   PHQ-9 Q9 Thoughts of better off dead/self-harm past 2 weeks :   (P) Not at all   Thoughts of suicide or self harm:      Self-harm Plan:        Self-harm Action:          Safety concerns for self or others:              She eats 2-3 servings of fruits and vegetables daily.She consumes 0 sweetened beverage(s) daily.She exercises with enough effort to increase her heart rate 60 or more minutes per day.  She exercises with enough effort to increase her heart rate 5 days per week.   She is taking medications regularly.        Asthma Follow-Up     Was ACT completed today?  Answers for HPI/ROS submitted by the patient on 2/2/2022  If you checked off any problems, how difficult have these problems made it for you to do your work, take care of things at home, or get along with other people?: Somewhat difficult  PHQ9 TOTAL SCORE: 3  LISA 7 TOTAL SCORE: 4        Yes    ACT Total Scores 2/3/2022   ACT TOTAL SCORE (Goal Greater than or Equal to 20) 14   In the past 12 months, how many times did you visit the emergency room for your asthma without being admitted to the hospital? 0   In the past 12 months, how many times were you hospitalized overnight because of your asthma? 0          How many days per week do you miss taking your asthma controller medication?  0    Please describe any recent triggers for your asthma: exercise or sports and cold air    Have you had any Emergency Room  Visits, Urgent Care Visits, or Hospital Admissions since your last office visit?  No    Recently off Singulair, recent COVID 19 a month ago and winter exacerbates her asthma. Will have her re-do once feeling improvement. Had been off Singulair a few months.        Review of Systems   Constitutional, HEENT, cardiovascular, pulmonary, GI, , musculoskeletal, neuro, skin, endocrine and psych systems are negative, except as otherwise noted.      Objective           Vitals:  No vitals were obtained today due to virtual visit.    Physical Exam   GENERAL: Healthy, alert and no distress  EYES: Eyes grossly normal to inspection.  No discharge or erythema, or obvious scleral/conjunctival abnormalities.  RESP: No audible wheeze, cough, or visible cyanosis.  No visible retractions or increased work of breathing.    SKIN: Visible skin clear. No significant rash, abnormal pigmentation or lesions.  NEURO: Cranial nerves grossly intact.  Mentation and speech appropriate for age.  PSYCH: Mentation appears normal, affect normal/bright, judgement and insight intact, normal speech and appearance well-groomed.              Changed to phone visit- 7 minutes  Video-Visit Details    Type of service:  Video Visit    Video End Time:    Originating Location (pt. Location):     Distant Location (provider location):  [facility]     Platform used for Video Visit: WebS[employer]-connected for 2 minutes but sound was too low to conduct visit

## 2022-02-10 ENCOUNTER — VIRTUAL VISIT (OUTPATIENT)
Dept: FAMILY MEDICINE | Facility: CLINIC | Age: 47
End: 2022-02-10
Payer: COMMERCIAL

## 2022-02-10 DIAGNOSIS — J98.01 COUGH DUE TO BRONCHOSPASM: Primary | ICD-10-CM

## 2022-02-10 DIAGNOSIS — F41.9 MILD ANXIETY: ICD-10-CM

## 2022-02-10 DIAGNOSIS — Z12.31 VISIT FOR SCREENING MAMMOGRAM: ICD-10-CM

## 2022-02-10 PROCEDURE — 99214 OFFICE O/P EST MOD 30 MIN: CPT | Mod: TEL | Performed by: NURSE PRACTITIONER

## 2022-02-10 RX ORDER — PAROXETINE 20 MG/1
40 TABLET, FILM COATED ORAL EVERY MORNING
Qty: 180 TABLET | Refills: 1 | Status: SHIPPED | OUTPATIENT
Start: 2022-02-10 | End: 2022-03-02

## 2022-02-10 RX ORDER — MONTELUKAST SODIUM 10 MG/1
10 TABLET ORAL AT BEDTIME
Qty: 90 TABLET | Refills: 3 | Status: SHIPPED | OUTPATIENT
Start: 2022-02-10 | End: 2022-12-01

## 2022-03-02 ENCOUNTER — TELEPHONE (OUTPATIENT)
Dept: FAMILY MEDICINE | Facility: CLINIC | Age: 47
End: 2022-03-02
Payer: COMMERCIAL

## 2022-03-02 DIAGNOSIS — F41.9 MILD ANXIETY: ICD-10-CM

## 2022-03-02 RX ORDER — PAROXETINE 40 MG/1
40 TABLET, FILM COATED ORAL EVERY MORNING
Qty: 90 TABLET | Refills: 1 | Status: SHIPPED | OUTPATIENT
Start: 2022-03-02 | End: 2022-09-27

## 2022-03-02 NOTE — TELEPHONE ENCOUNTER
Reason for Call:  Form, our goal is to have forms completed with 72 hours, however, some forms may require a visit or additional information.    Type of letter, form or note:  medical    Who is the form from?: [employer] (if other please explain)    Where did the form come from: form was faxed in    What clinic location was the form placed at?: [facility]    Where the form was placed: Dr Soto Box/Folder    What number is listed as a contact on the form?:        Additional comments:     Call taken on 3/2/2022 at 9:27 AM by Stella Jean

## 2022-03-02 NOTE — TELEPHONE ENCOUNTER
[facility] calling stating that insurance will not cover paxil 2 tablets in am, but will cover 1 tablet of 40mg. They are requesting a verbal OK to change the script. Please advise.     #[phone] to give verbal.     Irwin Vargas

## 2022-03-02 NOTE — TELEPHONE ENCOUNTER
Prior Authorization Retail Medication Request    Medication/Dose: Paroxetine HCL 20mg Tablet  ICD code (if different than what is on RX):    Previously Tried and Failed:   Rationale:      Insurance Name:  In Chart  Insurance ID:        Pharmacy Information (if different than what is on RX)  Name:  [id number] [place] - [address]  Phone:  [phone]    Plan does not cover. Please change RX or advise to start a PAReginaldo Vargas

## 2022-03-03 ENCOUNTER — OFFICE VISIT (OUTPATIENT)
Dept: FAMILY MEDICINE | Facility: CLINIC | Age: 47
End: 2022-03-03
Payer: COMMERCIAL

## 2022-03-03 VITALS
TEMPERATURE: 98.3 F | DIASTOLIC BLOOD PRESSURE: 88 MMHG | HEIGHT: 68 IN | HEART RATE: 81 BPM | WEIGHT: 220 LBS | OXYGEN SATURATION: 97 % | BODY MASS INDEX: 33.34 KG/M2 | SYSTOLIC BLOOD PRESSURE: 133 MMHG

## 2022-03-03 DIAGNOSIS — M54.2 NECK PAIN: Primary | ICD-10-CM

## 2022-03-03 DIAGNOSIS — M54.50 BILATERAL LOW BACK PAIN WITHOUT SCIATICA, UNSPECIFIED CHRONICITY: ICD-10-CM

## 2022-03-03 DIAGNOSIS — M54.9 UPPER BACK PAIN: ICD-10-CM

## 2022-03-03 PROCEDURE — 99213 OFFICE O/P EST LOW 20 MIN: CPT | Performed by: FAMILY MEDICINE

## 2022-03-03 RX ORDER — ACETAMINOPHEN 500 MG
500-1000 TABLET ORAL EVERY 6 HOURS PRN
COMMUNITY
Start: 2022-03-03 | End: 2023-03-09

## 2022-03-03 RX ORDER — CYCLOBENZAPRINE HCL 5 MG
5-10 TABLET ORAL 2 TIMES DAILY PRN
Qty: 30 TABLET | Refills: 1 | Status: SHIPPED | OUTPATIENT
Start: 2022-03-03 | End: 2022-07-07

## 2022-03-03 RX ORDER — IBUPROFEN 200 MG
600-800 TABLET ORAL EVERY 8 HOURS PRN
COMMUNITY
Start: 2022-03-03 | End: 2022-03-11

## 2022-03-03 ASSESSMENT — ENCOUNTER SYMPTOMS: BACK PAIN: 1

## 2022-03-03 NOTE — PROGRESS NOTES
"  Assessment & Plan   Neck pain  Upper back pain  Bilateral low back pain without sciatica, unspecified chronicity  related to increase exercise with rowing competition-ice or heat at this point would be helpful.  Will try below medications as needed and okay for activity as tolerated.  - cyclobenzaprine (FLEXERIL) 5 MG tablet  Dispense: 30 tablet; Refill: 1  - ibuprofen (ADVIL/MOTRIN) 200 MG tablet  - acetaminophen (TYLENOL) 500 MG tablet      BMI:   Estimated body mass index is 33.45 kg/m  as calculated from the following:    Height as of this encounter: 1.727 m (5' 8\").    Weight as of this encounter: 99.8 kg (220 lb).   Weight management plan: Discussed healthy diet and exercise guidelines      Return in about 2 weeks (around 3/17/2022) for symptoms failing to improve or sooner if worsening.      Prateek Noyola MD      [address] [address]  [email or web address]   Office: [phone]       Marbella Liriano is a 46 year old who presents for the following health issues     Back Pain     History of Present Illness     Asthma:  She presents for follow up of asthma.  She has some cough, no wheezing, and some shortness of breath. She is using a relief medication daily. She does not miss any doses of her controller medication throughout the week.Patient is aware of the following triggers: cold air, exercise or sports and upper respiratory infections. The patient has not had a visit to the Emergency Room, Urgent Care or Hospital due to asthma since the last clinic visit.     Back Pain:  She presents for follow up of back pain. Patient's back pain is a new problem.    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   Patient feels back pain: constantly   Location of back pain:  Left lower back, right middle of back, left middle of back, right upper back, left upper back, right side of neck, left " "side of neck, left shoulder, right side of waist and left side of waist  Description of back pain: burning  Back pain spreads: right shoulder, left shoulder, right side of neck and left side of neck    Since patient first noticed back pain, pain is: always present, but gets better and worse  Does back pain interfere with her job:  Yes  On a scale of 1-10 (10 being the worst), patient describes pain as:  8  What makes back pain worse: certain positions, sitting and twisting  Acupuncture: not tried  Acetaminophen: not tried  Activity or exercise: helpful  Chiropractor:  Helpful  Cold: not tried  Heat: helpful  Massage: helpful  Muscle relaxants: not tried  NSAIDS: helpful  Opioids: not tried  Physical Therapy: not tried  Rest: helpful  Steroid Injection: not tried  Stretching: helpful  Surgery: not tried  TENS unit: not tried  Topical pain relievers: helpful  Other healthcare providers patient is seeing for back pain: Chiropractor    Migraine but currently has a dull headaches:   Since the patient's last clinic visit, headaches are: worsened  The patient is getting headaches:  Daily  She is not able to do normal daily activities when she has a migraine.  The patient is taking the following rescue/relief medications:  Ibuprofen (Advil, Motrin) and Excedrin   Patient states \"The relief is inconsistent\" from the rescue/relief medications.   The patient is taking the following medications to prevent migraines:  No medications to prevent migraines  In the past 4 weeks, the patient has gone to an Urgent Care or Emergency Room 0 times times due to headaches.  Reason for visit:  Intense neck pain, back pain, and to do a lung check  Symptom onset:  3-7 days ago  Symptoms include:  Neck pain, difficulty turning head, impacts activity  Symptom intensity:  Severe  Symptom progression:  Staying the same  What makes it worse:  Running, sitting  What makes it better:  Laying down, not sitting    She eats 2-3 servings of fruits and " "vegetables daily.She consumes 0 sweetened beverage(s) daily.She exercises with enough effort to increase her heart rate 60 or more minutes per day.  She exercises with enough effort to increase her heart rate 5 days per week.     She is taking medications regularly.     Review of Systems   Musculoskeletal: Positive for back pain.        Objective    /88 (BP Location: Left arm, Patient Position: Sitting)   Pulse 81   Temp 98.3  F (36.8  C) (Tympanic)   Ht 1.727 m (5' 8\")   Wt 99.8 kg (220 lb)   LMP  (LMP Unknown)   SpO2 97%   Breastfeeding No   BMI 33.45 kg/m    Body mass index is 33.45 kg/m .  Physical Exam   GENERAL: healthy, alert and no distress  NECK: no adenopathy, no asymmetry, masses, or scars and thyroid normal to palpation  RESP: lungs clear to auscultation - no rales, rhonchi or wheezes  CV: regular rate and rhythm, normal S1 S2, no S3 or S4, no murmur, click or rub, no peripheral edema and peripheral pulses strong  ABDOMEN: soft, nontender, no hepatosplenomegaly, no masses and bowel sounds normal  MS: no gross musculoskeletal defects noted, no edema  Back: Bilateral paraspinal tenderness in the neck, upper back, and less so lower back, negative straight leg raising        "

## 2022-03-06 ENCOUNTER — HEALTH MAINTENANCE LETTER (OUTPATIENT)
Age: 47
End: 2022-03-06

## 2022-03-14 NOTE — TELEPHONE ENCOUNTER
Is this a quantity limit issue ?  Pt needs 30mg total - do they need separate 20mg and 10mg tab rx's?

## 2022-03-21 NOTE — TELEPHONE ENCOUNTER
RX for 40 mg once daily Paxil was sent to pharmacy on 3/2.  Is this still an ongoing concern/issue or did the pt receive the RX?      Loida Mayers MBA, MS, PA-C

## 2022-06-26 ENCOUNTER — HEALTH MAINTENANCE LETTER (OUTPATIENT)
Age: 47
End: 2022-06-26

## 2022-07-05 DIAGNOSIS — M54.9 UPPER BACK PAIN: ICD-10-CM

## 2022-07-05 DIAGNOSIS — M54.50 BILATERAL LOW BACK PAIN WITHOUT SCIATICA, UNSPECIFIED CHRONICITY: ICD-10-CM

## 2022-07-05 DIAGNOSIS — M54.2 NECK PAIN: ICD-10-CM

## 2022-07-06 NOTE — TELEPHONE ENCOUNTER
cyclobenzaprine (FLEXERIL) 5 MG tablet 30 tablet 1 3/3/2022  --   Sig - Route: Take 1-2 tablets (5-10 mg) by mouth 2 times daily as needed for muscle spasms - Oral   Sent to pharmacy as: Cyclobenzaprine HCl 5 MG Oral Tablet (FLEXERIL)   Class: E-Prescribe       Last office visit: 3/3/2022     Future Office Visit:        CSA -- Patient Level:    CSA: None found at the patient level.             Routing refill request to provider for review/approval because:  Drug not on the [facility] refill protocol     Gris Calloway RN, BSN  [facility]

## 2022-07-07 RX ORDER — CYCLOBENZAPRINE HCL 5 MG
5-10 TABLET ORAL 2 TIMES DAILY PRN
Qty: 30 TABLET | Refills: 1 | Status: SHIPPED | OUTPATIENT
Start: 2022-07-07 | End: 2022-12-01

## 2022-09-26 DIAGNOSIS — F41.9 MILD ANXIETY: ICD-10-CM

## 2022-09-27 RX ORDER — PAROXETINE 40 MG/1
TABLET, FILM COATED ORAL
Qty: 90 TABLET | Refills: 1 | Status: SHIPPED | OUTPATIENT
Start: 2022-09-27 | End: 2022-10-05 | Stop reason: ALTCHOICE

## 2022-09-27 NOTE — TELEPHONE ENCOUNTER
Routing refill request to provider for review/approval because:  Please review pt has 2 dosings on record    Stella Baez RN

## 2022-09-29 ENCOUNTER — MYC MEDICAL ADVICE (OUTPATIENT)
Dept: FAMILY MEDICINE | Facility: CLINIC | Age: 47
End: 2022-09-29

## 2022-10-03 ENCOUNTER — TRANSFERRED RECORDS (OUTPATIENT)
Dept: HEALTH INFORMATION MANAGEMENT | Facility: CLINIC | Age: 47
End: 2022-10-03

## 2022-10-03 ENCOUNTER — TELEPHONE (OUTPATIENT)
Dept: FAMILY MEDICINE | Facility: CLINIC | Age: 47
End: 2022-10-03

## 2022-10-03 DIAGNOSIS — Z12.31 VISIT FOR SCREENING MAMMOGRAM: Primary | ICD-10-CM

## 2022-10-03 NOTE — TELEPHONE ENCOUNTER
Patient is taking 20 mg paxil, not 40.  Please fill until can be seen .  Has appointment with Dr Soto on 12/1/22      Eden Healy

## 2022-10-03 NOTE — TELEPHONE ENCOUNTER
Please place orders for patient annual mammogram and I will call patient to  help her schedule      Eden Healy

## 2022-10-05 NOTE — TELEPHONE ENCOUNTER
Per 2/3/22 virtual visit for anxiety states take 20 mg 1.5 tablets daily     Patient states she has been taking only 20 mg and is working for her.     Advised of rx that was sent 10/5      Kelsey POWELL RN    [facility]

## 2022-10-25 ENCOUNTER — ANCILLARY PROCEDURE (OUTPATIENT)
Dept: MAMMOGRAPHY | Facility: CLINIC | Age: 47
End: 2022-10-25
Payer: COMMERCIAL

## 2022-10-25 PROCEDURE — 77067 SCR MAMMO BI INCL CAD: CPT | Mod: TC | Performed by: RADIOLOGY

## 2022-10-25 PROCEDURE — 77063 BREAST TOMOSYNTHESIS BI: CPT | Mod: TC | Performed by: RADIOLOGY

## 2022-11-21 ENCOUNTER — HEALTH MAINTENANCE LETTER (OUTPATIENT)
Age: 47
End: 2022-11-21

## 2022-11-30 ASSESSMENT — ENCOUNTER SYMPTOMS
MYALGIAS: 1
SORE THROAT: 0
DIZZINESS: 0
PARESTHESIAS: 0
PALPITATIONS: 0
EYE PAIN: 0
NAUSEA: 0
ARTHRALGIAS: 0
NERVOUS/ANXIOUS: 1
JOINT SWELLING: 0
CONSTIPATION: 0
CHILLS: 0
DIARRHEA: 0
HEADACHES: 1
COUGH: 1
HEMATURIA: 0
HEMATOCHEZIA: 0
SHORTNESS OF BREATH: 0
HEARTBURN: 0
FEVER: 0
WEAKNESS: 0
BREAST MASS: 0
FREQUENCY: 0
DYSURIA: 0
ABDOMINAL PAIN: 0

## 2022-11-30 ASSESSMENT — ASTHMA QUESTIONNAIRES
QUESTION_5 LAST FOUR WEEKS HOW WOULD YOU RATE YOUR ASTHMA CONTROL: SOMEWHAT CONTROLLED
QUESTION_2 LAST FOUR WEEKS HOW OFTEN HAVE YOU HAD SHORTNESS OF BREATH: ONCE OR TWICE A WEEK
ACT_TOTALSCORE: 16
ACT_TOTALSCORE: 16
QUESTION_1 LAST FOUR WEEKS HOW MUCH OF THE TIME DID YOUR ASTHMA KEEP YOU FROM GETTING AS MUCH DONE AT WORK, SCHOOL OR AT HOME: A LITTLE OF THE TIME
QUESTION_4 LAST FOUR WEEKS HOW OFTEN HAVE YOU USED YOUR RESCUE INHALER OR NEBULIZER MEDICATION (SUCH AS ALBUTEROL): TWO OR THREE TIMES PER WEEK
QUESTION_3 LAST FOUR WEEKS HOW OFTEN DID YOUR ASTHMA SYMPTOMS (WHEEZING, COUGHING, SHORTNESS OF BREATH, CHEST TIGHTNESS OR PAIN) WAKE YOU UP AT NIGHT OR EARLIER THAN USUAL IN THE MORNING: TWO OR THREE NIGHTS A WEEK

## 2022-11-30 ASSESSMENT — PATIENT HEALTH QUESTIONNAIRE - PHQ9
10. IF YOU CHECKED OFF ANY PROBLEMS, HOW DIFFICULT HAVE THESE PROBLEMS MADE IT FOR YOU TO DO YOUR WORK, TAKE CARE OF THINGS AT HOME, OR GET ALONG WITH OTHER PEOPLE: SOMEWHAT DIFFICULT
SUM OF ALL RESPONSES TO PHQ QUESTIONS 1-9: 11
SUM OF ALL RESPONSES TO PHQ QUESTIONS 1-9: 11

## 2022-12-01 ENCOUNTER — OFFICE VISIT (OUTPATIENT)
Dept: FAMILY MEDICINE | Facility: CLINIC | Age: 47
End: 2022-12-01
Payer: COMMERCIAL

## 2022-12-01 VITALS
DIASTOLIC BLOOD PRESSURE: 90 MMHG | BODY MASS INDEX: 35.77 KG/M2 | WEIGHT: 236 LBS | HEART RATE: 94 BPM | SYSTOLIC BLOOD PRESSURE: 140 MMHG | HEIGHT: 68 IN | OXYGEN SATURATION: 97 % | TEMPERATURE: 98.2 F | RESPIRATION RATE: 20 BRPM

## 2022-12-01 DIAGNOSIS — R41.840 ATTENTION DEFICIT: ICD-10-CM

## 2022-12-01 DIAGNOSIS — R03.0 ELEVATED BLOOD PRESSURE READING WITHOUT DIAGNOSIS OF HYPERTENSION: ICD-10-CM

## 2022-12-01 DIAGNOSIS — Z00.01 ENCOUNTER FOR ROUTINE ADULT HEALTH EXAMINATION WITH ABNORMAL FINDINGS: Primary | ICD-10-CM

## 2022-12-01 DIAGNOSIS — Z80.0 FAMILY HISTORY OF COLON CANCER: ICD-10-CM

## 2022-12-01 DIAGNOSIS — M54.2 NECK PAIN: ICD-10-CM

## 2022-12-01 DIAGNOSIS — Z62.21 FOSTER CARE CHILD: ICD-10-CM

## 2022-12-01 DIAGNOSIS — F41.9 MILD ANXIETY: ICD-10-CM

## 2022-12-01 DIAGNOSIS — R05.8 RECURRENT COUGH: ICD-10-CM

## 2022-12-01 DIAGNOSIS — E66.9 OBESITY (BMI 30-39.9): ICD-10-CM

## 2022-12-01 DIAGNOSIS — M54.50 BILATERAL LOW BACK PAIN WITHOUT SCIATICA, UNSPECIFIED CHRONICITY: ICD-10-CM

## 2022-12-01 DIAGNOSIS — R61 GENERALIZED HYPERHIDROSIS: ICD-10-CM

## 2022-12-01 DIAGNOSIS — Z13.6 CARDIOVASCULAR SCREENING; LDL GOAL LESS THAN 160: ICD-10-CM

## 2022-12-01 DIAGNOSIS — J20.9 ACUTE BRONCHITIS, UNSPECIFIED ORGANISM: ICD-10-CM

## 2022-12-01 DIAGNOSIS — J98.01 COUGH DUE TO BRONCHOSPASM: ICD-10-CM

## 2022-12-01 DIAGNOSIS — F41.9 ANXIETY: ICD-10-CM

## 2022-12-01 DIAGNOSIS — M54.9 UPPER BACK PAIN: ICD-10-CM

## 2022-12-01 DIAGNOSIS — Z23 NEED FOR PROPHYLACTIC VACCINATION AGAINST STREPTOCOCCUS PNEUMONIAE (PNEUMOCOCCUS): ICD-10-CM

## 2022-12-01 LAB
ALBUMIN SERPL BCG-MCNC: 4.5 G/DL (ref 3.5–5.2)
ALP SERPL-CCNC: 96 U/L (ref 35–104)
ALT SERPL W P-5'-P-CCNC: 11 U/L (ref 10–35)
ANION GAP SERPL CALCULATED.3IONS-SCNC: 16 MMOL/L (ref 7–15)
AST SERPL W P-5'-P-CCNC: 20 U/L (ref 10–35)
BILIRUB SERPL-MCNC: 0.5 MG/DL
BUN SERPL-MCNC: 11.2 MG/DL (ref 6–20)
CALCIUM SERPL-MCNC: 9.1 MG/DL (ref 8.6–10)
CHLORIDE SERPL-SCNC: 106 MMOL/L (ref 98–107)
CHOLEST SERPL-MCNC: 165 MG/DL
CREAT SERPL-MCNC: 0.74 MG/DL (ref 0.51–0.95)
DEPRECATED HCO3 PLAS-SCNC: 18 MMOL/L (ref 22–29)
ERYTHROCYTE [DISTWIDTH] IN BLOOD BY AUTOMATED COUNT: 13 % (ref 10–15)
GFR SERPL CREATININE-BSD FRML MDRD: >90 ML/MIN/1.73M2
GLUCOSE SERPL-MCNC: 90 MG/DL (ref 70–99)
HCT VFR BLD AUTO: 41.2 % (ref 35–47)
HDLC SERPL-MCNC: 51 MG/DL
HGB BLD-MCNC: 14.1 G/DL (ref 11.7–15.7)
LDLC SERPL CALC-MCNC: 101 MG/DL
MCH RBC QN AUTO: 27.8 PG (ref 26.5–33)
MCHC RBC AUTO-ENTMCNC: 34.2 G/DL (ref 31.5–36.5)
MCV RBC AUTO: 81 FL (ref 78–100)
NONHDLC SERPL-MCNC: 114 MG/DL
PLATELET # BLD AUTO: 321 10E3/UL (ref 150–450)
POTASSIUM SERPL-SCNC: 4.2 MMOL/L (ref 3.4–5.3)
PROT SERPL-MCNC: 7 G/DL (ref 6.4–8.3)
RBC # BLD AUTO: 5.07 10E6/UL (ref 3.8–5.2)
SODIUM SERPL-SCNC: 140 MMOL/L (ref 136–145)
TRIGL SERPL-MCNC: 67 MG/DL
TSH SERPL DL<=0.005 MIU/L-ACNC: 1.69 UIU/ML (ref 0.3–4.2)
WBC # BLD AUTO: 8.4 10E3/UL (ref 4–11)

## 2022-12-01 PROCEDURE — 90677 PCV20 VACCINE IM: CPT | Performed by: FAMILY MEDICINE

## 2022-12-01 PROCEDURE — 85027 COMPLETE CBC AUTOMATED: CPT | Performed by: FAMILY MEDICINE

## 2022-12-01 PROCEDURE — 82043 UR ALBUMIN QUANTITATIVE: CPT | Performed by: FAMILY MEDICINE

## 2022-12-01 PROCEDURE — 99396 PREV VISIT EST AGE 40-64: CPT | Mod: 25 | Performed by: FAMILY MEDICINE

## 2022-12-01 PROCEDURE — 84443 ASSAY THYROID STIM HORMONE: CPT | Performed by: FAMILY MEDICINE

## 2022-12-01 PROCEDURE — 36415 COLL VENOUS BLD VENIPUNCTURE: CPT | Performed by: FAMILY MEDICINE

## 2022-12-01 PROCEDURE — 80061 LIPID PANEL: CPT | Performed by: FAMILY MEDICINE

## 2022-12-01 PROCEDURE — 80053 COMPREHEN METABOLIC PANEL: CPT | Performed by: FAMILY MEDICINE

## 2022-12-01 PROCEDURE — 90471 IMMUNIZATION ADMIN: CPT | Performed by: FAMILY MEDICINE

## 2022-12-01 PROCEDURE — 99214 OFFICE O/P EST MOD 30 MIN: CPT | Mod: 25 | Performed by: FAMILY MEDICINE

## 2022-12-01 RX ORDER — FLUTICASONE FUROATE AND VILANTEROL 200; 25 UG/1; UG/1
1 POWDER RESPIRATORY (INHALATION) DAILY
Qty: 60 EACH | Refills: 11 | Status: SHIPPED | OUTPATIENT
Start: 2022-12-01 | End: 2023-12-14

## 2022-12-01 RX ORDER — BENZONATATE 200 MG/1
200 CAPSULE ORAL 3 TIMES DAILY PRN
Qty: 21 CAPSULE | Refills: 3 | Status: SHIPPED | OUTPATIENT
Start: 2022-12-01 | End: 2023-09-07

## 2022-12-01 RX ORDER — ALBUTEROL SULFATE 90 UG/1
AEROSOL, METERED RESPIRATORY (INHALATION)
Qty: 36 G | Refills: 11 | Status: SHIPPED | OUTPATIENT
Start: 2022-12-01 | End: 2023-12-15

## 2022-12-01 RX ORDER — CYCLOBENZAPRINE HCL 5 MG
5-10 TABLET ORAL 2 TIMES DAILY PRN
Qty: 30 TABLET | Refills: 1 | Status: SHIPPED | OUTPATIENT
Start: 2022-12-01 | End: 2023-02-02

## 2022-12-01 RX ORDER — PAROXETINE 20 MG/1
TABLET, FILM COATED ORAL
Qty: 135 TABLET | Refills: 1 | Status: SHIPPED | OUTPATIENT
Start: 2022-12-01 | End: 2023-09-07 | Stop reason: SINTOL

## 2022-12-01 RX ORDER — MONTELUKAST SODIUM 10 MG/1
10 TABLET ORAL AT BEDTIME
Qty: 90 TABLET | Refills: 3 | Status: SHIPPED | OUTPATIENT
Start: 2022-12-01 | End: 2023-12-14

## 2022-12-01 ASSESSMENT — ENCOUNTER SYMPTOMS
DIARRHEA: 0
HEADACHES: 1
ARTHRALGIAS: 0
DYSURIA: 0
FREQUENCY: 0
HEMATURIA: 0
JOINT SWELLING: 0
SHORTNESS OF BREATH: 0
HEARTBURN: 0
COUGH: 1
FEVER: 0
CHILLS: 0
NAUSEA: 0
PARESTHESIAS: 0
HEMATOCHEZIA: 0
SORE THROAT: 0
DIZZINESS: 0
MYALGIAS: 1
CONSTIPATION: 0
PALPITATIONS: 0
NERVOUS/ANXIOUS: 1
ABDOMINAL PAIN: 0
EYE PAIN: 0
BREAST MASS: 0
WEAKNESS: 0

## 2022-12-01 NOTE — PATIENT INSTRUCTIONS
[facility]  [address] [place] [id number]  Office: [phone]   Fax:    [phone]     Start walking for exercise - If walking outside,   - rain or shine - walk a block, then come home, next day walk   2 blocks , then come home ; and then add a block further from home daily - work up to 30-45minutes daily or 3-4x/week - or can work  up to  3-4 miles briskly daily.       If walking on treadmill or at  the mall, start with 5 minutes first day, then 6 minutes next day , then 7 minutes next day, then 8 minutes next day, etc.   Gradually work up to the above goals.  No stopping.      Can also try marching in place - try to have your knees come up as high to your chest as possible.  Start with 1-2 minutes at a time, and gradually add 30-60 seconds each workout. Try to work up to 15-20 minutes of walking/marching in place  Daily - great for during those days, when you are not comfortable walking outside.            Preventive Health Recommendations  Female Ages 40 to 49    Yearly exam:   See your health care provider every year in order to  Review health changes.   Discuss preventive care.    Review your medicines if your doctor prescribed any.    Get a Pap test every three years (unless you have an abnormal result and your provider advises testing more often).    If you get Pap tests with HPV test, you only need to test every 5 years, unless you have an abnormal result. You do not need a Pap test if your uterus was removed (hysterectomy) and you have not had cancer.    You should be tested each year for STDs (sexually transmitted diseases), if you're at risk.   Ask your doctor if you should have a mammogram.    Have a colonoscopy (test for colon cancer) if someone in your family has had colon cancer or polyps before age 50.     Have a cholesterol test every 5 years.     Have a diabetes test (fasting glucose) after age 45. If you are at risk for diabetes, you should have this test  "every 3 years.    Shots: Get a flu shot each year. Get a tetanus shot every 10 years.     Nutrition:   Eat at least 5 servings of fruits and vegetables each day.  Eat whole-grain bread, whole-wheat pasta and brown rice instead of white grains and rice.  Get adequate Calcium and Vitamin D.      Lifestyle  Exercise at least 150 minutes a week (an average of 30 minutes a day, 5 days a week). This will help you control your weight and prevent disease.  Limit alcohol to one drink per day.  No smoking.   Wear sunscreen to prevent skin cancer.  See your dentist every six months for an exam and cleaning.  Thank you so much or choosing [facility]  for your Health Care. It was a pleasure seeing you at your visit today! Please contact us with any questions or concerns you may have.                   Linda Soto MD                              To reach your [facility] care team after hours call:   [phone] press #2 \"to speak with your care team\".  This will get you to our clinic instead of routing to central [facility]  scheduling.     PLEASE NOTE OUR HOURS HAVE CHANGED secondary to COVID-19 coronavirus pandemic, as we are trying to minimize patient exposure to the virus,  which is now widespread in the [facility].  These hours may change with very little notice.  We apologize for any inconvenience.       Our current clinic hours are:          Monday- Thursday   7:00am - 6:00pm  in person.      Friday  7:00am- 5:00pm                       Saturday and Sunday : Closed to in person and virtual visits        We have telephone and virtual visit times available between    7:00am - 6pm on Monday-Friday as well.                                                Phone:  [phone]      Our pharmacy hours: Monday through Friday 8:00am to 5:00pm                        Saturday - 9:00 am to 12 noon       Sunday : Closed.              Phone:  [phone]          "     ###  Please note: at this time we are not accepting any walk-in visits. ###      There is also information available at our web site:  [email or web address]    If your provider ordered any lab tests and you do not receive the results within 10 business days, please call the clinic.    If you need a medication refill please contact your pharmacy.  Please allow 3 business days for your refill to be completed.    Our clinic offers telephone visits and e visits.  Please ask one of your team members to explain more.      Use [employer] (secure email communication and access to your chart) to send your primary care provider a message or make an appointment. Ask someone on your Team how to sign up for [employer].

## 2022-12-01 NOTE — PROGRESS NOTES
SUBJECTIVE:   CC: Deandra is an 46 year old who presents for preventive health visit and the following other medical problems:      1. Encounter for routine adult health examination with abnormal findings    2. Family history of colon cancer- father dx'd in mid 40's - pt's first colonoscopy in 4/24/2017 - repeat in 2022     3. Cough due to bronchospasm- needs refills on meds     4. Acute bronchitis, unspecified organism - needs refills on meds - not exacerbated right now     5. Neck pain    6. Upper back pain    7. Bilateral low back pain without sciatica, unspecified chronicity    8. Generalized hyperhidrosis    9. Mild anxiety    10. Foster care child    11. Cough due to bronchospasm    12. Attention deficit - symptoms - pt desires testing and possible treatment    13. Recurrent cough    14. Elevated blood pressure reading without diagnosis of hypertension    15. Obesity (BMI 30-39.9)    16. Anxiety    17. CARDIOVASCULAR SCREENING; LDL GOAL LESS THAN 160    18. Need for prophylactic vaccination against Streptococcus pneumoniae (pneumococcus)        Patient has been advised of split billing requirements and indicates understanding: Yes  Healthy Habits:     Getting at least 3 servings of Calcium per day:  NO    Bi-annual eye exam:  Yes    Dental care twice a year:  NO    Sleep apnea or symptoms of sleep apnea:  Daytime drowsiness    Diet:  Regular (no restrictions)    Frequency of exercise:  None    Taking medications regularly:  Yes    PHQ-2 Total Score: 3    Additional concerns today:  Yes    Cyclobenzaprine working really well to help with muscle spasms related to migraines.      Asthma getting bad this winter with molds/dust.  Would like some tessalon perles for cough.     Had covid-19 and flu shots this fall.      Mom passed away in 3/2022 and has had to help care for her dad. She kind of shut down emotionally a little bit after that. Hasn't been exercising really since then.    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     Has put on a lot of weight.  Working virtually and is a very social person.  Feels like she's in a funk.  Wonders if she has ADHD.  Continuing in therapy.  ? Will insurance cover neuropsych testing?\"    \"Procrastinates.\"   \"Disorganized.\" Pulls through in the last minute.  Was in foster care for years and had a mentally ill mother.  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  Has looked into divorce.  Can't afford it.  Staying for the kids and finances.  Pt is very positive and her  is not.  Workplace is negative for her as well.  They've suggested that she not be \"as positive in the workplace.\"      Depression and Anxiety Follow-Up - paxil working well for anxiety.     How are you doing with your depression since your last visit? More depressed than before- mother passed away early this year    How are you doing with your anxiety since your last visit?  No change    Are you having other symptoms that might be associated with depression or anxiety? No    Have you had a significant life event? Grief or Loss     Do you have any concerns with your use of alcohol or other drugs? No    Social History     Tobacco Use     Smoking status: Former     Packs/day: 0.10     Years: 5.00     Pack years: 0.50     Types: Cigarettes     Smokeless tobacco: Never   Substance Use Topics     Alcohol use: Yes     Comment: Rarely     Drug use: No     Comment: no herbal meds except for acidophilus      PHQ 2/2/2022 3/2/2022 11/30/2022   PHQ-9 Total Score 3 2 11   Q9: Thoughts of better off dead/self-harm past 2 weeks Not at all Not at all Not at all     LISA-7 SCORE 10/2/2019 12/4/2019 2/2/2022   Total Score - - 4 (minimal " anxiety)   Total Score 2 1 4     Last PHQ-9 11/30/2022   1.  Little interest or pleasure in doing things 1   2.  Feeling down, depressed, or hopeless 2   3.  Trouble falling or staying asleep, or sleeping too much 1   4.  Feeling tired or having little energy 2   5.  Poor appetite or overeating 1   6.  Feeling bad about yourself 3   7.  Trouble concentrating 1   8.  Moving slowly or restless 0   Q9: Thoughts of better off dead/self-harm past 2 weeks 0   PHQ-9 Total Score 11   Difficulty at work, home, or with people -     LISA-7  2/2/2022   1. Feeling nervous, anxious, or on edge 1   2. Not being able to stop or control worrying 1   3. Worrying too much about different things 1   4. Trouble relaxing 1   5. Being so restless that it is hard to sit still 0   6. Becoming easily annoyed or irritable 0   7. Feeling afraid, as if something awful might happen 0   LISA-7 Total Score 4   If you checked any problems, how difficult have they made it for you to do your work, take care of things at home, or get along with other people? -       Suicide Assessment Five-step Evaluation and Treatment (SAFE-T)    Asthma Follow-Up:     Was ACT completed today?    Yes    ACT Total Scores 11/30/2022   ACT TOTAL SCORE (Goal Greater than or Equal to 20) 16   In the past 12 months, how many times did you visit the emergency room for your asthma without being admitted to the hospital? 0   In the past 12 months, how many times were you hospitalized overnight because of your asthma? 0         How many days per week do you miss taking your asthma controller medication?  0    Please describe any recent triggers for your asthma: upper respiratory infections    Have you had any Emergency Room Visits, Urgent Care Visits, or Hospital Admissions since your last office visit?  No      Today's PHQ-2 Score:   PHQ-2 ( 1999 Pfizer) 11/30/2022   Q1: Little interest or pleasure in doing things 1   Q2: Feeling down, depressed or hopeless 2   PHQ-2 Score 3    PHQ-2 Total Score (12-17 Years)- Positive if 3 or more points; Administer PHQ-A if positive -   Q1: Little interest or pleasure in doing things Several days   Q2: Feeling down, depressed or hopeless More than half the days   PHQ-2 Score 3           Social History     Tobacco Use     Smoking status: Former     Packs/day: 0.10     Years: 5.00     Pack years: 0.50     Types: Cigarettes     Smokeless tobacco: Never   Substance Use Topics     Alcohol use: Yes     Comment: Rarely     If you drink alcohol do you typically have >3 drinks per day or >7 drinks per week? No    Alcohol Use 11/30/2022   Prescreen: >3 drinks/day or >7 drinks/week? No   Prescreen: >3 drinks/day or >7 drinks/week? -       Reviewed orders with patient.  Reviewed health maintenance and updated orders accordingly - Yes  Lab work is in process  Labs reviewed in EPIC  BP Readings from Last 3 Encounters:   12/01/22 (!) 140/90   03/03/22 133/88   04/14/21 122/82    Wt Readings from Last 3 Encounters:   12/01/22 107 kg (236 lb)   03/03/22 99.8 kg (220 lb)   04/14/21 100.7 kg (222 lb)                  Patient Active Problem List   Diagnosis     Chronic sinusitis     Bleeding, nose     Sinusitis     Lactose intolerance     Kidney stones     Maxillary bone loss     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 related to previous surgeries - 4 in 3 months for sinus issues/infection     Family history of breast cancer- mother's identical twin, maternal aunt, paternal GM and dad with breast lumps      Hot flashes     Generalized hyperhidrosis- since earliest recollections - soaks clothing multiple times/week - very disruptive to life     Mild anxiety     Foster care child     Anxiety     Multiple fractures of left foot with routine healing     Family history of colon cancer- father dx'd in mid 40's - pt's first colonoscopy in 4/24/2017 - repeat in 2022      Menorrhagia with regular cycle- ? cause of iron deficiency anemia- pt  would like to see ob/gyn specialists     Intractable episodic tension-type headache     Strain of neck muscle, initial encounter     Sun-damaged skin     Cough due to bronchospasm- needs refills on meds      Past Surgical History:   Procedure Laterality Date     ABDOMEN SURGERY  2001     APPENDECTOMY       ARTHROSCOPY, ARTHROPLASTY TEMPOROMANDIBULAR JOINT, COMBINED  age 14     TMJ surgery as an 7th grader     COLONOSCOPY  2017    Approximately     CYSTOSCOPY,URETEROSCOPY,STONE REMV  09/11/2002    with stent placements - since removed      ENDOSCOPIC BALLOON SINUPLASTY, OPTICAL TRACKING SYSTEM ENDOSCOPIC SINUS SURGERY, COMBINED  01/08/2013    Procedure: COMBINED ENDOSCOPIC BALLOON SINUPLASTY, OPTICAL TRACKING SYSTEM ENDOSCOPIC SINUS SURGERY;  Left Endo Sinus  Surgery  with Septoplasty       ENDOSCOPIC SINUS SURGERY  01/15/2013    Procedure: ENDOSCOPIC SINUS SURGERY;  removal of nasal packing with endocopic debridement of left paranasal sinuses;  Surgeon: Fabian Driver MD;  Location:  OR     ENDOSCOPIC SINUS SURGERY  04/22/2013    Procedure: ENDOSCOPIC SINUS SURGERY;  Functional Endoscopic Sinus Surgery With Biopsy ;  Surgeon: Jcarlos Hogan MD;  Location:  OR       Social History     Tobacco Use     Smoking status: Former     Packs/day: 0.10     Years: 5.00     Pack years: 0.50     Types: Cigarettes     Smokeless tobacco: Never   Substance Use Topics     Alcohol use: Yes     Comment: Rarely     Family History   Problem Relation Age of Onset     Asthma Mother      Depression Mother      Thyroid Disease Mother      Blood Disease Mother         Bleeding disorder     Bipolar Disorder Mother      Parkinsonism Mother      Dementia Mother      Anxiety Disorder Mother      Mental Illness Mother      Heart Disease Father         s/p pacemakers x 2      Diabetes Father      Cancer Father         Skin, Colon-dx'd mid-late 40's     Depression Father      Asthma Child      Alcohol/Drug Other         Sibling      Breast Cancer Paternal Grandmother      Breast Cancer Maternal Aunt         another of mom's sisters      Breast Cancer Maternal Aunt         mother's identical twin      Allergy (Severe) Son         Dairy, Eggs, and Oatmeal     Substance Abuse Maternal Grandfather      Substance Abuse Maternal Grandmother          Current Outpatient Medications   Medication Sig Dispense Refill     cholecalciferol 50 MCG (2000 UT) CAPS Take 1 capsule by mouth daily 90 capsule 4     Lactobacillus (PROBIOTIC ACIDOPHILUS PO)        montelukast (SINGULAIR) 10 MG tablet Take 1 tablet (10 mg) by mouth At Bedtime 90 tablet 3     PARoxetine (PAXIL) 20 MG tablet TAKE 1.5 TABLETS BY MOUTH EVERY DAY IN THE MORNING 135 tablet 0     acetaminophen (TYLENOL) 500 MG tablet Take 1-2 tablets (500-1,000 mg) by mouth every 6 hours as needed for mild pain       albuterol (VENTOLIN HFA) 108 (90 Base) MCG/ACT inhaler INHALE 2 PUFFS BY MOUTH EVERY 6 HOURS AS NEEDED FOR WHEEZE OR FOR SHORTNESS OF BREATH 36 g 11     cetirizine (ZYRTEC) 10 MG tablet Take 1 tablet (10 mg) by mouth every evening 30 tablet 1     cyclobenzaprine (FLEXERIL) 5 MG tablet TAKE 1-2 TABLETS (5-10 MG) BY MOUTH 2 TIMES DAILY AS NEEDED FOR MUSCLE SPASMS 30 tablet 1     fluticasone-vilanterol (BREO ELLIPTA) 200-25 MCG/INH inhaler Inhale 1 puff into the lungs daily 60 each 11     ibuprofen (ADVIL) 200 MG tablet Take 200 mg by mouth as needed.       Allergies   Allergen Reactions     Adhesive Tape Blisters     Melon      Mouth gets itchy      Seasonal Allergies      Recent Labs   Lab Test 05/04/21  0731 12/04/19  1001 08/22/18  0832   LDL 84 80 69   HDL 54 45* 54   TRIG 86 68 55   ALT 15 28 13   CR 0.76 0.66 0.67   GFRESTIMATED >90 >90 >90   GFRESTBLACK >90 >90 >90   POTASSIUM 4.5 4.0 4.1   TSH 1.49 0.65 0.99        Breast Cancer Screening:    FHS-7:   Breast CA Risk Assessment (FHS-7) 4/14/2021 10/25/2022   Did any of your first-degree relatives have breast or ovarian cancer? Yes Yes    Did any of your relatives have bilateral breast cancer? Unknown No   Did any man in your family have breast cancer? Yes No   Did any woman in your family have breast and ovarian cancer? Unknown Yes   Did any woman in your family have breast cancer before age 50 y? Unknown No   Do you have 2 or more relatives with breast and/or ovarian cancer? Yes No   Do you have 2 or more relatives with breast and/or bowel cancer? No Yes       Mammogram Screening: Recommended annual mammography  Pertinent mammograms are reviewed under the imaging tab.    History of abnormal Pap smear:   PAP / HPV Latest Ref Rng & Units 4/14/2021 7/27/2018 5/14/2015   PAP (Historical) - NIL NIL NIL   HPV16 NEG:Negative Negative Negative -   HPV18 NEG:Negative Negative Negative -   HRHPV NEG:Negative Negative Negative -     Reviewed and updated as needed this visit by clinical staff    Allergies               Reviewed and updated as needed this visit by Provider                     Review of Systems   Constitutional: Negative for chills and fever.   HENT: Negative for congestion, ear pain, hearing loss and sore throat.    Eyes: Positive for visual disturbance. Negative for pain.   Respiratory: Positive for cough. Negative for shortness of breath.    Cardiovascular: Negative for chest pain, palpitations and peripheral edema.   Gastrointestinal: Negative for abdominal pain, constipation, diarrhea, heartburn, hematochezia and nausea.   Breasts:  Negative for tenderness, breast mass and discharge.   Genitourinary: Positive for urgency. Negative for dysuria, frequency, genital sores, hematuria, pelvic pain, vaginal bleeding and vaginal discharge.   Musculoskeletal: Positive for myalgias. Negative for arthralgias and joint swelling.   Skin: Negative for rash.   Neurological: Positive for headaches. Negative for dizziness, weakness and paresthesias.   Psychiatric/Behavioral: Positive for mood changes. The patient is nervous/anxious.      BP Readings from  "Last 6 Encounters:   12/01/22 (!) 140/90   03/03/22 133/88   04/14/21 122/82   01/14/20 130/80   12/04/19 110/78   11/05/19 108/68          OBJECTIVE:   BP (!) 140/90   Pulse 94   Temp 98.2  F (36.8  C) (Tympanic)   Resp 20   Ht 1.727 m (5' 8\")   Wt 107 kg (236 lb)   SpO2 97%   BMI 35.88 kg/m    Physical Exam:   GENERAL: healthy, alert and no distress  EYES: Eyes grossly normal to inspection, PERRL and conjunctivae and sclerae normal  HENT: ear canals and TM's normal, nose and mouth without ulcers or lesions  NECK: no adenopathy, no asymmetry, masses, or scars and thyroid normal to palpation  RESP: lungs clear to auscultation - no rales, rhonchi or wheezes  BREAST: normal without masses, tenderness or nipple discharge and no palpable axillary masses or adenopathy  CV: regular rate and rhythm, normal S1 S2, no S3 or S4, no murmur, click or rub, no peripheral edema and peripheral pulses strong  ABDOMEN: soft, nontender, no hepatosplenomegaly, no masses and bowel sounds normal  MS: no gross musculoskeletal defects noted, no edema  SKIN: no suspicious lesions or rashes  NEURO: Normal strength and tone, mentation intact and speech normal  PSYCH: mentation appears normal, affect normal/bright    Diagnostic Test Results:  Labs reviewed in Epic    ASSESSMENT/PLAN:       ICD-10-CM    1. Encounter for routine adult health examination with abnormal findings  Z00.01       2. Family history of colon cancer- father dx'd in mid 40's - pt's first colonoscopy in 4/24/2017 - repeat in 2022   Z80.0 Colonoscopy Screening  Referral      3. Cough due to bronchospasm- needs refills on meds   J98.01 fluticasone-vilanterol (BREO ELLIPTA) 200-25 MCG/ACT inhaler      4. Acute bronchitis, unspecified organism - needs refills on meds - not exacerbated right now   J20.9 albuterol (VENTOLIN HFA) 108 (90 Base) MCG/ACT inhaler     fluticasone-vilanterol (BREO ELLIPTA) 200-25 MCG/ACT inhaler      5. Neck pain  M54.2 cyclobenzaprine " (FLEXERIL) 5 MG tablet      6. Upper back pain  M54.9 cyclobenzaprine (FLEXERIL) 5 MG tablet      7. Bilateral low back pain without sciatica, unspecified chronicity  M54.50 cyclobenzaprine (FLEXERIL) 5 MG tablet      8. Generalized hyperhidrosis  R61 PARoxetine (PAXIL) 20 MG tablet      9. Mild anxiety  F41.9 PARoxetine (PAXIL) 20 MG tablet      10. Foster care child  Z62.21 PARoxetine (PAXIL) 20 MG tablet      11. Cough due to bronchospasm  J98.01 montelukast (SINGULAIR) 10 MG tablet      12. Attention deficit - symptoms - pt desires testing and possible treatment  R41.840 Adult Neuropsychology Referral      13. Recurrent cough  R05.8 benzonatate (TESSALON) 200 MG capsule      14. Elevated blood pressure reading without diagnosis of hypertension  R03.0 Comprehensive metabolic panel     CBC with platelets     Albumin Random Urine Quantitative with Creat Ratio     TSH with free T4 reflex     Comprehensive metabolic panel     CBC with platelets     Albumin Random Urine Quantitative with Creat Ratio     TSH with free T4 reflex      15. Obesity (BMI 30-39.9)  E66.9       16. Anxiety  F41.9       17. CARDIOVASCULAR SCREENING; LDL GOAL LESS THAN 160  Z13.6 REVIEW OF HEALTH MAINTENANCE PROTOCOL ORDERS     Lipid panel reflex to direct LDL Non-fasting     Lipid panel reflex to direct LDL Non-fasting      18. Need for prophylactic vaccination against Streptococcus pneumoniae (pneumococcus)  Z23 Pneumococcal 20 Valent Conjugate (Prevnar 20)          .Return in 2 months (on 2/1/2023) for blood pressure, w/ Dr. V for 40 minute appointment.     Patient has been advised of split billing requirements and indicates understanding: Yes      COUNSELING:  Reviewed preventive health counseling, as reflected in patient instructions    Pt would like to try weight loss and exercise to better manage her BP prior to starting BP medication.       Return in 2 months (on 2/1/2023) for blood pressure, w/ Dr. V for 40 minute appointment.  "    Future Appointments 12/1/2022 - 5/30/2023      Date Visit Type Length Department Provider     2/2/2023  5:00 PM OFFICE VISIT 40 min [facility] Linda Soto MD    Location Instructions:     [facility] is located at [address] [place] [address], [address]. Free parking is available; access the lot by turning north from [address] onto [address], then west onto [address].                    BMI:   Estimated body mass index is 33.45 kg/m  as calculated from the following:    Height as of 3/3/22: 1.727 m (5' 8\").    Weight as of 3/3/22: 99.8 kg (220 lb).   Weight management plan: Discussed healthy diet and exercise guidelines follow up with me in 2 months.       She reports that she has quit smoking. Her smoking use included cigarettes. She has a 0.50 pack-year smoking history. She has never used smokeless tobacco.          Linda Soto MD  [facility]  Answers for HPI/ROS submitted by the patient on 11/30/2022  If you checked off any problems, how difficult have these problems made it for you to do your work, take care of things at home, or get along with other people?: Somewhat difficult  PHQ9 TOTAL SCORE: 11      "

## 2022-12-02 LAB
CREAT UR-MCNC: 186 MG/DL
MICROALBUMIN UR-MCNC: 24.3 MG/L
MICROALBUMIN/CREAT UR: 13.06 MG/G CR (ref 0–25)

## 2023-02-02 ENCOUNTER — OFFICE VISIT (OUTPATIENT)
Dept: FAMILY MEDICINE | Facility: CLINIC | Age: 48
End: 2023-02-02
Payer: COMMERCIAL

## 2023-02-02 VITALS
BODY MASS INDEX: 35.77 KG/M2 | TEMPERATURE: 98 F | DIASTOLIC BLOOD PRESSURE: 92 MMHG | OXYGEN SATURATION: 98 % | WEIGHT: 236 LBS | SYSTOLIC BLOOD PRESSURE: 126 MMHG | HEART RATE: 79 BPM | HEIGHT: 68 IN

## 2023-02-02 DIAGNOSIS — G43.019 INTRACTABLE MIGRAINE WITHOUT AURA AND WITHOUT STATUS MIGRAINOSUS: Primary | ICD-10-CM

## 2023-02-02 DIAGNOSIS — M54.9 UPPER BACK PAIN: ICD-10-CM

## 2023-02-02 DIAGNOSIS — I10 HYPERTENSION GOAL BP (BLOOD PRESSURE) < 140/90: ICD-10-CM

## 2023-02-02 DIAGNOSIS — M54.50 BILATERAL LOW BACK PAIN WITHOUT SCIATICA, UNSPECIFIED CHRONICITY: ICD-10-CM

## 2023-02-02 DIAGNOSIS — F41.9 ANXIETY: ICD-10-CM

## 2023-02-02 DIAGNOSIS — F33.0 MILD EPISODE OF RECURRENT MAJOR DEPRESSIVE DISORDER (H): ICD-10-CM

## 2023-02-02 DIAGNOSIS — M54.2 NECK PAIN: ICD-10-CM

## 2023-02-02 DIAGNOSIS — E66.01 MORBID OBESITY (H): ICD-10-CM

## 2023-02-02 PROCEDURE — 99214 OFFICE O/P EST MOD 30 MIN: CPT | Performed by: FAMILY MEDICINE

## 2023-02-02 RX ORDER — LISINOPRIL 5 MG/1
5 TABLET ORAL DAILY
Qty: 90 TABLET | Refills: 1 | Status: SHIPPED | OUTPATIENT
Start: 2023-02-02 | End: 2023-08-15

## 2023-02-02 RX ORDER — CYCLOBENZAPRINE HCL 5 MG
5-10 TABLET ORAL 2 TIMES DAILY PRN
Qty: 30 TABLET | Refills: 3 | Status: SHIPPED | OUTPATIENT
Start: 2023-02-02 | End: 2024-03-08

## 2023-02-02 RX ORDER — ZOLMITRIPTAN 5 MG/1
5 TABLET, FILM COATED ORAL
Qty: 10 TABLET | Refills: 3 | Status: SHIPPED | OUTPATIENT
Start: 2023-02-02 | End: 2024-02-06

## 2023-02-02 RX ORDER — TOPIRAMATE 25 MG/1
TABLET, FILM COATED ORAL
Qty: 120 TABLET | Refills: 3 | Status: SHIPPED | OUTPATIENT
Start: 2023-02-02 | End: 2023-03-01

## 2023-02-02 ASSESSMENT — ANXIETY QUESTIONNAIRES
GAD7 TOTAL SCORE: 3
GAD7 TOTAL SCORE: 3
7. FEELING AFRAID AS IF SOMETHING AWFUL MIGHT HAPPEN: NOT AT ALL
5. BEING SO RESTLESS THAT IT IS HARD TO SIT STILL: NOT AT ALL
GAD7 TOTAL SCORE: 3
IF YOU CHECKED OFF ANY PROBLEMS ON THIS QUESTIONNAIRE, HOW DIFFICULT HAVE THESE PROBLEMS MADE IT FOR YOU TO DO YOUR WORK, TAKE CARE OF THINGS AT HOME, OR GET ALONG WITH OTHER PEOPLE: NOT DIFFICULT AT ALL
3. WORRYING TOO MUCH ABOUT DIFFERENT THINGS: SEVERAL DAYS
8. IF YOU CHECKED OFF ANY PROBLEMS, HOW DIFFICULT HAVE THESE MADE IT FOR YOU TO DO YOUR WORK, TAKE CARE OF THINGS AT HOME, OR GET ALONG WITH OTHER PEOPLE?: NOT DIFFICULT AT ALL
6. BECOMING EASILY ANNOYED OR IRRITABLE: NOT AT ALL
2. NOT BEING ABLE TO STOP OR CONTROL WORRYING: SEVERAL DAYS
4. TROUBLE RELAXING: NOT AT ALL
7. FEELING AFRAID AS IF SOMETHING AWFUL MIGHT HAPPEN: NOT AT ALL
1. FEELING NERVOUS, ANXIOUS, OR ON EDGE: SEVERAL DAYS

## 2023-02-02 ASSESSMENT — ASTHMA QUESTIONNAIRES
QUESTION_4 LAST FOUR WEEKS HOW OFTEN HAVE YOU USED YOUR RESCUE INHALER OR NEBULIZER MEDICATION (SUCH AS ALBUTEROL): TWO OR THREE TIMES PER WEEK
QUESTION_2 LAST FOUR WEEKS HOW OFTEN HAVE YOU HAD SHORTNESS OF BREATH: ONCE OR TWICE A WEEK
QUESTION_1 LAST FOUR WEEKS HOW MUCH OF THE TIME DID YOUR ASTHMA KEEP YOU FROM GETTING AS MUCH DONE AT WORK, SCHOOL OR AT HOME: NONE OF THE TIME
QUESTION_5 LAST FOUR WEEKS HOW WOULD YOU RATE YOUR ASTHMA CONTROL: WELL CONTROLLED
ACT_TOTALSCORE: 21
QUESTION_3 LAST FOUR WEEKS HOW OFTEN DID YOUR ASTHMA SYMPTOMS (WHEEZING, COUGHING, SHORTNESS OF BREATH, CHEST TIGHTNESS OR PAIN) WAKE YOU UP AT NIGHT OR EARLIER THAN USUAL IN THE MORNING: NOT AT ALL

## 2023-02-02 ASSESSMENT — PATIENT HEALTH QUESTIONNAIRE - PHQ9
SUM OF ALL RESPONSES TO PHQ QUESTIONS 1-9: 3
10. IF YOU CHECKED OFF ANY PROBLEMS, HOW DIFFICULT HAVE THESE PROBLEMS MADE IT FOR YOU TO DO YOUR WORK, TAKE CARE OF THINGS AT HOME, OR GET ALONG WITH OTHER PEOPLE: NOT DIFFICULT AT ALL
SUM OF ALL RESPONSES TO PHQ QUESTIONS 1-9: 3

## 2023-02-02 NOTE — PROGRESS NOTES
Assessment & Plan       ICD-10-CM    1. Intractable migraine without aura and without status migrainosus  G43.019 topiramate (TOPAMAX) 25 MG tablet     ZOLMitriptan (ZOMIG) 5 MG tablet      2. Hypertension goal BP (blood pressure) < 140/90  - new diagnosis  I10 lisinopril (ZESTRIL) 5 MG tablet      3. Neck pain  M54.2 cyclobenzaprine (FLEXERIL) 5 MG tablet      4. Upper back pain  M54.9 cyclobenzaprine (FLEXERIL) 5 MG tablet      5. Bilateral low back pain without sciatica, unspecified chronicity  M54.50 cyclobenzaprine (FLEXERIL) 5 MG tablet      6. Anxiety  F41.9       7. Morbid obesity (H)  E66.01       8. Mild episode of recurrent major depressive disorder (H)  F33.0           discussed risks, benefits, and alternatives of meds prescribed and pt agrees to accept possible side effects and risks, including, but not limited to brain fog and fatigue.       Pt has not been sexually active in a really long time and knows that cannot /should not become pregnant on lisinopril    Starting with [place] [employer].  Plays [place].     Continue same dose of paroxetine 30mg daily.     Ordering of each unique test  Prescription drug management  40 minutes spent on the date of the encounter doing chart review, history and exam, documentation and further activities per the note.        MEDICATIONS:   Orders Placed This Encounter   Medications     topiramate (TOPAMAX) 25 MG tablet     Si tab at bedtime for 7 nights, then 1 tab twice daily for 7 days, then 2 tabs at night 1tab in am for 7 days, then 2 tabs twice daily     Dispense:  120 tablet     Refill:  3     lisinopril (ZESTRIL) 5 MG tablet     Sig: Take 1 tablet (5 mg) by mouth daily     Dispense:  90 tablet     Refill:  1     ZOLMitriptan (ZOMIG) 5 MG tablet     Sig: Take 1 tablet (5 mg) by mouth at onset of headache for migraine May repeat in 2 hours. Max 2 tablets/24 hours.     Dispense:  10 tablet     Refill:  3     cyclobenzaprine (FLEXERIL) 5 MG tablet     Sig:  Take 1-2 tablets (5-10 mg) by mouth 2 times daily as needed for muscle spasms     Dispense:  30 tablet     Refill:  3          - Continue other medications without change  Work on weight loss  Regular exercise  See Patient Instructions    Return in 1 month (on 3/2/2023) for depression/anxiety follow up, blood pressure, migraine , w/ Dr. HOUSER for 40 minute appointment.          Linda Soto MD  [facility] YAIR Liriano is a 47 year old, presenting for the following health issues:  Hypertension and Headache      History of Present Illness       She eats 2-3 servings of fruits and vegetables daily.She consumes 0 sweetened beverage(s) daily.She exercises with enough effort to increase her heart rate 10 to 19 minutes per day.  She exercises with enough effort to increase her heart rate 3 or less days per week.   She is taking medications regularly.    Today's PHQ-9         PHQ-9 Total Score: 3    PHQ-9 Q9 Thoughts of better off dead/self-harm past 2 weeks :   Not at all    How difficult have these problems made it for you to do your work, take care of things at home, or get along with other people: Not difficult at all  Today's LISA-7 Score: 3     Migraine :  Have gotten worse during the pandemic - in front of screens all day long.   Worse with barometric pressure, hormones, stress, chocolate with caffeine.       Since your last clinic visit, how have your headaches changed?  No change    How often are you getting headaches or migraines? Weekly, misses work 2x a month due to headaches     Are you able to do normal daily activities when you have a migraine? No    Are you taking rescue/relief medications? (Select all that apply) ibuprofen (Advil, Motrin) and Excedrin- 5/7 days/week.     excedrin migraine twice a week.     How helpful is your rescue/relief medication?  The relief is inconsistent    Are you taking any medications to prevent migraines? (Select all that apply)  No  In  the past 4 weeks, how often have you gone to urgent care or the emergency room because of your headaches?  0      No aura. Gets sensitivity to light. Discussed prevention in detail.         Depression and Anxiety Follow-Up: work is really hard right now.      How are you doing with your depression since your last visit? Improved :     How are you doing with your anxiety since your last visit?  No change    Are you having other symptoms that might be associated with depression or anxiety? No    Have you had a significant life event? No     Do you have any concerns with your use of alcohol or other drugs? No     Last visit on 12/1/2022 we increased pt's paroxetine to 20mg daily and now pt feels she's at the dosage that she wants to be at .      Social History     Tobacco Use     Smoking status: Former     Packs/day: 0.10     Years: 5.00     Pack years: 0.50     Types: Cigarettes     Smokeless tobacco: Never   Substance Use Topics     Alcohol use: Yes     Comment: Rarely     Drug use: No     Comment: no herbal meds except for acidophilus      PHQ 3/2/2022 11/30/2022 2/2/2023   PHQ-9 Total Score 2 11 3   Q9: Thoughts of better off dead/self-harm past 2 weeks Not at all Not at all Not at all     LISA-7 SCORE 12/4/2019 2/2/2022 2/2/2023   Total Score - 4 (minimal anxiety) 3 (minimal anxiety)   Total Score 1 4 3     Last PHQ-9 2/2/2023   1.  Little interest or pleasure in doing things 1   2.  Feeling down, depressed, or hopeless 1   3.  Trouble falling or staying asleep, or sleeping too much 0   4.  Feeling tired or having little energy 1   5.  Poor appetite or overeating 0   6.  Feeling bad about yourself 0   7.  Trouble concentrating 0   8.  Moving slowly or restless 0   Q9: Thoughts of better off dead/self-harm past 2 weeks 0   PHQ-9 Total Score 3   Difficulty at work, home, or with people -     LISA-7  2/2/2023   1. Feeling nervous, anxious, or on edge 1   2. Not being able to stop or control worrying 1   3. Worrying too  much about different things 1   4. Trouble relaxing 0   5. Being so restless that it is hard to sit still 0   6. Becoming easily annoyed or irritable 0   7. Feeling afraid, as if something awful might happen 0   LISA-7 Total Score 3   If you checked any problems, how difficult have they made it for you to do your work, take care of things at home, or get along with other people? Not difficult at all       Suicide Assessment Five-step Evaluation and Treatment (SAFE-T)    Patient Active Problem List   Diagnosis     Chronic sinusitis     Bleeding, nose     Sinusitis     Lactose intolerance     Kidney stones     Maxillary bone loss     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 related to previous surgeries - 4 in 3 months for sinus issues/infection     Family history of breast cancer- mother's identical twin, maternal aunt, paternal GM and dad with breast lumps      Hot flashes     Generalized hyperhidrosis- since earliest recollections - soaks clothing multiple times/week - very disruptive to life     Mild anxiety     Foster care child     Anxiety     Multiple fractures of left foot with routine healing     Family history of colon cancer- father dx'd in mid 40's - pt's first colonoscopy in 4/24/2017 - repeat in 2022      Menorrhagia with regular cycle- ? cause of iron deficiency anemia- pt would like to see ob/gyn specialists     Intractable episodic tension-type headache     Strain of neck muscle, initial encounter     Sun-damaged skin     Cough due to bronchospasm- needs refills on meds      Recurrent cough     Attention deficit - symptoms - pt desires testing and possible treatment     Intractable migraine without aura and without status migrainosus     Hypertension goal BP (blood pressure) < 140/90  - new diagnosis     Morbid obesity (H)       Current Outpatient Medications   Medication Sig Dispense Refill     acetaminophen (TYLENOL) 500 MG tablet Take 1-2 tablets (500-1,000 mg) by  "mouth every 6 hours as needed for mild pain       albuterol (VENTOLIN HFA) 108 (90 Base) MCG/ACT inhaler INHALE 2 PUFFS BY MOUTH EVERY 6 HOURS AS NEEDED FOR WHEEZE OR FOR SHORTNESS OF BREATH 36 g 11     benzonatate (TESSALON) 200 MG capsule Take 1 capsule (200 mg) by mouth 3 times daily as needed for cough 21 capsule 3     cetirizine (ZYRTEC) 10 MG tablet Take 1 tablet (10 mg) by mouth every evening 30 tablet 1     cholecalciferol 50 MCG (2000 UT) CAPS Take 1 capsule by mouth daily 90 capsule 4     cyclobenzaprine (FLEXERIL) 5 MG tablet Take 1-2 tablets (5-10 mg) by mouth 2 times daily as needed for muscle spasms 30 tablet 3     fluticasone-vilanterol (BREO ELLIPTA) 200-25 MCG/ACT inhaler Inhale 1 puff into the lungs daily 60 each 11     ibuprofen (ADVIL/MOTRIN) 200 MG tablet Take 200 mg by mouth as needed.       Lactobacillus (PROBIOTIC ACIDOPHILUS PO)        lisinopril (ZESTRIL) 5 MG tablet Take 1 tablet (5 mg) by mouth daily 90 tablet 1     montelukast (SINGULAIR) 10 MG tablet Take 1 tablet (10 mg) by mouth At Bedtime 90 tablet 3     PARoxetine (PAXIL) 20 MG tablet TAKE 1.5 TABLETS BY MOUTH EVERY DAY IN THE MORNING 135 tablet 1     topiramate (TOPAMAX) 25 MG tablet 1 tab at bedtime for 7 nights, then 1 tab twice daily for 7 days, then 2 tabs at night 1tab in am for 7 days, then 2 tabs twice daily 120 tablet 3     ZOLMitriptan (ZOMIG) 5 MG tablet Take 1 tablet (5 mg) by mouth at onset of headache for migraine May repeat in 2 hours. Max 2 tablets/24 hours. 10 tablet 3          Allergies   Allergen Reactions     Adhesive Tape Blisters     Melon      Mouth gets itchy      Seasonal Allergies               Review of Systems   Constitutional, HEENT, cardiovascular, pulmonary, GI, , musculoskeletal, neuro, skin, endocrine and psych systems are negative, except as otherwise noted.      Objective  :   BP (!) 126/92   Pulse 79   Temp 98  F (36.7  C)   Ht 1.727 m (5' 8\")   Wt 107 kg (236 lb)   LMP 01/26/2023   SpO2 " 98%   BMI 35.88 kg/m    Body mass index is 35.88 kg/m .  Physical Exam   GENERAL: healthy, alert and no distress  EYES: Eyes grossly normal to inspection, PERRL and conjunctivae and sclerae normal  HENT: ear canals and TM's normal, nose and mouth without ulcers or lesions  NECK: no adenopathy, no asymmetry, masses, or scars and thyroid normal to palpation  RESP: lungs clear to auscultation - no rales, rhonchi or wheezes  CV: regular rate and rhythm, normal S1 S2, no S3 or S4, no murmur, click or rub, no peripheral edema and peripheral pulses strong  MS: no gross musculoskeletal defects noted, no edema  SKIN: no suspicious lesions or rashes  NEURO: Normal strength and tone, mentation intact and speech normal  PSYCH: mentation appears normal, affect normal/bright  Cervical, thoracic and lumbar spine exam is normal without tenderness, masses or kyphoscoliosis. Decreased  range of motion without pain is noted.      Office Visit on 12/01/2022   Component Date Value Ref Range Status     Cholesterol 12/01/2022 165  <200 mg/dL Final     Triglycerides 12/01/2022 67  <150 mg/dL Final     Direct Measure HDL 12/01/2022 51  >=50 mg/dL Final     LDL Cholesterol Calculated 12/01/2022 101 (H)  <=100 mg/dL Final     Non HDL Cholesterol 12/01/2022 114  <130 mg/dL Final     Sodium 12/01/2022 140  136 - 145 mmol/L Final     Potassium 12/01/2022 4.2  3.4 - 5.3 mmol/L Final     Chloride 12/01/2022 106  98 - 107 mmol/L Final     Carbon Dioxide (CO2) 12/01/2022 18 (L)  22 - 29 mmol/L Final     Anion Gap 12/01/2022 16 (H)  7 - 15 mmol/L Final     Urea Nitrogen 12/01/2022 11.2  6.0 - 20.0 mg/dL Final     Creatinine 12/01/2022 0.74  0.51 - 0.95 mg/dL Final     Calcium 12/01/2022 9.1  8.6 - 10.0 mg/dL Final     Glucose 12/01/2022 90  70 - 99 mg/dL Final     Alkaline Phosphatase 12/01/2022 96  35 - 104 U/L Final     AST 12/01/2022 20  10 - 35 U/L Final     ALT 12/01/2022 11  10 - 35 U/L Final     Protein Total 12/01/2022 7.0  6.4 - 8.3 g/dL  Final     Albumin 12/01/2022 4.5  3.5 - 5.2 g/dL Final     Bilirubin Total 12/01/2022 0.5  <=1.2 mg/dL Final     GFR Estimate 12/01/2022 >90  >60 mL/min/1.73m2 Final    Effective December 21, 2021 eGFRcr in adults is calculated using the 2021 CKD-EPI creatinine equation which includes age and gender (Caleb et al., [facility], DOI: 10.[facility]6/[id number])     WBC Count 12/01/2022 8.4  4.0 - 11.0 10e3/uL Final     RBC Count 12/01/2022 5.07  3.80 - 5.20 10e6/uL Final     Hemoglobin 12/01/2022 14.1  11.7 - 15.7 g/dL Final     Hematocrit 12/01/2022 41.2  35.0 - 47.0 % Final     MCV 12/01/2022 81  78 - 100 fL Final     MCH 12/01/2022 27.8  26.5 - 33.0 pg Final     MCHC 12/01/2022 34.2  31.5 - 36.5 g/dL Final     RDW 12/01/2022 13.0  10.0 - 15.0 % Final     Platelet Count 12/01/2022 321  150 - 450 10e3/uL Final     Albumin Urine mg/L 12/01/2022 24.3  mg/L Final    The reference ranges have not been established in urine albumin. The results should be integrated into the clinical context for interpretation.     Albumin Urine mg/g Cr 12/01/2022 13.06  0.00 - 25.00 mg/g Cr Final    Microalbuminuria is defined as an albumin:creatinine ratio of 17 to 299 for males and 25 to 299 for females. A ratio of albumin:creatinine of 300 or higher is indicative of overt proteinuria.  Due to biologic variability, positive results should be confirmed by a second, first-morning random or 24-hour timed urine specimen. If there is discrepancy, a third specimen is recommended. When 2 out of 3 results are in the microalbuminuria range, this is evidence for incipient nephropathy and warrants increased efforts at glucose control, blood pressure control, and institution of therapy with an angiotensin-converting-enzyme (ACE) inhibitor (if the patient can tolerate it).       Creatinine Urine mg/dL 12/01/2022 186.0  mg/dL Final    The reference ranges have not been established in urine creatinine. The results should be integrated into the clinical context for  interpretation.     TSH 12/01/2022 1.69  0.30 - 4.20 uIU/mL Final

## 2023-02-03 NOTE — PATIENT INSTRUCTIONS
[facility]  [address] [place] [id number]  Office: [phone]   Fax:    [phone]     Start lisinopril for your blood pressure first - take at night  - take for 3-5 nights prior to starting topiramate for your migraines.

## 2023-02-04 ASSESSMENT — ASTHMA QUESTIONNAIRES: ACT_TOTALSCORE: 20

## 2023-02-26 DIAGNOSIS — G43.019 INTRACTABLE MIGRAINE WITHOUT AURA AND WITHOUT STATUS MIGRAINOSUS: ICD-10-CM

## 2023-02-28 NOTE — TELEPHONE ENCOUNTER
Routing refill request to provider for review/approval because:  Anti-Seizure Meds Protocol  Failed 02/26/2023 10:33 AM   Protocol Details  Review Authorizing provider's last note.

## 2023-03-01 RX ORDER — TOPIRAMATE 25 MG/1
TABLET, FILM COATED ORAL
Qty: 360 TABLET | Refills: 3 | Status: SHIPPED | OUTPATIENT
Start: 2023-03-01 | End: 2024-06-26 | Stop reason: SINTOL

## 2023-03-01 RX ORDER — TOPIRAMATE 25 MG/1
TABLET, FILM COATED ORAL
Qty: 120 TABLET | Refills: 3 | Status: SHIPPED | OUTPATIENT
Start: 2023-03-01 | End: 2023-03-01

## 2023-03-09 ENCOUNTER — OFFICE VISIT (OUTPATIENT)
Dept: FAMILY MEDICINE | Facility: CLINIC | Age: 48
End: 2023-03-09
Payer: COMMERCIAL

## 2023-03-09 VITALS
RESPIRATION RATE: 18 BRPM | HEART RATE: 84 BPM | SYSTOLIC BLOOD PRESSURE: 122 MMHG | DIASTOLIC BLOOD PRESSURE: 78 MMHG | HEIGHT: 68 IN | TEMPERATURE: 97.8 F | OXYGEN SATURATION: 98 % | BODY MASS INDEX: 34.98 KG/M2 | WEIGHT: 230.8 LBS

## 2023-03-09 DIAGNOSIS — R20.0 NUMBNESS AND TINGLING OF HAND: ICD-10-CM

## 2023-03-09 DIAGNOSIS — J45.30 MILD PERSISTENT ASTHMA WITHOUT COMPLICATION: ICD-10-CM

## 2023-03-09 DIAGNOSIS — R20.2 NUMBNESS AND TINGLING OF HAND: ICD-10-CM

## 2023-03-09 DIAGNOSIS — I10 HYPERTENSION GOAL BP (BLOOD PRESSURE) < 140/90: Primary | ICD-10-CM

## 2023-03-09 PROBLEM — J45.20 MILD INTERMITTENT ASTHMA IN ADULT WITHOUT COMPLICATION: Status: RESOLVED | Noted: 2023-03-09 | Resolved: 2023-03-09

## 2023-03-09 PROBLEM — J45.20 MILD INTERMITTENT ASTHMA IN ADULT WITHOUT COMPLICATION: Status: ACTIVE | Noted: 2023-03-09

## 2023-03-09 PROCEDURE — 99214 OFFICE O/P EST MOD 30 MIN: CPT | Performed by: FAMILY MEDICINE

## 2023-03-09 ASSESSMENT — ANXIETY QUESTIONNAIRES
1. FEELING NERVOUS, ANXIOUS, OR ON EDGE: SEVERAL DAYS
4. TROUBLE RELAXING: SEVERAL DAYS
7. FEELING AFRAID AS IF SOMETHING AWFUL MIGHT HAPPEN: SEVERAL DAYS
IF YOU CHECKED OFF ANY PROBLEMS ON THIS QUESTIONNAIRE, HOW DIFFICULT HAVE THESE PROBLEMS MADE IT FOR YOU TO DO YOUR WORK, TAKE CARE OF THINGS AT HOME, OR GET ALONG WITH OTHER PEOPLE: NOT DIFFICULT AT ALL
GAD7 TOTAL SCORE: 4
2. NOT BEING ABLE TO STOP OR CONTROL WORRYING: NOT AT ALL
8. IF YOU CHECKED OFF ANY PROBLEMS, HOW DIFFICULT HAVE THESE MADE IT FOR YOU TO DO YOUR WORK, TAKE CARE OF THINGS AT HOME, OR GET ALONG WITH OTHER PEOPLE?: NOT DIFFICULT AT ALL
7. FEELING AFRAID AS IF SOMETHING AWFUL MIGHT HAPPEN: SEVERAL DAYS
5. BEING SO RESTLESS THAT IT IS HARD TO SIT STILL: NOT AT ALL
3. WORRYING TOO MUCH ABOUT DIFFERENT THINGS: NOT AT ALL
6. BECOMING EASILY ANNOYED OR IRRITABLE: SEVERAL DAYS

## 2023-03-09 ASSESSMENT — PATIENT HEALTH QUESTIONNAIRE - PHQ9
SUM OF ALL RESPONSES TO PHQ QUESTIONS 1-9: 6
SUM OF ALL RESPONSES TO PHQ QUESTIONS 1-9: 6
10. IF YOU CHECKED OFF ANY PROBLEMS, HOW DIFFICULT HAVE THESE PROBLEMS MADE IT FOR YOU TO DO YOUR WORK, TAKE CARE OF THINGS AT HOME, OR GET ALONG WITH OTHER PEOPLE: NOT DIFFICULT AT ALL

## 2023-03-09 NOTE — LETTER
My Asthma Action Plan    Name: Deandra Lewis   YOB: 1975  Date: 3/9/2023   My doctor: Linda Soto MD   My clinic: [facility] [place]        My Rescue Medicine:   Albuterol inhaler (Proair/Ventolin/Proventil HFA)  2-4 puffs EVERY 4 HOURS as needed. Use a spacer if recommended by your provider.   My Asthma Severity:   Intermittent / Exercise Induced  Know your asthma triggers: smoke, upper respiratory infections, dust mites, pollens, animal dander, insects/rodents, mold, humidity, aspirin, strong odors and fumes, occupational exposure, exercise or sports, emotions, cold air and Gastric Reflux  upper respiratory infections          GREEN ZONE   Good Control    I feel good    No cough or wheeze    Can work, sleep and play without asthma symptoms       Take your asthma control medicine every day.     1. If exercise triggers your asthma, take your rescue medication    15 minutes before exercise or sports, and    During exercise if you have asthma symptoms  2. Spacer to use with inhaler: If you have a spacer, make sure to use it with your inhaler             YELLOW ZONE Getting Worse  I have ANY of these:    I do not feel good    Cough or wheeze    Chest feels tight    Wake up at night   1. Keep taking your Green Zone medications  2. Start taking your rescue medicine:    every 20 minutes for up to 1 hour. Then every 4 hours for 24-48 hours.  3. If you stay in the Yellow Zone for more than 12-24 hours, contact your doctor.  4. If you do not return to the Green Zone in 12-24 hours or you get worse, start taking your oral steroid medicine if prescribed by your provider.           RED ZONE Medical Alert - Get Help  I have ANY of these:    I feel awful    Medicine is not helping    Breathing getting harder    Trouble walking or talking    Nose opens wide to breathe       1. Take your rescue medicine NOW  2. If your provider has prescribed an oral steroid medicine, start taking it  NOW  3. Call your doctor NOW  4. If you are still in the Red Zone after 20 minutes and you have not reached your doctor:    Take your rescue medicine again and    Call 911 or go to the emergency room right away    See your regular doctor within 2 weeks of an Emergency Room or Urgent Care visit for follow-up treatment.          Annual Reminders:  Meet with Asthma Educator,  Flu Shot in the Fall, consider Pneumonia Vaccination for patients with asthma (aged 19 and older).    Pharmacy: [facility] [id number] [address]    Electronically signed by Linda Soto MD   Date: 03/09/23                    Asthma Triggers  How To Control Things That Make Your Asthma Worse    Triggers are things that make your asthma worse.  Look at the list below to help you find your triggers and   what you can do about them. You can help prevent asthma flare-ups by staying away from your triggers.      Trigger                                                          What you can do   Cigarette Smoke  Tobacco smoke can make asthma worse. Do not allow smoking in your home, car or around you.  Be sure no one smokes at a child s day care or school.  If you smoke, ask your health care provider for ways to help you quit.  Ask family members to quit too.  Ask your health care provider for a referral to Quit Plan to help you quit smoking, or call 8-776-688-PLAN.     Colds, Flu, Bronchitis  These are common triggers of asthma. Wash your hands often.  Don t touch your eyes, nose or mouth.  Get a flu shot every year.     Dust Mites  These are tiny bugs that live in cloth or carpet. They are too small to see. Wash sheets and blankets in hot water every week.   Encase pillows and mattress in dust mite proof covers.  Avoid having carpet if you can. If you have carpet, vacuum weekly.   Use a dust mask and HEPA vacuum.   Pollen and Outdoor Mold  Some people are allergic to trees, grass, or weed pollen, or molds. Try to keep  your windows closed.  Limit time out doors when pollen count is high.   Ask you health care provider about taking medicine during allergy season.     Animal Dander  Some people are allergic to skin flakes, urine or saliva from pets with fur or feathers. Keep pets with fur or feathers out of your home.    If you can t keep the pet outdoors, then keep the pet out of your bedroom.  Keep the bedroom door closed.  Keep pets off cloth furniture and away from stuffed toys.     Mice, Rats, and Cockroaches  Some people are allergic to the waste from these pests.   Cover food and garbage.  Clean up spills and food crumbs.  Store grease in the refrigerator.   Keep food out of the bedroom.   Indoor Mold  This can be a trigger if your home has high moisture. Fix leaking faucets, pipes, or other sources of water.   Clean moldy surfaces.  Dehumidify basement if it is damp and smelly.   Smoke, Strong Odors, and Sprays  These can reduce air quality. Stay away from strong odors and sprays, such as perfume, powder, hair spray, paints, smoke incense, paint, cleaning products, candles and new carpet.   Exercise or Sports  Some people with asthma have this trigger. Be active!  Ask your doctor about taking medicine before sports or exercise to prevent symptoms.    Warm up for 5-10 minutes before and after sports or exercise.     Other Triggers of Asthma  Cold air:  Cover your nose and mouth with a scarf.  Sometimes laughing or crying can be a trigger.  Some medicines and food can trigger asthma.

## 2023-03-09 NOTE — PROGRESS NOTES
Assessment & Plan :       ICD-10-CM    1. Hypertension goal BP (blood pressure) < 140/90  - new diagnosis- now well controlled   I10       2. Mild persistent asthma without complication  J45.30 Asthma Action Plan (AAP)      3. Numbness and tingling of hand- fingertips - ? caused by lisinopril vs. 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    topiramate may also be the cause of the numbness/tingling.    They have a concert next month.      Weight check.   Walking for exercise 4-5x/week.      Prescription drug management  39 minutes spent on the date of the encounter doing chart review, history and exam, documentation and further activities per the note       MEDICATIONS:   No orders of the defined types were placed in this encounter.         - Continue other medications without change  Work on weight loss  Regular exercise  See Patient Instructions    Return in 5 weeks (on 4/13/2023) for anxiety , headaches, tingling ,  follow up, w/ Dr. HOUSER for 40 minute appointment.          Linda Soto MD  [facility] [place]    Subjective :   Deandra is a 47 year old, presenting for the following health issues:  Recheck Medication    Getting some pins and needles tingling in her fingertips and toes - ? feeling cold - since starting on lisinopril 5mg or with the higher dose of topiramate 50mg twice daily.  Not sure which one is the cause.  Hasn't been to the gym either the last week since she hasn't been feeling well.      She's had more energy the last couple of days overall.     The topiramate seems to have helped the frequency of her migraines overall and zomig seems to have helped the weather related migraines, but not the menstrual related migraines.      Its the first anniversary of her biological mom Samuel's death next week.   That's been hitting her harder than she thought.  She's going with her  and her in-law's who she doesn't really like to [place].      History of Present Illness       Mental Health Follow-up:  Patient presents to follow-up on Anxiety.    Patient's anxiety since last visit has been:  No change  The patient is not having other symptoms associated with anxiety.  Any significant life events: grief or loss  Patient is feeling anxious or having panic attacks.  Patient has no concerns about alcohol or drug use.     Today's PHQ-9         PHQ-9 Total Score: 6    PHQ-9 Q9 Thoughts of better off dead/self-harm past 2 weeks :   Not at all    How difficult have these problems made it for you to do your work, take care of things at home, or get along with other people: Not difficult at all  Today's LISA-7 Score: 4     Wt Readings from Last 5 Encounters:   03/09/23 104.7 kg (230 lb 12.8 oz)   02/02/23 107 kg (236 lb)   12/01/22 107 kg (236 lb)   03/03/22 99.8 kg (220 lb)   04/14/21 100.7 kg (222 lb)      Patient Active Problem List   Diagnosis     Chronic sinusitis     Bleeding, nose     Sinusitis     Lactose intolerance     Kidney stones     Maxillary bone loss     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 related to previous surgeries - 4 in 3 months for sinus issues/infection     Family history of breast cancer- mother's identical twin, maternal aunt, paternal GM and dad with breast lumps      Hot flashes     Generalized hyperhidrosis- since earliest recollections - soaks clothing multiple times/week - very disruptive to life     Mild anxiety     Foster care child     Anxiety     Multiple fractures of left foot with routine healing     Family history of colon cancer- father dx'd in mid 40's - pt's first colonoscopy in 4/24/2017 - repeat in 2022      Menorrhagia with regular cycle- ? cause of iron deficiency anemia- pt would like to see ob/gyn specialists     Intractable  episodic tension-type headache     Strain of neck muscle, initial encounter     Sun-damaged skin     Cough due to bronchospasm- needs refills on meds      Recurrent cough     Attention deficit - symptoms - pt desires testing and possible treatment     Intractable migraine without aura and without status migrainosus     Hypertension goal BP (blood pressure) < 140/90  - new diagnosis     Morbid obesity (H)       Current Outpatient Medications   Medication Sig Dispense Refill     albuterol (VENTOLIN HFA) 108 (90 Base) MCG/ACT inhaler INHALE 2 PUFFS BY MOUTH EVERY 6 HOURS AS NEEDED FOR WHEEZE OR FOR SHORTNESS OF BREATH 36 g 11     benzonatate (TESSALON) 200 MG capsule Take 1 capsule (200 mg) by mouth 3 times daily as needed for cough 21 capsule 3     cetirizine (ZYRTEC) 10 MG tablet Take 1 tablet (10 mg) by mouth every evening 30 tablet 1     cholecalciferol 50 MCG (2000 UT) CAPS Take 1 capsule by mouth daily 90 capsule 4     cyclobenzaprine (FLEXERIL) 5 MG tablet Take 1-2 tablets (5-10 mg) by mouth 2 times daily as needed for muscle spasms 30 tablet 3     fluticasone-vilanterol (BREO ELLIPTA) 200-25 MCG/ACT inhaler Inhale 1 puff into the lungs daily 60 each 11     ibuprofen (ADVIL/MOTRIN) 200 MG tablet Take 200 mg by mouth as needed.       lisinopril (ZESTRIL) 5 MG tablet Take 1 tablet (5 mg) by mouth daily 90 tablet 1     montelukast (SINGULAIR) 10 MG tablet Take 1 tablet (10 mg) by mouth At Bedtime 90 tablet 3     PARoxetine (PAXIL) 20 MG tablet TAKE 1.5 TABLETS BY MOUTH EVERY DAY IN THE MORNING 135 tablet 1     topiramate (TOPAMAX) 25 MG tablet Take 2 TABS by mouth TWICE DAILY 360 tablet 3     ZOLMitriptan (ZOMIG) 5 MG tablet Take 1 tablet (5 mg) by mouth at onset of headache for migraine May repeat in 2 hours. Max 2 tablets/24 hours. 10 tablet 3     acetaminophen (TYLENOL) 500 MG tablet Take 1-2 tablets (500-1,000 mg) by mouth every 6 hours as needed for mild pain (Patient not taking: Reported on 3/9/2023)        "Lactobacillus (PROBIOTIC ACIDOPHILUS PO)  (Patient not taking: Reported on 3/9/2023)            Allergies   Allergen Reactions     Adhesive Tape Blisters     Melon      Mouth gets itchy      Seasonal Allergies        Review of Systems :   Constitutional, HEENT, cardiovascular, pulmonary, GI, , musculoskeletal, neuro, skin, endocrine and psych systems are negative, except as otherwise noted.      Objective    /78   Pulse 84   Temp 97.8  F (36.6  C) (Tympanic)   Resp 18   Ht 1.727 m (5' 8\")   Wt 104.7 kg (230 lb 12.8 oz)   LMP 01/26/2023   SpO2 98%   BMI 35.09 kg/m    Body mass index is 35.09 kg/m .  Physical Exam :  GENERAL: healthy, alert and no distress  EYES: Eyes grossly normal to inspection, PERRL and conjunctivae and sclerae normal  HENT: ear canals and TM's normal, nose and mouth without ulcers or lesions  NECK: no adenopathy, no asymmetry, masses, or scars and thyroid normal to palpation  RESP: lungs clear to auscultation - no rales, rhonchi or wheezes  BREAST: normal without masses, tenderness or nipple discharge and no palpable axillary masses or adenopathy  CV: regular rate and rhythm, normal S1 S2, no S3 or S4, no murmur, click or rub, no peripheral edema and peripheral pulses strong  ABDOMEN: soft, nontender, no hepatosplenomegaly, no masses and bowel sounds normal  MS: no gross musculoskeletal defects noted, no edema  SKIN: no suspicious lesions or rashes  NEURO: Normal strength and tone, mentation intact and speech normal  PSYCH: mentation appears normal, affect normal/bright    No results found for any visits on 03/09/23.                "

## 2023-03-10 NOTE — PATIENT INSTRUCTIONS
[facility]  [address] [place] [address]  Office: [phone]   Fax:    [phone]     Future Appointments 3/9/2023 - 9/5/2023        Date Visit Type Length Department Provider     4/13/2023  5:20 PM OFFICE VISIT 40 min  FAMILY PRACTICE Linda Soto MD    Location Instructions:     [facility] is located at [address], [address] . Free parking is available; access the lot by turning [address] from [address], then [address] onto [address].

## 2023-03-20 PROBLEM — R20.2 NUMBNESS AND TINGLING OF HAND: Status: ACTIVE | Noted: 2023-03-20

## 2023-03-20 PROBLEM — R20.0 NUMBNESS AND TINGLING OF HAND: Status: ACTIVE | Noted: 2023-03-20

## 2023-06-08 NOTE — TELEPHONE ENCOUNTER
Gastroenterology Outpatient History and Physical    Patient: [address]    Physician: Layla Aguero MD    Chief Complaint:  Fam hx CRC (F)  History of Present Illness: 66yo F with  Fam hx CRC (F). Last colonosocp y4/2017 sigmoid diverticulosis and internal hemorrhoids    History:  Past Medical History:   Diagnosis Date    Anxiety     Arthritis     GERD (gastroesophageal reflux disease)     Hypertension     Ill-defined condition     SCOLIOSIS    Nausea & vomiting     Psychiatric disorder     ANXIETY    Shingles       Past Surgical History:   Procedure Laterality Date    BREAST BIOPSY Right     CARPAL TUNNEL RELEASE Right 10/19/2022    COLONOSCOPY N/A 04/20/2017    COLONOSCOPY performed by Layla Aguero MD at [address]  04/20/2017         GI      esophageal dilation    GYN      pelvic floor reconstruction    HEENT      bilateral cateract    HYSTERECTOMY (CERVIX STATUS UNKNOWN)      partial    JOINT REPLACEMENT Left 2020    partial replacement    ORTHOPEDIC SURGERY  2017    RIGHT HAND REPAIR (DOG BITE)    TONSILLECTOMY  1965      Social History     Socioeconomic History    Marital status:      Spouse name: None    Number of children: None    Years of education: None    Highest education level: None   Tobacco Use    Smoking status: Never    Smokeless tobacco: Never   Substance and Sexual Activity    Alcohol use: Yes     Alcohol/week: 7.0 standard drinks     Comment: occasional    Drug use: Never      Family History   Problem Relation Age of Onset    Anesth Problems Neg Hx     Breast Cancer Maternal Aunt     Broken Bones Brother         Many broken bones from surfing accidents.     Heart Attack Sister     Obesity Sister     Heart Disease Father     Breast Cancer Mother 37    Cancer Father         colon      Patient Active Problem List   Diagnosis    First degree hemorrhoids    GERD (gastroesophageal reflux disease)    Anxiety    Scoliosis    Diverticulosis    Macular degeneration Message states insurance allows 1 per day.  Use higher strength.     Is there a 40 mg tab?      Current rx is for 2 20 mg tabs per day in the morning       Eden Healy

## 2023-08-01 ASSESSMENT — ANXIETY QUESTIONNAIRES
GAD7 TOTAL SCORE: 1
4. TROUBLE RELAXING: NOT AT ALL
5. BEING SO RESTLESS THAT IT IS HARD TO SIT STILL: NOT AT ALL
GAD7 TOTAL SCORE: 1
7. FEELING AFRAID AS IF SOMETHING AWFUL MIGHT HAPPEN: NOT AT ALL
2. NOT BEING ABLE TO STOP OR CONTROL WORRYING: NOT AT ALL
6. BECOMING EASILY ANNOYED OR IRRITABLE: NOT AT ALL
3. WORRYING TOO MUCH ABOUT DIFFERENT THINGS: NOT AT ALL
1. FEELING NERVOUS, ANXIOUS, OR ON EDGE: SEVERAL DAYS
IF YOU CHECKED OFF ANY PROBLEMS ON THIS QUESTIONNAIRE, HOW DIFFICULT HAVE THESE PROBLEMS MADE IT FOR YOU TO DO YOUR WORK, TAKE CARE OF THINGS AT HOME, OR GET ALONG WITH OTHER PEOPLE: NOT DIFFICULT AT ALL

## 2023-08-03 ENCOUNTER — VIRTUAL VISIT (OUTPATIENT)
Dept: FAMILY MEDICINE | Facility: CLINIC | Age: 48
End: 2023-08-03
Payer: COMMERCIAL

## 2023-08-03 DIAGNOSIS — F33.0 MILD EPISODE OF RECURRENT MAJOR DEPRESSIVE DISORDER (H): ICD-10-CM

## 2023-08-03 DIAGNOSIS — F41.9 ANXIETY: Primary | ICD-10-CM

## 2023-08-03 PROCEDURE — 99213 OFFICE O/P EST LOW 20 MIN: CPT | Mod: VID | Performed by: FAMILY MEDICINE

## 2023-08-03 RX ORDER — BUPROPION HYDROCHLORIDE 150 MG/1
150 TABLET ORAL EVERY MORNING
Qty: 180 TABLET | Refills: 1 | Status: SHIPPED | OUTPATIENT
Start: 2023-08-03 | End: 2023-09-07

## 2023-08-03 ASSESSMENT — PATIENT HEALTH QUESTIONNAIRE - PHQ9
SUM OF ALL RESPONSES TO PHQ QUESTIONS 1-9: 2
SUM OF ALL RESPONSES TO PHQ QUESTIONS 1-9: 2
10. IF YOU CHECKED OFF ANY PROBLEMS, HOW DIFFICULT HAVE THESE PROBLEMS MADE IT FOR YOU TO DO YOUR WORK, TAKE CARE OF THINGS AT HOME, OR GET ALONG WITH OTHER PEOPLE: NOT DIFFICULT AT ALL

## 2023-08-03 NOTE — PROGRESS NOTES
"Deandra is a 47 year old who is being evaluated via a billable video visit.      How would you like to obtain your AVS? MyChart  If the video visit is dropped, the invitation should be resent by: Text to cell phone: [phone]  Will anyone else be joining your video visit? No          Assessment & Plan :       ICD-10-CM    1. Anxiety  F41.9 buPROPion (WELLBUTRIN XL) 150 MG 24 hr tablet      2. Mild episode of recurrent major depressive disorder (H)  F33.0 buPROPion (WELLBUTRIN XL) 150 MG 24 hr tablet          We discussed cutting paxil 20mg tabs in 1/2 for 1 week = 10mg daily, then stopping. Ok to start bupropion xl 150mg right away and being on that for 2 weeks prior to increase to 300mg daily, if not at desired effect for anxiety reduction.      Return in 6 weeks (on 9/15/2023) for depression, anxiety, w/ Dr Soto, as a video visit.     Future Appointments 8/20/2023 - 2/16/2024        Date Visit Type Length Department Provider     8/28/2023  7:15 AM NEW 30 min [facility] Lia Savage PA-C              9/15/2023  1:00 PM OFFICE VISIT 20 min [facility] Linda Soto MD    Location Instructions:     [facility] is located at [address] [place] [address], [address] Free parking is available; access the lot by turning north from [address] onto [facility], then west onto [facility].                      Ordering of each unique test  Prescription drug management  25 minutes spent by me on the date of the encounter doing chart review, history and exam, documentation and further activities per the note       BMI:   Estimated body mass index is 35.09 kg/m  as calculated from the following:    Height as of 3/9/23: 1.727 m (5' 8\").    Weight as of 3/9/23: 104.7 kg (230 lb 12.8 oz).   Weight management plan: Discussed healthy diet and exercise guidelines    MEDICATIONS:   Orders Placed This Encounter   Medications    buPROPion (WELLBUTRIN XL) 150 MG 24 " hr tablet     Sig: Take 1 tablet (150 mg) by mouth every morning For 2 weeks, then increase to 2 tabs every morning if not at desired effect     Dispense:  180 tablet     Refill:  1          - Continue other medications without change  Work on weight loss  Regular exercise  See Patient Instructions         Linda Soto MD  [facility] PRIOR YAIR Liriano is a 47 year old, presenting for the following health issues:  Recheck Medication    1. Anxiety    2. Mild episode of recurrent major depressive disorder (H)            8/3/2023    12:41 PM   Additional Questions   Roomed by Archana NANDINI   Accompanied by Self       History of Present Illness       Mental Health Follow-up:  Patient presents to follow-up on Anxiety.    Patient's anxiety since last visit has been:  No change  The patient is not having other symptoms associated with anxiety.  Any significant life events: No  Patient is not feeling anxious or having panic attacks.  Patient has no concerns about alcohol or drug use.    Hypertension: She presents for follow up of hypertension.  She does not check blood pressure  regularly outside of the clinic. Outpatient blood pressures have not been over 140/90. She does not follow a low salt diet.     She eats 2-3 servings of fruits and vegetables daily.She consumes 0 sweetened beverage(s) daily.She exercises with enough effort to increase her heart rate 10 to 19 minutes per day.  She exercises with enough effort to increase her heart rate 5 days per week.   She is taking medications regularly.         She's been on Paxil for a while and is concerned about her weight.    She's trying to get 10,000 steps per day, but hasn't been able to lose weight.      paxil 10mg= mild bladder retention, increased headaches; lexapro = bad sweating.   She struggles more with anxiety than with depression.  We discussed trying bupropion xl instead and that we don't always see good effect with anxiety until  we get to the 300mg dosage on that.      Son is on lexapro and daughter is on zoloft, but they both have mixed anxiety and depression and pt is much more anxiety dominant..  Pt is currently on paxil 20mg 1 tab daily right now- she self decreased from 30mg several weeks ago.  We discussed cutting paxil 20mg tabs in 1/2 for 1 week = 10mg daily, then stopping. Ok to start bupropion xl 150mg right away and being on that for 2 weeks prior to increase to 300mg daily, if not at desired effect for anxiety reduction.      Applying for a new job at the same time.  Does weight training and TRX straps for resistance training in addition to her cardio.  Discussed lean protein with each meal and low glycemic index fruits and veggies for nutrition as well.       BP Readings from Last 3 Encounters:   03/09/23 122/78   02/02/23 (!) 126/92   12/01/22 (!) 140/90     Wt Readings from Last 5 Encounters:   03/09/23 104.7 kg (230 lb 12.8 oz)   02/02/23 107 kg (236 lb)   12/01/22 107 kg (236 lb)   03/03/22 99.8 kg (220 lb)   04/14/21 100.7 kg (222 lb)          2/3/2023     8:53 AM 8/1/2023     3:48 PM 8/20/2023    11:47 PM   ACT Total Scores   ACT TOTAL SCORE (Goal Greater than or Equal to 20) 20 17 20   In the past 12 months, how many times did you visit the emergency room for your asthma without being admitted to the hospital? 0 0 0   In the past 12 months, how many times were you hospitalized overnight because of your asthma? 0 0 0      Social History     Tobacco Use    Smoking status: Former     Packs/day: 0.10     Years: 5.00     Pack years: 0.50     Types: Cigarettes    Smokeless tobacco: Never   Substance Use Topics    Alcohol use: Yes     Comment: Rarely    Drug use: No     Comment: no herbal meds except for acidophilus          2/2/2023     4:44 PM 3/9/2023     5:16 PM 8/1/2023     3:47 PM   LISA-7 SCORE   Total Score 3 (minimal anxiety) 4 (minimal anxiety) 1 (minimal anxiety)   Total Score 3 4 1         2/2/2023     4:43 PM  3/9/2023     5:15 PM 8/3/2023    12:45 PM   PHQ   PHQ-9 Total Score 3 6 2   Q9: Thoughts of better off dead/self-harm past 2 weeks Not at all Not at all Not at all         8/3/2023    12:45 PM   Last PHQ-9   1.  Little interest or pleasure in doing things 0   2.  Feeling down, depressed, or hopeless 0   3.  Trouble falling or staying asleep, or sleeping too much 0   4.  Feeling tired or having little energy 1   5.  Poor appetite or overeating 0   6.  Feeling bad about yourself 0   7.  Trouble concentrating 1   8.  Moving slowly or restless 0   Q9: Thoughts of better off dead/self-harm past 2 weeks 0   PHQ-9 Total Score 2         8/1/2023     3:47 PM   LISA-7    1. Feeling nervous, anxious, or on edge 1   2. Not being able to stop or control worrying 0   3. Worrying too much about different things 0   4. Trouble relaxing 0   5. Being so restless that it is hard to sit still 0   6. Becoming easily annoyed or irritable 0   7. Feeling afraid, as if something awful might happen 0   LISA-7 Total Score 1   If you checked any problems, how difficult have they made it for you to do your work, take care of things at home, or get along with other people? 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 related to previous surgeries - 4 in 3 months for sinus issues/infection    Family history of breast cancer- mother's identical twin, maternal aunt, paternal GM and dad with breast lumps     Hot flashes    Generalized hyperhidrosis- since earliest recollections - soaks clothing multiple times/week - very disruptive to life    Mild anxiety    Foster care child    Anxiety    Multiple fractures of left foot with routine healing    Family history of colon cancer- father dx'd in mid 40's - pt's first colonoscopy in 4/24/2017 -  repeat in 2022     Menorrhagia with regular cycle- ? cause of iron deficiency anemia- pt would like to see ob/gyn specialists    Intractable episodic tension-type headache    Strain of neck muscle, initial encounter    Sun-damaged skin    Cough due to bronchospasm- needs refills on meds     Recurrent cough    Attention deficit - symptoms - pt desires testing and possible treatment    Intractable migraine without aura and without status migrainosus    Hypertension goal BP (blood pressure) < 140/90  - new diagnosis    Morbid obesity (H)    Numbness and tingling of hand- fingertips - ? caused by lisinopril vs. other - consider changing to amlodipine        Current Outpatient Medications   Medication Sig Dispense Refill    albuterol (VENTOLIN HFA) 108 (90 Base) MCG/ACT inhaler INHALE 2 PUFFS BY MOUTH EVERY 6 HOURS AS NEEDED FOR WHEEZE OR FOR SHORTNESS OF BREATH 36 g 11    benzonatate (TESSALON) 200 MG capsule Take 1 capsule (200 mg) by mouth 3 times daily as needed for cough 21 capsule 3    buPROPion (WELLBUTRIN XL) 150 MG 24 hr tablet Take 1 tablet (150 mg) by mouth every morning For 2 weeks, then increase to 2 tabs every morning if not at desired effect 180 tablet 1    cetirizine (ZYRTEC) 10 MG tablet Take 1 tablet (10 mg) by mouth every evening 30 tablet 1    cholecalciferol 50 MCG (2000 UT) CAPS Take 1 capsule by mouth daily 90 capsule 4    cyclobenzaprine (FLEXERIL) 5 MG tablet Take 1-2 tablets (5-10 mg) by mouth 2 times daily as needed for muscle spasms 30 tablet 3    fluticasone-vilanterol (BREO ELLIPTA) 200-25 MCG/ACT inhaler Inhale 1 puff into the lungs daily 60 each 11    ibuprofen (ADVIL/MOTRIN) 200 MG tablet Take 200 mg by mouth as needed.      montelukast (SINGULAIR) 10 MG tablet Take 1 tablet (10 mg) by mouth At Bedtime 90 tablet 3    PARoxetine (PAXIL) 20 MG tablet TAKE 1.5 TABLETS BY MOUTH EVERY DAY IN THE MORNING 135 tablet 1    topiramate (TOPAMAX) 25 MG tablet Take 2 TABS by mouth TWICE DAILY 360  tablet 3    ZOLMitriptan (ZOMIG) 5 MG tablet Take 1 tablet (5 mg) by mouth at onset of headache for migraine May repeat in 2 hours. Max 2 tablets/24 hours. 10 tablet 3    lisinopril (ZESTRIL) 5 MG tablet TAKE 1 TABLET BY MOUTH EVERY DAY 90 tablet 1          Allergies   Allergen Reactions    Adhesive Tape Blisters    Melon      Mouth gets itchy     Seasonal Allergies             Review of Systems   Constitutional, HEENT, cardiovascular, pulmonary, GI, , musculoskeletal, neuro, skin, endocrine and psych systems are negative, except as otherwise noted.      Objective           Vitals:  No vitals were obtained today due to virtual visit.    Physical Exam   GENERAL: Healthy, alert and no distress  EYES: Eyes grossly normal to inspection.  No discharge or erythema, or obvious scleral/conjunctival abnormalities.  RESP: No audible wheeze, cough, or visible cyanosis.  No visible retractions or increased work of breathing.    SKIN: Visible skin clear. No significant rash, abnormal pigmentation or lesions.  NEURO: Cranial nerves grossly intact.  Mentation and speech appropriate for age.  PSYCH: Mentation appears normal, affect normal/bright, judgement and insight intact, normal speech and appearance well-groomed.    Office Visit on 12/01/2022   Component Date Value Ref Range Status    Cholesterol 12/01/2022 165  <200 mg/dL Final    Triglycerides 12/01/2022 67  <150 mg/dL Final    Direct Measure HDL 12/01/2022 51  >=50 mg/dL Final    LDL Cholesterol Calculated 12/01/2022 101 (H)  <=100 mg/dL Final    Non HDL Cholesterol 12/01/2022 114  <130 mg/dL Final    Sodium 12/01/2022 140  136 - 145 mmol/L Final    Potassium 12/01/2022 4.2  3.4 - 5.3 mmol/L Final    Chloride 12/01/2022 106  98 - 107 mmol/L Final    Carbon Dioxide (CO2) 12/01/2022 18 (L)  22 - 29 mmol/L Final    Anion Gap 12/01/2022 16 (H)  7 - 15 mmol/L Final    Urea Nitrogen 12/01/2022 11.2  6.0 - 20.0 mg/dL Final    Creatinine 12/01/2022 0.74  0.51 - 0.95 mg/dL Final     Calcium 12/01/2022 9.1  8.6 - 10.0 mg/dL Final    Glucose 12/01/2022 90  70 - 99 mg/dL Final    Alkaline Phosphatase 12/01/2022 96  35 - 104 U/L Final    AST 12/01/2022 20  10 - 35 U/L Final    ALT 12/01/2022 11  10 - 35 U/L Final    Protein Total 12/01/2022 7.0  6.4 - 8.3 g/dL Final    Albumin 12/01/2022 4.5  3.5 - 5.2 g/dL Final    Bilirubin Total 12/01/2022 0.5  <=1.2 mg/dL Final    GFR Estimate 12/01/2022 >90  >60 mL/min/1.73m2 Final    Effective December 21, 2021 eGFRcr in adults is calculated using the 2021 CKD-EPI creatinine equation which includes age and gender (Caleb et al., [place], DOI: 10.1056/OWHVqx5220692)    WBC Count 12/01/2022 8.4  4.0 - 11.0 10e3/uL Final    RBC Count 12/01/2022 5.07  3.80 - 5.20 10e6/uL Final    Hemoglobin 12/01/2022 14.1  11.7 - 15.7 g/dL Final    Hematocrit 12/01/2022 41.2  35.0 - 47.0 % Final    MCV 12/01/2022 81  78 - 100 fL Final    MCH 12/01/2022 27.8  26.5 - 33.0 pg Final    MCHC 12/01/2022 34.2  31.5 - 36.5 g/dL Final    RDW 12/01/2022 13.0  10.0 - 15.0 % Final    Platelet Count 12/01/2022 321  150 - 450 10e3/uL Final    Albumin Urine mg/L 12/01/2022 24.3  mg/L Final    The reference ranges have not been established in urine albumin. The results should be integrated into the clinical context for interpretation.    Albumin Urine mg/g Cr 12/01/2022 13.06  0.00 - 25.00 mg/g Cr Final    Microalbuminuria is defined as an albumin:creatinine ratio of 17 to 299 for males and 25 to 299 for females. A ratio of albumin:creatinine of 300 or higher is indicative of overt proteinuria.  Due to biologic variability, positive results should be confirmed by a second, first-morning random or 24-hour timed urine specimen. If there is discrepancy, a third specimen is recommended. When 2 out of 3 results are in the microalbuminuria range, this is evidence for incipient nephropathy and warrants increased efforts at glucose control, blood pressure control, and institution of therapy with an  angiotensin-converting-enzyme (ACE) inhibitor (if the patient can tolerate it).      Creatinine Urine mg/dL 12/01/2022 186.0  mg/dL Final    The reference ranges have not been established in urine creatinine. The results should be integrated into the clinical context for interpretation.    TSH 12/01/2022 1.69  0.30 - 4.20 uIU/mL Final               Video-Visit Details    Type of service:  Video Visit   Start time: 1:08pm  Stop time: 1:24pm  Total time on video: 16 minutes   Originating Location (pt. Location): Home    Distant Location (provider location):  On-site  Platform used for Video Visit: Oanh

## 2023-08-15 DIAGNOSIS — I10 HYPERTENSION GOAL BP (BLOOD PRESSURE) < 140/90: ICD-10-CM

## 2023-08-15 RX ORDER — LISINOPRIL 5 MG/1
5 TABLET ORAL DAILY
Qty: 90 TABLET | Refills: 1 | Status: SHIPPED | OUTPATIENT
Start: 2023-08-15 | End: 2024-06-26 | Stop reason: SINTOL

## 2023-08-20 ASSESSMENT — ASTHMA QUESTIONNAIRES: ACT_TOTALSCORE: 20

## 2023-08-28 ENCOUNTER — OFFICE VISIT (OUTPATIENT)
Dept: FAMILY MEDICINE | Facility: CLINIC | Age: 48
End: 2023-08-28
Payer: COMMERCIAL

## 2023-08-28 DIAGNOSIS — L81.4 LENTIGINES: ICD-10-CM

## 2023-08-28 DIAGNOSIS — D48.5 NEOPLASM OF UNCERTAIN BEHAVIOR OF SKIN: ICD-10-CM

## 2023-08-28 DIAGNOSIS — D18.01 CHERRY ANGIOMA: Primary | ICD-10-CM

## 2023-08-28 DIAGNOSIS — D22.9 MULTIPLE BENIGN NEVI: ICD-10-CM

## 2023-08-28 DIAGNOSIS — D23.9 DERMATOFIBROMA: ICD-10-CM

## 2023-08-28 PROCEDURE — 11102 TANGNTL BX SKIN SINGLE LES: CPT | Performed by: PHYSICIAN ASSISTANT

## 2023-08-28 PROCEDURE — 88305 TISSUE EXAM BY PATHOLOGIST: CPT | Performed by: PATHOLOGY

## 2023-08-28 PROCEDURE — 99203 OFFICE O/P NEW LOW 30 MIN: CPT | Mod: 25 | Performed by: PHYSICIAN ASSISTANT

## 2023-08-28 PROCEDURE — 11103 TANGNTL BX SKIN EA SEP/ADDL: CPT | Performed by: PHYSICIAN ASSISTANT

## 2023-08-28 NOTE — PATIENT INSTRUCTIONS
Wound Care After a Biopsy    What is a skin biopsy?  A skin biopsy allows the doctor to examine a very small piece of tissue under the microscope to determine the diagnosis and the best treatment for the skin condition. A local anesthetic (numbing medicine) is injected with a very small needle into the skin area to be tested. A small piece of skin is taken from the area. Sometimes a suture (stitch) is used.     What are the risks of a skin biopsy?  I will experience scar, bleeding, swelling, pain, crusting and redness. I may experience incomplete removal or recurrence. Risks of this procedure are excessive bleeding, bruising, infection, nerve damage, numbness, thick (hypertrophic or keloidal) scar and non-diagnostic biopsy.    How should I care for my wound for the first 24 hours?  Keep the wound dry and covered for 24 hours  If it bleeds, hold direct pressure on the area for 15 minutes. If bleeding does not stop, call us or go to the emergency room  Avoid strenuous exercise the first 1-2 days or as your doctor instructs you    How should I care for the wound after 24 hours?  After 24 hours, remove the bandage  You may bathe or shower as normal  If you had a scalp biopsy, you can shampoo as usual and can use shower water to clean the biopsy site daily  Clean the wound once a day with gentle soap and water  Do not scrub, be gentle  Apply white petroleum/Vaseline after cleaning the wound with a cotton swab or a clean finger, and keep the site covered with a Bandaid /bandage. Bandages are not necessary with a scalp biopsy  If you are unable to cover the site with a Bandaid /bandage, re-apply ointment 2-3 times a day to keep the site moist. Moisture will help with healing  Avoid strenuous activity for first 1-2 days  Avoid lakes, rivers, pools, and oceans until the stitches are removed or the site is healed    How do I clean my wound?  Wash hands thoroughly with soap or use hand  before all wound care  Clean  the wound with gentle soap and water  Apply white petroleum/Vaseline  to wound after it is clean  Replace the Bandaid /bandage to keep the wound covered for the first few days or as instructed by your doctor  If you had a scalp biopsy, warm shower water to the area on a daily basis should suffice    What should I use to clean my wound?   Cotton-tipped applicators (Qtips )  White petroleum jelly (Vaseline ). Use a clean new container and use Q-tips to apply.  Bandaids  as needed  Gentle soap     How should I care for my wound long term?  Do not get your wound dirty  Keep up with wound care for one week or until the area is healed.    A small scab will form and fall off by itself when the area is completely healed. The area will be red and will become pink in color as it heals. Sun protection is very important for how your scar will turn out. Sunscreen with an SPF 30 or greater is recommended once the area is healed.  You should have some soreness but it should be mild and slowly go away over several days. Talk to your doctor about using tylenol for pain,    When should I call my doctor?  If you have increased:   Pain or swelling  Pus or drainage (clear or slightly yellow drainage is ok)  Temperature over 100F  Spreading redness or warmth around wound    When will I hear about my results?  The biopsy results can take 2 weeks to come back.  Your results will automatically release to 5by before your provider has even reviewed them.  The clinic will call you with the results, send you a 5by message, or have you schedule a follow-up clinic or phone time to discuss the results.  Contact our clinics if you do not hear from us in 2 weeks.    Who should I call with questions?  [facility]: [phone]  [facility]: [phone]  For urgent needs outside of business hours call the [facility] at [phone] and ask for the dermatology resident on  call         Patient Education       Proper skin care from [facility]:    -Eliminate harsh soaps as they strip the natural oils from the skin, often resulting in dry itchy skin ( i.e. Dial, Zest, Kamila Spring)  -Use mild soaps such as Cetaphil or Dove Sensitive Skin in the shower. You do not need to use soap on arms, legs, and trunk every time you shower unless visibly soiled.   -Avoid hot or cold showers.  -After showering, lightly dry off and apply moisturizing within 2-3 minutes. This will help trap moisture in the skin.   -Aggressive use of a moisturizer at least 1-2 times a day to the entire body (including -Vanicream, Cetaphil, Aquaphor or Cerave) and moisturize hands after every washing.  -We recommend using moisturizers that come in a tub that needs to be scooped out, not a pump. This has more of an oil base. It will hold moisture in your skin much better than a water base moisturizer. The above recommended are non-pore clogging.      Wear a sunscreen with at least SPF 30 on your face, ears, neck and V of the chest daily. Wear sunscreen on other areas of the body if those areas are exposed to the sun throughout the day. Sunscreens can contain physical and/or chemical blockers. Physical blockers are less likely to clog pores, these include zinc oxide and titanium dioxide. Reapply every two hour and after swimming.     Sunscreen examples: https://www.ewg.org/sunscreen/    UV radiation  UVA radiation remains constant throughout the day and throughout the year. It is a longer wavelength than UVB and therefore penetrates deeper into the skin leading to immediate and delayed tanning, photoaging, and skin cancer. 70-80% of UVA and UVB radiation occurs between the hours of 10am-2pm.  UVB radiation  UVB radiation causes the most harmful effects and is more significant during the summer months. However, snow and ice can reflect UVB radiation leading to skin damage during the winter months as well. UVB  radiation is responsible for tanning, burning, inflammation, delayed erythema (pinkness), pigmentation (brown spots), and skin cancer.     I recommend self monthly full body exams and yearly full body exams with a dermatology provider. If you develop a new or changing lesion please follow up for examination. Most skin cancers are pink and scaly or pink and pearly. However, we do see blue/brown/black skin cancers.  Consider the ABCDEs of melanoma when giving yourself your monthly full body exam ( don't forget the groin, buttocks, feet, toes, etc). A-asymmetry, B-borders, C-color, D-diameter, E-elevation or evolving. If you see any of these changes please follow up in clinic. If you cannot see your back I recommend purchasing a hand held mirror to use with a larger wall mirror.       Checking for Skin Cancer  You can find cancer early by checking your skin each month. There are 3 kinds of skin cancer. They are melanoma, basal cell carcinoma, and squamous cell carcinoma. Doing monthly skin checks is the best way to find new marks or skin changes. Follow the instructions below for checking your skin.   The ABCDEs of checking moles for melanoma   Check your moles or growths for signs of melanoma using ABCDE:   Asymmetry: the sides of the mole or growth don t match  Border: the edges are ragged, notched, or blurred  Color: the color within the mole or growth varies  Diameter: the mole or growth is larger than 6 mm (size of a pencil eraser)  Evolving: the size, shape, or color of the mole or growth is changing (evolving is not shown in the images below)    Checking for other types of skin cancer  Basal cell carcinoma or squamous cell carcinoma have symptoms such as:     A spot or mole that looks different from all other marks on your skin  Changes in how an area feels, such as itching, tenderness, or pain  Changes in the skin's surface, such as oozing, bleeding, or scaliness  A sore that does not heal  New swelling or  redness beyond the border of a mole    Who s at risk?  Anyone can get skin cancer. But you are at greater risk if you have:   Fair skin, light-colored hair, or light-colored eyes  Many moles or abnormal moles on your skin  A history of sunburns from sunlight or tanning beds  A family history of skin cancer  A history of exposure to radiation or chemicals  A weakened immune system  If you have had skin cancer in the past, you are at risk for recurring skin cancer.   How to check your skin  Do your monthly skin checkups in front of a full-length mirror. Check all parts of your body, including your:   Head (ears, face, neck, and scalp)  Torso (front, back, and sides)  Arms (tops, undersides, upper, and lower armpits)  Hands (palms, backs, and fingers, including under the nails)  Buttocks and genitals  Legs (front, back, and sides)  Feet (tops, soles, toes, including under the nails, and between toes)  If you have a lot of moles, take digital photos of them each month. Make sure to take photos both up close and from a distance. These can help you see if any moles change over time.   Most skin changes are not cancer. But if you see any changes in your skin, call your doctor right away. Only he or she can diagnose a problem. If you have skin cancer, seeing your doctor can be the first step toward getting the treatment that could save your life.   [employer] last reviewed this educational content on 4/1/2019 2000-2020 [employer]. [address] [place] [address]. All rights reserved. This information is not intended as a substitute for professional medical care. Always follow your healthcare professional's instructions.       When should I call my doctor?  If you are worsening or not improving, please, contact us or seek urgent care as noted below.     Who should I call with questions (adults)?  [facility] (adult and pediatric): [phone]  [facility]  [facility] (adult): [phone]  [facility] ([place], [place], [place] and [place]) [phone]  For urgent needs outside of business hours call the [facility] at [phone] and ask for the dermatology resident on call to be paged  If this is a medical emergency and you are unable to reach an ER, Call 911      If you need a prescription refill, please contact your pharmacy. Refills are approved or denied by our Physicians during normal business hours, Monday through Fridays  Per office policy, refills will not be granted if you have not been seen within the past year (or sooner depending on your child's condition)

## 2023-08-28 NOTE — LETTER
8/28/2023         RE: Deandra Lewis  [address] [place] [address]        Dear Colleague,    Thank you for referring your patient, Deandra Lewis, to the [facility]. Please see a copy of my visit note below.    [facility] Dermatology Note  Encounter Date: Aug 28, 2023  Office Visit      Dermatology Problem List:  1. NUB x2 - L thigh, L anterior shoulder - bx 8/28/23    FBSE 8/28/23  Social: Works indoors. Has tanning bed use hx.  ____________________________________________    Assessment & Plan:  # Neoplasm of unspecified behavior of the skin (D49.2) on the L thigh. The differential diagnosis includes MM vs other.    - Shave biopsy performed today, see procedure note below.    # Neoplasm of unspecified behavior of the skin (D49.2) on the L anterior shoulder. The differential diagnosis includes NMSC vs other .   - Shave biopsy performed today, see procedure note below.     # Benign findings: multiple benign nevi, lentigines, cherry angiomas, DF  - edu on benign etiology  - Signs and Symptoms of non-melanoma skin cancer and ABCDEs of melanoma reviewed with patient. Patient encouraged to perform monthly self skin exams and educated on how to perform them. UV precautions reviewed with patient. Patient was asked about new or changing moles/lesions on body.   - Sunscreen: Apply 20 minutes prior to going outdoors and reapply every two hours, when wet or sweating. We recommend using an SPF 30 or higher, and to use one that is water resistant.     - RTC for changes    Procedures Performed:   - Shave biopsy x2 procedure note, location(s): see above. After discussion of benefits and risks including but not limited to bleeding, infection, scar, incomplete removal, recurrence, and non-diagnostic biopsy, written consent and photographs were obtained. The area was cleaned with isopropyl alcohol. 0.5mL of 1% lidocaine with epinephrine was injected to obtain adequate    [facility]  UROLOGY  ________________________________________________    OUTPATIENT NOTE    Patient: Lonny Hoover    : 1950  MRN: [id number]        REASON FOR VISIT      Gross hematuria last week, now resolved    UROLOGIC COMPLAINTS TODAY      hematuria      HISTORY OF PRESENT ILLNESS     Lonny Hoover is a 71 year old with history of prostate cancer s/p LDR Brachytherapy in 2019. Patient developed gross hematuria last week which has since resolved. No other associated s/s.       REVIEW OF SYSTEMS     All systems are negative unless otherwise stated in the HPI  Review of Systems   All other systems reviewed and are negative.          PAST HISTORY      Past Medical History:   Diagnosis Date   • Anxiety    • Overactive bladder 2021   • Prostate cancer (CMS/HCC)    • Urinary tract infection        Past Surgical History:   Procedure Laterality Date   • Prostate gold seed implant         Family History   Problem Relation Age of Onset   • Bilateral breast cancer Sister          Tobacco Use: Quit          Packs/Day: 0     Years:           Alcohol Use: Not Asked         Drug Use:    Not Asked             MEDICATIONS AND ALLERGIES     Current Outpatient Medications   Medication Sig Dispense Refill   • oxybutynin (DITROPAN XL) 15 MG 24 hr tablet Take 1 tablet by mouth daily. 90 tablet 3     No current facility-administered medications for this visit.     Medications were reviewed and updated today.      ALLERGIES  ALLERGIES:  No Known Allergies        PHYSICAL EXAM AND VITAL SIGNS     Physical Exam  Constitutional:       Appearance: He is well-developed.   HENT:      Head: Normocephalic and atraumatic.      Right Ear: External ear normal.      Left Ear: External ear normal.   Eyes:      Pupils: Pupils are equal, round, and reactive to light.   Cardiovascular:      Rate and Rhythm: Normal rate and regular rhythm.      Heart sounds: Normal heart sounds.   Pulmonary:      Effort: Pulmonary effort is  anesthesia of lesion(s). Shave biopsy at site(s) performed. Hemostasis was achieved with aluminium chloride. Petrolatum ointment and a sterile dressing were applied. The patient tolerated the procedure and no complications were noted. The patient was provided with verbal and written post care instructions.      Follow-up: pending path results    Staff:     All risks, benefits and alternatives were discussed with patient.  Patient is in agreement and understands the assessment and plan.  All questions were answered.    Lia Savage PA-C, MPAS  [facility]: Phone: [phone], Fax: [phone]  [facility]: Phone: [phone],  Fax: [phone]  [facility]: Phone: [phone], Fax: [phone]  ____________________________________________    CC: Skin Check    HPI:  Ms. Deandra Lewis is a 47 year old female who presents today as a new patient for FBSE. Notes spot on the thigh which has been present a long time recently changing over the past few years. Initially noticed it change somewhat during the pandemic, but then within the past 1 year it has changed a lot more. No hx skin cancer. Notes she burns easily. Tanning bed use in the past.     Patient is otherwise feeling well, without additional concerns.    Labs:  none    Physical Exam:  Vitals: LMP 06/30/2023 (Approximate)   SKIN: Full skin, which includes the head/face, both arms, chest, back, abdomen,both legs, genitalia and/or groin buttocks, digits and/or nails, was examined.   - L thigh - 1.5cm very irregular lesion with color variation, irregular borders and asymmetry  - L anterior shoulder - 5mm pink and brown macule  - There is a firm tan/flesh colored papule that dimples with lateral pressure on the L thigh.   - Araiza's skin type II, <100 nevi  - There are dome shaped bright red papules on the trunk.   - Multiple regular brown pigmented  normal.      Breath sounds: Normal breath sounds.   Abdominal:      General: There is no distension.      Palpations: Abdomen is soft.      Tenderness: There is no abdominal tenderness. There is no rebound.   Musculoskeletal:         General: No tenderness. Normal range of motion.      Cervical back: Normal range of motion.   Skin:     General: Skin is warm.      Coloration: Skin is not pale.      Findings: No erythema or rash.   Neurological:      Mental Status: He is alert and oriented to person, place, and time.          Visit Vitals  /77   Pulse 90   Resp 16   Ht 5' 6\" (1.676 m)   Wt 53.5 kg (118 lb)   BMI 19.05 kg/m²          LABORATORY     Results for orders placed or performed in visit on 10/13/21   PSA, TOTAL   Result Value    PSA TOTAL 0.014   POCT URINE DIP AUTO   Result Value    POCT Color Yellow    POCT Appearance Clear    POCT Glucose Urine 100 mg/dL    POCT Bilirubin Negative    POCT Ketones Trace    POCT Specific Gravity 1.025    POCT Occult Blood Negative    POCT pH 5.5    POCT Protein Negative    POCT Urobilinogen 0.2    Urine Nitrite Negative    WBC (Leukocyte) Esterase POC Negative       BLDR Scan mLs   Date Value Ref Range Status   07/28/2021 0  Final         Office Visit on 10/13/2021   Component Date Value Ref Range Status   • PSA TOTAL 10/13/2021 0.014   Final   • POCT Color 10/13/2021 Yellow   Final   • POCT Appearance 10/13/2021 Clear   Final   • POCT Glucose Urine 10/13/2021 100 mg/dL  Negative mg/dL Final   • POCT Bilirubin 10/13/2021 Negative  Negative Final   • POCT Ketones 10/13/2021 Trace  Negative mg/dL Final   • POCT Specific Gravity 10/13/2021 1.025  1.005 - 1.030 Final   • POCT Occult Blood 10/13/2021 Negative  Negative, 10 Earl/uL, 25 Earl/uL, 80 Earl/uL, 200 Earl/uL, 15 Becca/uL, 70  Becca/uL, 125  Becca/uL, 500  Becca/uL Final   • POCT pH 10/13/2021 5.5  5 - 7 Final   • POCT Protein 10/13/2021 Negative  Negative mg/dL Final   • POCT Urobilinogen 10/13/2021 0.2  0.0 - 1.0 mg/dL Final    • Urine Nitrite 10/13/2021 Negative  Negative Final   • WBC (Leukocyte) Esterase POC 10/13/2021 Negative  Negative, 10 Earl/uL, 25 Earl/uL, 80 Earl/uL, 200 Earl/uL, 15 Becca/uL, 70  Becca/uL, 125  Becca/uL, 500  Becca/uL Final         LAST HCT:  No results found for: HCT    LAST CREATININE:  No results found for: CREATININE          PATHOLOGY         IMAGING       LAST CT:  No results found for this or any previous visit.    LAST X-RAY:  No results found for this or any previous visit.    LAST U/S:  No results found for this or any previous visit.      ASSESSMENT AND PLAN     1. Gross hematuria    2. Prostate cancer (CMS/HCC)    3. History of brachytherapy    4. Overactive bladder        - Reviewed possible etiologies of hematuria including but not limited to: genitourinary stones, renal cysts, tumors, superficial prostatic vessels, anticoagulant use, strictures among others. Risks for genitourinary malignancy include a history of smoking, exposure to benzenes, pelvic radiation, carcinogenic medications, etc  - Recommend cystoscopy and imaging for further evaluation. Risks of cystoscopy reviewed in detail with patient: infection, bleeding, problems urinating, pain. benefits of procedure reviewed.  - After discussion, patient would like to proceed with cystoscopy and imaging.      - s/p LDR brachytherapy 2019  - Continue PSA surveillance as directed every 6 months - he would like to follow his PSA with his primary physician and will address if suspicious increase.      - Patient no longer taking the Oxybutynin 15 mg ER.     Orders Placed This Encounter   • CT UROGRAM W WO CONTRAST     Scheduling Instructions:      Instructions for required prep will be provided at time of scheduling imaging exam.    • PSA, TOTAL     This order was created through External Result Entry   • PSA     Order Specific Question:   How should test results be released to the patient's [employer] portal?     Answer:   Automatic Release   • POCT Urine Dip  macules and papules are identified on the trunk and extremities.   - Scattered brown macules on sun exposed areas.  - No other lesions of concern on areas examined.         Medications:  Current Outpatient Medications   Medication     albuterol (VENTOLIN HFA) 108 (90 Base) MCG/ACT inhaler     buPROPion (WELLBUTRIN XL) 150 MG 24 hr tablet     cetirizine (ZYRTEC) 10 MG tablet     cyclobenzaprine (FLEXERIL) 5 MG tablet     fluticasone-vilanterol (BREO ELLIPTA) 200-25 MCG/ACT inhaler     ibuprofen (ADVIL/MOTRIN) 200 MG tablet     lisinopril (ZESTRIL) 5 MG tablet     montelukast (SINGULAIR) 10 MG tablet     topiramate (TOPAMAX) 25 MG tablet     ZOLMitriptan (ZOMIG) 5 MG tablet     benzonatate (TESSALON) 200 MG capsule     cholecalciferol 50 MCG (2000 UT) CAPS     PARoxetine (PAXIL) 20 MG tablet     No current facility-administered medications for this visit.      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 related to previous surgeries - 4 in 3 months for sinus issues/infection     Family history of breast cancer- mother's identical twin, maternal aunt, paternal GM and dad with breast lumps      Hot flashes     Generalized hyperhidrosis- since earliest recollections - soaks clothing multiple times/week - very disruptive to life     Mild anxiety     Foster care child     Anxiety     Multiple fractures of left foot with routine healing     Family history of colon cancer- father dx'd in mid 40's - pt's first colonoscopy in 4/24/2017 - repeat in 2022      Menorrhagia with regular cycle- ? cause of iron deficiency anemia- pt would like to see ob/gyn specialists     Intractable episodic tension-type headache     Strain of neck muscle, initial encounter     Sun-damaged skin     Cough due to  Auto      Case Request  • Procedure: Cystoscopy, barbotage    • Blood thinners: No  • Clearance: No  sent      Return for post-op. Return in 6 months for psa.      _____________________________________________________      Scribe Signature:  I, Corinna Spring CMA, personally scribed the services dictated to me by Curly Potter M.D. in this documentation.    Scribe Attestation:  The documentation recorded by the scribe accurately reflects the service I personally performed and the decision made by me, Curly Potter MD.     bronchospasm- needs refills on meds      Recurrent cough     Attention deficit - symptoms - pt desires testing and possible treatment     Intractable migraine without aura and without status migrainosus     Hypertension goal BP (blood pressure) < 140/90  - new diagnosis     Morbid obesity (H)     Numbness and tingling of hand- fingertips - ? caused by lisinopril vs. other - consider changing to amlodipine      Past Medical History:   Diagnosis Date     Anemia      Anxiety     paxil 10mg= mild bladder retention, increased headaches; lexapro = bad sweating     Contraception     ocp's some = mood swings ; seasonale: frequent vaginal bleeding and weight gain     Hot flashes 11/16/2016     Hypertension goal BP (blood pressure) < 140/90  - new diagnosis 2/2/2023     Kidney stones     Dr. 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        Sincerely,        Lia Savage PA-C

## 2023-08-28 NOTE — PROGRESS NOTES
[facility] Dermatology Note  Encounter Date: Aug 28, 2023  Office Visit      Dermatology Problem List:  1. NUB x2 - L thigh, L anterior shoulder - bx 8/28/23    FBSE 8/28/23  Social: Works indoors. Has tanning bed use hx.  ____________________________________________    Assessment & Plan:  # Neoplasm of unspecified behavior of the skin (D49.2) on the L thigh. The differential diagnosis includes MM vs other.    - Shave biopsy performed today, see procedure note below.    # Neoplasm of unspecified behavior of the skin (D49.2) on the L anterior shoulder. The differential diagnosis includes NMSC vs other .   - Shave biopsy performed today, see procedure note below.     # Benign findings: multiple benign nevi, lentigines, cherry angiomas, DF  - edu on benign etiology  - Signs and Symptoms of non-melanoma skin cancer and ABCDEs of melanoma reviewed with patient. Patient encouraged to perform monthly self skin exams and educated on how to perform them. UV precautions reviewed with patient. Patient was asked about new or changing moles/lesions on body.   - Sunscreen: Apply 20 minutes prior to going outdoors and reapply every two hours, when wet or sweating. We recommend using an SPF 30 or higher, and to use one that is water resistant.     - RTC for changes    Procedures Performed:   - Shave biopsy x2 procedure note, location(s): see above. After discussion of benefits and risks including but not limited to bleeding, infection, scar, incomplete removal, recurrence, and non-diagnostic biopsy, written consent and photographs were obtained. The area was cleaned with isopropyl alcohol. 0.5mL of 1% lidocaine with epinephrine was injected to obtain adequate anesthesia of lesion(s). Shave biopsy at site(s) performed. Hemostasis was achieved with aluminium chloride. Petrolatum ointment and a sterile dressing were applied. The patient tolerated the procedure and no complications were noted. The patient was  provided with verbal and written post care instructions.      Follow-up: pending path results    Staff:     All risks, benefits and alternatives were discussed with patient.  Patient is in agreement and understands the assessment and plan.  All questions were answered.    Lia Savage PA-C, MPAS  [facility]: Phone: [phone], Fax: [phone]  [facility]: Phone: [phone],  Fax: [phone]  [facility]: Phone: [phone], Fax: [phone]  ____________________________________________    CC: Skin Check    HPI:  Ms. Deandra Lewis is a 47 year old female who presents today as a new patient for FBSE. Notes spot on the thigh which has been present a long time recently changing over the past few years. Initially noticed it change somewhat during the pandemic, but then within the past 1 year it has changed a lot more. No hx skin cancer. Notes she burns easily. Tanning bed use in the past.     Patient is otherwise feeling well, without additional concerns.    Labs:  none    Physical Exam:  Vitals: LMP 06/30/2023 (Approximate)   SKIN: Full skin, which includes the head/face, both arms, chest, back, abdomen,both legs, genitalia and/or groin buttocks, digits and/or nails, was examined.   - L thigh - 1.5cm very irregular lesion with color variation, irregular borders and asymmetry  - L anterior shoulder - 5mm pink and brown macule  - There is a firm tan/flesh colored papule that dimples with lateral pressure on the L thigh.   - Araiza's skin type II, <100 nevi  - There are dome shaped bright red papules on the trunk.   - Multiple regular brown pigmented macules and papules are identified on the trunk and extremities.   - Scattered brown macules on sun exposed areas.  - No other lesions of concern on areas examined.         Medications:  Current Outpatient Medications   Medication    albuterol  (VENTOLIN HFA) 108 (90 Base) MCG/ACT inhaler    buPROPion (WELLBUTRIN XL) 150 MG 24 hr tablet    cetirizine (ZYRTEC) 10 MG tablet    cyclobenzaprine (FLEXERIL) 5 MG tablet    fluticasone-vilanterol (BREO ELLIPTA) 200-25 MCG/ACT inhaler    ibuprofen (ADVIL/MOTRIN) 200 MG tablet    lisinopril (ZESTRIL) 5 MG tablet    montelukast (SINGULAIR) 10 MG tablet    topiramate (TOPAMAX) 25 MG tablet    ZOLMitriptan (ZOMIG) 5 MG tablet    benzonatate (TESSALON) 200 MG capsule    cholecalciferol 50 MCG (2000 UT) CAPS    PARoxetine (PAXIL) 20 MG tablet     No current facility-administered medications for this visit.      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 related to previous surgeries - 4 in 3 months for sinus issues/infection    Family history of breast cancer- mother's identical twin, maternal aunt, paternal GM and dad with breast lumps     Hot flashes    Generalized hyperhidrosis- since earliest recollections - soaks clothing multiple times/week - very disruptive to life    Mild anxiety    Foster care child    Anxiety    Multiple fractures of left foot with routine healing    Family history of colon cancer- father dx'd in mid 40's - pt's first colonoscopy in 4/24/2017 - repeat in 2022     Menorrhagia with regular cycle- ? cause of iron deficiency anemia- pt would like to see ob/gyn specialists    Intractable episodic tension-type headache    Strain of neck muscle, initial encounter    Sun-damaged skin    Cough due to bronchospasm- needs refills on meds     Recurrent cough    Attention deficit - symptoms - pt desires testing and possible treatment    Intractable migraine without aura and without status migrainosus    Hypertension goal BP (blood pressure) < 140/90  - new diagnosis    Morbid obesity (H)     Numbness and tingling of hand- fingertips - ? caused by lisinopril vs. other - consider changing to amlodipine      Past Medical History:   Diagnosis Date    Anemia     Anxiety     paxil 10mg= mild bladder retention, increased headaches; lexapro = bad sweating    Contraception     ocp's some = mood swings ; seasonale: frequent vaginal bleeding and weight gain    Hot flashes 11/16/2016    Hypertension goal BP (blood pressure) < 140/90  - new diagnosis 2/2/2023    Kidney stones     Dr. 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

## 2023-08-30 DIAGNOSIS — C43.72 MALIGNANT MELANOMA OF LEFT LOWER EXTREMITY INCLUDING HIP (H): Primary | ICD-10-CM

## 2023-08-30 LAB
PATH REPORT.COMMENTS IMP SPEC: ABNORMAL
PATH REPORT.COMMENTS IMP SPEC: YES
PATH REPORT.FINAL DX SPEC: ABNORMAL
PATH REPORT.GROSS SPEC: ABNORMAL
PATH REPORT.MICROSCOPIC SPEC OTHER STN: ABNORMAL
PATH REPORT.RELEVANT HX SPEC: ABNORMAL

## 2023-08-31 ENCOUNTER — TELEPHONE (OUTPATIENT)
Dept: FAMILY MEDICINE | Facility: CLINIC | Age: 48
End: 2023-08-31
Payer: COMMERCIAL

## 2023-08-31 ENCOUNTER — PATIENT OUTREACH (OUTPATIENT)
Dept: ONCOLOGY | Facility: CLINIC | Age: 48
End: 2023-08-31
Payer: COMMERCIAL

## 2023-08-31 NOTE — TELEPHONE ENCOUNTER
----- Message from Lia Savage PA-C sent at 8/30/2023  5:58 PM CDT -----  Discussed path in detail with patient. Will place referral to surgical onc for SLNB and excision.     A. Skin, left thigh, shave:  - Malignant melanoma - (see comment)     B. Skin, left anterior shoulder, shave:  - Dermatofibroma - (see description)

## 2023-08-31 NOTE — PROGRESS NOTES
New Patient Oncology Nurse Navigator Note     Referring provider: Lia Savage PA-C      Referring Clinic/Organization: [facility] DERMATOLOGY      Referred to (specialty:) Surgical Oncology     Requested provider (if applicable): IMELDA     Date Referral Received: August 30, 2023     Evaluation for:  C43.72 (ICD-10-CM) - Malignant melanoma of left lower extremity including hip (H)      Clinical History (per Nurse review of records provided):      Patient saw dermatology on 8/28/23 for neoplasm of unspecified behavior of the skin on the left thigh. Shave biopsy was done which showed:    Final Diagnosis   A. Skin, left thigh, shave:  - Malignant melanoma - (see comment)     B. Skin, left anterior shoulder, shave:  - Dermatofibroma - (see description)    Electronically signed by Dave Rogers MD on 8/30/2023 at  3:13 PM   Comment   [id number]   Melanoma:  Subtype: Superficial spreading  Breslow depth: 1.2 millimeters  Anatomic (Ino's) level: IV  Margins: positive for invasive melanoma and melanoma in situ at the lateral margin  Mitoses: none  Ulceration: absent  Lymphovascular invasion: absent  Perineural invasion: absent  Microsatellitosis: absent  Tumor infiltrating lymphocytes: present, non-brisk  Regression: absent  Precursor lesion: not identified  AJCC microstage: pT2a        Records Location: See Bookmarked material     Records Needed: NA      Additional testing needed prior to consult: NA    Payor: [employer] / Plan: C[employer]ched ADVANTAGE / Product Type: HMO /     August 31, 2023  Called patient to introduced myself and role as nurse navigator with [facility] Hematology/Oncology department and to inform them that we have received the referral for a diagnosis of Melanoma from  Lia Savage PA-C.    Patient did not answer the phone so a detailed message was left for them including NPS number. Requested callback to speak to a  to set  the consult date/time/location. Explained to patient in the message that they will receive a call from our new patient scheduling team (NPS number below, hours are Monday - Friday 8am - 4:30 pm) in the next 1-2 business days to schedule the consultation. Encouraged them to call back with any questions.     September 1, 2023  Patient returned call to Nurse Navigator today. Per patient she would prefer to have a female surgeon at [facility] to be seen at [facility]. Patient declined appointment on 9/5/23 with Dr. Diaz and asked to be scheduled with Dr. Higginbotham at . Message sent to Dr. Higginbotham about adding on patient as next available is the end of September.    Nancy Black, RN, BSN  [facility] Hematology/Oncology Nurse Navigator  [phone]

## 2023-09-05 ENCOUNTER — TELEPHONE (OUTPATIENT)
Dept: SURGERY | Facility: CLINIC | Age: 48
End: 2023-09-05
Payer: COMMERCIAL

## 2023-09-05 NOTE — TELEPHONE ENCOUNTER
PREVISIT INFORMATION                                                    Deandra RADHA Lewis scheduled for future visit at [facility] specialty clinics.    Patient is scheduled to see Dr. Higginbotham on 9/6/23  Reason for visit: Malignant melanoma  Referring provider AWA Dominguez  Has patient seen previous specialist? No  Medical Records:  Available in chart.  Patient was previously seen at a [facility] or [facility] facility.    REVIEW                                                      New patient packet mailed to patient: No  Medication reconciliation complete: No      Current Outpatient Medications   Medication Sig Dispense Refill    albuterol (VENTOLIN HFA) 108 (90 Base) MCG/ACT inhaler INHALE 2 PUFFS BY MOUTH EVERY 6 HOURS AS NEEDED FOR WHEEZE OR FOR SHORTNESS OF BREATH 36 g 11    benzonatate (TESSALON) 200 MG capsule Take 1 capsule (200 mg) by mouth 3 times daily as needed for cough (Patient not taking: Reported on 8/28/2023) 21 capsule 3    buPROPion (WELLBUTRIN XL) 150 MG 24 hr tablet Take 1 tablet (150 mg) by mouth every morning For 2 weeks, then increase to 2 tabs every morning if not at desired effect 180 tablet 1    cetirizine (ZYRTEC) 10 MG tablet Take 1 tablet (10 mg) by mouth every evening 30 tablet 1    cholecalciferol 50 MCG (2000 UT) CAPS Take 1 capsule by mouth daily (Patient not taking: Reported on 8/28/2023) 90 capsule 4    cyclobenzaprine (FLEXERIL) 5 MG tablet Take 1-2 tablets (5-10 mg) by mouth 2 times daily as needed for muscle spasms 30 tablet 3    fluticasone-vilanterol (BREO ELLIPTA) 200-25 MCG/ACT inhaler Inhale 1 puff into the lungs daily 60 each 11    ibuprofen (ADVIL/MOTRIN) 200 MG tablet Take 200 mg by mouth as needed.      lisinopril (ZESTRIL) 5 MG tablet TAKE 1 TABLET BY MOUTH EVERY DAY 90 tablet 1    montelukast (SINGULAIR) 10 MG tablet Take 1 tablet (10 mg) by mouth At Bedtime 90 tablet 3    PARoxetine (PAXIL) 20 MG tablet TAKE 1.5 TABLETS BY MOUTH EVERY DAY IN  THE MORNING 135 tablet 1    topiramate (TOPAMAX) 25 MG tablet Take 2 TABS by mouth TWICE DAILY 360 tablet 3    ZOLMitriptan (ZOMIG) 5 MG tablet Take 1 tablet (5 mg) by mouth at onset of headache for migraine May repeat in 2 hours. Max 2 tablets/24 hours. 10 tablet 3       Allergies: Adhesive tape, Melon, and Seasonal allergies        PLAN/FOLLOW-UP NEEDED                                                      Previsit review complete.  Patient will see provider at future scheduled appointment.     Writer called and spoke with patient for previsit, appointment date, time and location reminder. My Chart message also sent.    Patient Reminders Given:  Please, make sure you bring an updated list of your medications.   If you are having a procedure, please, present 15 minutes early.  If you need to cancel or reschedule,please call [phone].    Sudha Dwyer LPN

## 2023-09-06 ENCOUNTER — OFFICE VISIT (OUTPATIENT)
Dept: SURGERY | Facility: CLINIC | Age: 48
End: 2023-09-06
Attending: PHYSICIAN ASSISTANT
Payer: COMMERCIAL

## 2023-09-06 VITALS — HEIGHT: 68 IN | WEIGHT: 222 LBS | BODY MASS INDEX: 33.65 KG/M2

## 2023-09-06 DIAGNOSIS — C43.72 MALIGNANT MELANOMA OF LEFT LOWER EXTREMITY INCLUDING HIP (H): Primary | ICD-10-CM

## 2023-09-06 PROCEDURE — 99205 OFFICE O/P NEW HI 60 MIN: CPT | Performed by: SURGERY

## 2023-09-06 RX ORDER — ACETAMINOPHEN 325 MG/1
975 TABLET ORAL ONCE
Status: CANCELLED | OUTPATIENT
Start: 2023-09-06 | End: 2023-09-06

## 2023-09-06 NOTE — LETTER
9/6/2023         RE: Deandra Lewis  [address] [place] [address]        Dear Colleague,    Thank you for referring your patient, Deandra Lewis, to the [facility]. Please see a copy of my visit note below.    NEW SURGICAL CONSULTATION  Sep 6, 2023    Deandra Lewis is a 47 year old female who presents with a lesion on the LEFT thigh.  She was referred by Lia Savage PA-C.    HPI:    She noted a pigmented lesion on the left thigh for at least 3 years. More recently it became scaly. It did not itch or bleed.    A shave biopsy was performed by AWA Savage on 8/28/2023.  It showed superficial spreading melanoma, 1.2 mm deep with no ulceration or mitoses.    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    A skin check was performed by AWA Savage on 8/28/2023. A shoulder dermatofibroma was also identified.    FAMILY HISTORY:  Pancreatic ca: No  Melanoma: No  Breast ca: Yes - mat aunts x2 (mother's twin dx 60s, other aunt dx in 50s); PGM  Colon ca: father  Other cancer: Yes basal cell ca, squamous cell, mat aunt w/ leukemia    Patient Active Problem List   Diagnosis     Chronic sinusitis     Bleeding, nose     Sinusitis     Lactose intolerance     Kidney stones     Maxillary bone loss     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 related to previous surgeries - 4 in 3 months for sinus issues/infection     Family history of breast cancer- mother's identical twin, maternal aunt, paternal GM and dad with breast lumps      Hot flashes     Generalized hyperhidrosis- since earliest recollections - soaks clothing multiple times/week - very disruptive to life     Mild anxiety     Foster care child     Anxiety     Multiple fractures of left foot with routine healing     Family history of colon cancer- father dx'd in mid 40's - pt's  first colonoscopy in 4/24/2017 - repeat in 2022      Menorrhagia with regular cycle- ? cause of iron deficiency anemia- pt would like to see ob/gyn specialists     Intractable episodic tension-type headache     Strain of neck muscle, initial encounter     Sun-damaged skin     Cough due to bronchospasm- needs refills on meds      Recurrent cough     Attention deficit - symptoms - pt desires testing and possible treatment     Intractable migraine without aura and without status migrainosus     Hypertension goal BP (blood pressure) < 140/90  - new diagnosis     Morbid obesity (H)     Numbness and tingling of hand- fingertips - ? caused by lisinopril vs. other - consider changing to amlodipine    HTN  No DM, MI, CVA     Past Medical History:   Diagnosis Date     Anemia      Anxiety     paxil 10mg= mild bladder retention, increased headaches; lexapro = bad sweating     Contraception     ocp's some = mood swings ; seasonale: frequent vaginal bleeding and weight gain     Hot flashes 11/16/2016     Hypertension goal BP (blood pressure) < 140/90  - new diagnosis 2/2/2023     Kidney stones     Dr. Blank Steele - [facility] [place] [facility]      Lactose intolerance      Maxillary bone loss 01/2014    left - secondary to severe oral/sinus infection and bleeding  - [facility] [place]/p multiple surgeries      Multiple fractures of left foot with routine healing 02/25/2021     Night sweats      Sleep problems      Uncomplicated asthma 20s       Past Surgical History:   Procedure Laterality Date     ABDOMEN SURGERY  2001     APPENDECTOMY       ARTHROSCOPY, ARTHROPLASTY TEMPOROMANDIBULAR JOINT, COMBINED  age 14     TMJ surgery as an 9th grader     COLONOSCOPY  2017    Approximately     CYSTOSCOPY,URETEROSCOPY,STONE REMV  09/11/2002    with stent placements - since removed      ENDOSCOPIC BALLOON SINUPLASTY, OPTICAL TRACKING SYSTEM ENDOSCOPIC SINUS SURGERY, COMBINED  01/08/2013    Procedure: COMBINED ENDOSCOPIC BALLOON  SINUPLASTY, OPTICAL TRACKING SYSTEM ENDOSCOPIC SINUS SURGERY;  Left Endo Sinus  Surgery  with Septoplasty       ENDOSCOPIC SINUS SURGERY  01/15/2013    Procedure: ENDOSCOPIC SINUS SURGERY;  removal of nasal packing with endocopic debridement of left paranasal sinuses;  Surgeon: Fabian Driver MD;  Location: [facility] OR     ENDOSCOPIC SINUS SURGERY  04/22/2013    Procedure: ENDOSCOPIC SINUS SURGERY;  Functional Endoscopic Sinus Surgery With Biopsy ;  Surgeon: Jcarlos Hogan MD;  Location: [facility] OR   No GA issues  2013 sinus surgery with bleeding requiring IR - worked up for bleeding disorder, which was negative per chart review.  Reports prior hx of renal stones and urethral stricture, past hx of painful catheterization.    Current Outpatient Medications   Medication Sig Dispense Refill     albuterol (VENTOLIN HFA) 108 (90 Base) MCG/ACT inhaler INHALE 2 PUFFS BY MOUTH EVERY 6 HOURS AS NEEDED FOR WHEEZE OR FOR SHORTNESS OF BREATH 36 g 11     benzonatate (TESSALON) 200 MG capsule Take 1 capsule (200 mg) by mouth 3 times daily as needed for cough (Patient not taking: Reported on 8/28/2023) 21 capsule 3     buPROPion (WELLBUTRIN XL) 150 MG 24 hr tablet Take 1 tablet (150 mg) by mouth every morning For 2 weeks, then increase to 2 tabs every morning if not at desired effect 180 tablet 1     cetirizine (ZYRTEC) 10 MG tablet Take 1 tablet (10 mg) by mouth every evening 30 tablet 1     cholecalciferol 50 MCG (2000 UT) CAPS Take 1 capsule by mouth daily (Patient not taking: Reported on 8/28/2023) 90 capsule 4     cyclobenzaprine (FLEXERIL) 5 MG tablet Take 1-2 tablets (5-10 mg) by mouth 2 times daily as needed for muscle spasms 30 tablet 3     fluticasone-vilanterol (BREO ELLIPTA) 200-25 MCG/ACT inhaler Inhale 1 puff into the lungs daily 60 each 11     ibuprofen (ADVIL/MOTRIN) 200 MG tablet Take 200 mg by mouth as needed.       lisinopril (ZESTRIL) 5 MG tablet TAKE 1 TABLET BY MOUTH EVERY DAY 90 tablet 1     montelukast  (SINGULAIR) 10 MG tablet Take 1 tablet (10 mg) by mouth At Bedtime 90 tablet 3     PARoxetine (PAXIL) 20 MG tablet TAKE 1.5 TABLETS BY MOUTH EVERY DAY IN THE MORNING 135 tablet 1     topiramate (TOPAMAX) 25 MG tablet Take 2 TABS by mouth TWICE DAILY 360 tablet 3     ZOLMitriptan (ZOMIG) 5 MG tablet Take 1 tablet (5 mg) by mouth at onset of headache for migraine May repeat in 2 hours. Max 2 tablets/24 hours. 10 tablet 3           Allergies   Allergen Reactions     Adhesive Tape Blisters     Melon      Mouth gets itchy      Seasonal Allergies         SOCIAL HISTORY:  Smokes: No    ROS  Headaches Yes - hx migraines  Vision changes No  Dizziness No  Abdominal pain No  Rectal bleeding No  Melena No  Unintentional weight loss No  Easy bruising/bleeding: No  History of DVT/PE: No    LMP 06/30/2023 (Approximate)   Physical Exam  Constitutional:       Appearance: Normal appearance.   Pulmonary:      Effort: Pulmonary effort is normal. No respiratory distress.   Abdominal:      General: There is no distension.      Palpations: Abdomen is soft. There is no hepatomegaly or mass.      Tenderness: There is no abdominal tenderness.   Lymphadenopathy:      Cervical: No cervical adenopathy.      Right cervical: No superficial, deep or posterior cervical adenopathy.     Left cervical: No superficial, deep or posterior cervical adenopathy.      Upper Body:      Right upper body: No supraclavicular, axillary, pectoral or epitrochlear adenopathy.      Left upper body: No supraclavicular, axillary, pectoral or epitrochlear adenopathy.      Lower Body: No right inguinal adenopathy. No left inguinal adenopathy.      Comments: No lymphedema in bilateral lower extremities.    Skin:     General: Skin is warm and dry.                 INVESTIGATIONS:    Biopsy (8/28/2023) showed:  A. Skin, left thigh, shave:  Malignant melanoma, superficial spreading type  Depth:  1.2  mm  Ulceration: absent  Mitotic rate: 0/mm2  Margin status: positive at  lateral margin     ASSESSMENT:  Deandra Lewis is a 47 year old female with a LEFT lower extremity cutaneous melanoma.    Her stage is:   Cancer Staging   Malignant melanoma of left lower extremity including hip (H)  Staging form: Melanoma of the Skin, AJCC 8th Edition  - Clinical: Stage IB (cT2a, cN0, cM0) - Unsigned    The staging and natural history of melanoma was discussed with the patient.      A wide local excision with appropriate margins is indicated to reduce the risk of local recurrence.  The risks of a wide local excision were discussed, including scar formation, bleeding, wound infection, wound dehiscence, seroma formation, and numbness of surrounding skin. Given the size of the primary site and resection margins, I do recommend a plastic surgery referral for a complex wound closure to be performed current with the WLE.      In addition to the surgical management of the skin, a sentinel lymph node biopsy is recommended for chika staging.  This is performed with the combination of the radioactive Tilmanocept and lymphazurin. The risks of a sentinel lymph node biopsy were discussed with the patient, including the risks of lymphedema, bleeding, wound infection, wound dehiscence, seroma formation, nerve injury, and paresthesias. We discussed the various possible chika basin(s): inguinofemoral. There is also a small risk of anaphylaxis with lymphazurin injection as part of the procedure.  The findings of the sentinel lymph node biopsy may result in the need for further staging evaluations, systemic therapy, surgery (i.e. Completion lymph node dissection) +/- radiation.  Mapping will be determined pre-operatively with a lymphoscintigram.  There is a 10% chance of patients whose sentinel lymph nodes do not map. Should this be the case, we discussed that we would not pursue surgical chika staging, but would instead proceed with chika surveillance with imaging.     We discussed that surgery is a same day surgery  procedure. We discussed that surgical pathology results will be reviewed at the postoperative visit to allow for careful discussion of next steps and for answering questions.    We discussed that due to the logistics of scheduling a concurrent surgery with plastic surgery, the location would be at the [place] location (likely [facility]).  Deandra Lewis then shared that she had a poor past experience with surgery at the [place] location at the hospital but was willing to have surgery there given the need now.  We did discuss that with the inguinofemoral SLNB, the ipsilateral groin would be shaved in the operating room. In addition, depending on the length of time under anesthesia, a bladder catheterization may be needed while under general anesthesia.  Deandra Lewis was OK with both of these if needed during surgery.    Finally, we discussed that patients with a diagnosis of melanoma are at risk of recurrence (local and distant) and of subsequent de sedrick melanoma.  Deandra Lewis will need to proceed with quarterly dermatologic full skin survey and evaluation.  I also reviewed the importance of protection from sun exposure, including wearing long sleeves, hats, etc and also the use of sunscreen with SPF of at least 35 with UVA and UVB protection, with frequent re-applications.    She has a family history of cancer. I recommended a genetic counseling referral. Results of this will not influence the management of her melanoma.    All of the above were discussed with the patient and all questions answered.  Deandra Lewis elected to proceed with wide local excision of a LEFT thigh melanoma with 2 cm margins, sentinel lymph node mapping and biopsy.     Total time spent with the patient was 45 minutes, of which more than half was counseling.     PLAN:  Genetic counseling referral  Wide local excision of left thigh melanoma with 2 cm margins  [place] lymph node mapping and biopsy  Plastic surgery referral for concurrent  complex wound closure  Discussed that a bladder catheterization may be indicated during surgery under anesthesia depending on length of surgery and whether the bladder is full upon scanning post-procedure prior to waking from Anesthesia  Patient to report to her PCP for preoperative H&P and testing.    Tracie Higginbotham MD MS [facility] FACS  Associate Professor of Surgery  Division of Surgical Oncology  [facility]     60 minutes spent on the date of the encounter doing chart review, review of test result(s), interpretation of test(s), patient visit, documentation, and care coordination.      Again, thank you for allowing me to participate in the care of your patient.        Sincerely,        Tracie Higginbotham MD

## 2023-09-06 NOTE — NURSING NOTE
Nurse Care Coordination:       I introduced self to patient and explained my role of nurse coordinator. I visited with Deandra at her surgical consultation on 9/6/23 with Dr. Higginbotham at the [facility]          At the end of the consultation, we reviewed Deandra's plan of care and education. The plan is for patient to meet with plastics once Schedulers have looked into the plastics surgery schedules.      I informed her for surgery she will need a pre-op exam within 30 days before surgery. Surgery packed reviewed in clinic with patient and surgery soap given to Deandra. Patient notified that surgery pathology results will be reviewed in clinic at Deandra's first post operative visit with Dr. Higginbotham about two weeks after surgery       I answered her questions and encouraged patient to call me back with any future questions or concerns. Deandra has my contact information, and knows to contact me in the future with any questions or concerns.     Annamarie Kerr RNCC  Surgical Oncology, Plastics and General Surgery  [facility]

## 2023-09-06 NOTE — PROGRESS NOTES
NEW SURGICAL CONSULTATION  Sep 6, 2023    Deandra Lewis is a 47 year old female who presents with a lesion on the LEFT thigh.  She was referred by Lia Savage PA-C.    HPI:    She noted a pigmented lesion on the left thigh for at least 3 years. More recently it became scaly. It did not itch or bleed.    A shave biopsy was performed by AWA Savage on 8/28/2023.  It showed superficial spreading melanoma, 1.2 mm deep with no ulceration or mitoses.    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    A skin check was performed by AWA Savage on 8/28/2023. A shoulder dermatofibroma was also identified.    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 related to previous surgeries - 4 in 3 months for sinus issues/infection    Family history of breast cancer- mother's identical twin, maternal aunt, paternal GM and dad with breast lumps     Hot flashes    Generalized hyperhidrosis- since earliest recollections - soaks clothing multiple times/week - very disruptive to life    Mild anxiety    Foster care child    Anxiety    Multiple fractures of left foot with routine healing    Family history of colon cancer- father dx'd in mid 40's - pt's first colonoscopy in 4/24/2017 - repeat in 2022     Menorrhagia with regular cycle- ? cause of iron deficiency anemia- pt would like to see ob/gyn specialists    Intractable episodic tension-type headache    Strain of neck muscle, initial encounter    Sun-damaged skin     Cough due to bronchospasm- needs refills on meds     Recurrent cough    Attention deficit - symptoms - pt desires testing and possible treatment    Intractable migraine without aura and without status migrainosus    Hypertension goal BP (blood pressure) < 140/90  - new diagnosis    Morbid obesity (H)    Numbness and tingling of hand- fingertips - ? caused by lisinopril vs. other - consider changing to amlodipine    HTN  No DM, MI, CVA     Past Medical History:   Diagnosis Date    Anemia     Anxiety     paxil 10mg= mild bladder retention, increased headaches; lexapro = bad sweating    Contraception     ocp's some = mood swings ; seasonale: frequent vaginal bleeding and weight gain    Hot flashes 11/16/2016    Hypertension goal BP (blood pressure) < 140/90  - new diagnosis 2/2/2023    Kidney stones     Dr. Blank [facility] - Urology     Lactose intolerance     Maxillary bone loss 01/2014    left - secondary to severe oral/sinus infection and bleeding  - hosp at  off [place] s/p multiple surgeries     Multiple fractures of left foot with routine healing 02/25/2021    Night sweats     Sleep problems     Uncomplicated asthma 20s       Past Surgical History:   Procedure Laterality Date    ABDOMEN SURGERY  2001    APPENDECTOMY      ARTHROSCOPY, ARTHROPLASTY TEMPOROMANDIBULAR JOINT, COMBINED  age 14     TMJ surgery as an 7th grader    COLONOSCOPY  2017    Approximately    CYSTOSCOPY,URETEROSCOPY,STONE REMV  09/11/2002    with stent placements - since removed     ENDOSCOPIC BALLOON SINUPLASTY, OPTICAL TRACKING SYSTEM ENDOSCOPIC SINUS SURGERY, COMBINED  01/08/2013    Procedure: COMBINED ENDOSCOPIC BALLOON SINUPLASTY, OPTICAL TRACKING SYSTEM ENDOSCOPIC SINUS SURGERY;  Left Endo Sinus  Surgery  with Septoplasty      ENDOSCOPIC SINUS SURGERY  01/15/2013    Procedure: ENDOSCOPIC SINUS SURGERY;  removal of nasal packing with endocopic debridement of left paranasal sinuses;  Surgeon: Fabian Driver MD;   Location: [facility] OR    ENDOSCOPIC SINUS SURGERY  04/22/2013    Procedure: ENDOSCOPIC SINUS SURGERY;  Functional Endoscopic Sinus Surgery With Biopsy ;  Surgeon: Jcarlos Hogan MD;  Location: [facility] OR   No GA issues  2013 sinus surgery with bleeding requiring IR - worked up for bleeding disorder, which was negative per chart review.  Reports prior hx of renal stones and urethral stricture, past hx of painful catheterization.    Current Outpatient Medications   Medication Sig Dispense Refill    albuterol (VENTOLIN HFA) 108 (90 Base) MCG/ACT inhaler INHALE 2 PUFFS BY MOUTH EVERY 6 HOURS AS NEEDED FOR WHEEZE OR FOR SHORTNESS OF BREATH 36 g 11    benzonatate (TESSALON) 200 MG capsule Take 1 capsule (200 mg) by mouth 3 times daily as needed for cough (Patient not taking: Reported on 8/28/2023) 21 capsule 3    buPROPion (WELLBUTRIN XL) 150 MG 24 hr tablet Take 1 tablet (150 mg) by mouth every morning For 2 weeks, then increase to 2 tabs every morning if not at desired effect 180 tablet 1    cetirizine (ZYRTEC) 10 MG tablet Take 1 tablet (10 mg) by mouth every evening 30 tablet 1    cholecalciferol 50 MCG (2000 UT) CAPS Take 1 capsule by mouth daily (Patient not taking: Reported on 8/28/2023) 90 capsule 4    cyclobenzaprine (FLEXERIL) 5 MG tablet Take 1-2 tablets (5-10 mg) by mouth 2 times daily as needed for muscle spasms 30 tablet 3    fluticasone-vilanterol (BREO ELLIPTA) 200-25 MCG/ACT inhaler Inhale 1 puff into the lungs daily 60 each 11    ibuprofen (ADVIL/MOTRIN) 200 MG tablet Take 200 mg by mouth as needed.      lisinopril (ZESTRIL) 5 MG tablet TAKE 1 TABLET BY MOUTH EVERY DAY 90 tablet 1    montelukast (SINGULAIR) 10 MG tablet Take 1 tablet (10 mg) by mouth At Bedtime 90 tablet 3    PARoxetine (PAXIL) 20 MG tablet TAKE 1.5 TABLETS BY MOUTH EVERY DAY IN THE MORNING 135 tablet 1    topiramate (TOPAMAX) 25 MG tablet Take 2 TABS by mouth TWICE DAILY 360 tablet 3    ZOLMitriptan (ZOMIG) 5 MG tablet Take 1  tablet (5 mg) by mouth at onset of headache for migraine May repeat in 2 hours. Max 2 tablets/24 hours. 10 tablet 3           Allergies   Allergen Reactions    Adhesive Tape Blisters    Melon      Mouth gets itchy     Seasonal Allergies         SOCIAL HISTORY:  Smokes: No    ROS  Headaches Yes - hx migraines  Vision changes No  Dizziness No  Abdominal pain No  Rectal bleeding No  Melena No  Unintentional weight loss No  Easy bruising/bleeding: No  History of DVT/PE: No    LMP 06/30/2023 (Approximate)   Physical Exam  Constitutional:       Appearance: Normal appearance.   Pulmonary:      Effort: Pulmonary effort is normal. No respiratory distress.   Abdominal:      General: There is no distension.      Palpations: Abdomen is soft. There is no hepatomegaly or mass.      Tenderness: There is no abdominal tenderness.   Lymphadenopathy:      Cervical: No cervical adenopathy.      Right cervical: No superficial, deep or posterior cervical adenopathy.     Left cervical: No superficial, deep or posterior cervical adenopathy.      Upper Body:      Right upper body: No supraclavicular, axillary, pectoral or epitrochlear adenopathy.      Left upper body: No supraclavicular, axillary, pectoral or epitrochlear adenopathy.      Lower Body: No right inguinal adenopathy. No left inguinal adenopathy.      Comments: No lymphedema in bilateral lower extremities.    Skin:     General: Skin is warm and dry.                 INVESTIGATIONS:    Biopsy (8/28/2023) showed:  A. Skin, left thigh, shave:  Malignant melanoma, superficial spreading type  Depth:  1.2  mm  Ulceration: absent  Mitotic rate: 0/mm2  Margin status: positive at lateral margin     ASSESSMENT:  Deandra Lewis is a 47 year old female with a LEFT lower extremity cutaneous melanoma.    Her stage is:   Cancer Staging   Malignant melanoma of left lower extremity including hip (H)  Staging form: Melanoma of the Skin, AJCC 8th Edition  - Clinical: Stage IB (cT2a, cN0, cM0) -  Unsigned    The staging and natural history of melanoma was discussed with the patient.      A wide local excision with appropriate margins is indicated to reduce the risk of local recurrence.  The risks of a wide local excision were discussed, including scar formation, bleeding, wound infection, wound dehiscence, seroma formation, and numbness of surrounding skin. Given the size of the primary site and resection margins, I do recommend a plastic surgery referral for a complex wound closure to be performed current with the WLE.      In addition to the surgical management of the skin, a sentinel lymph node biopsy is recommended for chika staging.  This is performed with the combination of the radioactive Tilmanocept and lymphazurin. The risks of a sentinel lymph node biopsy were discussed with the patient, including the risks of lymphedema, bleeding, wound infection, wound dehiscence, seroma formation, nerve injury, and paresthesias. We discussed the various possible chika basin(s): inguinofemoral. There is also a small risk of anaphylaxis with lymphazurin injection as part of the procedure.  The findings of the sentinel lymph node biopsy may result in the need for further staging evaluations, systemic therapy, surgery (i.e. Completion lymph node dissection) +/- radiation.  Mapping will be determined pre-operatively with a lymphoscintigram.  There is a 10% chance of patients whose sentinel lymph nodes do not map. Should this be the case, we discussed that we would not pursue surgical chika staging, but would instead proceed with chika surveillance with imaging.     We discussed that surgery is a same day surgery procedure. We discussed that surgical pathology results will be reviewed at the postoperative visit to allow for careful discussion of next steps and for answering questions.    We discussed that due to the logistics of scheduling a concurrent surgery with plastic surgery, the location would be at the  [place] location (likely ASC).  Deandra Lewis then shared that she had a poor past experience with surgery at the [place] location at the hospital but was willing to have surgery there given the need now.  We did discuss that with the inguinofemoral SLNB, the ipsilateral groin would be shaved in the operating room. In addition, depending on the length of time under anesthesia, a bladder catheterization may be needed while under general anesthesia.  Deandra Lewis was OK with both of these if needed during surgery.    Finally, we discussed that patients with a diagnosis of melanoma are at risk of recurrence (local and distant) and of subsequent de sedrick melanoma.  Deandra Lewis will need to proceed with quarterly dermatologic full skin survey and evaluation.  I also reviewed the importance of protection from sun exposure, including wearing long sleeves, hats, etc and also the use of sunscreen with SPF of at least 35 with UVA and UVB protection, with frequent re-applications.    She has a family history of cancer. I recommended a genetic counseling referral. Results of this will not influence the management of her melanoma.    All of the above were discussed with the patient and all questions answered.  Deandra Lewis elected to proceed with wide local excision of a LEFT thigh melanoma with 2 cm margins, sentinel lymph node mapping and biopsy.     Total time spent with the patient was 45 minutes, of which more than half was counseling.     PLAN:  Genetic counseling referral  Wide local excision of left thigh melanoma with 2 cm margins  [place] lymph node mapping and biopsy  Plastic surgery referral for concurrent complex wound closure  Discussed that a bladder catheterization may be indicated during surgery under anesthesia depending on length of surgery and whether the bladder is full upon scanning post-procedure prior to waking from Anesthesia  Patient to report to her PCP for preoperative H&P and  testing.    Tracie Higginbotham MD MS [facility] FACS  Associate Professor of Surgery  Division of Surgical Oncology  [facility]     60 minutes spent on the date of the encounter doing chart review, review of test result(s), interpretation of test(s), patient visit, documentation, and care coordination.

## 2023-09-06 NOTE — NURSING NOTE
"Deandra Lewis's goals for this visit include:   Chief Complaint   Patient presents with    Consult     Malignant melanoma left lower extremity       She requests these members of her care team be copied on today's visit information: Yes    PCP: Linda Soto    Referring Provider:  Lia Savage PA-C  [address] [place] [id number]    Ht 1.727 m (5' 8\")   Wt 100.7 kg (222 lb)   LMP 06/30/2023 (Approximate)   BMI 33.75 kg/m      Do you need any medication refills at today's visit? No    Sudha Dwyer LPN      "

## 2023-09-07 ENCOUNTER — VIRTUAL VISIT (OUTPATIENT)
Dept: FAMILY MEDICINE | Facility: CLINIC | Age: 48
End: 2023-09-07
Payer: COMMERCIAL

## 2023-09-07 DIAGNOSIS — F33.0 MILD EPISODE OF RECURRENT MAJOR DEPRESSIVE DISORDER (H): ICD-10-CM

## 2023-09-07 DIAGNOSIS — F41.9 ANXIETY: Primary | ICD-10-CM

## 2023-09-07 DIAGNOSIS — C43.72 MALIGNANT MELANOMA OF LEFT LOWER EXTREMITY INCLUDING HIP (H): ICD-10-CM

## 2023-09-07 PROCEDURE — 99214 OFFICE O/P EST MOD 30 MIN: CPT | Mod: VID | Performed by: FAMILY MEDICINE

## 2023-09-07 RX ORDER — BUPROPION HYDROCHLORIDE 150 MG/1
450 TABLET ORAL EVERY MORNING
Qty: 270 TABLET | Refills: 1 | Status: SHIPPED | OUTPATIENT
Start: 2023-09-07 | End: 2024-03-28

## 2023-09-07 ASSESSMENT — ANXIETY QUESTIONNAIRES
5. BEING SO RESTLESS THAT IT IS HARD TO SIT STILL: NOT AT ALL
6. BECOMING EASILY ANNOYED OR IRRITABLE: NOT AT ALL
GAD7 TOTAL SCORE: 3
2. NOT BEING ABLE TO STOP OR CONTROL WORRYING: SEVERAL DAYS
3. WORRYING TOO MUCH ABOUT DIFFERENT THINGS: NOT AT ALL
7. FEELING AFRAID AS IF SOMETHING AWFUL MIGHT HAPPEN: NOT AT ALL
GAD7 TOTAL SCORE: 3
IF YOU CHECKED OFF ANY PROBLEMS ON THIS QUESTIONNAIRE, HOW DIFFICULT HAVE THESE PROBLEMS MADE IT FOR YOU TO DO YOUR WORK, TAKE CARE OF THINGS AT HOME, OR GET ALONG WITH OTHER PEOPLE: NOT DIFFICULT AT ALL
1. FEELING NERVOUS, ANXIOUS, OR ON EDGE: SEVERAL DAYS
4. TROUBLE RELAXING: SEVERAL DAYS

## 2023-09-07 ASSESSMENT — PATIENT HEALTH QUESTIONNAIRE - PHQ9
SUM OF ALL RESPONSES TO PHQ QUESTIONS 1-9: 3
SUM OF ALL RESPONSES TO PHQ QUESTIONS 1-9: 3
10. IF YOU CHECKED OFF ANY PROBLEMS, HOW DIFFICULT HAVE THESE PROBLEMS MADE IT FOR YOU TO DO YOUR WORK, TAKE CARE OF THINGS AT HOME, OR GET ALONG WITH OTHER PEOPLE: NOT DIFFICULT AT ALL

## 2023-09-07 NOTE — PROGRESS NOTES
Deandra is a 47 year old who is being evaluated via a billable video visit.      How would you like to obtain your AVS? MyChart  If the video visit is dropped, the invitation should be resent by: Text to cell phone: [phone]  Will anyone else be joining your video visit? No          Assessment & Plan :       ICD-10-CM    1. Anxiety  F41.9 buPROPion (WELLBUTRIN XL) 150 MG 24 hr tablet      2. Mild episode of recurrent major depressive disorder (H)  F33.0 buPROPion (WELLBUTRIN XL) 150 MG 24 hr tablet      3. Malignant melanoma of left lower extremity including hip (H)  C43.72           Increase bupropion to 450mg /day.      Return in about 13 days (around 9/20/2023) for preop physical , w/  V for 40 minute appointment.     Future Appointments 9/7/2023 - 3/5/2024        Date Visit Type Length Department Provider     9/14/2023  3:30 PM NEW PLASTIC SURGERY 30 min [facility] Pj Aguirre MD    Location Instructions:     We are located in the [facility] at [address], [address], [place], [place].&nbsp; Our building is located across the street from [facility] and offers free parking in our on-site lot. Please take the stairs or elevator to the second floor of the [facility] and check in at the  located in the Specialty Clinic, [address].              9/20/2023 11:20 AM PRE-OPERATIVE 40 min  FAMILY PRACTICE Linda Soto MD    Location Instructions:     [facility] is located at [address] [place] [address], along [address]. Free parking is available; access the lot by turning north from [address] onto [address], then west onto [address].              11/30/2023  7:45 AM RETURN 15 min [facility] Lia Savage, PAScottC                      Ordering of each unique test  Prescription drug management  25 minutes spent by me on the date of the encounter doing chart review,  history and exam, documentation and further activities per the note       MEDICATIONS:  Continue current medications without change  Regular exercise  See Patient Instructions         Linda Soto MD  [facility] PRIOR YAIR Liriano is a 47 year old, presenting for the following health issues:  Recheck Medication      9/7/2023     5:06 PM   Additional Questions   Roomed by jeanne leon       History of Present Illness       Mental Health Follow-up:  Patient presents to follow-up on Anxiety.    Patient's anxiety since last visit has been:  Worse  The patient is having other symptoms associated with anxiety.  Any significant life events: health concerns  Patient is feeling anxious or having panic attacks.  Patient has no concerns about alcohol or drug use.    Reason for visit:  Med check and to discuss recent cancer dx    She eats 2-3 servings of fruits and vegetables daily.She consumes 0 sweetened beverage(s) daily.She exercises with enough effort to increase her heart rate 20 to 29 minutes per day.  She exercises with enough effort to increase her heart rate 3 or less days per week.   She is taking medications regularly.       New dx of malignant melanoma of left thigh - just got results from Dr. Savage - dermatology.  Met with surgical oncology - Dr. Higginbotham - yesterday . Will need wider excision with sentinal node biopsy  and will prob. Need plastic surgery for possible skin grafting.     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   Will need a preop soon.       Social History     Tobacco Use    Smoking status: Former     Packs/day: 0.10     Years: 5.00     Pack years: 0.50     Types: Cigarettes    Smokeless tobacco: Never   Substance Use Topics    Alcohol use: Yes     Comment: Rarely    Drug use: No     Comment: no herbal meds except for acidophilus          3/9/2023     5:15 PM 8/3/2023    12:45 PM 9/7/2023     4:24 PM   PHQ   PHQ-9 Total Score 6 2  3   Q9: Thoughts of better off dead/self-harm past 2 weeks Not at all Not at all Not at all         3/9/2023     5:16 PM 8/1/2023     3:47 PM 9/7/2023     4:25 PM   LISA-7 SCORE   Total Score 4 (minimal anxiety) 1 (minimal anxiety) 3 (minimal anxiety)   Total Score 4 1 3         9/7/2023     4:24 PM   Last PHQ-9   1.  Little interest or pleasure in doing things 0   2.  Feeling down, depressed, or hopeless 1   3.  Trouble falling or staying asleep, or sleeping too much 1   4.  Feeling tired or having little energy 1   5.  Poor appetite or overeating 0   6.  Feeling bad about yourself 0   7.  Trouble concentrating 0   8.  Moving slowly or restless 0   Q9: Thoughts of better off dead/self-harm past 2 weeks 0   PHQ-9 Total Score 3         9/7/2023     4:25 PM   LISA-7    1. Feeling nervous, anxious, or on edge 1   2. Not being able to stop or control worrying 1   3. Worrying too much about different things 0   4. Trouble relaxing 1   5. Being so restless that it is hard to sit still 0   6. Becoming easily annoyed or irritable 0   7. Feeling afraid, as if something awful might happen 0   LISA-7 Total Score 3   If you checked any problems, how difficult have they made it for you to do your work, take care of things at home, or get along with other people? Not difficult at all     Is on Wellbutrin xl 150mg - taking 2 -150mg tabs daily since 8/17/2023.   Would like to try to go up on the dose to 450mg daily.    Was ravenously hungry on paroxetine. Has had fewer headaches since the change until her melanoma dx late last week.  Has been really weepy lately -- even before the melanoma dx.   Seeing her therapist once a month or so - has appt to see her upcoming - will be going to monthly  ( virtually if she can)  is on cancellation list for  every other week.         Suicide Assessment Five-step Evaluation and Treatment (SAFE-T)    Acute Illness  Acute illness concerns: cough  Onset/Duration: 2 weeks   Symptoms:  Fever:  No  Chills/Sweats: No  Headache (location?): No  Sinus Pressure: No  Conjunctivitis:  No  Ear Pain: no  Rhinorrhea: YES  Congestion: No  Sore Throat: No  Cough: YES-non-productive- dry - like her asthma   Wheeze: YES- and a little tight - has started using her Breo Ellipta 200/25 2 weeks ago.   Decreased Appetite: No  Nausea: No, but a little upset stomach today.   Vomiting: No  Diarrhea: No  Dysuria/Freq.: No  Dysuria or Hematuria: No  Fatigue/Achiness: No  Sick/Strep Exposure: No  Therapies tried and outcome: albuterol MDI.       Patient Active Problem List   Diagnosis    Chronic sinusitis    Bleeding, nose    Sinusitis    Lactose intolerance    Kidney stones    Maxillary bone loss    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 related to previous surgeries - 4 in 3 months for sinus issues/infection    Family history of breast cancer- mother's identical twin, maternal aunt, paternal GM and dad with breast lumps     Hot flashes    Generalized hyperhidrosis- since earliest recollections - soaks clothing multiple times/week - very disruptive to life    Mild anxiety    Foster care child    Anxiety    Multiple fractures of left foot with routine healing    Family history of colon cancer- father dx'd in mid 40's - pt's first colonoscopy in 4/24/2017 - repeat in 2022     Menorrhagia with regular cycle- ? cause of iron deficiency anemia- pt would like to see ob/gyn specialists    Intractable episodic tension-type headache    Strain of neck muscle, initial encounter    Sun-damaged skin    Cough due to bronchospasm- needs refills on meds     Recurrent cough    Attention deficit - symptoms - pt desires testing and possible treatment    Intractable migraine without aura and without status migrainosus    Hypertension goal BP (blood pressure) < 140/90  - new diagnosis    Morbid obesity (H)    Numbness and tingling of hand- fingertips - ? caused by lisinopril vs. other - consider changing to  amlodipine     Malignant melanoma of left lower extremity including hip (H)       Current Outpatient Medications   Medication Sig Dispense Refill    albuterol (VENTOLIN HFA) 108 (90 Base) MCG/ACT inhaler INHALE 2 PUFFS BY MOUTH EVERY 6 HOURS AS NEEDED FOR WHEEZE OR FOR SHORTNESS OF BREATH 36 g 11    buPROPion (WELLBUTRIN XL) 150 MG 24 hr tablet Take 1 tablet (150 mg) by mouth every morning For 2 weeks, then increase to 2 tabs every morning if not at desired effect 180 tablet 1    cetirizine (ZYRTEC) 10 MG tablet Take 1 tablet (10 mg) by mouth every evening 30 tablet 1    cyclobenzaprine (FLEXERIL) 5 MG tablet Take 1-2 tablets (5-10 mg) by mouth 2 times daily as needed for muscle spasms 30 tablet 3    fluticasone-vilanterol (BREO ELLIPTA) 200-25 MCG/ACT inhaler Inhale 1 puff into the lungs daily 60 each 11    ibuprofen (ADVIL/MOTRIN) 200 MG tablet Take 200 mg by mouth as needed.      lisinopril (ZESTRIL) 5 MG tablet TAKE 1 TABLET BY MOUTH EVERY DAY 90 tablet 1    montelukast (SINGULAIR) 10 MG tablet Take 1 tablet (10 mg) by mouth At Bedtime 90 tablet 3    topiramate (TOPAMAX) 25 MG tablet Take 2 TABS by mouth TWICE DAILY 360 tablet 3    ZOLMitriptan (ZOMIG) 5 MG tablet Take 1 tablet (5 mg) by mouth at onset of headache for migraine May repeat in 2 hours. Max 2 tablets/24 hours. 10 tablet 3    benzonatate (TESSALON) 200 MG capsule Take 1 capsule (200 mg) by mouth 3 times daily as needed for cough (Patient not taking: Reported on 8/28/2023) 21 capsule 3    cholecalciferol 50 MCG (2000 UT) CAPS Take 1 capsule by mouth daily (Patient not taking: Reported on 8/28/2023) 90 capsule 4          Allergies   Allergen Reactions    Adhesive Tape Blisters    Melon      Mouth gets itchy     Seasonal Allergies           Review of Systems   Constitutional, HEENT, cardiovascular, pulmonary, GI, , musculoskeletal, neuro, skin, endocrine and psych systems are negative, except as otherwise noted.      Objective           Vitals:  No  vitals were obtained today due to virtual visit.    Physical Exam   GENERAL: Healthy, alert and no distress  EYES: Eyes grossly normal to inspection.  No discharge or erythema, or obvious scleral/conjunctival abnormalities.  RESP: No audible wheeze, cough, or visible cyanosis.  No visible retractions or increased work of breathing.    SKIN: Visible skin clear. No significant rash, abnormal pigmentation or lesions.  NEURO: Cranial nerves grossly intact.  Mentation and speech appropriate for age.  PSYCH: Mentation appears normal, affect normal/bright, judgement and insight intact, normal speech and appearance well-groomed.            Video-Visit Details    Type of service:  Video Visit   Start time: 5:42pm  Stop time: 6:05pm  Total time on video : 23 minutes.   Originating Location (pt. Location): Home    Distant Location (provider location):  On-site  Platform used for Video Visit: Oanh

## 2023-09-13 NOTE — NURSING NOTE
RN notified via staff message from 's team that pt has been scheduled to with  on 9/14/23.Annamarie Kerr RN

## 2023-09-14 ENCOUNTER — OFFICE VISIT (OUTPATIENT)
Dept: PLASTIC SURGERY | Facility: AMBULATORY SURGERY CENTER | Age: 48
End: 2023-09-14
Payer: COMMERCIAL

## 2023-09-14 VITALS
HEIGHT: 68 IN | HEART RATE: 70 BPM | SYSTOLIC BLOOD PRESSURE: 120 MMHG | BODY MASS INDEX: 34.86 KG/M2 | DIASTOLIC BLOOD PRESSURE: 80 MMHG | WEIGHT: 230 LBS

## 2023-09-14 DIAGNOSIS — C43.9 MELANOMA OF SKIN (H): Primary | ICD-10-CM

## 2023-09-14 PROCEDURE — 99203 OFFICE O/P NEW LOW 30 MIN: CPT | Performed by: PLASTIC SURGERY

## 2023-09-14 NOTE — LETTER
9/14/2023         RE: Deandra Lewis  [address] [place] [address]        Dear Colleague,    Thank you for referring your patient, Deandra Lewis, to the [facility] [place]. Please see a copy of my visit note below.    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    This lesion has been present for at least 3 years and has slowly grown in size.  Patient had a shave biopsy that confirmed diagnosis of melanoma.    This patient is scheduled to have a wide local excision of this lesion with lymphatic mapping and sentinel lymph node biopsy.  Dr. Higginbotham, who is her surgical oncologist, has referred the patient to me to discuss reconstructive options.    Past medical history:  Past Medical History:   Diagnosis Date     Anemia      Anxiety     paxil 10mg= mild bladder retention, at 30mg = ravenously hungry/weight gain increased headaches; lexapro = bad sweating     Contraception     ocp's some = mood swings ; seasonale: frequent vaginal bleeding and weight gain     Hot flashes 11/16/2016     Hypertension goal BP (blood pressure) < 140/90  - new diagnosis 02/02/2023     Kidney stones     Dr. 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    left - secondary to severe oral/sinus infection and bleeding  [place] [facility] [place]      Multiple fractures of left foot with routine healing 02/25/2021     Night sweats      Sleep problems      Uncomplicated asthma 20s     High blood pressure, asthma    Past surgical history:  Laparoscopic appendectomy, orthognathic surgery, sinus surgery    Allergies:  Adhesive tape    Medications:    Current  Outpatient Medications:      albuterol (VENTOLIN HFA) 108 (90 Base) MCG/ACT inhaler, INHALE 2 PUFFS BY MOUTH EVERY 6 HOURS AS NEEDED FOR WHEEZE OR FOR SHORTNESS OF BREATH, Disp: 36 g, Rfl: 11     buPROPion (WELLBUTRIN XL) 150 MG 24 hr tablet, Take 3 tablets (450 mg) by mouth every morning, Disp: 270 tablet, Rfl: 1     cetirizine (ZYRTEC) 10 MG tablet, Take 1 tablet (10 mg) by mouth every evening, Disp: 30 tablet, Rfl: 1     cholecalciferol 50 MCG (2000 UT) CAPS, Take 1 capsule by mouth daily (Patient not taking: Reported on 8/28/2023), Disp: 90 capsule, Rfl: 4     cyclobenzaprine (FLEXERIL) 5 MG tablet, Take 1-2 tablets (5-10 mg) by mouth 2 times daily as needed for muscle spasms, Disp: 30 tablet, Rfl: 3     fluticasone-vilanterol (BREO ELLIPTA) 200-25 MCG/ACT inhaler, Inhale 1 puff into the lungs daily, Disp: 60 each, Rfl: 11     ibuprofen (ADVIL/MOTRIN) 200 MG tablet, Take 200 mg by mouth as needed., Disp: , Rfl:      lisinopril (ZESTRIL) 5 MG tablet, TAKE 1 TABLET BY MOUTH EVERY DAY, Disp: 90 tablet, Rfl: 1     montelukast (SINGULAIR) 10 MG tablet, Take 1 tablet (10 mg) by mouth At Bedtime, Disp: 90 tablet, Rfl: 3     topiramate (TOPAMAX) 25 MG tablet, Take 2 TABS by mouth TWICE DAILY, Disp: 360 tablet, Rfl: 3     ZOLMitriptan (ZOMIG) 5 MG tablet, Take 1 tablet (5 mg) by mouth at onset of headache for migraine May repeat in 2 hours. Max 2 tablets/24 hours., Disp: 10 tablet, Rfl: 3    Family history:  Father with history of basal carcinoma    Social History:  Denies tobacco, denies alcohol    Review of systems:  General ROS: No complaints or constitutional symptoms  Skin: No complaints or symptoms   Hematologic/Lymphatic: No symptoms or complaints  Psychiatric: No symptoms or complaints  Endocrine: No excessive fatigue, no hypermetabolic symptoms reported  Respiratory ROS: No cough, shortness of breath, or wheezing  Cardiovascular ROS: No chest pain or dyspnea on exertion  Breast ROS: Denies palpable breast  "masses, denies nipple discharge, denies peau d'orange  Gastrointestinal ROS: No abdominal pain, nausea, diarrhea, or constipation  Musculoskeletal ROS: No recent injuries reported  Neurological ROS: No focal neurologic defects reported.      Physical exam:  /80 (BP Location: Right arm, Patient Position: Sitting)   Pulse 70   Ht 1.727 m (5' 8\")   Wt 104.3 kg (230 lb)   LMP 09/02/2023   BMI 34.97 kg/m    General: Alert, cooperative, appears stated age   Skin: There is 2 cm x 1.2 cm superficial open wound on the left thigh, distal third, anteromedial aspect.  This is compatible with the biopsy site of the melanoma.  There are no suspicious lesions in the periphery that suggest in-transit mets.  There were no palpable left inguinal lymph nodes.  Lymphatic: No obvious adenopathy, no swelling   Eyes: No scleral icterus, pupils equal  HENT: No traumatic injury to the head or face, no gross abnormalities  Lungs: Normal respiratory effort, breath sounds equal bilaterally  Heart: Regular rate and rhythm  Breasts: Not examined  Abdomen: Soft, non-distended and non-tender to palpation  Neurologic: Grossly intact            ASSESSMENT:    This is a 47 year old lady with diagnosis of left thigh superficial spreading melanoma.    Patient is scheduled by Dr. Higginbotham to have a wide local excision of this lesion with lymphatic mapping and sentinel lymph node biopsy.       PLAN:      After wide local excision, I will proceed to perform complex closure with undermining in the periphery of the defect.    Risks were explained to the patient and they include but are not limited to scarring, infection, dehiscence, bleeding, hematoma, seroma, temporary versus permanent numbness in the area, need for further surgeries.    Patient has agreed to proceed.    We will coordinate with Dr. Higginbotham timing and location for the surgery    Time spent with the patient 30 minutes.      jP Aguirre MD, FACS   Diplomate American Board of Plastic " Surgery  Diplomate American Board of Surgery  [facility]  Division of Plastic & Reconstructive Surgery  Office: [phone]   9/14/2023 at 4:22 PM        Again, thank you for allowing me to participate in the care of your patient.        Sincerely,        Pj Aguirre MD

## 2023-09-14 NOTE — PROGRESS NOTES
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    This lesion has been present for at least 3 years and has slowly grown in size.  Patient had a shave biopsy that confirmed diagnosis of melanoma.    This patient is scheduled to have a wide local excision of this lesion with lymphatic mapping and sentinel lymph node biopsy.  Dr. Higginbotham, who is her surgical oncologist, has referred the patient to me to discuss reconstructive options.    Past medical history:  Past Medical History:   Diagnosis Date    Anemia     Anxiety     paxil 10mg= mild bladder retention, at 30mg = ravenously hungry/weight gain increased headaches; lexapro = bad sweating    Contraception     ocp's some = mood swings ; seasonale: frequent vaginal bleeding and weight gain    Hot flashes 11/16/2016    Hypertension goal BP (blood pressure) < 140/90  - new diagnosis 02/02/2023    Kidney stones     Dr. 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    left - secondary to severe oral/sinus infection and bleeding  [place] [facility] [place]     Multiple fractures of left foot with routine healing 02/25/2021    Night sweats     Sleep problems     Uncomplicated asthma 20s     High blood pressure, asthma    Past surgical history:  Laparoscopic appendectomy, orthognathic surgery, sinus surgery    Allergies:  Adhesive tape    Medications:    Current Outpatient Medications:     albuterol (VENTOLIN HFA) 108 (90 Base) MCG/ACT inhaler, INHALE 2 PUFFS BY MOUTH EVERY 6 HOURS AS NEEDED FOR WHEEZE OR FOR SHORTNESS OF BREATH, Disp: 36 g, Rfl: 11    buPROPion (WELLBUTRIN XL) 150 MG 24 hr tablet, Take 3 tablets (450 mg) by mouth every  morning, Disp: 270 tablet, Rfl: 1    cetirizine (ZYRTEC) 10 MG tablet, Take 1 tablet (10 mg) by mouth every evening, Disp: 30 tablet, Rfl: 1    cholecalciferol 50 MCG (2000 UT) CAPS, Take 1 capsule by mouth daily (Patient not taking: Reported on 8/28/2023), Disp: 90 capsule, Rfl: 4    cyclobenzaprine (FLEXERIL) 5 MG tablet, Take 1-2 tablets (5-10 mg) by mouth 2 times daily as needed for muscle spasms, Disp: 30 tablet, Rfl: 3    fluticasone-vilanterol (BREO ELLIPTA) 200-25 MCG/ACT inhaler, Inhale 1 puff into the lungs daily, Disp: 60 each, Rfl: 11    ibuprofen (ADVIL/MOTRIN) 200 MG tablet, Take 200 mg by mouth as needed., Disp: , Rfl:     lisinopril (ZESTRIL) 5 MG tablet, TAKE 1 TABLET BY MOUTH EVERY DAY, Disp: 90 tablet, Rfl: 1    montelukast (SINGULAIR) 10 MG tablet, Take 1 tablet (10 mg) by mouth At Bedtime, Disp: 90 tablet, Rfl: 3    topiramate (TOPAMAX) 25 MG tablet, Take 2 TABS by mouth TWICE DAILY, Disp: 360 tablet, Rfl: 3    ZOLMitriptan (ZOMIG) 5 MG tablet, Take 1 tablet (5 mg) by mouth at onset of headache for migraine May repeat in 2 hours. Max 2 tablets/24 hours., Disp: 10 tablet, Rfl: 3    Family history:  Father with history of basal carcinoma    Social History:  Denies tobacco, denies alcohol    Review of systems:  General ROS: No complaints or constitutional symptoms  Skin: No complaints or symptoms   Hematologic/Lymphatic: No symptoms or complaints  Psychiatric: No symptoms or complaints  Endocrine: No excessive fatigue, no hypermetabolic symptoms reported  Respiratory ROS: No cough, shortness of breath, or wheezing  Cardiovascular ROS: No chest pain or dyspnea on exertion  Breast ROS: Denies palpable breast masses, denies nipple discharge, denies peau d'orange  Gastrointestinal ROS: No abdominal pain, nausea, diarrhea, or constipation  Musculoskeletal ROS: No recent injuries reported  Neurological ROS: No focal neurologic defects reported.      Physical exam:  /80 (BP Location: Right arm,  "Patient Position: Sitting)   Pulse 70   Ht 1.727 m (5' 8\")   Wt 104.3 kg (230 lb)   LMP 09/02/2023   BMI 34.97 kg/m    General: Alert, cooperative, appears stated age   Skin: There is 2 cm x 1.2 cm superficial open wound on the left thigh, distal third, anteromedial aspect.  This is compatible with the biopsy site of the melanoma.  There are no suspicious lesions in the periphery that suggest in-transit mets.  There were no palpable left inguinal lymph nodes.  Lymphatic: No obvious adenopathy, no swelling   Eyes: No scleral icterus, pupils equal  HENT: No traumatic injury to the head or face, no gross abnormalities  Lungs: Normal respiratory effort, breath sounds equal bilaterally  Heart: Regular rate and rhythm  Breasts: Not examined  Abdomen: Soft, non-distended and non-tender to palpation  Neurologic: Grossly intact            ASSESSMENT:    This is a 47 year old lady with diagnosis of left thigh superficial spreading melanoma.    Patient is scheduled by Dr. Higginbotham to have a wide local excision of this lesion with lymphatic mapping and sentinel lymph node biopsy.       PLAN:      After wide local excision, I will proceed to perform complex closure with undermining in the periphery of the defect.    Risks were explained to the patient and they include but are not limited to scarring, infection, dehiscence, bleeding, hematoma, seroma, temporary versus permanent numbness in the area, need for further surgeries.    Patient has agreed to proceed.    We will coordinate with Dr. Higginbotham timing and location for the surgery    Time spent with the patient 30 minutes.      Pj Aguirre MD, FACS   Diplomate American Board of Plastic Surgery  Diplomate American Board of Surgery  [facility]  Division of Plastic & Reconstructive Surgery  Office: [phone]   9/14/2023 at 4:22 PM    "

## 2023-09-14 NOTE — NURSING NOTE
Patient here today for consult after being referred to us by Dr. Higginbotham. Patient has skin cancer above left knee and will likely need skin graft.    Ginny Lennon RN on 9/14/2023 at 4:04 PM

## 2023-09-15 ENCOUNTER — TELEPHONE (OUTPATIENT)
Dept: ONCOLOGY | Facility: CLINIC | Age: 48
End: 2023-09-15

## 2023-09-15 ENCOUNTER — PREP FOR PROCEDURE (OUTPATIENT)
Dept: PLASTIC SURGERY | Facility: CLINIC | Age: 48
End: 2023-09-15

## 2023-09-15 ENCOUNTER — HOSPITAL ENCOUNTER (OUTPATIENT)
Facility: AMBULATORY SURGERY CENTER | Age: 48
End: 2023-09-15
Attending: PLASTIC SURGERY | Admitting: PLASTIC SURGERY
Payer: COMMERCIAL

## 2023-09-15 PROBLEM — C43.9 MELANOMA OF SKIN (H): Status: ACTIVE | Noted: 2023-09-14

## 2023-09-15 NOTE — TELEPHONE ENCOUNTER
Scheduled surgery for 10/9 in [place] with Dr. Higginbotham and Dr. Aguirre.    H&P with PCP Dr. Soto on 9/20.     Post-op scheduled for 10/13 with Dr. Aguirre and 10/25 with Dr. Higginbotham    Patient received surgery packet.    Patient noted she has a new spot that popped up on her left calf (the same leg her current skin cancer is on) in the last month. She is wondering if her PCP should look at it when she has her H&P on 9/20 and if it should be removed and sent to path, or if she should see dermatologist Dr. Savage to remove it. Writer noted she would send a message to Dr. Higginbotham to get input. Patient verbalized agreement with plan.    Arleen Art on 09/15/23 at 1:08 AM  Senior Perioperative Coordinator   Ph: [phone]

## 2023-09-15 NOTE — NURSING NOTE
Pt scheduled for Melanoma surgery on 10/9/23 with Dr. Higginbotham and . While speaking with surgery scheduler pt states she identified another spot and Dr. Higginbotham was made known of this.  asked RN to have pt see Dermatology asap to have this new spot checked.  Spoke with dermatology team and appt offered with  on Monday at 12:30pm in  to check new spot pt identified. Pt called and notified of appointment and pt expressed gratitude for this appointment but asked due to distance if there is anyway she could be seen in [place] next week as she comes from [place]. RN noted in chart pt has seen Lia Savage in [place]. Routing this message to [facility] and Lia Savage to advise. Thank you..Annamarie Kerr RN     Tension Headache: Care Instructions  Your Care Instructions  Most headaches are tension headaches. These headaches tend to happen again, especially if you are under stress. A tension headache may cause pain or a feeling of pressure all over your head. You probably can't pinpoint the center of the pain. If you keep getting tension headaches, the best thing you can do to limit them is to find out what is causing them and then make changes in those areas. Follow-up care is a key part of your treatment and safety. Be sure to make and go to all appointments, and call your doctor if you are having problems. It's also a good idea to know your test results and keep a list of the medicines you take. How can you care for yourself at home? · Rest in a quiet, dark room with a cool cloth on your forehead until your headache is gone. Close your eyes, and try to relax or go to sleep. Don't watch TV or read. Avoid using the computer. · Use a warm, moist towel or a heating pad set on low to relax tight shoulder and neck muscles. · Have someone gently massage your neck and shoulders. · Take pain medicines exactly as directed. ? If the doctor gave you a prescription medicine for pain, take it as prescribed. ? If you are not taking a prescription pain medicine, ask your doctor if you can take an over-the-counter medicine. · Be careful not to take pain medicine more often than the instructions allow, because you may get worse or more frequent headaches when the medicine wears off. · If you get another tension headache, stop what you are doing and sit quietly for a moment. Close your eyes and breathe slowly. Try to relax your head and neck muscles. · Do not ignore new symptoms that occur with a headache, such as fever, weakness or numbness, vision changes, or confusion. These may be signs of a more serious problem. To help prevent headaches  · Keep a headache diary so you can figure out what triggers your headaches. Avoiding triggers may help you prevent headaches. Record when each headache began, how long it lasted, and what the pain was like (throbbing, aching, stabbing, or dull). List anything that may have triggered the headache, such as being physically or emotionally stressed or being anxious or depressed. Other possible triggers are hunger, anger, fatigue, poor posture, and muscle strain. · Find healthy ways to deal with stress. Headaches are most common during or right after stressful times. Take time to relax before and after you do something that has caused a headache in the past.  · Exercise daily to relieve stress. Relaxation exercises may help reduce tension. · Get plenty of sleep. · Eat regularly and well. Long periods without food can trigger a headache. · Treat yourself to a massage. Some people find that massages are very helpful in relieving tension. · Try to keep your muscles relaxed by keeping good posture. Check your jaw, face, neck, and shoulder muscles for tension, and try to relax them. When sitting at a desk, change positions often, and stretch for 30 seconds each hour. · Reduce eyestrain from computers by blinking frequently and looking away from the computer screen every so often. Make sure you have proper eyewear and that your monitor is set up properly, about an arm's length away. When should you call for help? Call 911 anytime you think you may need emergency care. For example, call if:    · You have signs of a stroke. These may include:  ? Sudden numbness, paralysis, or weakness in your face, arm, or leg, especially on only one side of your body. ? Sudden vision changes. ? Sudden trouble speaking. ? Sudden confusion or trouble understanding simple statements. ? Sudden problems with walking or balance.   ? A sudden, severe headache that is different from past headaches.    Call your doctor now or seek immediate medical care if:    · You have new or worse nausea and vomiting.     · You have a new or higher fever.     · Your headache gets much worse.    Watch closely for changes in your health, and be sure to contact your doctor if:    · You are not getting better after 2 days (48 hours). Where can you learn more? Go to http://guido-michael.info/. Enter 84 17 07 in the search box to learn more about \"Tension Headache: Care Instructions. \"  Current as of: March 28, 2019  Content Version: 12.1  © 2955-3082 [employer]. Care instructions adapted under license by [employer] (which disclaims liability or warranty for this information). If you have questions about a medical condition or this instruction, always ask your healthcare professional. [address] any warranty or liability for your use of this information.

## 2023-09-15 NOTE — TELEPHONE ENCOUNTER
Called and LVM for patient to schedule surgery with Dr. Higginbotham and Dr. Aguirre. Provided direct call back number [phone].      Arleen Art on 09/15/23 at 12:13 PM  Senior Perioperative Coordinator   Ph: [phone]

## 2023-09-18 ENCOUNTER — OFFICE VISIT (OUTPATIENT)
Dept: FAMILY MEDICINE | Facility: CLINIC | Age: 48
End: 2023-09-18
Payer: COMMERCIAL

## 2023-09-18 DIAGNOSIS — D23.9 DERMATOFIBROMA: Primary | ICD-10-CM

## 2023-09-18 PROCEDURE — 99213 OFFICE O/P EST LOW 20 MIN: CPT | Performed by: PHYSICIAN ASSISTANT

## 2023-09-18 ASSESSMENT — PAIN SCALES - GENERAL: PAINLEVEL: NO PAIN (0)

## 2023-09-18 NOTE — LETTER
9/18/2023         RE: Deandra Lewis  [address] [place] [address]        Dear Colleague,    Thank you for referring your patient, Deandra Lewis, to the [facility]. Please see a copy of my visit note below.    [facility] Dermatology Note  Encounter Date: Sep 18, 2023  Office Visit      Dermatology Problem List:  1. Malignant melanoma, BD 1.2mm, pT2a - L thigh - bx 8/28/23 - pending WLE and SLNB with Dr. Higginbotham/Carla 10/5/23  2. Benign biopsies:  - L anterior shoulder - bx 8/28/23 - DF  3. 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  ________________________________    Assessment & Plan:  # Dermatofibroma. L calf - patient's spot of concern  - edu on etiology  - discussed benign nature  - offered removal, patient declined    Procedures Performed:   none    Follow-up: 3 month(s) in-person, or earlier for new or changing lesions    Staff:     All risks, benefits and alternatives were discussed with patient.  Patient is in agreement and understands the assessment and plan.  All questions were answered.    Lia Savage PA-C, MPAS  [facility]: Phone: [phone], Fax: [phone]  [facility]: Phone: [phone],  Fax: [phone]  [facility]: Phone: [phone], Fax: [phone]  ____________________________________________    CC: Derm Problem (New spot on back of L calf)    HPI:  Ms. Deandra Lewis is a 47 year old female who presents today as a return patient for spot check. She recently saw me for a bx of a lesion on the thigh which turned out to be melanoma. She is awaiting surgical follow up and oncology which she has scheduled. She has a new spot on her calf which appeared since she was here last that she would like evaluated, potentially biopsied.     Patient is otherwise feeling well,  without additional concerns.    Labs:  none    Physical Exam:  Vitals: LMP 09/02/2023   SKIN: Focused examination of face and L lower leg was performed.   - There is a firm tan/flesh colored papule that dimples with lateral pressure on the L calf.  - No other lesions of concern on areas examined.     Medications:  Current Outpatient Medications   Medication     albuterol (VENTOLIN HFA) 108 (90 Base) MCG/ACT inhaler     buPROPion (WELLBUTRIN XL) 150 MG 24 hr tablet     cetirizine (ZYRTEC) 10 MG tablet     cholecalciferol 50 MCG (2000 UT) CAPS     cyclobenzaprine (FLEXERIL) 5 MG tablet     fluticasone-vilanterol (BREO ELLIPTA) 200-25 MCG/ACT inhaler     ibuprofen (ADVIL/MOTRIN) 200 MG tablet     lisinopril (ZESTRIL) 5 MG tablet     montelukast (SINGULAIR) 10 MG tablet     topiramate (TOPAMAX) 25 MG tablet     ZOLMitriptan (ZOMIG) 5 MG tablet     No current facility-administered medications for this visit.      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 related to previous surgeries - 4 in 3 months for sinus issues/infection     Family history of breast cancer- mother's identical twin, maternal aunt, paternal GM and dad with breast lumps      Hot flashes     Generalized hyperhidrosis- since earliest recollections - soaks clothing multiple times/week - very disruptive to life     Mild anxiety     Foster care child     Anxiety     Multiple fractures of left foot with routine healing     Family history of colon cancer- father dx'd in mid 40's - pt's first colonoscopy in 4/24/2017 - repeat in 2022      Menorrhagia with regular cycle- ? cause of iron deficiency anemia- pt would like to see ob/gyn specialists     Intractable episodic tension-type headache     Strain of neck muscle, initial  encounter     Sun-damaged skin     Cough due to bronchospasm- needs refills on meds      Recurrent cough     Attention deficit - symptoms - pt desires testing and possible treatment     Intractable migraine without aura and without status migrainosus     Hypertension goal BP (blood pressure) < 140/90  - new diagnosis     Morbid obesity (H)     Numbness and tingling of hand- fingertips - ? caused by lisinopril vs. other - consider changing to amlodipine      Malignant melanoma of left lower extremity including hip (H)     Melanoma of skin (H)     Past Medical History:   Diagnosis Date     Anemia      Anxiety     paxil 10mg= mild bladder retention, at 30mg = ravenously hungry/weight gain increased headaches; lexapro = bad sweating     Contraception     ocp's some = mood swings ; seasonale: frequent vaginal bleeding and weight gain     Hot flashes 11/16/2016     Hypertension goal BP (blood pressure) < 140/90  - new diagnosis 02/02/2023     Kidney stones     Dr. 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        Sincerely,        Lia Savage PA-C

## 2023-09-18 NOTE — TELEPHONE ENCOUNTER
Patient Contact    Attempt # 1    Was call answered?  No.  Left message on voicemail with information to call nurse back at [phone].   Advised can see today at 2:30 pm for spot check see message below.    Beatriz OLMOS RN  [facility] [place] [place]  [phone]

## 2023-09-18 NOTE — PROGRESS NOTES
[facility] Dermatology Note  Encounter Date: Sep 18, 2023  Office Visit      Dermatology Problem List:  1. Malignant melanoma, BD 1.2mm, pT2a - L thigh - bx 8/28/23 - pending WLE and SLNB with Dr. Higginbotham/Carla 10/9/23  2. Benign biopsies:  - L anterior shoulder - bx 8/28/23 - DF  3. 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  ________________________________    Assessment & Plan:  # Dermatofibroma. L calf - patient's spot of concern  - edu on etiology  - discussed benign nature  - offered removal, patient declined    Procedures Performed:   none    Follow-up: 3 month(s) in-person, or earlier for new or changing lesions    Staff:     All risks, benefits and alternatives were discussed with patient.  Patient is in agreement and understands the assessment and plan.  All questions were answered.    Lia Savage PA-C, MPAS  [facility]: Phone: [phone], Fax: [phone]  [facility]: Phone: [phone],  Fax: [phone]  [facility]: Phone: [phone], Fax: [phone]  ____________________________________________    CC: Derm Problem (New spot on back of L calf)    HPI:  Ms. Deandra Lewis is a 47 year old female who presents today as a return patient for spot check. She recently saw me for a bx of a lesion on the thigh which turned out to be melanoma. She is awaiting surgical follow up and oncology which she has scheduled. She has a new spot on her calf which appeared since she was here last that she would like evaluated, potentially biopsied.     Patient is otherwise feeling well, without additional concerns.    Labs:  none    Physical Exam:  Vitals: LMP 09/02/2023   SKIN: Focused examination of face and L lower leg was performed.   - There is a firm tan/flesh colored papule that dimples with lateral pressure on the L calf.  - No other  lesions of concern on areas examined.     Medications:  Current Outpatient Medications   Medication    albuterol (VENTOLIN HFA) 108 (90 Base) MCG/ACT inhaler    buPROPion (WELLBUTRIN XL) 150 MG 24 hr tablet    cetirizine (ZYRTEC) 10 MG tablet    cholecalciferol 50 MCG (2000 UT) CAPS    cyclobenzaprine (FLEXERIL) 5 MG tablet    fluticasone-vilanterol (BREO ELLIPTA) 200-25 MCG/ACT inhaler    ibuprofen (ADVIL/MOTRIN) 200 MG tablet    lisinopril (ZESTRIL) 5 MG tablet    montelukast (SINGULAIR) 10 MG tablet    topiramate (TOPAMAX) 25 MG tablet    ZOLMitriptan (ZOMIG) 5 MG tablet     No current facility-administered medications for this visit.      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 related to previous surgeries - 4 in 3 months for sinus issues/infection    Family history of breast cancer- mother's identical twin, maternal aunt, paternal GM and dad with breast lumps     Hot flashes    Generalized hyperhidrosis- since earliest recollections - soaks clothing multiple times/week - very disruptive to life    Mild anxiety    Foster care child    Anxiety    Multiple fractures of left foot with routine healing    Family history of colon cancer- father dx'd in mid 40's - pt's first colonoscopy in 4/24/2017 - repeat in 2022     Menorrhagia with regular cycle- ? cause of iron deficiency anemia- pt would like to see ob/gyn specialists    Intractable episodic tension-type headache    Strain of neck muscle, initial encounter    Sun-damaged skin    Cough due to bronchospasm- needs refills on meds     Recurrent cough    Attention deficit - symptoms - pt desires testing and possible treatment    Intractable migraine without aura and without status migrainosus    Hypertension goal BP (blood pressure) < 140/90  -  new diagnosis    Morbid obesity (H)    Numbness and tingling of hand- fingertips - ? caused by lisinopril vs. other - consider changing to amlodipine     Malignant melanoma of left lower extremity including hip (H)    Melanoma of skin (H)     Past Medical History:   Diagnosis Date    Anemia     Anxiety     paxil 10mg= mild bladder retention, at 30mg = ravenously hungry/weight gain increased headaches; lexapro = bad sweating    Contraception     ocp's some = mood swings ; seasonale: frequent vaginal bleeding and weight gain    Hot flashes 11/16/2016    Hypertension goal BP (blood pressure) < 140/90  - new diagnosis 02/02/2023    Kidney stones     Dr. Blank Steele - [facility] - Urology     Lactose intolerance     Malignant melanoma of left lower extremity including hip (H) 09/06/2023    Maxillary bone loss 01/2014    left - secondary to severe oral/sinus infection and bleeding  - [facility] [place] [facility] [place]     Multiple fractures of left foot with routine healing 02/25/2021    Night sweats     Sleep problems     Uncomplicated asthma 20s

## 2023-09-18 NOTE — TELEPHONE ENCOUNTER
Pt called back and scheduled with Lia Savage today at 2:30 pm for a spot check on left calf.    Beatriz OLMOS RN  [facility] [place] [place]  [phone]

## 2023-09-18 NOTE — PATIENT INSTRUCTIONS
Patient Education       Proper skin care from [facility]:    -Eliminate harsh soaps as they strip the natural oils from the skin, often resulting in dry itchy skin ( i.e. Dial, Zest, [place])  -Use mild soaps such as Cetaphil or Dove Sensitive Skin in the shower. You do not need to use soap on arms, legs, and trunk every time you shower unless visibly soiled.   -Avoid hot or cold showers.  -After showering, lightly dry off and apply moisturizing within 2-3 minutes. This will help trap moisture in the skin.   -Aggressive use of a moisturizer at least 1-2 times a day to the entire body (including -Vanicream, Cetaphil, Aquaphor or Cerave) and moisturize hands after every washing.  -We recommend using moisturizers that come in a tub that needs to be scooped out, not a pump. This has more of an oil base. It will hold moisture in your skin much better than a water base moisturizer. The above recommended are non-pore clogging.      Wear a sunscreen with at least SPF 30 on your face, ears, neck and V of the chest daily. Wear sunscreen on other areas of the body if those areas are exposed to the sun throughout the day. Sunscreens can contain physical and/or chemical blockers. Physical blockers are less likely to clog pores, these include zinc oxide and titanium dioxide. Reapply every two hour and after swimming.     Sunscreen examples: https://www.ewg.org/sunscreen/    UV radiation  UVA radiation remains constant throughout the day and throughout the year. It is a longer wavelength than UVB and therefore penetrates deeper into the skin leading to immediate and delayed tanning, photoaging, and skin cancer. 70-80% of UVA and UVB radiation occurs between the hours of 10am-2pm.  UVB radiation  UVB radiation causes the most harmful effects and is more significant during the summer months. However, snow and ice can reflect UVB radiation leading to skin damage during the winter months as well. UVB radiation is  responsible for tanning, burning, inflammation, delayed erythema (pinkness), pigmentation (brown spots), and skin cancer.     I recommend self monthly full body exams and yearly full body exams with a dermatology provider. If you develop a new or changing lesion please follow up for examination. Most skin cancers are pink and scaly or pink and pearly. However, we do see blue/brown/black skin cancers.  Consider the ABCDEs of melanoma when giving yourself your monthly full body exam ( don't forget the groin, buttocks, feet, toes, etc). A-asymmetry, B-borders, C-color, D-diameter, E-elevation or evolving. If you see any of these changes please follow up in clinic. If you cannot see your back I recommend purchasing a hand held mirror to use with a larger wall mirror.       Checking for Skin Cancer  You can find cancer early by checking your skin each month. There are 3 kinds of skin cancer. They are melanoma, basal cell carcinoma, and squamous cell carcinoma. Doing monthly skin checks is the best way to find new marks or skin changes. Follow the instructions below for checking your skin.   The ABCDEs of checking moles for melanoma   Check your moles or growths for signs of melanoma using ABCDE:   Asymmetry: the sides of the mole or growth don t match  Border: the edges are ragged, notched, or blurred  Color: the color within the mole or growth varies  Diameter: the mole or growth is larger than 6 mm (size of a pencil eraser)  Evolving: the size, shape, or color of the mole or growth is changing (evolving is not shown in the images below)    Checking for other types of skin cancer  Basal cell carcinoma or squamous cell carcinoma have symptoms such as:     A spot or mole that looks different from all other marks on your skin  Changes in how an area feels, such as itching, tenderness, or pain  Changes in the skin's surface, such as oozing, bleeding, or scaliness  A sore that does not heal  New swelling or redness beyond  the border of a mole    Who s at risk?  Anyone can get skin cancer. But you are at greater risk if you have:   Fair skin, light-colored hair, or light-colored eyes  Many moles or abnormal moles on your skin  A history of sunburns from sunlight or tanning beds  A family history of skin cancer  A history of exposure to radiation or chemicals  A weakened immune system  If you have had skin cancer in the past, you are at risk for recurring skin cancer.   How to check your skin  Do your monthly skin checkups in front of a full-length mirror. Check all parts of your body, including your:   Head (ears, face, neck, and scalp)  Torso (front, back, and sides)  Arms (tops, undersides, upper, and lower armpits)  Hands (palms, backs, and fingers, including under the nails)  Buttocks and genitals  Legs (front, back, and sides)  Feet (tops, soles, toes, including under the nails, and between toes)  If you have a lot of moles, take digital photos of them each month. Make sure to take photos both up close and from a distance. These can help you see if any moles change over time.   Most skin changes are not cancer. But if you see any changes in your skin, call your doctor right away. Only he or she can diagnose a problem. If you have skin cancer, seeing your doctor can be the first step toward getting the treatment that could save your life.   [employer] last reviewed this educational content on 4/1/2019 2000-2020 [employer]. [address] [place] [address], [place] [address]. All rights reserved. This information is not intended as a substitute for professional medical care. Always follow your healthcare professional's instructions.       When should I call my doctor?  If you are worsening or not improving, please, contact us or seek urgent care as noted below.     Who should I call with questions (adults)?  [facility] (adult and pediatric): [phone]  [facility] (adult): [phone]  [facility] ([place], [place], [place] and [place]) [phone]  For urgent needs outside of business hours call the [facility] at [phone] and ask for the dermatology resident on call to be paged  If this is a medical emergency and you are unable to reach an ER, Call 911      If you need a prescription refill, please contact your pharmacy. Refills are approved or denied by our Physicians during normal business hours, Monday through Fridays  Per office policy, refills will not be granted if you have not been seen within the past year (or sooner depending on your child's condition)

## 2023-09-18 NOTE — CONFIDENTIAL NOTE
[facility] Dermatology Note  Encounter Date: Sep 18, 2023  Office Visit      Dermatology Problem List:  1. ***    Social: ***  ____________________________________________    Assessment & Plan:  # {Diagnosesderm:990618}.   {TreatmentPlans:974061}   - ***     # {Diagnosesderm:287114}.   {TreatmentPlans:426585}   - ***     # {Diagnosesderm:101088}.   {TreatmentPlans:612513}   - ***     # {Diagnosesderm:683402}.   {TreatmentPlans:919533}   - ***     Procedures Performed:   {kkprocedurenotes:231484}   {kkprocedurenotes:887732}   {kkprocedurenotes:030718}   {kkprocedurenotes:696365}     Follow-up: {kkfollowup:497749}    Staff:     All risks, benefits and alternatives were discussed with patient.  Patient is in agreement and understands the assessment and plan.  All questions were answered.    Lia Savage PA-C, MPAS  [facility] [place]: Phone: [phone], Fax: [phone]  [facility]: Phone: [phone],  Fax: [phone]  [facility]: Phone: [phone], Fax: [phone]  ____________________________________________    CC: No chief complaint on file.      HPI:  Ms. Deandra Lewis is a 47 year old female who presents today {kknew/return:316630} for ***    Patient is otherwise feeling well, without additional concerns.    Labs:  ***    Physical Exam:  Vitals: LMP 09/02/2023   SKIN: {kkSkinExam:243439}   - ***   - {Skin Exam Derm:289879}.   - {Skin Exam Derm:339850}.   - {Skin Exam Derm:798296}.   - No other lesions of concern on areas examined.     Medications:  Current Outpatient Medications   Medication    albuterol (VENTOLIN HFA) 108 (90 Base) MCG/ACT inhaler    buPROPion (WELLBUTRIN XL) 150 MG 24 hr tablet    cetirizine (ZYRTEC) 10 MG tablet    cholecalciferol 50 MCG (2000 UT) CAPS    cyclobenzaprine (FLEXERIL) 5 MG tablet    fluticasone-vilanterol (BREO ELLIPTA) 200-25 MCG/ACT inhaler     ibuprofen (ADVIL/MOTRIN) 200 MG tablet    lisinopril (ZESTRIL) 5 MG tablet    montelukast (SINGULAIR) 10 MG tablet    topiramate (TOPAMAX) 25 MG tablet    ZOLMitriptan (ZOMIG) 5 MG tablet     No current facility-administered medications for this visit.      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 related to previous surgeries - 4 in 3 months for sinus issues/infection    Family history of breast cancer- mother's identical twin, maternal aunt, paternal GM and dad with breast lumps     Hot flashes    Generalized hyperhidrosis- since earliest recollections - soaks clothing multiple times/week - very disruptive to life    Mild anxiety    Foster care child    Anxiety    Multiple fractures of left foot with routine healing    Family history of colon cancer- father dx'd in mid 40's - pt's first colonoscopy in 4/24/2017 - repeat in 2022     Menorrhagia with regular cycle- ? cause of iron deficiency anemia- pt would like to see ob/gyn specialists    Intractable episodic tension-type headache    Strain of neck muscle, initial encounter    Sun-damaged skin    Cough due to bronchospasm- needs refills on meds     Recurrent cough    Attention deficit - symptoms - pt desires testing and possible treatment    Intractable migraine without aura and without status migrainosus    Hypertension goal BP (blood pressure) < 140/90  - new diagnosis    Morbid obesity (H)    Numbness and tingling of hand- fingertips - ? caused by lisinopril vs. other - consider changing to amlodipine     Malignant melanoma of left lower extremity including hip (H)    Melanoma of skin (H)     Past Medical History:   Diagnosis Date    Anemia     Anxiety     paxil 10mg= mild bladder retention, at 30mg = ravenously hungry/weight gain  increased headaches; lexapro = bad sweating    Contraception     ocp's some = mood swings ; seasonale: frequent vaginal bleeding and weight gain    Hot flashes 11/16/2016    Hypertension goal BP (blood pressure) < 140/90  - new diagnosis 02/02/2023    Kidney stones     Dr. 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

## 2023-09-19 NOTE — NURSING NOTE
Chrissie Mckee, VILLA  You; Lia Savage PA-C; Tracie Higginbotham MD21 hours ago (11:42 AM)     LP  Wonderful, thank you so much Lia!    Lia Sweeney PA-C  You; Tracie Higginbotham MD22 hours ago (11:00 AM)     BB  I will see her today in EP st 230pm to double check and potentially biopsy this spot. Please let Dr. Higginbotham know that the dermatology notes for the EP location are (unfortunately and annoyingly) under the family medicine tab when she searches for them.    Deandra Stauffer [phone]  Beatriz Mckeon RNYesterday (8:05 AM)     Beatriz Mckeon RNYesterday (8:05 AM)     DELIA  Pt called back and scheduled with Lia Savage today at 2:30 pm for a spot check on left calf.     Beatriz OLMOS RN  [facility] [place] [place]  [phone]            Note     Beatriz Mckeon RNYesterday (7:36 AM)     DELIA  Patient Contact     Attempt # 1     Was call answered?  No.  Left message on voicemail with information to call nurse back at [phone].   Advised can see today at 2:30 pm for spot check see message below.     Beatriz OLMOS RN  [facility] [place] [place]  [phone]                  Note

## 2023-09-20 ENCOUNTER — OFFICE VISIT (OUTPATIENT)
Dept: FAMILY MEDICINE | Facility: CLINIC | Age: 48
End: 2023-09-20
Payer: COMMERCIAL

## 2023-09-20 ENCOUNTER — TELEPHONE (OUTPATIENT)
Dept: SURGERY | Facility: CLINIC | Age: 48
End: 2023-09-20

## 2023-09-20 VITALS
BODY MASS INDEX: 32.74 KG/M2 | OXYGEN SATURATION: 98 % | RESPIRATION RATE: 18 BRPM | SYSTOLIC BLOOD PRESSURE: 110 MMHG | TEMPERATURE: 97.9 F | HEIGHT: 68 IN | HEART RATE: 101 BPM | DIASTOLIC BLOOD PRESSURE: 78 MMHG | WEIGHT: 216 LBS

## 2023-09-20 DIAGNOSIS — Z23 NEEDS FLU SHOT: ICD-10-CM

## 2023-09-20 DIAGNOSIS — C43.72 MALIGNANT MELANOMA OF LEFT LOWER EXTREMITY INCLUDING HIP (H): ICD-10-CM

## 2023-09-20 DIAGNOSIS — N92.0 MENORRHAGIA WITH REGULAR CYCLE: ICD-10-CM

## 2023-09-20 DIAGNOSIS — Z01.818 PRE-OP EXAM: Primary | ICD-10-CM

## 2023-09-20 DIAGNOSIS — J45.21 MILD INTERMITTENT ASTHMA WITH (ACUTE) EXACERBATION: ICD-10-CM

## 2023-09-20 DIAGNOSIS — Z02.89 ENCOUNTER FOR COMPLETION OF FORM WITH PATIENT: ICD-10-CM

## 2023-09-20 DIAGNOSIS — Z01.818 PREOP GENERAL PHYSICAL EXAM: ICD-10-CM

## 2023-09-20 DIAGNOSIS — D50.8 OTHER IRON DEFICIENCY ANEMIA: ICD-10-CM

## 2023-09-20 DIAGNOSIS — I10 HYPERTENSION GOAL BP (BLOOD PRESSURE) < 140/90: ICD-10-CM

## 2023-09-20 DIAGNOSIS — Z78.9 HEPATITIS B VIRUS SEROLOGIC STATUS UNKNOWN: ICD-10-CM

## 2023-09-20 LAB
ALBUMIN SERPL BCG-MCNC: 4.6 G/DL (ref 3.5–5.2)
ALP SERPL-CCNC: 96 U/L (ref 35–104)
ALT SERPL W P-5'-P-CCNC: 11 U/L (ref 0–50)
ANION GAP SERPL CALCULATED.3IONS-SCNC: 10 MMOL/L (ref 7–15)
AST SERPL W P-5'-P-CCNC: 25 U/L (ref 0–45)
BILIRUB SERPL-MCNC: 0.4 MG/DL
BUN SERPL-MCNC: 11.6 MG/DL (ref 6–20)
CALCIUM SERPL-MCNC: 9.5 MG/DL (ref 8.6–10)
CHLORIDE SERPL-SCNC: 106 MMOL/L (ref 98–107)
CREAT SERPL-MCNC: 0.9 MG/DL (ref 0.51–0.95)
DEPRECATED HCO3 PLAS-SCNC: 23 MMOL/L (ref 22–29)
EGFRCR SERPLBLD CKD-EPI 2021: 79 ML/MIN/1.73M2
ERYTHROCYTE [DISTWIDTH] IN BLOOD BY AUTOMATED COUNT: 13.5 % (ref 10–15)
GLUCOSE SERPL-MCNC: 89 MG/DL (ref 70–99)
HCG UR QL: NEGATIVE
HCT VFR BLD AUTO: 39.4 % (ref 35–47)
HGB BLD-MCNC: 13.3 G/DL (ref 11.7–15.7)
MCH RBC QN AUTO: 27.6 PG (ref 26.5–33)
MCHC RBC AUTO-ENTMCNC: 33.8 G/DL (ref 31.5–36.5)
MCV RBC AUTO: 82 FL (ref 78–100)
PLATELET # BLD AUTO: 288 10E3/UL (ref 150–450)
POTASSIUM SERPL-SCNC: 4.4 MMOL/L (ref 3.4–5.3)
PROT SERPL-MCNC: 6.8 G/DL (ref 6.4–8.3)
RBC # BLD AUTO: 4.82 10E6/UL (ref 3.8–5.2)
SODIUM SERPL-SCNC: 139 MMOL/L (ref 136–145)
WBC # BLD AUTO: 4.6 10E3/UL (ref 4–11)

## 2023-09-20 PROCEDURE — 93000 ELECTROCARDIOGRAM COMPLETE: CPT | Performed by: FAMILY MEDICINE

## 2023-09-20 PROCEDURE — 90686 IIV4 VACC NO PRSV 0.5 ML IM: CPT | Performed by: FAMILY MEDICINE

## 2023-09-20 PROCEDURE — 81025 URINE PREGNANCY TEST: CPT | Performed by: FAMILY MEDICINE

## 2023-09-20 PROCEDURE — 90471 IMMUNIZATION ADMIN: CPT | Performed by: FAMILY MEDICINE

## 2023-09-20 PROCEDURE — 80053 COMPREHEN METABOLIC PANEL: CPT | Performed by: FAMILY MEDICINE

## 2023-09-20 PROCEDURE — 85027 COMPLETE CBC AUTOMATED: CPT | Performed by: FAMILY MEDICINE

## 2023-09-20 PROCEDURE — 86706 HEP B SURFACE ANTIBODY: CPT | Performed by: FAMILY MEDICINE

## 2023-09-20 PROCEDURE — 99215 OFFICE O/P EST HI 40 MIN: CPT | Mod: 25 | Performed by: FAMILY MEDICINE

## 2023-09-20 PROCEDURE — 36415 COLL VENOUS BLD VENIPUNCTURE: CPT | Performed by: FAMILY MEDICINE

## 2023-09-20 ASSESSMENT — ASTHMA QUESTIONNAIRES: ACT_TOTALSCORE: 21

## 2023-09-20 NOTE — LETTER
My Asthma Action Plan    Name: Deandra Lewis   YOB: 1975  Date: 9/20/2023   My doctor: Linda Soto MD   My clinic: [facility] [place]        My Rescue Medicine:   Albuterol inhaler (Proair/Ventolin/Proventil HFA)  2-4 puffs EVERY 4 HOURS as needed. Use a spacer if recommended by your provider.    Controller medication during allergy flares and cold temps:   Breo Ellipta 200/25 - inhalation daily to twice daily if needed during exacerbations.    My Asthma Severity:   Intermittent / Exercise Induced  Know your asthma triggers:other possible triggers besides cold temps and pollen allergies:  smoke, upper respiratory infections, dust mites, pollens, animal dander, insects/rodents, mold, humidity, aspirin, strong odors and fumes, occupational exposure, exercise or sports, emotions, cold air, and Gastric Reflux  upper respiratory infections          GREEN ZONE   Good Control  I feel good  No cough or wheeze  Can work, sleep and play without asthma symptoms       Take your asthma control medicine every day, if needing your albuterol MDI more than twice weekly.     If exercise triggers your asthma, take your rescue medication  15 minutes before exercise or sports, and  During exercise if you have asthma symptoms  Spacer to use with inhaler: If you have a spacer, make sure to use it with your inhaler             YELLOW ZONE Getting Worse  I have ANY of these:  I do not feel good  Cough or wheeze  Chest feels tight  Wake up at night   Keep taking your Green Zone medications  Start taking your rescue medicine:  every 20 minutes for up to 1 hour. Then every 4 hours for 24-48 hours.  If you stay in the Yellow Zone for more than 12-24 hours, contact your doctor.  If you do not return to the Green Zone in 12-24 hours or you get worse, start taking your oral steroid medicine if prescribed by your provider.           RED ZONE Medical Alert - Get Help  I have ANY of these:  I feel  awful  Medicine is not helping  Breathing getting harder  Trouble walking or talking  Nose opens wide to breathe       Take your rescue medicine NOW  If your provider has prescribed an oral steroid medicine, start taking it NOW  Call your doctor NOW  If you are still in the Red Zone after 20 minutes and you have not reached your doctor:  Take your rescue medicine again and  Call 911 or go to the emergency room right away    See your regular doctor within 2 weeks of an Emergency Room or Urgent Care visit for follow-up treatment.          Annual Reminders:  Meet with Asthma Educator,  Flu Shot in the Fall, consider Pneumonia Vaccination for patients with asthma (aged 19 and older).    Pharmacy: [facility] [id number] [facility] [address] [place] [address]    Electronically signed by Linda Soto MD   Date: 09/20/23                    Asthma Triggers  How To Control Things That Make Your Asthma Worse    Triggers are things that make your asthma worse.  Look at the list below to help you find your triggers and   what you can do about them. You can help prevent asthma flare-ups by staying away from your triggers.      Trigger                                                          What you can do   Cigarette Smoke  Tobacco smoke can make asthma worse. Do not allow smoking in your home, car or around you.  Be sure no one smokes at a child s day care or school.  If you smoke, ask your health care provider for ways to help you quit.  Ask family members to quit too.  Ask your health care provider for a referral to Quit Plan to help you quit smoking, or call 9-622-166-PLAN.     Colds, Flu, Bronchitis  These are common triggers of asthma. Wash your hands often.  Don t touch your eyes, nose or mouth.  Get a flu shot every year.     Dust Mites  These are tiny bugs that live in cloth or carpet. They are too small to see. Wash sheets and blankets in hot water every week.   Encase pillows and mattress in dust mite proof  covers.  Avoid having carpet if you can. If you have carpet, vacuum weekly.   Use a dust mask and HEPA vacuum.   Pollen and Outdoor Mold  Some people are allergic to trees, grass, or weed pollen, or molds. Try to keep your windows closed.  Limit time out doors when pollen count is high.   Ask you health care provider about taking medicine during allergy season.     Animal Dander  Some people are allergic to skin flakes, urine or saliva from pets with fur or feathers. Keep pets with fur or feathers out of your home.    If you can t keep the pet outdoors, then keep the pet out of your bedroom.  Keep the bedroom door closed.  Keep pets off cloth furniture and away from stuffed toys.     Mice, Rats, and Cockroaches  Some people are allergic to the waste from these pests.   Cover food and garbage.  Clean up spills and food crumbs.  Store grease in the refrigerator.   Keep food out of the bedroom.   Indoor Mold  This can be a trigger if your home has high moisture. Fix leaking faucets, pipes, or other sources of water.   Clean moldy surfaces.  Dehumidify basement if it is damp and smelly.   Smoke, Strong Odors, and Sprays  These can reduce air quality. Stay away from strong odors and sprays, such as perfume, powder, hair spray, paints, smoke incense, paint, cleaning products, candles and new carpet.   Exercise or Sports  Some people with asthma have this trigger. Be active!  Ask your doctor about taking medicine before sports or exercise to prevent symptoms.    Warm up for 5-10 minutes before and after sports or exercise.     Other Triggers of Asthma  Cold air:  Cover your nose and mouth with a scarf.  Sometimes laughing or crying can be a trigger.  Some medicines and food can trigger asthma.

## 2023-09-20 NOTE — PROGRESS NOTES
[facility]  [address] [place] [address]  Office: [phone]   Fax:    [phone]       Primary Provider: Jerri Chauhan  Pre-op Performing Provider: JERRI CHAUHAN        PREOPERATIVE EVALUATION:  Today's date: 9/20/2023    Deandra is a 47 year old female who presents for a preoperative evaluation.      9/20/2023    11:01 AM   Additional Questions   Roomed by jeanne leon       Surgical Information:  Surgery/Procedure: melanoma surgery - left thigh   Surgery Location: [facility]  Surgeon: Dr Tracie Higginbotham - Oncologic Surgery to do   Wide Local Excision of LEFT thigh melanoma Left General   [place] Lymph Node Mapping and Biopsy        and Dr. Pj Aguirre - Plastic Surgery to do Complex Closure of soft-tissue defect of left thigh    Surgery Date: 10/9/23  Time of Surgery: 7:45 am check in 1200pm surgery  Where patient plans to recover: At home with family  Fax number for surgical facility: Note does not need to be faxed, will be available electronically in Epic.    Assessment & Plan     The proposed surgical procedure is considered MODERATE risk.      ICD-10-CM    1. Pre-op exam  Z01.818 HCG qualitative urine     CBC with platelets     Comprehensive metabolic panel (BMP + Alb, Alk Phos, ALT, AST, Total. Bili, TP)     EKG 12-lead complete w/read - Clinics     CBC with platelets     Comprehensive metabolic panel (BMP + Alb, Alk Phos, ALT, AST, Total. Bili, TP)     CANCELED: HCG qualitative urine      2. Malignant melanoma of left lower extremity including hip (H)- Stage 1B (cT2a, cN0, cM0)  C43.72       3. Other iron deficiency anemia - Anemia - ? related to previous surgeries - 4 in 3 months for sinus issues/infection  D50.8 CBC with platelets     CBC with platelets      4. Hypertension goal BP (blood pressure) < 140/90  - new diagnosis  I10 CBC with platelets     Comprehensive metabolic panel (BMP + Alb, Alk Phos, ALT, AST, Total. Bili, TP)     EKG  12-lead complete w/read - Clinics     CBC with platelets     Comprehensive metabolic panel (BMP + Alb, Alk Phos, ALT, AST, Total. Bili, TP)      5. Menorrhagia with regular cycle- ? cause of iron deficiency anemia- pt would like to see ob/gyn specialists- didn't go in 2021 - better than previous  N92.0       6. Mild intermittent asthma with (acute) exacerbation- allergies and cold temperature triggers  J45.21 Asthma Action Plan (AAP)      7. Hepatitis B virus serologic status unknown  Z78.9 Hepatitis B Surface Antibody     Hepatitis B Surface Antibody      8. Needs flu shot  Z23 INFLUENZA VACCINE IM > 6 MONTHS VALENT IIV4 (AFLURIA/FLUZONE)      9. Encounter for completion of form with patient- [facility] paperwork for melanoma left leg  Z02.89       10. Preop general physical exam  Z01.818           Pt diagnosed with mild intermittent asthma - did asthma action plan and ACT today.       Risks and Recommendations:  The patient has the following additional risks and recommendations for perioperative complications:  Pulmonary:  asthma - mild intermittent    - Incentive spirometry post-op   - Recently treated pulmonary infection    Antiplatelet or Anticoagulation Medication Instructions:   - Patient is on no antiplatelet or anticoagulation medications.    Additional Medication Instructions:-usually takes bupropion, lisinopril and topiramate in the am's.    - ACE/ARB: HOLD on day of surgery (minimum 11 hours for general anesthesia).   - Antiepileptics: Continue without modification.   - triptans, migraine abortives: HOLD on day of surgery   - ibuprofen (Advil, Motrin): HOLD 10 days before surgery.    - SSRIs, SNRIs, TCAs, Antipsychotics: Continue without modification.     Do NOT take your lisinopril the morning of surgery.   Do NOT take any Zomig ( zolmitriptan) the day of surgery.     You can take your bupropion and topiramate the morning of surgery with a sip of water.     Do take tylenol only for pain for 7-10 days prior to  "surgery.   Stop taking all vitamins and supplements 2 weeks prior to your surgery.     RECOMMENDATION:    APPROVAL GIVEN to proceed with proposed procedure, without further diagnostic evaluation.    Ordering of each unique test  Prescription drug management  55 minutes spent by me on the date of the encounter doing chart review, history and exam, documentation and further activities per the note      Subjective       HPI related to upcoming procedure: pt has had a benign appearing pigmented nevus on her left anterior thigh for the last couple of years, but it rapidly changed in appearance late this summer with getting raised and \"Bumpy\" and changing in color to a mixed brown and gray.  Hx of signif. Tanning bed exposure in her teenage years.     Had shave excision biopsy done on 8/28/2023 = malignant melanoma: Melanoma:  Subtype: Superficial spreading  Breslow depth: 1.2 millimeters  Anatomic (Ino's) level: IV  Margins: positive for invasive melanoma and melanoma in situ at the lateral margin  Mitoses: none  Ulceration: absent  Lymphovascular invasion: absent  Perineural invasion: absent  Microsatellitosis: absent  Tumor infiltrating lymphocytes: present, non-brisk  Regression: absent  Precursor lesion: not identified  AJCC microstage: pT2a          9/19/2023     9:46 AM   Preop Questions   1. Have you ever had a heart attack or stroke? No   2. Have you ever had surgery on your heart or blood vessels, such as a stent placement, a coronary artery bypass, or surgery on an artery in your head, neck, heart, or legs? No   3. Do you have chest pain with activity? No   4. Do you have a history of  heart failure? No   5. Do you currently have a cold, bronchitis or symptoms of other infection? No   6. Do you have a cough, shortness of breath, or wheezing? YES - better since starting Breo Ellipta and taking allergy medication. Using albuterol MDI prn - hasn't needed it in 1.5 weeks.    7. Do you or anyone in your family " "have previous history of blood clots? No   8. Do you or does anyone in your family have a serious bleeding problem such as prolonged bleeding following surgeries or cuts? No   9. Have you ever had problems with anemia or been told to take iron pills? No   10. Have you had any abnormal blood loss such as black, tarry or bloody stools, or abnormal vaginal bleeding? No   11. Have you ever had a blood transfusion? No   12. Are you willing to have a blood transfusion if it is medically needed before, during, or after your surgery? Yes   13. Have you or any of your relatives ever had problems with anesthesia? YES - requires higher doses of lidocaine, local and general anesthesia than the average person. Pt describes \"Red-headed syndrome\"   Has had awareness of surgeries and awakened during surgeries in the past.  ? Metabolizes anesthesia faster than most people.     14. Do you have sleep apnea, excessive snoring or daytime drowsiness? No   15. Do you have any artifical heart valves or other implanted medical devices like a pacemaker, defibrillator, or continuous glucose monitor? No   16. Do you have artificial joints? No   17. Are you allergic to latex? No   18. Is there any chance that you may be pregnant? No       Health Care Directive:  Patient does not have a Health Care Directive or Living Will: Discussed advance care planning with patient; information given to patient to review.    Preoperative Review of :   reviewed - no record of controlled substances prescribed.      Status of Chronic Conditions:  See problem list for active medical problems.  Problems all longstanding and stable, except as noted/documented.  See ROS for pertinent symptoms related to these conditions.    Review of Systems:   CONSTITUTIONAL: NEGATIVE for fever, chills, change in weight  INTEGUMENTARY/SKIN: NEGATIVE for worrisome rashes, moles or lesions  EYES: NEGATIVE for vision changes or irritation  ENT/MOUTH: NEGATIVE for ear, mouth and " throat problems  RESP: NEGATIVE for significant cough or SOB  CV: NEGATIVE for chest pain, palpitations or peripheral edema  GI: NEGATIVE for nausea, abdominal pain, heartburn, or change in bowel habits  : NEGATIVE for frequency, dysuria, or hematuria  MUSCULOSKELETAL: NEGATIVE for significant arthralgias or myalgia  NEURO: NEGATIVE for weakness, dizziness or paresthesias  ENDOCRINE: NEGATIVE for temperature intolerance, skin/hair changes  HEME: NEGATIVE for bleeding problems  PSYCHIATRIC: NEGATIVE for changes in mood or affect    Patient Active Problem List    Diagnosis Date Noted    Melanoma of skin (H) 09/14/2023     Priority: Medium    Malignant melanoma of left lower extremity including hip (H) 09/06/2023     Priority: Medium    Numbness and tingling of hand- fingertips - ? caused by lisinopril vs. other - consider changing to amlodipine  03/20/2023     Priority: Medium    Intractable migraine without aura and without status migrainosus 02/02/2023     Priority: Medium    Hypertension goal BP (blood pressure) < 140/90  - new diagnosis 02/02/2023     Priority: Medium    Morbid obesity (H) 02/02/2023     Priority: Medium    Recurrent cough 12/01/2022     Priority: Medium    Attention deficit - symptoms - pt desires testing and possible treatment 12/01/2022     Priority: Medium    Menorrhagia with regular cycle- ? cause of iron deficiency anemia- pt would like to see ob/gyn specialists 05/02/2021     Priority: Medium    Intractable episodic tension-type headache 05/02/2021     Priority: Medium    Strain of neck muscle, initial encounter 05/02/2021     Priority: Medium    Sun-damaged skin 05/02/2021     Priority: Medium    Cough due to bronchospasm- needs refills on meds  05/02/2021     Priority: Medium    Family history of colon cancer- father dx'd in mid 40's - pt's first colonoscopy in 4/24/2017 - repeat in 2022 04/14/2021     Priority: Medium    Multiple fractures of left foot with routine healing 02/25/2021      Priority: Medium    Anxiety      Priority: Medium     paxil 10mg= mild bladder retention, increased headaches       Mild anxiety 06/01/2018     Priority: Medium    Foster care child 06/01/2018     Priority: Medium    Generalized hyperhidrosis- since earliest recollections - soaks clothing multiple times/week - very disruptive to life 01/04/2017     Priority: Medium     Familial - mother and daughter as well.       Hot flashes 11/16/2016     Priority: Medium    Family history of breast cancer- mother's identical twin, maternal aunt, paternal GM and dad with breast lumps  09/11/2014     Priority: Medium    Lactose intolerance      Priority: Medium    Kidney stones      Priority: Medium     Dr. Sharon Heppler - [facility] - Urology       Disturbance in sleep behavior      Priority: Medium    Other iron deficiency anemia - Anemia - ? 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 Provider to review and confirm      Chronic sinusitis 01/03/2013     Priority: Medium      Past Medical History:   Diagnosis Date    Anemia     Anxiety     paxil 10mg= mild bladder retention, at 30mg = ravenously hungry/weight gain increased headaches; lexapro = bad sweating    Contraception     ocp's some = mood swings ; seasonale: frequent vaginal bleeding and weight gain    Hot flashes 11/16/2016    Hypertension goal BP (blood pressure) < 140/90  - new diagnosis 02/02/2023    Kidney stones     Dr. Sharon Heppler - [facility] - Urology     Lactose intolerance     Malignant melanoma of left lower extremity including hip (H) 09/06/2023    Maxillary bone loss  01/2014    left - secondary to severe oral/sinus infection and bleeding  - [facility] [place] [facility]/p multiple surgeries     Multiple fractures of left foot with routine healing 02/25/2021    Night sweats     Sleep problems     Uncomplicated asthma 20s     Past Surgical History:   Procedure Laterality Date    ABDOMEN SURGERY  2001    APPENDECTOMY      ARTHROSCOPY, ARTHROPLASTY TEMPOROMANDIBULAR JOINT, COMBINED  age 14     TMJ surgery as an 7th grader    COLONOSCOPY  2017    Approximately    CYSTOSCOPY,URETEROSCOPY,STONE REMV  09/11/2002    with stent placements - since removed     ENDOSCOPIC BALLOON SINUPLASTY, OPTICAL TRACKING SYSTEM ENDOSCOPIC SINUS SURGERY, COMBINED  01/08/2013    Procedure: COMBINED ENDOSCOPIC BALLOON SINUPLASTY, OPTICAL TRACKING SYSTEM ENDOSCOPIC SINUS SURGERY;  Left Endo Sinus  Surgery  with Septoplasty      ENDOSCOPIC SINUS SURGERY  01/15/2013    Procedure: ENDOSCOPIC SINUS SURGERY;  removal of nasal packing with endocopic debridement of left paranasal sinuses;  Surgeon: Fabian Driver MD;  Location:  OR    ENDOSCOPIC SINUS SURGERY  04/22/2013    Procedure: ENDOSCOPIC SINUS SURGERY;  Functional Endoscopic Sinus Surgery With Biopsy ;  Surgeon: Jcarlos Hogan MD;  Location:  OR     Current Outpatient Medications   Medication Sig Dispense Refill    albuterol (VENTOLIN HFA) 108 (90 Base) MCG/ACT inhaler INHALE 2 PUFFS BY MOUTH EVERY 6 HOURS AS NEEDED FOR WHEEZE OR FOR SHORTNESS OF BREATH 36 g 11    buPROPion (WELLBUTRIN XL) 150 MG 24 hr tablet Take 3 tablets (450 mg) by mouth every morning 270 tablet 1    cetirizine (ZYRTEC) 10 MG tablet Take 1 tablet (10 mg) by mouth every evening 30 tablet 1    cholecalciferol 50 MCG (2000 UT) CAPS Take 1 capsule by mouth daily 90 capsule 4    cyclobenzaprine (FLEXERIL) 5 MG tablet Take 1-2 tablets (5-10 mg) by mouth 2 times daily as needed for muscle spasms 30 tablet 3    fluticasone-vilanterol (BREO ELLIPTA) 200-25 MCG/ACT inhaler Inhale 1 puff  "into the lungs daily 60 each 11    ibuprofen (ADVIL/MOTRIN) 200 MG tablet Take 200 mg by mouth as needed.      lisinopril (ZESTRIL) 5 MG tablet TAKE 1 TABLET BY MOUTH EVERY DAY 90 tablet 1    montelukast (SINGULAIR) 10 MG tablet Take 1 tablet (10 mg) by mouth At Bedtime 90 tablet 3    topiramate (TOPAMAX) 25 MG tablet Take 2 TABS by mouth TWICE DAILY 360 tablet 3    ZOLMitriptan (ZOMIG) 5 MG tablet Take 1 tablet (5 mg) by mouth at onset of headache for migraine May repeat in 2 hours. Max 2 tablets/24 hours. 10 tablet 3       Allergies   Allergen Reactions    Adhesive Tape Blisters    Melon      Mouth gets itchy     Seasonal Allergies         Social History     Tobacco Use    Smoking status: Former     Packs/day: 0.10     Years: 5.00     Pack years: 0.50     Types: Cigarettes    Smokeless tobacco: Never   Substance Use Topics    Alcohol use: Yes     Comment: Rarely     Family History   Problem Relation Age of Onset    Asthma Mother     Depression Mother     Thyroid Disease Mother     Blood Disease Mother         Bleeding disorder    Bipolar Disorder Mother     Parkinsonism Mother     Dementia Mother     Anxiety Disorder Mother     Mental Illness Mother     Heart Disease Father         s/p pacemakers x 2     Diabetes Father     Cancer Father         Skin, Colon-dx'd mid-late 40's    Depression Father     Skin Cancer Father     Substance Abuse Maternal Grandmother     Substance Abuse Maternal Grandfather     Breast Cancer Paternal Grandmother     Skin Cancer Paternal Grandfather     Allergy (Severe) Son         Dairy, Eggs, and Oatmeal    Asthma Child     Breast Cancer Maternal Aunt         another of mom's sisters     Breast Cancer Maternal Aunt         mother's identical twin     Alcohol/Drug Other         Sibling     History   Drug Use No     Comment: no herbal meds except for acidophilus          Objective     /78   Pulse 101   Temp 97.9  F (36.6  C)   Resp 18   Ht 1.727 m (5' 8\")   Wt 98 kg (216 lb)   " LMP 09/02/2023   SpO2 98%   BMI 32.84 kg/m      Physical Exam:     GENERAL APPEARANCE: healthy, alert and no distress     EYES: EOMI, PERRL     HENT: ear canals and TM's normal and nose and mouth without ulcers or lesions     NECK: no adenopathy, no asymmetry, masses, or scars and thyroid normal to palpation     RESP: lungs clear to auscultation - no rales, rhonchi or wheezes     CV: regular rates and rhythm, normal S1 S2, no S3 or S4 and no murmur, click or rub     ABDOMEN:  soft, nontender, no HSM or masses and bowel sounds normal     MS: extremities normal- no gross deformities noted, no evidence of inflammation in joints, FROM in all extremities.     SKIN: no suspicious lesions or rashes     NEURO: Normal strength and tone, sensory exam grossly normal, mentation intact and speech normal     PSYCH: mentation appears normal. and affect normal/bright     LYMPHATICS: No cervical adenopathy    Recent Labs   Lab Test 12/01/22  1419   HGB 14.1         POTASSIUM 4.2   CR 0.74        Diagnostics:  Labs pending at this time.  Results will be reviewed when available.       Revised Cardiac Risk Index (RCRI):  The patient has the following serious cardiovascular risks for perioperative complications:   - No serious cardiac risks = 0 points     RCRI Interpretation: 0 points: Class I (very low risk - 0.4% complication rate)         Signed Electronically by:      Linda Soto MD  Copy of this evaluation report is provided to requesting physician.

## 2023-09-20 NOTE — H&P (VIEW-ONLY)
[facility]  [address] [place] [address]  Office: [phone]   Fax:    [phone]       Primary Provider: Jerri Chauhan  Pre-op Performing Provider: JERRI CHAUHAN        PREOPERATIVE EVALUATION:  Today's date: 9/20/2023    Deandra is a 47 year old female who presents for a preoperative evaluation.      9/20/2023    11:01 AM   Additional Questions   Roomed by jeanne leon       Surgical Information:  Surgery/Procedure: melanoma surgery - left thigh   Surgery Location: [facility]  Surgeon: Dr Tracie Higginbotham - Oncologic Surgery to do   Wide Local Excision of LEFT thigh melanoma Left General   [facility] Lymph Node Mapping and Biopsy        and Dr. Pj Aguirre - Plastic Surgery to do Complex Closure of soft-tissue defect of left thigh    Surgery Date: 10/9/23  Time of Surgery: 7:45 am check in 1200pm surgery  Where patient plans to recover: At home with family  Fax number for surgical facility: Note does not need to be faxed, will be available electronically in Epic.    Assessment & Plan     The proposed surgical procedure is considered MODERATE risk.      ICD-10-CM    1. Pre-op exam  Z01.818 HCG qualitative urine     CBC with platelets     Comprehensive metabolic panel (BMP + Alb, Alk Phos, ALT, AST, Total. Bili, TP)     EKG 12-lead complete w/read - Clinics     CBC with platelets     Comprehensive metabolic panel (BMP + Alb, Alk Phos, ALT, AST, Total. Bili, TP)     CANCELED: HCG qualitative urine      2. Malignant melanoma of left lower extremity including hip (H)- Stage 1B (cT2a, cN0, cM0)  C43.72       3. Other iron deficiency anemia - Anemia - ? related to previous surgeries - 4 in 3 months for sinus issues/infection  D50.8 CBC with platelets     CBC with platelets      4. Hypertension goal BP (blood pressure) < 140/90  - new diagnosis  I10 CBC with platelets     Comprehensive metabolic panel (BMP + Alb, Alk Phos, ALT, AST, Total. Bili, TP)     EKG  12-lead complete w/read - Clinics     CBC with platelets     Comprehensive metabolic panel (BMP + Alb, Alk Phos, ALT, AST, Total. Bili, TP)      5. Menorrhagia with regular cycle- ? cause of iron deficiency anemia- pt would like to see ob/gyn specialists- didn't go in 2021 - better than previous  N92.0       6. Mild intermittent asthma with (acute) exacerbation- allergies and cold temperature triggers  J45.21 Asthma Action Plan (AAP)      7. Hepatitis B virus serologic status unknown  Z78.9 Hepatitis B Surface Antibody     Hepatitis B Surface Antibody      8. Needs flu shot  Z23 INFLUENZA VACCINE IM > 6 MONTHS VALENT IIV4 (AFLURIA/FLUZONE)      9. Encounter for completion of form with patient- [facility] paperwork for melanoma left leg  Z02.89       10. Preop general physical exam  Z01.818           Pt diagnosed with mild intermittent asthma - did asthma action plan and ACT today.       Risks and Recommendations:  The patient has the following additional risks and recommendations for perioperative complications:  Pulmonary:  asthma - mild intermittent    - Incentive spirometry post-op   - Recently treated pulmonary infection    Antiplatelet or Anticoagulation Medication Instructions:   - Patient is on no antiplatelet or anticoagulation medications.    Additional Medication Instructions:-usually takes bupropion, lisinopril and topiramate in the am's.    - ACE/ARB: HOLD on day of surgery (minimum 11 hours for general anesthesia).   - Antiepileptics: Continue without modification.   - triptans, migraine abortives: HOLD on day of surgery   - ibuprofen (Advil, Motrin): HOLD 10 days before surgery.    - SSRIs, SNRIs, TCAs, Antipsychotics: Continue without modification.     Do NOT take your lisinopril the morning of surgery.   Do NOT take any Zomig ( zolmitriptan) the day of surgery.     You can take your bupropion and topiramate the morning of surgery with a sip of water.     Do take tylenol only for pain for 7-10 days prior to  "surgery.   Stop taking all vitamins and supplements 2 weeks prior to your surgery.     RECOMMENDATION:    APPROVAL GIVEN to proceed with proposed procedure, without further diagnostic evaluation.    Ordering of each unique test  Prescription drug management  55 minutes spent by me on the date of the encounter doing chart review, history and exam, documentation and further activities per the note      Subjective       HPI related to upcoming procedure: pt has had a benign appearing pigmented nevus on her left anterior thigh for the last couple of years, but it rapidly changed in appearance late this summer with getting raised and \"Bumpy\" and changing in color to a mixed brown and gray.  Hx of signif. Tanning bed exposure in her teenage years.     Had shave excision biopsy done on 8/28/2023 = malignant melanoma: Melanoma:  Subtype: Superficial spreading  Breslow depth: 1.2 millimeters  Anatomic (Ino's) level: IV  Margins: positive for invasive melanoma and melanoma in situ at the lateral margin  Mitoses: none  Ulceration: absent  Lymphovascular invasion: absent  Perineural invasion: absent  Microsatellitosis: absent  Tumor infiltrating lymphocytes: present, non-brisk  Regression: absent  Precursor lesion: not identified  AJCC microstage: pT2a          9/19/2023     9:46 AM   Preop Questions   1. Have you ever had a heart attack or stroke? No   2. Have you ever had surgery on your heart or blood vessels, such as a stent placement, a coronary artery bypass, or surgery on an artery in your head, neck, heart, or legs? No   3. Do you have chest pain with activity? No   4. Do you have a history of  heart failure? No   5. Do you currently have a cold, bronchitis or symptoms of other infection? No   6. Do you have a cough, shortness of breath, or wheezing? YES - better since starting Breo Ellipta and taking allergy medication. Using albuterol MDI prn - hasn't needed it in 1.5 weeks.    7. Do you or anyone in your family " "have previous history of blood clots? No   8. Do you or does anyone in your family have a serious bleeding problem such as prolonged bleeding following surgeries or cuts? No   9. Have you ever had problems with anemia or been told to take iron pills? No   10. Have you had any abnormal blood loss such as black, tarry or bloody stools, or abnormal vaginal bleeding? No   11. Have you ever had a blood transfusion? No   12. Are you willing to have a blood transfusion if it is medically needed before, during, or after your surgery? Yes   13. Have you or any of your relatives ever had problems with anesthesia? YES - requires higher doses of lidocaine, local and general anesthesia than the average person. Pt describes \"Red-headed syndrome\"   Has had awareness of surgeries and awakened during surgeries in the past.  ? Metabolizes anesthesia faster than most people.     14. Do you have sleep apnea, excessive snoring or daytime drowsiness? No   15. Do you have any artifical heart valves or other implanted medical devices like a pacemaker, defibrillator, or continuous glucose monitor? No   16. Do you have artificial joints? No   17. Are you allergic to latex? No   18. Is there any chance that you may be pregnant? No       Health Care Directive:  Patient does not have a Health Care Directive or Living Will: Discussed advance care planning with patient; information given to patient to review.    Preoperative Review of :   reviewed - no record of controlled substances prescribed.      Status of Chronic Conditions:  See problem list for active medical problems.  Problems all longstanding and stable, except as noted/documented.  See ROS for pertinent symptoms related to these conditions.    Review of Systems:   CONSTITUTIONAL: NEGATIVE for fever, chills, change in weight  INTEGUMENTARY/SKIN: NEGATIVE for worrisome rashes, moles or lesions  EYES: NEGATIVE for vision changes or irritation  ENT/MOUTH: NEGATIVE for ear, mouth and " throat problems  RESP: NEGATIVE for significant cough or SOB  CV: NEGATIVE for chest pain, palpitations or peripheral edema  GI: NEGATIVE for nausea, abdominal pain, heartburn, or change in bowel habits  : NEGATIVE for frequency, dysuria, or hematuria  MUSCULOSKELETAL: NEGATIVE for significant arthralgias or myalgia  NEURO: NEGATIVE for weakness, dizziness or paresthesias  ENDOCRINE: NEGATIVE for temperature intolerance, skin/hair changes  HEME: NEGATIVE for bleeding problems  PSYCHIATRIC: NEGATIVE for changes in mood or affect    Patient Active Problem List    Diagnosis Date Noted    Melanoma of skin (H) 09/14/2023     Priority: Medium    Malignant melanoma of left lower extremity including hip (H) 09/06/2023     Priority: Medium    Numbness and tingling of hand- fingertips - ? caused by lisinopril vs. other - consider changing to amlodipine  03/20/2023     Priority: Medium    Intractable migraine without aura and without status migrainosus 02/02/2023     Priority: Medium    Hypertension goal BP (blood pressure) < 140/90  - new diagnosis 02/02/2023     Priority: Medium    Morbid obesity (H) 02/02/2023     Priority: Medium    Recurrent cough 12/01/2022     Priority: Medium    Attention deficit - symptoms - pt desires testing and possible treatment 12/01/2022     Priority: Medium    Menorrhagia with regular cycle- ? cause of iron deficiency anemia- pt would like to see ob/gyn specialists 05/02/2021     Priority: Medium    Intractable episodic tension-type headache 05/02/2021     Priority: Medium    Strain of neck muscle, initial encounter 05/02/2021     Priority: Medium    Sun-damaged skin 05/02/2021     Priority: Medium    Cough due to bronchospasm- needs refills on meds  05/02/2021     Priority: Medium    Family history of colon cancer- father dx'd in mid 40's - pt's first colonoscopy in 4/24/2017 - repeat in 2022 04/14/2021     Priority: Medium    Multiple fractures of left foot with routine healing 02/25/2021      Priority: Medium    Anxiety      Priority: Medium     paxil 10mg= mild bladder retention, increased headaches       Mild anxiety 06/01/2018     Priority: Medium    Foster care child 06/01/2018     Priority: Medium    Generalized hyperhidrosis- since earliest recollections - soaks clothing multiple times/week - very disruptive to life 01/04/2017     Priority: Medium     Familial - mother and daughter as well.       Hot flashes 11/16/2016     Priority: Medium    Family history of breast cancer- mother's identical twin, maternal aunt, paternal GM and dad with breast lumps  09/11/2014     Priority: Medium    Lactose intolerance      Priority: Medium    Kidney stones      Priority: Medium     Dr. Sharon Heppler - [facility] - Urology       Disturbance in sleep behavior      Priority: Medium    Other iron deficiency anemia - Anemia - ? 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 Provider to review and confirm      Chronic sinusitis 01/03/2013     Priority: Medium      Past Medical History:   Diagnosis Date    Anemia     Anxiety     paxil 10mg= mild bladder retention, at 30mg = ravenously hungry/weight gain increased headaches; lexapro = bad sweating    Contraception     ocp's some = mood swings ; seasonale: frequent vaginal bleeding and weight gain    Hot flashes 11/16/2016    Hypertension goal BP (blood pressure) < 140/90  - new diagnosis 02/02/2023    Kidney stones     Dr. Sharon Heppler - [facility] - Urology     Lactose intolerance     Malignant melanoma of left lower extremity including hip (H) 09/06/2023    Maxillary bone loss  01/2014    left - secondary to severe oral/sinus infection and bleeding  - [facility] [place] s/p multiple surgeries     Multiple fractures of left foot with routine healing 02/25/2021    Night sweats     Sleep problems     Uncomplicated asthma 20s     Past Surgical History:   Procedure Laterality Date    ABDOMEN SURGERY  2001    APPENDECTOMY      ARTHROSCOPY, ARTHROPLASTY TEMPOROMANDIBULAR JOINT, COMBINED  age 14     TMJ surgery as an 7th grader    COLONOSCOPY  2017    Approximately    CYSTOSCOPY,URETEROSCOPY,STONE REMV  09/11/2002    with stent placements - since removed     ENDOSCOPIC BALLOON SINUPLASTY, OPTICAL TRACKING SYSTEM ENDOSCOPIC SINUS SURGERY, COMBINED  01/08/2013    Procedure: COMBINED ENDOSCOPIC BALLOON SINUPLASTY, OPTICAL TRACKING SYSTEM ENDOSCOPIC SINUS SURGERY;  Left Endo Sinus  Surgery  with Septoplasty      ENDOSCOPIC SINUS SURGERY  01/15/2013    Procedure: ENDOSCOPIC SINUS SURGERY;  removal of nasal packing with endocopic debridement of left paranasal sinuses;  Surgeon: Fabian Driver MD;  Location:  OR    ENDOSCOPIC SINUS SURGERY  04/22/2013    Procedure: ENDOSCOPIC SINUS SURGERY;  Functional Endoscopic Sinus Surgery With Biopsy ;  Surgeon: Jcarlos Hogan MD;  Location:  OR     Current Outpatient Medications   Medication Sig Dispense Refill    albuterol (VENTOLIN HFA) 108 (90 Base) MCG/ACT inhaler INHALE 2 PUFFS BY MOUTH EVERY 6 HOURS AS NEEDED FOR WHEEZE OR FOR SHORTNESS OF BREATH 36 g 11    buPROPion (WELLBUTRIN XL) 150 MG 24 hr tablet Take 3 tablets (450 mg) by mouth every morning 270 tablet 1    cetirizine (ZYRTEC) 10 MG tablet Take 1 tablet (10 mg) by mouth every evening 30 tablet 1    cholecalciferol 50 MCG (2000 UT) CAPS Take 1 capsule by mouth daily 90 capsule 4    cyclobenzaprine (FLEXERIL) 5 MG tablet Take 1-2 tablets (5-10 mg) by mouth 2 times daily as needed for muscle spasms 30 tablet 3    fluticasone-vilanterol (BREO ELLIPTA) 200-25 MCG/ACT inhaler Inhale 1 puff  "into the lungs daily 60 each 11    ibuprofen (ADVIL/MOTRIN) 200 MG tablet Take 200 mg by mouth as needed.      lisinopril (ZESTRIL) 5 MG tablet TAKE 1 TABLET BY MOUTH EVERY DAY 90 tablet 1    montelukast (SINGULAIR) 10 MG tablet Take 1 tablet (10 mg) by mouth At Bedtime 90 tablet 3    topiramate (TOPAMAX) 25 MG tablet Take 2 TABS by mouth TWICE DAILY 360 tablet 3    ZOLMitriptan (ZOMIG) 5 MG tablet Take 1 tablet (5 mg) by mouth at onset of headache for migraine May repeat in 2 hours. Max 2 tablets/24 hours. 10 tablet 3       Allergies   Allergen Reactions    Adhesive Tape Blisters    Melon      Mouth gets itchy     Seasonal Allergies         Social History     Tobacco Use    Smoking status: Former     Packs/day: 0.10     Years: 5.00     Pack years: 0.50     Types: Cigarettes    Smokeless tobacco: Never   Substance Use Topics    Alcohol use: Yes     Comment: Rarely     Family History   Problem Relation Age of Onset    Asthma Mother     Depression Mother     Thyroid Disease Mother     Blood Disease Mother         Bleeding disorder    Bipolar Disorder Mother     Parkinsonism Mother     Dementia Mother     Anxiety Disorder Mother     Mental Illness Mother     Heart Disease Father         s/p pacemakers x 2     Diabetes Father     Cancer Father         Skin, Colon-dx'd mid-late 40's    Depression Father     Skin Cancer Father     Substance Abuse Maternal Grandmother     Substance Abuse Maternal Grandfather     Breast Cancer Paternal Grandmother     Skin Cancer Paternal Grandfather     Allergy (Severe) Son         Dairy, Eggs, and Oatmeal    Asthma Child     Breast Cancer Maternal Aunt         another of mom's sisters     Breast Cancer Maternal Aunt         mother's identical twin     Alcohol/Drug Other         Sibling     History   Drug Use No     Comment: no herbal meds except for acidophilus          Objective     /78   Pulse 101   Temp 97.9  F (36.6  C)   Resp 18   Ht 1.727 m (5' 8\")   Wt 98 kg (216 lb)   " LMP 09/02/2023   SpO2 98%   BMI 32.84 kg/m      Physical Exam:     GENERAL APPEARANCE: healthy, alert and no distress     EYES: EOMI, PERRL     HENT: ear canals and TM's normal and nose and mouth without ulcers or lesions     NECK: no adenopathy, no asymmetry, masses, or scars and thyroid normal to palpation     RESP: lungs clear to auscultation - no rales, rhonchi or wheezes     CV: regular rates and rhythm, normal S1 S2, no S3 or S4 and no murmur, click or rub     ABDOMEN:  soft, nontender, no HSM or masses and bowel sounds normal     MS: extremities normal- no gross deformities noted, no evidence of inflammation in joints, FROM in all extremities.     SKIN: no suspicious lesions or rashes     NEURO: Normal strength and tone, sensory exam grossly normal, mentation intact and speech normal     PSYCH: mentation appears normal. and affect normal/bright     LYMPHATICS: No cervical adenopathy    Recent Labs   Lab Test 12/01/22  1419   HGB 14.1         POTASSIUM 4.2   CR 0.74        Diagnostics:  Labs pending at this time.  Results will be reviewed when available.       Revised Cardiac Risk Index (RCRI):  The patient has the following serious cardiovascular risks for perioperative complications:   - No serious cardiac risks = 0 points     RCRI Interpretation: 0 points: Class I (very low risk - 0.4% complication rate)         Signed Electronically by:      Linda Soto MD  Copy of this evaluation report is provided to requesting physician.

## 2023-09-20 NOTE — TELEPHONE ENCOUNTER
ALIVIA forms received via fax today from the [facility].    Surgery 10/9/23.    Forms on Lorin CRUZ's desk.    Sudha Dwyer LPN

## 2023-09-20 NOTE — PATIENT INSTRUCTIONS
Preparing for Your Surgery  Getting started  A nurse will call you to review your health history and instructions. They will give you an arrival time based on your scheduled surgery time. Please be ready to share:  Your doctor's clinic name and phone number  Your medical, surgical, and anesthesia history  A list of allergies and sensitivities  A list of medicines, including herbal treatments and over-the-counter drugs  Whether the patient has a legal guardian (ask how to send us the papers in advance)  Please tell us if you're pregnant--or if there's any chance you might be pregnant. Some surgeries may injure a fetus (unborn baby), so they require a pregnancy test. Surgeries that are safe for a fetus don't always need a test, and you can choose whether to have one.   If you have a child who's having surgery, please ask for a copy of Preparing for Your Child's Surgery.    Preparing for surgery  Within 10 to 30 days of surgery: Have a pre-op exam (sometimes called an H&P, or History and Physical). This can be done at a clinic or pre-operative center.  If you're having a , you may not need this exam. Talk to your care team.  At your pre-op exam, talk to your care team about all medicines you take. If you need to stop any medicines before surgery, ask when to start taking them again.  We do this for your safety. Many medicines can make you bleed too much during surgery. Some change how well surgery (anesthesia) drugs work.  Call your insurance company to let them know you're having surgery. (If you don't have insurance, call [phone].)  Call your clinic if there's any change in your health. This includes signs of a cold or flu (sore throat, runny nose, cough, rash, fever). It also includes a scrape or scratch near the surgery site.  If you have questions on the day of surgery, call your hospital or surgery center.  Eating and drinking guidelines  For your safety: Unless your surgeon tells you otherwise,  follow the guidelines below.  Eat and drink as usual until 8 hours before you arrive for surgery. After that, no food or milk.  Drink clear liquids until 2 hours before you arrive. These are liquids you can see through, like water, Gatorade, and Propel Water. They also include plain black coffee and tea (no cream or milk), candy, and breath mints. You can spit out gum when you arrive.  If you drink alcohol: Stop drinking it the night before surgery.  If your care team tells you to take medicine on the morning of surgery, it's okay to take it with a sip of water.  Preventing infection  Shower or bathe the night before and morning of your surgery. Follow the instructions your clinic gave you. (If no instructions, use regular soap.)  Don't shave or clip hair near your surgery site. We'll remove the hair if needed.  Don't smoke or vape the morning of surgery. You may chew nicotine gum up to 2 hours before surgery. A nicotine patch is okay.  Note: Some surgeries require you to completely quit smoking and nicotine. Check with your surgeon.  Your care team will make every effort to keep you safe from infection. We will:  Clean our hands often with soap and water (or an alcohol-based hand rub).  Clean the skin at your surgery site with a special soap that kills germs.  Give you a special gown to keep you warm. (Cold raises the risk of infection.)  Wear special hair covers, masks, gowns and gloves during surgery.  Give antibiotic medicine, if prescribed. Not all surgeries need antibiotics.  What to bring on the day of surgery  Photo ID and insurance card  Copy of your health care directive, if you have one  Glasses and hearing aids (bring cases)  You can't wear contacts during surgery  Inhaler and eye drops, if you use them (tell us about these when you arrive)  CPAP machine or breathing device, if you use them  A few personal items, if spending the night  If you have . . .  A pacemaker, ICD (cardiac defibrillator) or other  implant: Bring the ID card.  An implanted stimulator: Bring the remote control.  A legal guardian: Bring a copy of the certified (court-stamped) guardianship papers.  Please remove any jewelry, including body piercings. Leave jewelry and other valuables at home.  If you're going home the day of surgery  You must have a responsible adult drive you home. They should stay with you overnight as well.  If you don't have someone to stay with you, and you aren't safe to go home alone, we may keep you overnight. Insurance often won't pay for this.  After surgery  If it's hard to control your pain or you need more pain medicine, please call your surgeon's office.  Questions?   If you have any questions for your care team, list them here: _________________________________________________________________________________________________________________________________________________________________________ ____________________________________ ____________________________________ ____________________________________  For informational purposes only. Not to replace the advice of your health care provider. Copyright   2003, 2019 [employer]. All rights reserved. Clinically reviewed by Mary Nance MD. [employer] 233839 - REV 12/22.    How to Take Your Medication Before Surgery  Additional Medication Instructions:   - ACE/ARB: HOLD on day of surgery (minimum 11 hours for general anesthesia).   - Antiepileptics: Continue without modification.   - triptans, migraine abortives: HOLD on day of surgery   - ibuprofen (Advil, Motrin): HOLD 10 days before surgery.    - SSRIs, SNRIs, TCAs, Antipsychotics: Continue without modification.     Do NOT take your lisinopril the morning of surgery.   Do NOT take any Zomig ( zolmitriptan) the day of surgery.     You can take your bupropion and topiramate the morning of surgery with a sip of water.     Do take tylenol only for pain for 7-10 days prior to surgery.   Stop taking all  "vitamins and supplements 2 weeks prior to your surgery.       Thank you so much or choosing [facility]  for your Health Care. It was a pleasure seeing you at your visit today! Please contact us with any questions or concerns you may have.                   Linda Soto MD                              To reach your [facility] care team after hours call:   [phone] press #2 \"to speak with your care team\".  This will get you to our clinic instead of routing to central [facility]  scheduling.     PLEASE NOTE OUR HOURS HAVE CHANGED secondary to COVID-19 coronavirus pandemic, as we are trying to minimize patient exposure to the virus,  which is now widespread in the [facility].  These hours may change with very little notice.  We apologize for any inconvenience.       Our current clinic hours are:          Monday- Thursday   7:00am - 6:00pm  in person.      Friday  7:00am- 5:00pm                       Saturday and Sunday : Closed to in person and virtual visits        We have telephone and virtual visit times available between    7:00am - 6pm on Monday-Friday as well.                                                Phone:  [phone]      Our pharmacy hours: Monday through Friday 8:00am to 5:00pm                        Saturday - 9:00 am to 12 noon       Sunday : Closed.              Phone:  [phone]              ###  Please note: at this time we are not accepting any walk-in visits. ###      There is also information available at our web site:  [email or web address]    If your provider ordered any lab tests and you do not receive the results within 10 business days, please call the clinic.    If you need a medication refill please contact your pharmacy.  Please allow 3 business days for your refill to be completed.    Our clinic offers telephone visits and e visits.  Please ask one of your team members to explain more.      Use NoFlo (secure email " communication and access to your chart) to send your primary care provider a message or make an appointment. Ask someone on your Team how to sign up for MyChart.

## 2023-09-20 NOTE — PROGRESS NOTES
Scanned and put into FMLA (form that was filled out during her visit by provider) [place] Dept[employer] of [employer]age and [employer] division    Archana K/South

## 2023-09-21 LAB
HBV SURFACE AB SERPL IA-ACNC: 0.56 M[IU]/ML
HBV SURFACE AB SERPL IA-ACNC: NONREACTIVE M[IU]/ML

## 2023-09-26 NOTE — TELEPHONE ENCOUNTER
Forms completed and waiting on [employer] response from pt to confirm dates off and RN will fax over completed forms to [employer] ..Annamarie Kerr RN

## 2023-09-26 NOTE — TELEPHONE ENCOUNTER
See [employer] message dated 9/26/23. Forms faxed and successful transmission confirmed via right fax..Annamarie Kerr RN

## 2023-10-06 ENCOUNTER — ANESTHESIA EVENT (OUTPATIENT)
Dept: SURGERY | Facility: CLINIC | Age: 48
End: 2023-10-06
Payer: COMMERCIAL

## 2023-10-09 ENCOUNTER — HOSPITAL ENCOUNTER (OUTPATIENT)
Facility: CLINIC | Age: 48
Discharge: HOME | End: 2023-10-09
Attending: SURGERY | Admitting: SURGERY
Payer: COMMERCIAL

## 2023-10-09 ENCOUNTER — ANESTHESIA (OUTPATIENT)
Dept: SURGERY | Facility: CLINIC | Age: 48
End: 2023-10-09
Payer: COMMERCIAL

## 2023-10-09 ENCOUNTER — HOSPITAL ENCOUNTER (OUTPATIENT)
Dept: NUCLEAR MEDICINE | Facility: CLINIC | Age: 48
Setting detail: NUCLEAR MEDICINE
Discharge: HOME | End: 2023-10-09
Attending: SURGERY
Payer: COMMERCIAL

## 2023-10-09 VITALS
SYSTOLIC BLOOD PRESSURE: 132 MMHG | BODY MASS INDEX: 32.18 KG/M2 | RESPIRATION RATE: 12 BRPM | HEIGHT: 68 IN | TEMPERATURE: 97.9 F | HEART RATE: 85 BPM | WEIGHT: 212.3 LBS | OXYGEN SATURATION: 95 % | DIASTOLIC BLOOD PRESSURE: 84 MMHG

## 2023-10-09 DIAGNOSIS — Z85.820 HISTORY OF MELANOMA EXCISION: ICD-10-CM

## 2023-10-09 DIAGNOSIS — Z85.820 HISTORY OF MELANOMA EXCISION: Primary | ICD-10-CM

## 2023-10-09 DIAGNOSIS — Z98.890 HISTORY OF MELANOMA EXCISION: Primary | ICD-10-CM

## 2023-10-09 DIAGNOSIS — C43.72 MALIGNANT MELANOMA OF LEFT LOWER EXTREMITY INCLUDING HIP (H): ICD-10-CM

## 2023-10-09 DIAGNOSIS — C43.9 MELANOMA OF SKIN (H): ICD-10-CM

## 2023-10-09 DIAGNOSIS — Z98.890 HISTORY OF MELANOMA EXCISION: ICD-10-CM

## 2023-10-09 LAB — GLUCOSE BLDC GLUCOMTR-MCNC: 88 MG/DL (ref 70–99)

## 2023-10-09 PROCEDURE — 88342 IMHCHEM/IMCYTCHM 1ST ANTB: CPT | Mod: 26 | Performed by: DERMATOLOGY

## 2023-10-09 PROCEDURE — 78195 LYMPH SYSTEM IMAGING: CPT | Mod: 26 | Performed by: RADIOLOGY

## 2023-10-09 PROCEDURE — 38531 OPEN BX/EXC INGUINOFEM NODES: CPT | Mod: LT | Performed by: SURGERY

## 2023-10-09 PROCEDURE — 13122 CMPLX RPR S/A/L ADDL 5 CM/>: CPT | Performed by: PLASTIC SURGERY

## 2023-10-09 PROCEDURE — 250N000013 HC RX MED GY IP 250 OP 250 PS 637: Performed by: SURGERY

## 2023-10-09 PROCEDURE — 250N000011 HC RX IP 250 OP 636: Mod: JZ | Performed by: NURSE ANESTHETIST, CERTIFIED REGISTERED

## 2023-10-09 PROCEDURE — 710N000012 HC RECOVERY PHASE 2, PER MINUTE: Performed by: SURGERY

## 2023-10-09 PROCEDURE — 272N000001 HC OR GENERAL SUPPLY STERILE: Performed by: SURGERY

## 2023-10-09 PROCEDURE — 999N000141 HC STATISTIC PRE-PROCEDURE NURSING ASSESSMENT: Performed by: SURGERY

## 2023-10-09 PROCEDURE — 250N000011 HC RX IP 250 OP 636: Performed by: STUDENT IN AN ORGANIZED HEALTH CARE EDUCATION/TRAINING PROGRAM

## 2023-10-09 PROCEDURE — 88341 IMHCHEM/IMCYTCHM EA ADD ANTB: CPT | Mod: 26 | Performed by: DERMATOLOGY

## 2023-10-09 PROCEDURE — 88305 TISSUE EXAM BY PATHOLOGIST: CPT | Mod: 26 | Performed by: DERMATOLOGY

## 2023-10-09 PROCEDURE — 13121 CMPLX RPR S/A/L 2.6-7.5 CM: CPT | Performed by: PLASTIC SURGERY

## 2023-10-09 PROCEDURE — 250N000009 HC RX 250: Performed by: NURSE ANESTHETIST, CERTIFIED REGISTERED

## 2023-10-09 PROCEDURE — 11606 EXC TR-EXT MAL+MARG >4 CM: CPT | Mod: LT | Performed by: SURGERY

## 2023-10-09 PROCEDURE — 38900 IO MAP OF SENT LYMPH NODE: CPT | Mod: LT | Performed by: SURGERY

## 2023-10-09 PROCEDURE — 360N000076 HC SURGERY LEVEL 3, PER MIN: Performed by: SURGERY

## 2023-10-09 PROCEDURE — 250N000009 HC RX 250: Performed by: SURGERY

## 2023-10-09 PROCEDURE — 250N000011 HC RX IP 250 OP 636: Performed by: SURGERY

## 2023-10-09 PROCEDURE — A9520 TC99 TILMANOCEPT DIAG 0.5MCI: HCPCS | Performed by: SURGERY

## 2023-10-09 PROCEDURE — 250N000025 HC SEVOFLURANE, PER MIN: Performed by: SURGERY

## 2023-10-09 PROCEDURE — 258N000003 HC RX IP 258 OP 636: Performed by: NURSE ANESTHETIST, CERTIFIED REGISTERED

## 2023-10-09 PROCEDURE — 370N000017 HC ANESTHESIA TECHNICAL FEE, PER MIN: Performed by: SURGERY

## 2023-10-09 PROCEDURE — 82962 GLUCOSE BLOOD TEST: CPT

## 2023-10-09 PROCEDURE — 78195 LYMPH SYSTEM IMAGING: CPT

## 2023-10-09 PROCEDURE — 250N000013 HC RX MED GY IP 250 OP 250 PS 637: Performed by: STUDENT IN AN ORGANIZED HEALTH CARE EDUCATION/TRAINING PROGRAM

## 2023-10-09 PROCEDURE — 272N000002 HC OR SUPPLY OTHER OPNP: Performed by: SURGERY

## 2023-10-09 PROCEDURE — 710N000010 HC RECOVERY PHASE 1, LEVEL 2, PER MIN: Performed by: SURGERY

## 2023-10-09 PROCEDURE — 250N000009 HC RX 250: Performed by: PLASTIC SURGERY

## 2023-10-09 PROCEDURE — 343N000001 HC RX 343: Performed by: SURGERY

## 2023-10-09 PROCEDURE — 88307 TISSUE EXAM BY PATHOLOGIST: CPT | Mod: TC | Performed by: SURGERY

## 2023-10-09 PROCEDURE — 88307 TISSUE EXAM BY PATHOLOGIST: CPT | Mod: 26 | Performed by: DERMATOLOGY

## 2023-10-09 RX ORDER — ONDANSETRON 2 MG/ML
4 INJECTION INTRAMUSCULAR; INTRAVENOUS EVERY 30 MIN PRN
Status: DISCONTINUED | OUTPATIENT
Start: 2023-10-09 | End: 2023-10-09 | Stop reason: HOSPADM

## 2023-10-09 RX ORDER — ONDANSETRON 4 MG/1
4 TABLET, ORALLY DISINTEGRATING ORAL EVERY 6 HOURS PRN
Qty: 16 TABLET | Refills: 0 | Status: SHIPPED | OUTPATIENT
Start: 2023-10-09 | End: 2023-11-02

## 2023-10-09 RX ORDER — FENTANYL CITRATE 50 UG/ML
25 INJECTION, SOLUTION INTRAMUSCULAR; INTRAVENOUS EVERY 5 MIN PRN
Status: DISCONTINUED | OUTPATIENT
Start: 2023-10-09 | End: 2023-10-09 | Stop reason: HOSPADM

## 2023-10-09 RX ORDER — FENTANYL CITRATE 50 UG/ML
INJECTION, SOLUTION INTRAMUSCULAR; INTRAVENOUS PRN
Status: DISCONTINUED | OUTPATIENT
Start: 2023-10-09 | End: 2023-10-09

## 2023-10-09 RX ORDER — OXYCODONE HYDROCHLORIDE 10 MG/1
10 TABLET ORAL
Status: COMPLETED | OUTPATIENT
Start: 2023-10-09 | End: 2023-10-09

## 2023-10-09 RX ORDER — LIDOCAINE HYDROCHLORIDE 20 MG/ML
INJECTION, SOLUTION INFILTRATION; PERINEURAL PRN
Status: DISCONTINUED | OUTPATIENT
Start: 2023-10-09 | End: 2023-10-09

## 2023-10-09 RX ORDER — CEPHALEXIN 500 MG/1
500 CAPSULE ORAL 3 TIMES DAILY
Qty: 9 CAPSULE | Refills: 0 | Status: SHIPPED | OUTPATIENT
Start: 2023-10-09 | End: 2023-10-12

## 2023-10-09 RX ORDER — FENTANYL CITRATE 50 UG/ML
50 INJECTION, SOLUTION INTRAMUSCULAR; INTRAVENOUS EVERY 5 MIN PRN
Status: DISCONTINUED | OUTPATIENT
Start: 2023-10-09 | End: 2023-10-09 | Stop reason: HOSPADM

## 2023-10-09 RX ORDER — LIDOCAINE HYDROCHLORIDE AND EPINEPHRINE 10; 10 MG/ML; UG/ML
INJECTION, SOLUTION INFILTRATION; PERINEURAL PRN
Status: DISCONTINUED | OUTPATIENT
Start: 2023-10-09 | End: 2023-10-09 | Stop reason: HOSPADM

## 2023-10-09 RX ORDER — SODIUM CHLORIDE, SODIUM LACTATE, POTASSIUM CHLORIDE, CALCIUM CHLORIDE 600; 310; 30; 20 MG/100ML; MG/100ML; MG/100ML; MG/100ML
INJECTION, SOLUTION INTRAVENOUS CONTINUOUS
Status: DISCONTINUED | OUTPATIENT
Start: 2023-10-09 | End: 2023-10-09 | Stop reason: HOSPADM

## 2023-10-09 RX ORDER — AMOXICILLIN 250 MG
1-2 CAPSULE ORAL 2 TIMES DAILY
Qty: 30 TABLET | Refills: 0 | Status: SHIPPED | OUTPATIENT
Start: 2023-10-09 | End: 2023-11-02

## 2023-10-09 RX ORDER — HYDROMORPHONE HCL IN WATER/PF 6 MG/30 ML
0.2 PATIENT CONTROLLED ANALGESIA SYRINGE INTRAVENOUS EVERY 5 MIN PRN
Status: DISCONTINUED | OUTPATIENT
Start: 2023-10-09 | End: 2023-10-09 | Stop reason: HOSPADM

## 2023-10-09 RX ORDER — OXYCODONE HYDROCHLORIDE 5 MG/1
5 TABLET ORAL
Status: DISCONTINUED | OUTPATIENT
Start: 2023-10-09 | End: 2023-10-09 | Stop reason: HOSPADM

## 2023-10-09 RX ORDER — ONDANSETRON 2 MG/ML
INJECTION INTRAMUSCULAR; INTRAVENOUS PRN
Status: DISCONTINUED | OUTPATIENT
Start: 2023-10-09 | End: 2023-10-09

## 2023-10-09 RX ORDER — HYDROCODONE BITARTRATE AND ACETAMINOPHEN 5; 325 MG/1; MG/1
1-2 TABLET ORAL EVERY 4 HOURS PRN
Qty: 30 TABLET | Refills: 0 | Status: SHIPPED | OUTPATIENT
Start: 2023-10-09 | End: 2023-11-02

## 2023-10-09 RX ORDER — ISOSULFAN BLUE 50 MG/5ML
INJECTION, SOLUTION SUBCUTANEOUS PRN
Status: DISCONTINUED | OUTPATIENT
Start: 2023-10-09 | End: 2023-10-09 | Stop reason: HOSPADM

## 2023-10-09 RX ORDER — CEFAZOLIN SODIUM/WATER 2 G/20 ML
2 SYRINGE (ML) INTRAVENOUS SEE ADMIN INSTRUCTIONS
Status: DISCONTINUED | OUTPATIENT
Start: 2023-10-09 | End: 2023-10-09 | Stop reason: HOSPADM

## 2023-10-09 RX ORDER — ONDANSETRON 4 MG/1
4 TABLET, ORALLY DISINTEGRATING ORAL EVERY 30 MIN PRN
Status: DISCONTINUED | OUTPATIENT
Start: 2023-10-09 | End: 2023-10-09 | Stop reason: HOSPADM

## 2023-10-09 RX ORDER — DEXAMETHASONE SODIUM PHOSPHATE 4 MG/ML
INJECTION, SOLUTION INTRA-ARTICULAR; INTRALESIONAL; INTRAMUSCULAR; INTRAVENOUS; SOFT TISSUE PRN
Status: DISCONTINUED | OUTPATIENT
Start: 2023-10-09 | End: 2023-10-09

## 2023-10-09 RX ORDER — GINSENG 100 MG
CAPSULE ORAL PRN
Status: DISCONTINUED | OUTPATIENT
Start: 2023-10-09 | End: 2023-10-09 | Stop reason: HOSPADM

## 2023-10-09 RX ORDER — HALOPERIDOL 5 MG/ML
1 INJECTION INTRAMUSCULAR
Status: DISCONTINUED | OUTPATIENT
Start: 2023-10-09 | End: 2023-10-09 | Stop reason: HOSPADM

## 2023-10-09 RX ORDER — HYDRALAZINE HYDROCHLORIDE 20 MG/ML
2.5-5 INJECTION INTRAMUSCULAR; INTRAVENOUS EVERY 10 MIN PRN
Status: DISCONTINUED | OUTPATIENT
Start: 2023-10-09 | End: 2023-10-09 | Stop reason: HOSPADM

## 2023-10-09 RX ORDER — ACETAMINOPHEN 325 MG/1
975 TABLET ORAL ONCE
Status: COMPLETED | OUTPATIENT
Start: 2023-10-09 | End: 2023-10-09

## 2023-10-09 RX ORDER — SODIUM CHLORIDE, SODIUM LACTATE, POTASSIUM CHLORIDE, CALCIUM CHLORIDE 600; 310; 30; 20 MG/100ML; MG/100ML; MG/100ML; MG/100ML
INJECTION, SOLUTION INTRAVENOUS CONTINUOUS PRN
Status: DISCONTINUED | OUTPATIENT
Start: 2023-10-09 | End: 2023-10-09

## 2023-10-09 RX ORDER — BUPIVACAINE HYDROCHLORIDE 2.5 MG/ML
INJECTION, SOLUTION INFILTRATION; PERINEURAL PRN
Status: DISCONTINUED | OUTPATIENT
Start: 2023-10-09 | End: 2023-10-09 | Stop reason: HOSPADM

## 2023-10-09 RX ORDER — PROPOFOL 10 MG/ML
INJECTION, EMULSION INTRAVENOUS PRN
Status: DISCONTINUED | OUTPATIENT
Start: 2023-10-09 | End: 2023-10-09

## 2023-10-09 RX ORDER — CEFAZOLIN SODIUM/WATER 2 G/20 ML
2 SYRINGE (ML) INTRAVENOUS
Status: COMPLETED | OUTPATIENT
Start: 2023-10-09 | End: 2023-10-09

## 2023-10-09 RX ORDER — LABETALOL HYDROCHLORIDE 5 MG/ML
10 INJECTION, SOLUTION INTRAVENOUS
Status: DISCONTINUED | OUTPATIENT
Start: 2023-10-09 | End: 2023-10-09 | Stop reason: HOSPADM

## 2023-10-09 RX ORDER — HYDROMORPHONE HCL IN WATER/PF 6 MG/30 ML
0.4 PATIENT CONTROLLED ANALGESIA SYRINGE INTRAVENOUS EVERY 5 MIN PRN
Status: DISCONTINUED | OUTPATIENT
Start: 2023-10-09 | End: 2023-10-09 | Stop reason: HOSPADM

## 2023-10-09 RX ADMIN — Medication 10 MG: at 14:22

## 2023-10-09 RX ADMIN — HYDROMORPHONE HYDROCHLORIDE 0.4 MG: 0.2 INJECTION, SOLUTION INTRAMUSCULAR; INTRAVENOUS; SUBCUTANEOUS at 16:57

## 2023-10-09 RX ADMIN — Medication 50 MG: at 13:52

## 2023-10-09 RX ADMIN — HYDROMORPHONE HYDROCHLORIDE 0.4 MG: 0.2 INJECTION, SOLUTION INTRAMUSCULAR; INTRAVENOUS; SUBCUTANEOUS at 17:24

## 2023-10-09 RX ADMIN — SODIUM CHLORIDE, POTASSIUM CHLORIDE, SODIUM LACTATE AND CALCIUM CHLORIDE: 600; 310; 30; 20 INJECTION, SOLUTION INTRAVENOUS at 13:37

## 2023-10-09 RX ADMIN — Medication 10 MG: at 14:45

## 2023-10-09 RX ADMIN — ONDANSETRON 4 MG: 2 INJECTION INTRAMUSCULAR; INTRAVENOUS at 15:36

## 2023-10-09 RX ADMIN — PROPOFOL 200 MG: 10 INJECTION, EMULSION INTRAVENOUS at 13:52

## 2023-10-09 RX ADMIN — HYDROMORPHONE HYDROCHLORIDE 0.5 MG: 1 INJECTION, SOLUTION INTRAMUSCULAR; INTRAVENOUS; SUBCUTANEOUS at 15:36

## 2023-10-09 RX ADMIN — FENTANYL CITRATE 50 MCG: 50 INJECTION INTRAMUSCULAR; INTRAVENOUS at 13:44

## 2023-10-09 RX ADMIN — FENTANYL CITRATE 100 MCG: 50 INJECTION INTRAMUSCULAR; INTRAVENOUS at 13:55

## 2023-10-09 RX ADMIN — LIDOCAINE HYDROCHLORIDE 100 MG: 20 INJECTION, SOLUTION INFILTRATION; PERINEURAL at 13:44

## 2023-10-09 RX ADMIN — FENTANYL CITRATE 50 MCG: 50 INJECTION, SOLUTION INTRAMUSCULAR; INTRAVENOUS at 16:51

## 2023-10-09 RX ADMIN — HYDROMORPHONE HYDROCHLORIDE 0.4 MG: 0.2 INJECTION, SOLUTION INTRAMUSCULAR; INTRAVENOUS; SUBCUTANEOUS at 17:35

## 2023-10-09 RX ADMIN — FENTANYL CITRATE 50 MCG: 50 INJECTION INTRAMUSCULAR; INTRAVENOUS at 16:06

## 2023-10-09 RX ADMIN — FENTANYL CITRATE 25 MCG: 50 INJECTION, SOLUTION INTRAMUSCULAR; INTRAVENOUS at 16:43

## 2023-10-09 RX ADMIN — DEXAMETHASONE SODIUM PHOSPHATE 8 MG: 4 INJECTION, SOLUTION INTRA-ARTICULAR; INTRALESIONAL; INTRAMUSCULAR; INTRAVENOUS; SOFT TISSUE at 14:07

## 2023-10-09 RX ADMIN — SODIUM CHLORIDE, POTASSIUM CHLORIDE, SODIUM LACTATE AND CALCIUM CHLORIDE: 600; 310; 30; 20 INJECTION, SOLUTION INTRAVENOUS at 15:00

## 2023-10-09 RX ADMIN — ONDANSETRON 4 MG: 2 INJECTION INTRAMUSCULAR; INTRAVENOUS at 17:42

## 2023-10-09 RX ADMIN — OXYCODONE HYDROCHLORIDE 10 MG: 10 TABLET ORAL at 17:57

## 2023-10-09 RX ADMIN — Medication 2 G: at 13:55

## 2023-10-09 RX ADMIN — FENTANYL CITRATE 25 MCG: 50 INJECTION, SOLUTION INTRAMUSCULAR; INTRAVENOUS at 16:34

## 2023-10-09 RX ADMIN — FENTANYL CITRATE 50 MCG: 50 INJECTION INTRAMUSCULAR; INTRAVENOUS at 14:45

## 2023-10-09 RX ADMIN — MIDAZOLAM 2 MG: 1 INJECTION INTRAMUSCULAR; INTRAVENOUS at 13:37

## 2023-10-09 RX ADMIN — TILMANOCEPT 0.5 MILLICURIE: KIT at 08:15

## 2023-10-09 RX ADMIN — PROCHLORPERAZINE EDISYLATE 5 MG: 5 INJECTION INTRAMUSCULAR; INTRAVENOUS at 18:03

## 2023-10-09 RX ADMIN — SUGAMMADEX 200 MG: 100 INJECTION, SOLUTION INTRAVENOUS at 16:08

## 2023-10-09 RX ADMIN — HYDROMORPHONE HYDROCHLORIDE 0.4 MG: 0.2 INJECTION, SOLUTION INTRAMUSCULAR; INTRAVENOUS; SUBCUTANEOUS at 17:05

## 2023-10-09 RX ADMIN — HYDROMORPHONE HYDROCHLORIDE 0.4 MG: 0.2 INJECTION, SOLUTION INTRAMUSCULAR; INTRAVENOUS; SUBCUTANEOUS at 17:14

## 2023-10-09 RX ADMIN — ACETAMINOPHEN 975 MG: 325 TABLET, FILM COATED ORAL at 12:05

## 2023-10-09 ASSESSMENT — ACTIVITIES OF DAILY LIVING (ADL)
ADLS_ACUITY_SCORE: 37

## 2023-10-09 NOTE — INTERVAL H&P NOTE
"I have reviewed the surgical (or preoperative) H&P that is linked to this encounter, and examined the patient. There are no significant changes    Clinical Conditions Present on Arrival:  Clinically Significant Risk Factors Present on Admission                  # Obesity: Estimated body mass index is 32.28 kg/m  as calculated from the following:    Height as of this encounter: 1.727 m (5' 8\").    Weight as of this encounter: 96.3 kg (212 lb 4.9 oz).     Pj Aguirre MD , FACS   Diplomate American Board of Plastic Surgery  Diplomate American Board of Surgery  Adj. Assistant Professor of Surgery  Division of Plastic & Reconstructive Surgery   [facility]  Office: [phone]   10/9/2023 at 12:15 PM   "

## 2023-10-09 NOTE — DISCHARGE INSTRUCTIONS
Same-Day Surgery   Adult Discharge Orders & Instructions   For 24 hours after surgery  Get plenty of rest.  A responsible adult must stay with you for at least 24 hours after you leave the hospital.   Do not drive or use heavy equipment.  If you have weakness or tingling, don't drive or use heavy equipment until this feeling goes away.  Do not drink alcohol.  Avoid strenuous or risky activities.  Ask for help when climbing stairs.   You may feel lightheaded.  IF so, sit for a few minutes before standing.  Have someone help you get up.   If you have nausea (feel sick to your stomach): Drink only clear liquids such as apple juice, ginger ale, broth or 7-Up.  Rest may also help.  Be sure to drink enough fluids.  Move to a regular diet as you feel able.  You may have a slight fever. Call the doctor if your fever is over 100 F (37.7 C) (taken under the tongue) or lasts longer than 24 hours.  You may have a dry mouth, a sore throat, muscle aches or trouble sleeping.  These should go away after 24 hours.  Do not make important or legal decisions.   Call your doctor for any of the followin.  Signs of infection (fever, growing tenderness at the surgery site, a large amount of drainage or bleeding, severe pain, foul-smelling drainage, redness, swelling).    2. It has been over 8 to 10 hours since surgery and you are still not able to urinate (pass water).    3.  Headache for over 24 hours.    To contact a doctor, call Dr. Higginbotham or Dr. Aguirre or:  '   [phone] and ask for the resident on call for General/Plastics Surgery (answered 24 hours a day)  '   Emergency Department: [facility]: [phone]

## 2023-10-09 NOTE — ANESTHESIA PREPROCEDURE EVALUATION
Anesthesia Pre-Procedure Evaluation    Patient: Deandra Lewis   MRN: [id number] : 1975        Procedure : Procedure(s):  Wide Local Excision of LEFT thigh melanoma  Left Femoral Marshall Lymph Node Mapping and Biopsy  COMPLEX CLOSURE SOFT TISSUE DEFECT LEFT THIGH.          Past Medical History:   Diagnosis Date    Anemia     Anxiety     paxil 10mg= mild bladder retention, at 30mg = ravenously hungry/weight gain increased headaches; lexapro = bad sweating    Contraception     ocp's some = mood swings ; seasonale: frequent vaginal bleeding and weight gain    Hot flashes 2016    Hypertension goal BP (blood pressure) < 140/90  - new diagnosis 2023    Kidney stones     Dr. Blank Steele - [facility] - Urology     Lactose intolerance     Malignant melanoma of left lower extremity including hip (H) 2023    Maxillary bone loss 2014    left - secondary to severe oral/sinus infection and bleeding  - [facility] [place] s/p multiple surgeries     Multiple fractures of left foot with routine healing 2021    Night sweats     Sleep problems     Uncomplicated asthma 20s      Past Surgical History:   Procedure Laterality Date    ABDOMEN SURGERY      APPENDECTOMY      ARTHROSCOPY, ARTHROPLASTY TEMPOROMANDIBULAR JOINT, COMBINED  age 14     TMJ surgery as an 9th grader    COLONOSCOPY  2017    Approximately    CYSTOSCOPY,URETEROSCOPY,STONE REMV  2002    with stent placements - since removed     ENDOSCOPIC BALLOON SINUPLASTY, OPTICAL TRACKING SYSTEM ENDOSCOPIC SINUS SURGERY, COMBINED  2013    Procedure: COMBINED ENDOSCOPIC BALLOON SINUPLASTY, OPTICAL TRACKING SYSTEM ENDOSCOPIC SINUS SURGERY;  Left Endo Sinus  Surgery  with Septoplasty      ENDOSCOPIC SINUS SURGERY  01/15/2013    Procedure: ENDOSCOPIC SINUS SURGERY;  removal of nasal packing with endocopic debridement of left paranasal sinuses;  Surgeon: Fabian Driver MD;  Location:  OR    ENDOSCOPIC SINUS SURGERY   04/22/2013    Procedure: ENDOSCOPIC SINUS SURGERY;  Functional Endoscopic Sinus Surgery With Biopsy ;  Surgeon: Jcarlos Hogan MD;  Location: [facility] OR      Allergies   Allergen Reactions    Adhesive Tape Blisters    Melon      Mouth gets itchy     Seasonal Allergies       Social History     Tobacco Use    Smoking status: Former     Packs/day: 0.10     Years: 5.00     Pack years: 0.50     Types: Cigarettes    Smokeless tobacco: Never    Tobacco comments:     Tried smoking in high school   Substance Use Topics    Alcohol use: Not Currently     Comment: Rarely      Wt Readings from Last 1 Encounters:   10/09/23 96.3 kg (212 lb 4.9 oz)        Anesthesia Evaluation   Pt has had prior anesthetic.         ROS/MED HX  ENT/Pulmonary:     (+)                    Mild Persistent, asthma                  Neurologic:       Cardiovascular:     (+)  hypertension- -   -  - -                                      METS/Exercise Tolerance: >4 METS    Hematologic:       Musculoskeletal:       GI/Hepatic:       Renal/Genitourinary:       Endo:       Psychiatric/Substance Use:       Infectious Disease:       Malignancy:   (+) Malignancy, History of Skin.Skin CA Active status post.      Other:            Physical Exam    Airway        Mallampati: III   TM distance: > 3 FB   Neck ROM: full   Mouth opening: < 3 cm    Respiratory Devices and Support         Dental           Cardiovascular          Rhythm and rate: regular and normal     Pulmonary               Other findings: Has     OUTSIDE LABS:  CBC:   Lab Results   Component Value Date    WBC 4.6 09/20/2023    WBC 8.4 12/01/2022    HGB 13.3 09/20/2023    HGB 14.1 12/01/2022    HCT 39.4 09/20/2023    HCT 41.2 12/01/2022     09/20/2023     12/01/2022     BMP:   Lab Results   Component Value Date     09/20/2023     12/01/2022    POTASSIUM 4.4 09/20/2023    POTASSIUM 4.2 12/01/2022    CHLORIDE 106 09/20/2023    CHLORIDE 106 12/01/2022    CO2 23 09/20/2023     CO2 18 (L) 12/01/2022    BUN 11.6 09/20/2023    BUN 11.2 12/01/2022    CR 0.90 09/20/2023    CR 0.74 12/01/2022    GLC 88 10/09/2023    GLC 89 09/20/2023     COAGS:   Lab Results   Component Value Date    PTT 32 03/25/2013    INR 1.08 03/25/2013     POC:   Lab Results   Component Value Date    HCG Negative 09/20/2023     HEPATIC:   Lab Results   Component Value Date    ALBUMIN 4.6 09/20/2023    PROTTOTAL 6.8 09/20/2023    ALT 11 09/20/2023    AST 25 09/20/2023    ALKPHOS 96 09/20/2023    BILITOTAL 0.4 09/20/2023     OTHER:   Lab Results   Component Value Date    CHLOE 9.5 09/20/2023    TSH 1.69 12/01/2022    T4 1.20 11/16/2016       Anesthesia Plan    ASA Status:  3    NPO Status:  NPO Appropriate    Anesthesia Type: General.     - Airway: ETT   Induction: Intravenous.   Maintenance: Balanced.   Techniques and Equipment:     - Airway: Video-Laryngoscope     - Lines/Monitors: 2nd IV     Consents    Anesthesia Plan(s) and associated risks, benefits, and realistic alternatives discussed. Questions answered and patient/representative(s) expressed understanding.     - Discussed:     - Discussed with:  Patient      - Extended Intubation/Ventilatory Support Discussed: No.      - Patient is DNR/DNI Status: No     Use of blood products discussed: No .     Postoperative Care       PONV prophylaxis: Ondansetron (or other 5HT-3), Dexamethasone or Solumedrol     Comments:                Afshin Oviedo MD

## 2023-10-09 NOTE — PROGRESS NOTES
Patient seen in preoperative holding area in presence of 3C preoperative RN.    Lymphoscintigraphy results were reviewed with nuclear medicine to show left inguinal node(s).  I reviewed the results along with the images with Deandra RADHA Lewis.    We discussed her nervousness regarding sentinel lymph node mapping and biopsy. We discussed the rationale for it. After careful consideration, Deandra Lewis elected to proceed.  We discussed that I do not know the exact location but did show her by marking on the skin an approximate location for the SLNB, in the left groin area. The primary tumor site was also confirmed with the patient and marked on the skin.    We also discussed that a Marsh catheter will be placed intraoperatively and removed prior to her waking up.      She is in agreement with the plan above.    Tracie Higginbotham MD MS [facility] FACS  Associate Professor of Surgery  Division of Surgical Oncology  [facility]

## 2023-10-09 NOTE — OP NOTE
DATE OF SURGERY: October 9, 2023     SURGEON: Tracie Higginbotham MD  ASSISTANT(S): Trace Alonzo MD PGY-3    PREOPERATIVE DIAGNOSIS: Left thigh melanoma  POSTOPERATIVE DIAGNOSIS: same    PROCEDURE(S):   Left femoral sentinel lymph node mapping and biopsy   Wide local excision of left thigh melanoma with 2 cm margins  Complex wound closure by Dr Aguirre, to be dictated separately    ANESTHESIA: General    CLINICAL NOTE:  Deandra Lewis is a 47 year old woman with a 1.2 mm thick melanoma of the left distal medial thigh.  The risks and benefits of a wide local excision and sentinel lymph node mapping and biopsy were discussed with the patient and she elected to proceed with informed consent.    OPERATIVE FINDINGS:  1. One femoral sentinel lymph node removed.    OPERATIVE PROCEDURE:  The patient first presented to the Nuclear Medicine department for josé-tumoral injection of technetium-labelled Tilmanocept and lymphoscintigraphy.  The lymphoscintigraphy images were personally reviewed by me prior to the start of the procedure. Preoperative lymphoscintigraphy indicated the sentinel chika basin(s) is in the left inguinofemoral region. The primary tumor site was verified with the patient by me personally.  The patient was brought to the operating room and placed in the supine position with appropriate padding for all of the pressure points.  A general anesthetic was administered by the Anesthesia team.  A surgical safety checklist was performed to confirm the patient's identity, the site and laterality of the procedure. Perioperative antibiotics (Ancef) was provided.  VTE prophylaxis was provided with serial compression devices. 0.3 mL of lymphazurin was injected into the four quadrants around the tumor site on the left distal medial thigh.  The left  lower extremity was then prepped and draped in the usual sterile fashion using Chloraprep.     We began with the left groin.  An incision was made directly overlying point of maximal  radioactivity, just inferior to the inguinal crease in the medial upper thigh.  Naresh's fascia was incised.  The Neoprobe was used to identify the sentinel lymph nodes.  [place] lymph node #1 was faintly blue and had an ex vivo count of 1664.  The background count was 28. No other blue or palpable lymph nodes were found.  Thus no additional sentinel lymph nodes were sought.  All of the lymph nodes were sent to pathology individually.  The wound was irrigated and hemostasis was achieved. 10 mL of 0.25% marcaine without epinephrine was infiltrated into the surgical site.  The incision was closed in two layers with interrupted 3-0 vicryl and a running subcuticular 4-0 monocryl.     Next, 2 cm margins were marked out circumferentially around the tumor site (where the biopsy previously took place) on the left distal medial thigh.  The tumor site itself measured 22 mm x 15 mm.  The marked out oval was then excised, taking with it the subcutaneous fat.  The dissection was carried out down to the underlying fascia.  The specimen was then marked using a marking suture; short for superior (12:00), and long for lateral (3:00).  The specimen measured 5.0 cm x 4.5 cm.  The specimen was then sent to pathology.  The cavity at this point measured 8.5 cm x 5.0 cm and was 1.5 cm deep.  The wound was irrigated and hemostasis was achieved. The patient tolerated the procedure well thus far. The sponge, needle, instrument counts were correct thus far.  At this point, Dr Aguirre  and their team took over for the complex wound closure portion of the case, which will be dictated separately.       I was present and scrubbed for the entire above procedure.    Dr. Alonzo actively first assisted during this operation providing necessary retraction and exposure as well as maintaining hemostasis and clear operative field.  This was helpful in allowing the operation to proceed in a safe and expeditious manner.  They were present for the entire  duration of the critical portions of the operation.    EBL: 10 mL    SPECIMEN(S):  A. Left femoral sentinel lymph node # 1  B. Left distal medial thigh skin lesion; short suture = superior (12:00), long suture = lateral (3:00)    COMMISSION ON CANCER SYNOPTIC REPORT:  Wide Local Excision for Primary Cutaneous Melanoma - Excision 1 (Upper Leg - Left)  Operation performed with curative intent Yes   Original Breslow thickness of the lesion 1.2 mm (to the tenth of a millimeter)   Clinical margin width 2 cm   Depth of excision Full-thickness skin/subcutaneous tissue down to fascia (melanoma)     Tracie Higginbotham MD MSc FRCSC FACS  Associate Professor of Surgery  Division of Surgical Oncology  [facility]

## 2023-10-09 NOTE — ANESTHESIA CARE TRANSFER NOTE
Patient: Deandra Lewis    Procedure: Procedure(s):  Wide Local Excision of LEFT thigh melanoma  Left Femoral [place] Lymph Node Mapping and Biopsy  COMPLEX CLOSURE SOFT TISSUE DEFECT LEFT THIGH 14 cm Length       Diagnosis: Malignant melanoma of left lower extremity including hip (H) [C43.72]  Diagnosis Additional Information: No value filed.    Anesthesia Type:   No value filed.     Note:    Oropharynx: oropharynx clear of all foreign objects and spontaneously breathing  Level of Consciousness: awake  Oxygen Supplementation: face mask  Level of Supplemental Oxygen (L/min / FiO2): 6  Independent Airway: airway patency satisfactory and stable  Dentition: dentition unchanged  Vital Signs Stable: post-procedure vital signs reviewed and stable  Report to RN Given: handoff report given  Patient transferred to: PACU    Handoff Report: Identifed the Patient, Identified the Reponsible Provider, Reviewed the pertinent medical history, Discussed the surgical course, Reviewed Intra-OP anesthesia mangement and issues during anesthesia, Set expectations for post-procedure period and Allowed opportunity for questions and acknowledgement of understanding      Vitals:  Vitals Value Taken Time   /79 10/09/23 1620   Temp     Pulse 98 10/09/23 1623   Resp     SpO2 100 % 10/09/23 1623   Vitals shown include unvalidated device data.    Electronically Signed By: ANEESH Castle CRNA  October 9, 2023  4:24 PM

## 2023-10-09 NOTE — ANESTHESIA PROCEDURE NOTES
Airway       Patient location during procedure: OR       Procedure Start/Stop Times: 10/9/2023 1:54 PM  Staff -        CRNA: Meche Rodriguez APRN CRNA       Performed By: CRNA  Consent for Airway        Urgency: elective  Indications and Patient Condition       Indications for airway management: josé-procedural       Induction type:intravenous       Mask difficulty assessment: 1 - vent by mask    Final Airway Details       Final airway type: endotracheal airway       Successful airway: ETT - single  Endotracheal Airway Details        ETT size (mm): 7.0       Cuffed: yes       Successful intubation technique: video laryngoscopy       VL Blade Size: Glidescope 3 (lo pro)       Grade View of Cords: 1       Adjucts: stylet       Position: Right       Measured from: lips       Secured at (cm): 22       Bite block used: None    Post intubation assessment        Placement verified by: capnometry, equal breath sounds and chest rise        Number of attempts at approach: 1       Number of other approaches attempted: 0       Secured with: pink tape       Ease of procedure: easy       Dentition: Intact    Medication(s) Administered   Medication Administration Time: 10/9/2023 1:54 PM        
No

## 2023-10-09 NOTE — ANESTHESIA POSTPROCEDURE EVALUATION
Patient: Deandra Lewis    Procedure: Procedure(s):  Wide Local Excision of LEFT thigh melanoma  Left Femoral Ozan Lymph Node Mapping and Biopsy  COMPLEX CLOSURE SOFT TISSUE DEFECT LEFT THIGH 14 cm Length       Anesthesia Type:  General    Note:  Disposition: Outpatient   Postop Pain Control:             Sign Out: ONGOING pain issues (pain 3/10 following 2mg IV dilaudid and 10mg oxycodone. Has narcotics prescribed by surgeon for home)   PONV: No   Neuro/Psych: Uneventful            Sign Out: Acceptable/Baseline neuro status   Airway/Respiratory: Uneventful            Sign Out: Acceptable/Baseline resp. status   CV/Hemodynamics: Uneventful            Sign Out: Acceptable CV status; No obvious hypovolemia; No obvious fluid overload   Other NRE: NONE   DID A NON-ROUTINE EVENT OCCUR? No           Last vitals:  Vitals Value Taken Time   /75 10/09/23 1830   Temp 36.6  C (97.8  F) 10/09/23 1815   Pulse 84 10/09/23 1832   Resp 12 10/09/23 1815   SpO2 94 % 10/09/23 1832   Vitals shown include unvalidated device data.    Electronically Signed By: Raymond Castaneda MD  October 9, 2023  6:33 PM

## 2023-10-10 ENCOUNTER — PATIENT OUTREACH (OUTPATIENT)
Dept: ONCOLOGY | Facility: CLINIC | Age: 48
End: 2023-10-10
Payer: COMMERCIAL

## 2023-10-10 DIAGNOSIS — G89.18 POSTOPERATIVE PAIN: Primary | ICD-10-CM

## 2023-10-10 NOTE — OP NOTE
October 9, 2023    Deandra Lewis      Preoperative diagnosis:  Melanoma left thigh     Postoperative diagnosis: same     Procedure: Complex closure, 14 cm length, open wound, distal third, anteromedial aspect of left thigh, after wide local excision of melanoma.      Surgeon: Pj Aguirre MD.     Assistant: Quyen Spring CSA (there was no plastic surgery resident available to assist).     Anesthesia:  General and local, utilizing lidocaine 1% with epinephrine in the periphery of this open wound.     IV fluids: Please see anesthesia record     Estimated blood loss (EBL): 2 mL      Findings:  Open wound, anterior medial aspect, distal third of her left thigh, with exposure of the underlying fascia.  The defect measured 5 cm x 10 cm x 2 cm.           Specimen:  None     Drains: None     Disposition:  Patient tolerated procedure well, she was extubated and transferred to the recovery room awake and in stable condition.     Indications:  Mrs. Lewis is a 47 years old lady who has been diagnosed with melanoma, superficial spreading type, Breslow thickness of 1.2 mm, in the anterior medial aspect, distal third of her left thigh.     Patient is scheduled for wide local excision of this melanoma, with 2 cm margins as well as left inguinal sentinel lymph node biopsy by Dr. Higginbotham.     I will participate in the soft tissue coverage of the wound defect after excision.     Risks were explained to the patient and they include but are not limited to scarring, infection, dehiscence, bleeding, seroma, hematoma, nerve injury, possibility of skin graft, donor site wound, need for further surgeries.  Patient has acknowledged all these risks and has agreed to proceed.     Procedure:     Patient was identified in the preoperative holding area and preoperative IV antibiotics were given to the patient.     Dr. Higginbotham began the procedure and completed the excision and the lymph node biopsy.  Please see her operative report for further  details.  Once this was completed I was called to the operating room.     The resultant wound defect after the wide local excision, measured 10 cm x 5 cm x 2 cm.  There was underlying fascia exposed.     I then proceeded to infiltrate the periphery of this wound with lidocaine 1% with epinephrine in order to achieve analgesia and promote hemostasis.  Then, I irrigate the open wound with antibiotic solution.     Next, I proceeded to perform extensive undermining in the supra aponeurotic plane.  Undermining was at least 3 cm in the periphery of the open wound and above the exposed fascia.     Irrigation was provided and I found that hemostasis was excellent.       Wound was then closed in layers.  Deep subcutaneous tissue with buried and interrupted 2-0 PDS.  Medial and lateral dogears were excised. Superficial subcutaneous tissues were then approximated with 3-0 PDS.  Finally, the skin was closed with 4-0 STRATAFIX running subcuticular stitches.     Length of closure was 14 cm.     Instrument count was reported by nursing personnel as correct.     Patient tolerated procedure well, she was then extubated and transferred to recovery room awake and in stable condition.      Pj Aguirre MD , FACS   Diplomate American Board of Plastic Surgery  Diplomate American Board of Surgery  Adj. Assistant Professor of Surgery  Division of Plastic & Reconstructive Surgery   [facility]  Office: [phone]   10/9/2023 at 8:02 PM

## 2023-10-10 NOTE — PROGRESS NOTES
Post Op Discharge Call     Surgery:      Left femoral sentinel lymph node mapping and biopsy   Wide local excision of left thigh melanoma with 2 cm margins  Complex wound closure by Dr Aguirre, to be dictated separately     Surgery date: 10/9/2023     Discharge Date:  10/9/2023    Date of Post-op Call:  10/10/2023       Immediate Concerns:     Nausea and vomiting, especially when she stands up from sitting or lying down. She is unable to take ondansetron due to causing headaches. She is taking Norco and Keflex with food but overall has minimal PO intake. Encouraged more fluids.      Pain:  No concerns with pain management. Taking Norco as directed with some relief.    Using pain medications as recommended with appropriate relief.      Incision:   No concerns, healing well, no redness, drainage or edema reported. Is in ACE wrap per Dr. Aguirre's instructions due to allergy to adhesives.      Activity:   No difficulty with ADLs reported.   Patient is up independently at home.   Encourage patient to continue to stay active.        Post op/follow up plans:      Post op appointment scheduled,confirmed date and time with patient. Direct contact number provided for any additional questions or concerns.     Future Appointments   Date Time Provider Department Center   10/12/2023  2:00 PM [facility]   10/25/2023  4:00 PM [facility]   11/7/2023  1:00 PM [id number]   11/30/2023  7:45 AM [facility]         Valencia Carreon, RNCC  [facility]  Surgical Oncology

## 2023-10-11 RX ORDER — METHOCARBAMOL 500 MG/1
500 TABLET, FILM COATED ORAL 4 TIMES DAILY PRN
Qty: 20 TABLET | Refills: 0 | Status: SHIPPED | OUTPATIENT
Start: 2023-10-11 | End: 2024-06-26

## 2023-10-11 NOTE — TELEPHONE ENCOUNTER
POST-OP CALL  Oct 10, 2023    Deandra Lewis is a 47 year old female s/p left femoral sentinel lymph node biopsy and wide local excision of left thigh melanoma with Dr. Higginbotham and complex wound closure with Dr. Aguirre 10/9/23 for melanoma.     She reports the nausea is much better. She is taking Norco 2 every 4 hours around the clock. She asked for a prescription refill of Norco. She was given 30 tables of Norco at discharge. We can try adding Robaxin to see if this better controls her pain and hopefully helps minimize her use of Norco. She recommend she not take Norco scheduled and rather take is as needed. She is seeing Dr. Aguirre tomorrow.   Follow up appointment scheduled on 10/25/23 with Dr. Higginbotham.  Patient will call with any questions or concerns.    Vanessa Burger PA-C

## 2023-10-12 ENCOUNTER — OFFICE VISIT (OUTPATIENT)
Dept: PLASTIC SURGERY | Facility: AMBULATORY SURGERY CENTER | Age: 48
End: 2023-10-12
Payer: COMMERCIAL

## 2023-10-12 DIAGNOSIS — C43.9 MELANOMA OF SKIN (H): Primary | ICD-10-CM

## 2023-10-12 PROCEDURE — 99024 POSTOP FOLLOW-UP VISIT: CPT | Performed by: PLASTIC SURGERY

## 2023-10-12 RX ORDER — IBUPROFEN 800 MG/1
800 TABLET, FILM COATED ORAL EVERY 6 HOURS PRN
Qty: 30 TABLET | Refills: 0 | Status: SHIPPED | OUTPATIENT
Start: 2023-10-12 | End: 2024-06-26

## 2023-10-12 NOTE — LETTER
10/12/2023         RE: Deandra Lewis  [address] [place] [address]        Dear Colleague,    Thank you for referring your patient, Deandra Lewis, to the [facility] [place]. Please see a copy of my visit note below.    Ms. Combs is a 47 years old lady status post wide local excision of malignant melanoma left thigh by Dr. Higginbotham as well as complex closure by me.  She is 3 days out from surgery.  She is doing well.    At the physical exam, dressing has been removed, incision is healing well, no sign of infection or dehiscence.  No evidence of seroma or hematoma.  Patient is able to ambulate.    Plan: May begin having regular showers, wash wound with soap and water, start applying cocoa butter lotion along the incision.  And follow-up with me in 3 weeks.    Patient is doing excellent from plastic surgery standpoint.    Pj Aguirre MD , FACS   Diplomate American Board of Plastic Surgery  Diplomate American Board of Surgery  Adj. Assistant Professor of Surgery  Division of Plastic & Reconstructive Surgery   [facility]  Office: [phone]   10/12/2023 at 2:24 PM       Again, thank you for allowing me to participate in the care of your patient.        Sincerely,        Pj Aguirre MD

## 2023-10-12 NOTE — PROGRESS NOTES
Ms. Combs is a 47 years old lady status post wide local excision of malignant melanoma left thigh by Dr. Higginbotham as well as complex closure by me.  She is 3 days out from surgery.  She is doing well.    At the physical exam, dressing has been removed, incision is healing well, no sign of infection or dehiscence.  No evidence of seroma or hematoma.  Patient is able to ambulate.    Plan: May begin having regular showers, wash wound with soap and water, start applying cocoa butter lotion along the incision.  And follow-up with me in 3 weeks.    Patient is doing excellent from plastic surgery standpoint.    Pj Aguirre MD , FACS   Diplomate American Board of Plastic Surgery  Diplomate American Board of Surgery  Adj. Assistant Professor of Surgery  Division of Plastic & Reconstructive Surgery   [facility]  Office: [phone]   10/12/2023 at 2:24 PM

## 2023-10-17 NOTE — TELEPHONE ENCOUNTER
Forms received via fax on 10/13/23 requesting forms be signed and initialed by . Forms signed by  today and faxed. Successful transmission confirmed by Right fax..Annamarie Kerr RN

## 2023-10-18 LAB
PATH REPORT.COMMENTS IMP SPEC: NORMAL
PATH REPORT.COMMENTS IMP SPEC: NORMAL
PATH REPORT.FINAL DX SPEC: NORMAL
PATH REPORT.GROSS SPEC: NORMAL
PATH REPORT.MICROSCOPIC SPEC OTHER STN: NORMAL
PATH REPORT.RELEVANT HX SPEC: NORMAL
PHOTO IMAGE: NORMAL

## 2023-10-25 ENCOUNTER — OFFICE VISIT (OUTPATIENT)
Dept: SURGERY | Facility: CLINIC | Age: 48
End: 2023-10-25
Payer: COMMERCIAL

## 2023-10-25 DIAGNOSIS — C43.9 MELANOMA OF SKIN (H): Primary | ICD-10-CM

## 2023-10-25 PROCEDURE — 99024 POSTOP FOLLOW-UP VISIT: CPT | Performed by: SURGERY

## 2023-10-25 NOTE — NURSING NOTE
Deandra Lewis's goals for this visit include:   Chief Complaint   Patient presents with    Surgical Followup     2 week post op thigh excision, SNLB       She requests these members of her care team be copied on today's visit information: no    PCP: Linda Soto    Referring Provider:  No referring provider defined for this encounter.    LMP 09/02/2023     Do you need any medication refills at today's visit? No    Sudha Dwyer LPN

## 2023-10-25 NOTE — NURSING NOTE
Return to work forms signed by  and faxed to [phone]. Successful transmission confirmed by Right[employer]..Annamarie Kerr RN

## 2023-10-25 NOTE — PROGRESS NOTES
FOLLOW-UP  Oct 25, 2023    Deandra Lewis is a 47 year old female who returns for her 1st post-operative follow-up visit.     Cancer Staging   Malignant melanoma of left lower extremity including hip (H)-left anterior thigh Stage 1B (cT2a, cN0, cM0)  Staging form: Melanoma of the Skin, AJCC 8th Edition  - Clinical: Stage IB (cT2a, cN0, cM0) - Unsigned    Treatment to date:  Wide local excision of left lower anterior thigh melanoma with 2 cm margins, left femoral sentinel lymph node mapping and biopsy (10/9/2023)    HPI:    She underwent a WLE of a left lower anterior thigh melanoma and left femoral SLNB on 10/9/2023.  She is currently  2 week(s) post-op.  Final surgical pathology showed no residual melanoma and 1 benign node.    Since the procedure, she has been doing well.  She denies any redness or swelling, or drainage from the incision, or fever/chills.  She has some limited knee range of motion but denies any lower extremity swelling.       LMP 09/02/2023    Physical Exam  Constitutional:       Appearance: She is well-developed.   Pulmonary:      Effort: No respiratory distress.   Lymphadenopathy:      Comments: Femoral incision healing well without seroma, hematoma, or cellulitis.    Skin:     General: Skin is warm and dry.                INVESTIGATIONS:    Surgical Pathology (10/9/2023):  Final Diagnosis   A(1). Left femoral sentinel lymph node #1:  - One benign reactive lymph node, with no evidence of metastatic melanoma (0 of 1 lymph nodes positive) - (see description)      B(2). Left distal medial thigh skin lesion:  - Scar from the prior surgical procedure, with no evidence of residual lesion     ASSESSMENT:    Deandra Lewis is a 47 year old female with left lower extremity cutaneous melanoma, s/p surgery.    She is doing well.  I recommended she start moisturizing and massaging the scar with Vaseline.     We reviewed the pathology today and a copy of the report was provided. No other surgery treatment is  indicated.    Her stage is:   Cancer Staging   Malignant melanoma of left lower extremity including hip (H)-left anterior thigh Stage 1B (cT2a, cN0, cM0)  Staging form: Melanoma of the Skin, AJCC 8th Edition  - Clinical: Stage IB (cT2a, cN0, cM0) - Unsigned  - Pathologic: Stage IB (pT2a, pN0, cM0) - Signed by Tracie Higginbotham MD on 10/25/2023     We reviewed that surveillance involves a history and physical exam every 6 months for the first 5 years, then annually, with imaging studies only indicated if symptoms arise. We reviewed the signs and symptoms that could arise in the setting of recurrent locoregional or metastatic disease. In addition, she will need to undergo regular dermatologic full skin survey and evaluation given that patients with a diagnosis of melanoma are at risk of recurrence (local and distant) and of subsequent de sedrick melanoma.  I also reviewed the importance of protection from sun exposure, including wearing long sleeves, hats, etc and also the use of sunscreen with SPF of at least 35, with frequent re-applications.     All of the above was discussed with the patient and all questions were answered.     PLAN:  Follow up with dermatology for skin checks  Follow up with me in 6 months    Tracie Higginbotham MD MS [facility] FACS  Associate Professor of Surgery  Division of Surgical Oncology  [facility]

## 2023-10-25 NOTE — LETTER
10/25/2023         RE: Deandra Lewis  [address] [place] [address]        Dear Colleague,    Thank you for referring your patient, Deandra Lewis, to the [facility]. Please see a copy of my visit note below.    FOLLOW-UP  Oct 25, 2023    Deandra Lewis is a 47 year old female who returns for her 1st post-operative follow-up visit.     Cancer Staging   Malignant melanoma of left lower extremity including hip (H)-left anterior thigh Stage 1B (cT2a, cN0, cM0)  Staging form: Melanoma of the Skin, AJCC 8th Edition  - Clinical: Stage IB (cT2a, cN0, cM0) - Unsigned    Treatment to date:  Wide local excision of left lower anterior thigh melanoma with 2 cm margins, left femoral sentinel lymph node mapping and biopsy (10/9/2023)    HPI:    She underwent a WLE of a left lower anterior thigh melanoma and left femoral SLNB on 10/9/2023.  She is currently  2 week(s) post-op.  Final surgical pathology showed no residual melanoma and 1 benign node.    Since the procedure, she has been doing well.  She denies any redness or swelling, or drainage from the incision, or fever/chills.  She has some limited knee range of motion but denies any lower extremity swelling.       LMP 09/02/2023    Physical Exam  Constitutional:       Appearance: She is well-developed.   Pulmonary:      Effort: No respiratory distress.   Lymphadenopathy:      Comments: Femoral incision healing well without seroma, hematoma, or cellulitis.    Skin:     General: Skin is warm and dry.                INVESTIGATIONS:    Surgical Pathology (10/9/2023):  Final Diagnosis   A(1). Left femoral sentinel lymph node #1:  - One benign reactive lymph node, with no evidence of metastatic melanoma (0 of 1 lymph nodes positive) - (see description)      B(2). Left distal medial thigh skin lesion:  - Scar from the prior surgical procedure, with no evidence of residual lesion     ASSESSMENT:    Deandra Lewis is a 47 year old female with left  lower extremity cutaneous melanoma, s/p surgery.    She is doing well.  I recommended she start moisturizing and massaging the scar with Vaseline.     We reviewed the pathology today and a copy of the report was provided. No other surgery treatment is indicated.    Her stage is:   Cancer Staging   Malignant melanoma of left lower extremity including hip (H)-left anterior thigh Stage 1B (cT2a, cN0, cM0)  Staging form: Melanoma of the Skin, AJCC 8th Edition  - Clinical: Stage IB (cT2a, cN0, cM0) - Unsigned  - Pathologic: Stage IB (pT2a, pN0, cM0) - Signed by Tracie Higginbotham MD on 10/25/2023     We reviewed that surveillance involves a history and physical exam every 6 months for the first 5 years, then annually, with imaging studies only indicated if symptoms arise. We reviewed the signs and symptoms that could arise in the setting of recurrent locoregional or metastatic disease. In addition, she will need to undergo regular dermatologic full skin survey and evaluation given that patients with a diagnosis of melanoma are at risk of recurrence (local and distant) and of subsequent de sedrick melanoma.  I also reviewed the importance of protection from sun exposure, including wearing long sleeves, hats, etc and also the use of sunscreen with SPF of at least 35, with frequent re-applications.     All of the above was discussed with the patient and all questions were answered.     PLAN:  Follow up with dermatology for skin checks  Follow up with me in 6 months    Tracie Higginbotham MD MS [facility] FACS  Associate Professor of Surgery  Division of Surgical Oncology  [facility]       Again, thank you for allowing me to participate in the care of your patient.        Sincerely,        Tracie Higginbotham MD

## 2023-11-01 ENCOUNTER — PATIENT OUTREACH (OUTPATIENT)
Dept: CARE COORDINATION | Facility: CLINIC | Age: 48
End: 2023-11-01
Payer: COMMERCIAL

## 2023-11-02 ENCOUNTER — OFFICE VISIT (OUTPATIENT)
Dept: PLASTIC SURGERY | Facility: AMBULATORY SURGERY CENTER | Age: 48
End: 2023-11-02
Payer: COMMERCIAL

## 2023-11-02 DIAGNOSIS — Z98.890 HISTORY OF MELANOMA EXCISION: Primary | ICD-10-CM

## 2023-11-02 DIAGNOSIS — Z85.820 HISTORY OF MELANOMA EXCISION: Primary | ICD-10-CM

## 2023-11-02 PROCEDURE — 99212 OFFICE O/P EST SF 10 MIN: CPT | Performed by: PLASTIC SURGERY

## 2023-11-02 NOTE — LETTER
11/2/2023         RE: Deandra Lewis  [address] [place] [address]        Dear Colleague,    Thank you for referring your patient, Deandra Lewis, to the [facility] [place]. Please see a copy of my visit note below.    Lauryn is a 47 years old lady status post wide local excision of melanoma on her left thigh followed by complex closure.  Patient is almost 4 weeks out from surgery.  She is doing well.    At the physical exam, the incision is well-healed with no sign of late dehiscence or hypertrophic scarring or keloid.  The edema is going down.  Patient present with improved range of motion, including better flexion and extension of her knee joint.              Plan: Continue with scar massage with cocoa butter lotion 3-5 times a day for the next 3 months.  May resume all normal activities in 2 weeks.  May start running in 4 weeks.  Patient is doing excellent from plastic surgery standpoint.  She can follow-up with me as needed.  Continue surveillance with her dermatologist and with Dr. Higginbotham.    Patient is happy with her result and so am I.    Pj Aguirre MD , FACS   Diplomate American Board of Plastic Surgery  Diplomate American Board of Surgery  Adj. Assistant Professor of Surgery  Division of Plastic & Reconstructive Surgery   [facility]  Office: [phone]   11/2/2023 at 1:52 PM     Again, thank you for allowing me to participate in the care of your patient.        Sincerely,        Pj Aguirre MD

## 2023-11-02 NOTE — NURSING NOTE
Patient here today today for S/P complex wound closure to 10/09/2023. She is doing well! She reports pain that is manageable with Tylenol or Ibuprofen. She has some questions about stretching as she wants to ensure she does not heal without being able to stretch her leg.       Ginny Lennon RN on 11/2/2023 at 1:27 PM

## 2023-11-07 ENCOUNTER — ANCILLARY PROCEDURE (OUTPATIENT)
Dept: MAMMOGRAPHY | Facility: CLINIC | Age: 48
End: 2023-11-07
Attending: FAMILY MEDICINE
Payer: COMMERCIAL

## 2023-11-07 DIAGNOSIS — Z12.31 VISIT FOR SCREENING MAMMOGRAM: ICD-10-CM

## 2023-11-07 PROCEDURE — 77063 BREAST TOMOSYNTHESIS BI: CPT | Mod: TC | Performed by: RADIOLOGY

## 2023-11-07 PROCEDURE — 77067 SCR MAMMO BI INCL CAD: CPT | Mod: TC | Performed by: RADIOLOGY

## 2023-11-13 NOTE — RESULT ENCOUNTER NOTE
Your mammogram was normal.     Thank you so much for choosing [facility].  Please contact us with any questions that you may have.   We appreciate the opportunity to serve you now and look forward to supporting your healthcare needs for a long time to come!    Most Sincerely,     Linda Soto MD

## 2023-11-14 ENCOUNTER — PATIENT OUTREACH (OUTPATIENT)
Dept: ONCOLOGY | Facility: CLINIC | Age: 48
End: 2023-11-14
Payer: COMMERCIAL

## 2023-11-14 NOTE — PROGRESS NOTES
received Cancer Risk Management Program referral, referred for:     personal and family history of cancer      Reviewed for appropriate plan, and sent to New Patient Scheduling for completion.

## 2023-11-30 ENCOUNTER — OFFICE VISIT (OUTPATIENT)
Dept: FAMILY MEDICINE | Facility: CLINIC | Age: 48
End: 2023-11-30
Payer: COMMERCIAL

## 2023-11-30 DIAGNOSIS — Z85.820 HISTORY OF MALIGNANT MELANOMA OF SKIN: ICD-10-CM

## 2023-11-30 DIAGNOSIS — D18.01 CHERRY ANGIOMA: ICD-10-CM

## 2023-11-30 DIAGNOSIS — L81.4 LENTIGINES: ICD-10-CM

## 2023-11-30 DIAGNOSIS — D22.9 MULTIPLE NEVI: Primary | ICD-10-CM

## 2023-11-30 DIAGNOSIS — Z12.83 ENCOUNTER FOR SCREENING FOR MALIGNANT NEOPLASM OF SKIN: ICD-10-CM

## 2023-11-30 PROCEDURE — 99213 OFFICE O/P EST LOW 20 MIN: CPT | Performed by: PHYSICIAN ASSISTANT

## 2023-11-30 ASSESSMENT — PAIN SCALES - GENERAL: PAINLEVEL: NO PAIN (0)

## 2023-11-30 NOTE — PROGRESS NOTES
[facility] Dermatology Note  Encounter Date: Nov 30, 2023  Office Visit      Dermatology Problem List:  1. Malignant melanoma, BD 1.2mm, pT2a - L thigh   - bx 8/28/23, excision 10/07/2023 no evidence of residual lesion one negative femoral lymph node  2. Benign biopsies:  - L anterior shoulder - bx 8/28/23 - DF  3. 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 Has tanning bed use hx.  ____________________________________________    Assessment & Plan:  # History of melanoma, BD 1.2mm, pT2a L thigh, no clinical evidence of recurrence.   - ABCDEs: Counseled ABCDEs of melanoma: Asymmetry, Border (irregularity), Color (not uniform, changes in color), Diameter (greater than 6 mm which is about the size of a pencil eraser), and Evolving (any changes in preexisting moles).  - Sun protection: Counseled SPF30+ sunscreen, UPF clothing, sun avoidance, tanning bed avoidance.   - Recommended yearly dental, ophthalmology, and gynecology exams.  - Recommended skin exams for all first-degree relatives.  - Recommended follow up is 3 months    # Dermatofibromas.   # Benign appearing nevi.   # Lentigines   - Continue to monitor.    - Sunscreen: Apply 20 minutes prior to going outdoors and reapply every two hours, when wet or sweating. We recommend using an SPF 30 or higher, and to use one that is water resistant.     - Advised to monitor for changing, non-healing, bleeding, painful, changing, or otherwise symptomatic lesions  - Continue skin exams    # Healing excision site  - Continue scar massage and emollient use.     Follow-up: 3 month(s) in-person, or earlier for new or changing lesions    Staff:     All risks, benefits and alternatives were discussed with patient.  Patient is in agreement and understands the assessment and plan.  All questions were answered.  ____________________________________________    CC: Derm Problem  (3 month [facility] [place]  hx melanoma)      HPI:  Ms. Denadra Lewis is a 47 year old female who presents today as a return patient for a full skin cancer screening evaluation. The patient has a history of malignant melanoma Breslow depth 1.2mm of the pT2a L thigh. Excision on 10/07/2023 demonstrated no residual melanoma and a benign reactive lymph node. The patient reports the site is healing well. She denies any new or concerning lesions.     Patient is otherwise feeling well, without additional concerns.    Physical Exam:  Vitals: There were no vitals taken for this visit.  SKIN: Total skin excluding the undergarment areas was performed. The exam included the head/face, neck, both arms, chest, back, abdomen, both legs, digits and/or nails.    - Several 1-2mm red dome shaped symmetric papules, consistent with cherry angiomas.   - Multiple tan/brown macules and papules scattered throughout exam, consistent with benign nevi. No concerning features on dermoscopy.   - Scattered tan, homogenous macules scattered on sun exposed areas of trunk, extremities and face, consistent with solar lentigines.   - The left anterior thigh demonstrates a linear, well healed scar with not nodularity, repigmentation, or pain to palpation.    - No cervical, axillary, inguinal, or popliteal lymphadenopathy.    - No other lesions of concern on areas examined.     Medications:  Current Outpatient Medications   Medication    albuterol (VENTOLIN HFA) 108 (90 Base) MCG/ACT inhaler    buPROPion (WELLBUTRIN XL) 150 MG 24 hr tablet    cetirizine (ZYRTEC) 10 MG tablet    cholecalciferol 50 MCG (2000 UT) CAPS    cyclobenzaprine (FLEXERIL) 5 MG tablet    fluticasone-vilanterol (BREO ELLIPTA) 200-25 MCG/ACT inhaler    ibuprofen (ADVIL/MOTRIN) 200 MG tablet    ibuprofen (ADVIL/MOTRIN) 800 MG tablet    lisinopril (ZESTRIL) 5 MG tablet    methocarbamol (ROBAXIN) 500 MG tablet    montelukast (SINGULAIR) 10 MG tablet    topiramate (TOPAMAX) 25 MG tablet     ZOLMitriptan (ZOMIG) 5 MG tablet     No current facility-administered medications for this visit.      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 related to previous surgeries - 4 in 3 months for sinus issues/infection    Family history of breast cancer- mother's identical twin, maternal aunt, paternal GM and dad with breast lumps     Hot flashes    Generalized hyperhidrosis- since earliest recollections - soaks clothing multiple times/week - very disruptive to life    Mild anxiety    Foster care child    Anxiety    Multiple fractures of left foot with routine healing    Family history of colon cancer- father dx'd in mid 40's - pt's first colonoscopy in 4/24/2017 - repeat in 2022     Menorrhagia with regular cycle- ? cause of iron deficiency anemia- pt would like to see ob/gyn specialists- didn't go in 2021 - better than previous    Intractable episodic tension-type headache    Strain of neck muscle, initial encounter    Sun-damaged skin    Cough due to bronchospasm- needs refills on meds     Recurrent cough    Attention deficit - symptoms - pt desires testing and possible treatment    Intractable migraine without aura and without status migrainosus    Hypertension goal BP (blood pressure) < 140/90  - new diagnosis    Morbid obesity (H)    Numbness and tingling of hand- fingertips - ? caused by lisinopril vs. 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 (cT2a, cN0, cM0)    Melanoma of skin (H)    Mild intermittent asthma with (acute) exacerbation- allergies and cold temperature triggers    Encounter for completion of form with patient- [facility] paperwork for melanoma left leg     Past Medical History:   Diagnosis  Date    Anemia     Anxiety     paxil 10mg= mild bladder retention, at 30mg = ravenously hungry/weight gain increased headaches; lexapro = bad sweating    Contraception     ocp's some = mood swings ; seasonale: frequent vaginal bleeding and weight gain    Hot flashes 11/16/2016    Hypertension goal BP (blood pressure) < 140/90  - new diagnosis 02/02/2023    Kidney stones     Dr. 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

## 2023-11-30 NOTE — LETTER
11/30/2023         RE: Deandra Lewis  [address] [place] [address]        Dear Colleague,    Thank you for referring your patient, Deandra Lewis, to the [facility]. Please see a copy of my visit note below.    [facility] Dermatology Note  Encounter Date: Nov 30, 2023  Office Visit      Dermatology Problem List:  1. Malignant melanoma, BD 1.2mm, pT2a - L thigh   - bx 8/28/23, excision 10/07/2023 no evidence of residual lesion one negative femoral lymph node  2. Benign biopsies:  - L anterior shoulder - bx 8/28/23 - DF  3. 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  ____________________________________________    Assessment & Plan:  # History of melanoma, BD 1.2mm, pT2a L thigh, no clinical evidence of recurrence.   - ABCDEs: Counseled ABCDEs of melanoma: Asymmetry, Border (irregularity), Color (not uniform, changes in color), Diameter (greater than 6 mm which is about the size of a pencil eraser), and Evolving (any changes in preexisting moles).  - Sun protection: Counseled SPF30+ sunscreen, UPF clothing, sun avoidance, tanning bed avoidance.   - Recommended yearly dental, ophthalmology, and gynecology exams.  - Recommended skin exams for all first-degree relatives.  - Recommended follow up is 3 months    # Dermatofibromas.   # Benign appearing nevi.   # Lentigines   - Continue to monitor.    - Sunscreen: Apply 20 minutes prior to going outdoors and reapply every two hours, when wet or sweating. We recommend using an SPF 30 or higher, and to use one that is water resistant.     - Advised to monitor for changing, non-healing, bleeding, painful, changing, or otherwise symptomatic lesions  - Continue skin exams    # Healing excision site  - Continue scar massage and emollient use.     Follow-up: 3 month(s) in-person, or earlier for new or  changing lesions    Staff:     All risks, benefits and alternatives were discussed with patient.  Patient is in agreement and understands the assessment and plan.  All questions were answered.  ____________________________________________    CC: Derm Problem (3 month [facility]  hx melanoma)      HPI:  Ms. Deandra Lewis is a 47 year old female who presents today as a return patient for a full skin cancer screening evaluation. The patient has a history of malignant melanoma Breslow depth 1.2mm of the pT2a L thigh. Excision on 10/07/2023 demonstrated no residual melanoma and a benign reactive lymph node. The patient reports the site is healing well. She denies any new or concerning lesions.     Patient is otherwise feeling well, without additional concerns.    Physical Exam:  Vitals: There were no vitals taken for this visit.  SKIN: Total skin excluding the undergarment areas was performed. The exam included the head/face, neck, both arms, chest, back, abdomen, both legs, digits and/or nails.    - Several 1-2mm red dome shaped symmetric papules, consistent with cherry angiomas.   - Multiple tan/brown macules and papules scattered throughout exam, consistent with benign nevi. No concerning features on dermoscopy.   - Scattered tan, homogenous macules scattered on sun exposed areas of trunk, extremities and face, consistent with solar lentigines.   - The left anterior thigh demonstrates a linear, well healed scar with not nodularity, repigmentation, or pain to palpation.    - No cervical, axillary, inguinal, or popliteal lymphadenopathy.    - No other lesions of concern on areas examined.     Medications:  Current Outpatient Medications   Medication     albuterol (VENTOLIN HFA) 108 (90 Base) MCG/ACT inhaler     buPROPion (WELLBUTRIN XL) 150 MG 24 hr tablet     cetirizine (ZYRTEC) 10 MG tablet     cholecalciferol 50 MCG (2000 UT) CAPS     cyclobenzaprine (FLEXERIL) 5 MG tablet     fluticasone-vilanterol (BREO ELLIPTA) 200-25  MCG/ACT inhaler     ibuprofen (ADVIL/MOTRIN) 200 MG tablet     ibuprofen (ADVIL/MOTRIN) 800 MG tablet     lisinopril (ZESTRIL) 5 MG tablet     methocarbamol (ROBAXIN) 500 MG tablet     montelukast (SINGULAIR) 10 MG tablet     topiramate (TOPAMAX) 25 MG tablet     ZOLMitriptan (ZOMIG) 5 MG tablet     No current facility-administered medications for this visit.      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 related to previous surgeries - 4 in 3 months for sinus issues/infection     Family history of breast cancer- mother's identical twin, maternal aunt, paternal GM and dad with breast lumps      Hot flashes     Generalized hyperhidrosis- since earliest recollections - soaks clothing multiple times/week - very disruptive to life     Mild anxiety     Foster care child     Anxiety     Multiple fractures of left foot with routine healing     Family history of colon cancer- father dx'd in mid 40's - pt's first colonoscopy in 4/24/2017 - repeat in 2022      Menorrhagia with regular cycle- ? cause of iron deficiency anemia- pt would like to see ob/gyn specialists- didn't go in 2021 - better than previous     Intractable episodic tension-type headache     Strain of neck muscle, initial encounter     Sun-damaged skin     Cough due to bronchospasm- needs refills on meds      Recurrent cough     Attention deficit - symptoms - pt desires testing and possible treatment     Intractable migraine without aura and without status migrainosus     Hypertension goal BP (blood pressure) < 140/90  - new diagnosis     Morbid obesity (H)     Numbness and tingling of hand- fingertips - ? caused by lisinopril vs. other - consider changing to amlodipine      Malignant melanoma of left lower extremity including hip  (H)-left anterior thigh Stage 1B (cT2a, cN0, cM0)     Melanoma of skin (H)     Mild intermittent asthma with (acute) exacerbation- allergies and cold temperature triggers     Encounter for completion of form with patient- [place] paperwork for melanoma left leg     Past Medical History:   Diagnosis Date     Anemia      Anxiety     paxil 10mg= mild bladder retention, at 30mg = ravenously hungry/weight gain increased headaches; lexapro = bad sweating     Contraception     ocp's some = mood swings ; seasonale: frequent vaginal bleeding and weight gain     Hot flashes 11/16/2016     Hypertension goal BP (blood pressure) < 140/90  - new diagnosis 02/02/2023     Kidney stones     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        Sincerely,        Lia Savage PA-C

## 2023-12-14 ENCOUNTER — MYC REFILL (OUTPATIENT)
Dept: FAMILY MEDICINE | Facility: CLINIC | Age: 48
End: 2023-12-14
Payer: COMMERCIAL

## 2023-12-14 DIAGNOSIS — J20.9 ACUTE BRONCHITIS, UNSPECIFIED ORGANISM: ICD-10-CM

## 2023-12-14 DIAGNOSIS — J98.01 COUGH DUE TO BRONCHOSPASM: ICD-10-CM

## 2023-12-14 RX ORDER — MONTELUKAST SODIUM 10 MG/1
1 TABLET ORAL AT BEDTIME
Qty: 90 TABLET | Refills: 0 | Status: SHIPPED | OUTPATIENT
Start: 2023-12-14 | End: 2024-02-18

## 2023-12-15 ENCOUNTER — MYC REFILL (OUTPATIENT)
Dept: FAMILY MEDICINE | Facility: CLINIC | Age: 48
End: 2023-12-15
Payer: COMMERCIAL

## 2023-12-15 DIAGNOSIS — J20.9 ACUTE BRONCHITIS, UNSPECIFIED ORGANISM: ICD-10-CM

## 2023-12-15 RX ORDER — FLUTICASONE FUROATE AND VILANTEROL 200; 25 UG/1; UG/1
1 POWDER RESPIRATORY (INHALATION) DAILY
Qty: 60 EACH | Refills: 2 | Status: SHIPPED | OUTPATIENT
Start: 2023-12-15 | End: 2024-08-20

## 2023-12-15 RX ORDER — ALBUTEROL SULFATE 90 UG/1
AEROSOL, METERED RESPIRATORY (INHALATION)
Qty: 36 G | Refills: 1 | Status: SHIPPED | OUTPATIENT
Start: 2023-12-15 | End: 2023-12-15

## 2023-12-15 RX ORDER — ALBUTEROL SULFATE 90 UG/1
AEROSOL, METERED RESPIRATORY (INHALATION)
Qty: 36 G | Refills: 1 | OUTPATIENT
Start: 2023-12-15

## 2023-12-15 RX ORDER — ALBUTEROL SULFATE 90 UG/1
AEROSOL, METERED RESPIRATORY (INHALATION)
Qty: 36 G | Refills: 11 | OUTPATIENT
Start: 2023-12-15

## 2023-12-15 RX ORDER — ALBUTEROL SULFATE 90 UG/1
AEROSOL, METERED RESPIRATORY (INHALATION)
Qty: 36 G | Refills: 1 | Status: SHIPPED | OUTPATIENT
Start: 2023-12-15 | End: 2024-03-28

## 2024-02-02 DIAGNOSIS — G43.019 INTRACTABLE MIGRAINE WITHOUT AURA AND WITHOUT STATUS MIGRAINOSUS: ICD-10-CM

## 2024-02-04 ENCOUNTER — HEALTH MAINTENANCE LETTER (OUTPATIENT)
Age: 49
End: 2024-02-04

## 2024-02-06 RX ORDER — ZOLMITRIPTAN 5 MG/1
TABLET, FILM COATED ORAL
Qty: 10 TABLET | Refills: 3 | Status: SHIPPED | OUTPATIENT
Start: 2024-02-06 | End: 2024-02-18

## 2024-02-15 ENCOUNTER — OFFICE VISIT (OUTPATIENT)
Dept: FAMILY MEDICINE | Facility: CLINIC | Age: 49
End: 2024-02-15
Payer: COMMERCIAL

## 2024-02-15 DIAGNOSIS — L81.4 LENTIGINES: ICD-10-CM

## 2024-02-15 DIAGNOSIS — D18.01 CHERRY ANGIOMA: Primary | ICD-10-CM

## 2024-02-15 DIAGNOSIS — Z85.820 HISTORY OF MALIGNANT MELANOMA OF SKIN: ICD-10-CM

## 2024-02-15 DIAGNOSIS — D49.2 NEOPLASM OF SKIN: ICD-10-CM

## 2024-02-15 DIAGNOSIS — D22.9 MULTIPLE NEVI: ICD-10-CM

## 2024-02-15 PROBLEM — D48.5 NEOPLASM OF UNCERTAIN BEHAVIOR OF SKIN: Status: ACTIVE | Noted: 2024-02-15

## 2024-02-15 PROCEDURE — 99214 OFFICE O/P EST MOD 30 MIN: CPT | Mod: 25 | Performed by: PHYSICIAN ASSISTANT

## 2024-02-15 PROCEDURE — 88305 TISSUE EXAM BY PATHOLOGIST: CPT | Performed by: DERMATOLOGY

## 2024-02-15 PROCEDURE — 11102 TANGNTL BX SKIN SINGLE LES: CPT | Performed by: PHYSICIAN ASSISTANT

## 2024-02-15 ASSESSMENT — PAIN SCALES - GENERAL: PAINLEVEL: NO PAIN (0)

## 2024-02-15 NOTE — PROGRESS NOTES
[facility] Dermatology Note  Encounter Date: Feb 15, 2024  Office Visit      Dermatology Problem List:  #NUB, L posterior shoulder, S/p Biopsy performed on 2/15/24  1. Malignant melanoma, BD 1.2mm, pT2a - L thigh - bx 8/28/23 - S/p WLE Dr. Higginbotham/Carla 10/9/23  2. Benign biopsies:  - L anterior shoulder - bx 8/28/23 - DF  3. 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  ____________________________________________    Assessment & Plan:  # Neoplasm of unspecified behavior of the skin (D49.2) on the L posterior shoulder. The differential diagnosis includes DN vs MM vs other.   - Shave biopsy performed today, see procedure note below.   - Photographed    # Hx of MM , L thigh   - ABCDEs: Counseled ABCDEs of melanoma: Asymmetry, Border (irregularity), Color (not uniform, changes in color), Diameter (greater than 6 mm which is about the size of a pencil eraser), and Evolving (any changes in preexisting moles).  - Sun protection: Counseled SPF30+ sunscreen, UPF clothing, sun avoidance, tanning bed avoidance.   - Recommended yearly dental, ophthalmology, and gynecology exams.  - Recommended skin exams for all first-degree relatives.  - Recommended follow up is 4 months    # Benign findings: multiple benign nevi, lentigines, cherry angiomas, SKs  - edu on benign etiology  - Signs and Symptoms of non-melanoma skin cancer and ABCDEs of melanoma reviewed with patient. Patient encouraged to perform monthly self skin exams and educated on how to perform them. UV precautions reviewed with patient. Patient was asked about new or changing moles/lesions on body.   - Sunscreen: Apply 20 minutes prior to going outdoors and reapply every two hours, when wet or sweating. We recommend using an SPF 30 or higher, and to use one that is water resistant.     - RTC for changes      Procedures Performed:   - Shave biopsy procedure note, location(s): see above. After discussion of  benefits and risks including but not limited to bleeding, infection, scar, incomplete removal, recurrence, and non-diagnostic biopsy, written consent and photographs were obtained. The area was cleaned with isopropyl alcohol. 0.5mL of 1% lidocaine with epinephrine was injected to obtain adequate anesthesia of lesion(s). Shave biopsy at site(s) performed. Hemostasis was achieved with aluminium chloride. Petrolatum ointment and a sterile dressing were applied. The patient tolerated the procedure and no complications were noted. The patient was provided with verbal and written post care instructions.      Follow-up: 4 month(s) in-person, or earlier for new or changing lesions    Staff and scribe     Scribe Disclosure:   I, MAXX SRIVASTAVA, am serving as a scribe; to document services personally performed by Lia Savage PA-C -based on data collection and the provider's statements to me.     Provider Disclosure:  I agree with above History, Review of Systems, Physical exam and Plan.  I have reviewed the content of the documentation and have edited it as needed. I have personally performed the services documented here and the documentation accurately represents those services and the decisions I have made.      Electronically signed by:     All risks, benefits and alternatives were discussed with patient.  Patient is in agreement and understands the assessment and plan.  All questions were answered.    Lia Savage PA-C, [facility]  [facility] [place]: Phone: [phone], Fax: [phone]  [facility]: Phone: [phone],  Fax: [phone]  [facility]: Phone: [phone], Fax: [phone]  ____________________________________________    CC: Skin Check ([facility])      Reviewed patients past medical history and pertinent chart review prior to patient's visit today.     HPI:  Ms. Deandra Lewis is a 48 year old female who  presents today as a return patient for [facility]. No areas of concern. Feeling well. No weight changes, no fevers, night sweats.    Patient is otherwise feeling well, without additional concerns.    Labs:  N/A    Physical Exam:  Vitals: There were no vitals taken for this visit.  SKIN: Full skin, which includes the head/face, both arms, chest, back, abdomen,both legs, genitalia and/or groin buttocks, digits and/or nails, was examined.   - -Araiza's skin type II, has <100 nevi  - There are dome shaped bright red papules on the trunk.   - Multiple regular brown pigmented macules and papules are identified on the trunk and extremities.   - Scattered brown macules on sun exposed areas.  - There are waxy stuck on tan to brown papules on the trunk.    LYMPH: Examination of the pre/post auricular, occipital, cervical, clavicular, axillary and inguinal lymph nodes was negative.  - Well healed scar on her L thigh,  NERD   - L posterior shoulder there is  a 4 mm pink and brown asymmetrical macule.   - No other lesions of concern on areas examined.         Medications:  Current Outpatient Medications   Medication    albuterol (PROAIR HFA/PROVENTIL HFA/VENTOLIN HFA) 108 (90 Base) MCG/ACT inhaler    buPROPion (WELLBUTRIN XL) 150 MG 24 hr tablet    cetirizine (ZYRTEC) 10 MG tablet    fluticasone-vilanterol (BREO ELLIPTA) 200-25 MCG/ACT inhaler    ibuprofen (ADVIL/MOTRIN) 200 MG tablet    methocarbamol (ROBAXIN) 500 MG tablet    montelukast (SINGULAIR) 10 MG tablet    ZOLMitriptan (ZOMIG) 5 MG tablet    cholecalciferol 50 MCG (2000 UT) CAPS    cyclobenzaprine (FLEXERIL) 5 MG tablet    ibuprofen (ADVIL/MOTRIN) 800 MG tablet    lisinopril (ZESTRIL) 5 MG tablet    topiramate (TOPAMAX) 25 MG tablet     No current facility-administered medications for this visit.      Past Medical/Surgical History:   Patient Active Problem List   Diagnosis    Chronic sinusitis    Bleeding, nose    Sinusitis    Lactose intolerance    Kidney stones     Maxillary bone loss    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 related to previous surgeries - 4 in 3 months for sinus issues/infection    Family history of breast cancer- mother's identical twin, maternal aunt, paternal GM and dad with breast lumps     Hot flashes    Generalized hyperhidrosis- since earliest recollections - soaks clothing multiple times/week - very disruptive to life    Mild anxiety    Foster care child    Anxiety    Multiple fractures of left foot with routine healing    Family history of colon cancer- father dx'd in mid 40's - pt's first colonoscopy in 4/24/2017 - repeat in 2022     Menorrhagia with regular cycle- ? cause of iron deficiency anemia- pt would like to see ob/gyn specialists- didn't go in 2021 - better than previous    Intractable episodic tension-type headache    Strain of neck muscle, initial encounter    Sun-damaged skin    Cough due to bronchospasm- needs refills on meds     Recurrent cough    Attention deficit - symptoms - pt desires testing and possible treatment    Intractable migraine without aura and without status migrainosus    Hypertension goal BP (blood pressure) < 140/90  - new diagnosis    Morbid obesity (H)    Numbness and tingling of hand- fingertips - ? caused by lisinopril vs. 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 (cT2a, cN0, cM0)    Melanoma of skin (H)    Mild intermittent asthma with (acute) exacerbation- allergies and cold temperature triggers    Encounter for completion of form with patient- [place] paperwork for melanoma left leg     Past Medical History:   Diagnosis Date    Anemia     Anxiety     paxil 10mg= mild bladder retention, at 30mg = ravenously hungry/weight gain increased headaches; lexapro = bad sweating    Contraception     ocp's some = mood swings ; seasonale: frequent vaginal bleeding and weight gain    Hot flashes  11/16/2016    Hypertension goal BP (blood pressure) < 140/90  - new diagnosis 02/02/2023    Kidney stones     Dr. 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

## 2024-02-15 NOTE — PATIENT INSTRUCTIONS
Wound Care After a Biopsy    What is a skin biopsy?  A skin biopsy allows the doctor to examine a very small piece of tissue under the microscope to determine the diagnosis and the best treatment for the skin condition. A local anesthetic (numbing medicine) is injected with a very small needle into the skin area to be tested. A small piece of skin is taken from the area. Sometimes a suture (stitch) is used.     What are the risks of a skin biopsy?  I will experience scar, bleeding, swelling, pain, crusting and redness. I may experience incomplete removal or recurrence. Risks of this procedure are excessive bleeding, bruising, infection, nerve damage, numbness, thick (hypertrophic or keloidal) scar and non-diagnostic biopsy.    How should I care for my wound for the first 24 hours?  Keep the wound dry and covered for 24 hours  If it bleeds, hold direct pressure on the area for 15 minutes. If bleeding does not stop, call us or go to the emergency room  Avoid strenuous exercise the first 1-2 days or as your doctor instructs you    How should I care for the wound after 24 hours?  After 24 hours, remove the bandage  You may bathe or shower as normal  If you had a scalp biopsy, you can shampoo as usual and can use shower water to clean the biopsy site daily  Clean the wound once a day with gentle soap and water  Do not scrub, be gentle  Apply white petroleum/Vaseline after cleaning the wound with a cotton swab or a clean finger, and keep the site covered with a Bandaid /bandage. Bandages are not necessary with a scalp biopsy  If you are unable to cover the site with a Bandaid /bandage, re-apply ointment 2-3 times a day to keep the site moist. Moisture will help with healing  Avoid strenuous activity for first 1-2 days  Avoid lakes, rivers, pools, and oceans until the stitches are removed or the site is healed    How do I clean my wound?  Wash hands thoroughly with soap or use hand  before all wound care  Clean  the wound with gentle soap and water  Apply white petroleum/Vaseline  to wound after it is clean  Replace the Bandaid /bandage to keep the wound covered for the first few days or as instructed by your doctor  If you had a scalp biopsy, warm shower water to the area on a daily basis should suffice    What should I use to clean my wound?   Cotton-tipped applicators (Qtips )  White petroleum jelly (Vaseline ). Use a clean new container and use Q-tips to apply.  Bandaids  as needed  Gentle soap     How should I care for my wound long term?  Do not get your wound dirty  Keep up with wound care for one week or until the area is healed.  A small scab will form and fall off by itself when the area is completely healed. The area will be red and will become pink in color as it heals. Sun protection is very important for how your scar will turn out. Sunscreen with an SPF 30 or greater is recommended once the area is healed.  You should have some soreness but it should be mild and slowly go away over several days. Talk to your doctor about using tylenol for pain,    When should I call my doctor?  If you have increased:   Pain or swelling  Pus or drainage (clear or slightly yellow drainage is ok)  Temperature over 100F  Spreading redness or warmth around wound    When will I hear about my results?  The biopsy results can take 2 weeks to come back.  Your results will automatically release to [employer] before your provider has even reviewed them.  The clinic will call you with the results, send you a [employer] message, or have you schedule a follow-up clinic or phone time to discuss the results.  Contact our clinics if you do not hear from us in 2 weeks.    Who should I call with questions?  [facility]: [phone]  [facility]: [phone]  For urgent needs outside of business hours call the [facility] at [phone] and ask for the dermatology resident on call  Patient Education       Proper skin care from [facility]:    -Eliminate harsh soaps as they strip the natural oils from the skin, often resulting in dry itchy skin ( i.e. Dial, Zest, [place])  -Use mild soaps such as Cetaphil or Dove Sensitive Skin in the shower. You do not need to use soap on arms, legs, and trunk every time you shower unless visibly soiled.   -Avoid hot or cold showers.  -After showering, lightly dry off and apply moisturizing within 2-3 minutes. This will help trap moisture in the skin.   -Aggressive use of a moisturizer at least 1-2 times a day to the entire body (including -Vanicream, Cetaphil, Aquaphor or Cerave) and moisturize hands after every washing.  -We recommend using moisturizers that come in a tub that needs to be scooped out, not a pump. This has more of an oil base. It will hold moisture in your skin much better than a water base moisturizer. The above recommended are non-pore clogging.      Wear a sunscreen with at least SPF 30 on your face, ears, neck and V of the chest daily. Wear sunscreen on other areas of the body if those areas are exposed to the sun throughout the day. Sunscreens can contain physical and/or chemical blockers. Physical blockers are less likely to clog pores, these include zinc oxide and titanium dioxide. Reapply every two hour and after swimming.     Sunscreen examples: https://www.ewg.org/sunscreen/    UV radiation  UVA radiation remains constant throughout the day and throughout the year. It is a longer wavelength than UVB and therefore penetrates deeper into the skin leading to immediate and delayed tanning, photoaging, and skin cancer. 70-80% of UVA and UVB radiation occurs between the hours of 10am-2pm.  UVB radiation  UVB radiation causes the most harmful effects and is more significant during the summer months. However, snow and ice can reflect UVB radiation leading to skin damage during the winter months as well. UVB radiation is  responsible for tanning, burning, inflammation, delayed erythema (pinkness), pigmentation (brown spots), and skin cancer.     I recommend self monthly full body exams and yearly full body exams with a dermatology provider. If you develop a new or changing lesion please follow up for examination. Most skin cancers are pink and scaly or pink and pearly. However, we do see blue/brown/black skin cancers.  Consider the ABCDEs of melanoma when giving yourself your monthly full body exam ( don't forget the groin, buttocks, feet, toes, etc). A-asymmetry, B-borders, C-color, D-diameter, E-elevation or evolving. If you see any of these changes please follow up in clinic. If you cannot see your back I recommend purchasing a hand held mirror to use with a larger wall mirror.       Checking for Skin Cancer  You can find cancer early by checking your skin each month. There are 3 kinds of skin cancer. They are melanoma, basal cell carcinoma, and squamous cell carcinoma. Doing monthly skin checks is the best way to find new marks or skin changes. Follow the instructions below for checking your skin.   The ABCDEs of checking moles for melanoma   Check your moles or growths for signs of melanoma using ABCDE:   Asymmetry: the sides of the mole or growth don t match  Border: the edges are ragged, notched, or blurred  Color: the color within the mole or growth varies  Diameter: the mole or growth is larger than 6 mm (size of a pencil eraser)  Evolving: the size, shape, or color of the mole or growth is changing (evolving is not shown in the images below)    Checking for other types of skin cancer  Basal cell carcinoma or squamous cell carcinoma have symptoms such as:     A spot or mole that looks different from all other marks on your skin  Changes in how an area feels, such as itching, tenderness, or pain  Changes in the skin's surface, such as oozing, bleeding, or scaliness  A sore that does not heal  New swelling or redness beyond  the border of a mole    Who s at risk?  Anyone can get skin cancer. But you are at greater risk if you have:   Fair skin, light-colored hair, or light-colored eyes  Many moles or abnormal moles on your skin  A history of sunburns from sunlight or tanning beds  A family history of skin cancer  A history of exposure to radiation or chemicals  A weakened immune system  If you have had skin cancer in the past, you are at risk for recurring skin cancer.   How to check your skin  Do your monthly skin checkups in front of a full-length mirror. Check all parts of your body, including your:   Head (ears, face, neck, and scalp)  Torso (front, back, and sides)  Arms (tops, undersides, upper, and lower armpits)  Hands (palms, backs, and fingers, including under the nails)  Buttocks and genitals  Legs (front, back, and sides)  Feet (tops, soles, toes, including under the nails, and between toes)  If you have a lot of moles, take digital photos of them each month. Make sure to take photos both up close and from a distance. These can help you see if any moles change over time.   Most skin changes are not cancer. But if you see any changes in your skin, call your doctor right away. Only he or she can diagnose a problem. If you have skin cancer, seeing your doctor can be the first step toward getting the treatment that could save your life.   [employer] last reviewed this educational content on 4/1/2019 2000-2020 [employer]. [address] [place] [address], [place] [address]. All rights reserved. This information is not intended as a substitute for professional medical care. Always follow your healthcare professional's instructions.       When should I call my doctor?  If you are worsening or not improving, please, contact us or seek urgent care as noted below.     Who should I call with questions (adults)?  [facility] (adult and pediatric): [phone]  [facility]  [place] (adult): [phone]  [facility] ([place], [place], [place] and [place]) [phone]  For urgent needs outside of business hours call the [facility] at [phone] and ask for the dermatology resident on call to be paged  If this is a medical emergency and you are unable to reach an ER, Call 911      If you need a prescription refill, please contact your pharmacy. Refills are approved or denied by our Physicians during normal business hours, Monday through Fridays  Per office policy, refills will not be granted if you have not been seen within the past year (or sooner depending on your child's condition)

## 2024-02-15 NOTE — LETTER
2/15/2024         RE: Deandra Lewis  [address] [place] [address]        Dear Colleague,    Thank you for referring your patient, Deandra Lewis, to the [facility]. Please see a copy of my visit note below.    [facility] Dermatology Note  Encounter Date: Feb 15, 2024  Office Visit      Dermatology Problem List:  #NUB, L posterior shoulder, S/p Biopsy performed on 2/15/24  1. Malignant melanoma, BD 1.2mm, pT2a - L thigh - bx 8/28/23 - S/p WLE Dr. Higginbotham/Carla 10/9/23  2. Benign biopsies:  - L anterior shoulder - bx 8/28/23 - DF  3. 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  ____________________________________________    Assessment & Plan:  # Neoplasm of unspecified behavior of the skin (D49.2) on the L posterior shoulder. The differential diagnosis includes DN vs MM vs other.   - Shave biopsy performed today, see procedure note below.   - Photographed    # Hx of MM , L thigh   - ABCDEs: Counseled ABCDEs of melanoma: Asymmetry, Border (irregularity), Color (not uniform, changes in color), Diameter (greater than 6 mm which is about the size of a pencil eraser), and Evolving (any changes in preexisting moles).  - Sun protection: Counseled SPF30+ sunscreen, UPF clothing, sun avoidance, tanning bed avoidance.   - Recommended yearly dental, ophthalmology, and gynecology exams.  - Recommended skin exams for all first-degree relatives.  - Recommended follow up is 4 months    # Benign findings: multiple benign nevi, lentigines, cherry angiomas, SKs  - edu on benign etiology  - Signs and Symptoms of non-melanoma skin cancer and ABCDEs of melanoma reviewed with patient. Patient encouraged to perform monthly self skin exams and educated on how to perform them. UV precautions reviewed with patient. Patient was asked about new or changing moles/lesions on body.   - Sunscreen: Apply 20 minutes prior to going outdoors and  reapply every two hours, when wet or sweating. We recommend using an SPF 30 or higher, and to use one that is water resistant.     - RTC for changes      Procedures Performed:   - Shave biopsy procedure note, location(s): see above. After discussion of benefits and risks including but not limited to bleeding, infection, scar, incomplete removal, recurrence, and non-diagnostic biopsy, written consent and photographs were obtained. The area was cleaned with isopropyl alcohol. 0.5mL of 1% lidocaine with epinephrine was injected to obtain adequate anesthesia of lesion(s). Shave biopsy at site(s) performed. Hemostasis was achieved with aluminium chloride. Petrolatum ointment and a sterile dressing were applied. The patient tolerated the procedure and no complications were noted. The patient was provided with verbal and written post care instructions.      Follow-up: 4 month(s) in-person, or earlier for new or changing lesions    Staff and scribe     Scribe Disclosure:   I, MAXX SRIVASTAVA, am serving as a scribe; to document services personally performed by Lia Savage PA-C -based on data collection and the provider's statements to me.     Provider Disclosure:  I agree with above History, Review of Systems, Physical exam and Plan.  I have reviewed the content of the documentation and have edited it as needed. I have personally performed the services documented here and the documentation accurately represents those services and the decisions I have made.      Electronically signed by:     All risks, benefits and alternatives were discussed with patient.  Patient is in agreement and understands the assessment and plan.  All questions were answered.    Lia Savage PA-C, [facility]  [facility] [place]: Phone: [phone], Fax: [phone]  [facility]: Phone: [phone],  Fax: [phone]  [facility]: Phone:  [phone], Fax: [phone]  ____________________________________________    CC: Skin Check ([facility]SC)      Reviewed patients past medical history and pertinent chart review prior to patient's visit today.     HPI:  Ms. Deandra Lewis is a 48 year old female who presents today as a return patient for FBSC. No areas of concern. Feeling well. No weight changes, no fevers, night sweats.    Patient is otherwise feeling well, without additional concerns.    Labs:  N/A    Physical Exam:  Vitals: There were no vitals taken for this visit.  SKIN: Full skin, which includes the head/face, both arms, chest, back, abdomen,both legs, genitalia and/or groin buttocks, digits and/or nails, was examined.   - -Araiza's skin type II, has <100 nevi  - There are dome shaped bright red papules on the trunk.   - Multiple regular brown pigmented macules and papules are identified on the trunk and extremities.   - Scattered brown macules on sun exposed areas.  - There are waxy stuck on tan to brown papules on the trunk.    LYMPH: Examination of the pre/post auricular, occipital, cervical, clavicular, axillary and inguinal lymph nodes was negative.  - Well healed scar on her L thigh,  NERD   - L posterior shoulder there is  a 4 mm pink and brown asymmetrical macule.   - No other lesions of concern on areas examined.         Medications:  Current Outpatient Medications   Medication     albuterol (PROAIR HFA/PROVENTIL HFA/VENTOLIN HFA) 108 (90 Base) MCG/ACT inhaler     buPROPion (WELLBUTRIN XL) 150 MG 24 hr tablet     cetirizine (ZYRTEC) 10 MG tablet     fluticasone-vilanterol (BREO ELLIPTA) 200-25 MCG/ACT inhaler     ibuprofen (ADVIL/MOTRIN) 200 MG tablet     methocarbamol (ROBAXIN) 500 MG tablet     montelukast (SINGULAIR) 10 MG tablet     ZOLMitriptan (ZOMIG) 5 MG tablet     cholecalciferol 50 MCG (2000 UT) CAPS     cyclobenzaprine (FLEXERIL) 5 MG tablet     ibuprofen (ADVIL/MOTRIN) 800 MG tablet     lisinopril (ZESTRIL) 5 MG tablet      topiramate (TOPAMAX) 25 MG tablet     No current facility-administered medications for this visit.      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 related to previous surgeries - 4 in 3 months for sinus issues/infection     Family history of breast cancer- mother's identical twin, maternal aunt, paternal GM and dad with breast lumps      Hot flashes     Generalized hyperhidrosis- since earliest recollections - soaks clothing multiple times/week - very disruptive to life     Mild anxiety     Foster care child     Anxiety     Multiple fractures of left foot with routine healing     Family history of colon cancer- father dx'd in mid 40's - pt's first colonoscopy in 4/24/2017 - repeat in 2022      Menorrhagia with regular cycle- ? cause of iron deficiency anemia- pt would like to see ob/gyn specialists- didn't go in 2021 - better than previous     Intractable episodic tension-type headache     Strain of neck muscle, initial encounter     Sun-damaged skin     Cough due to bronchospasm- needs refills on meds      Recurrent cough     Attention deficit - symptoms - pt desires testing and possible treatment     Intractable migraine without aura and without status migrainosus     Hypertension goal BP (blood pressure) < 140/90  - new diagnosis     Morbid obesity (H)     Numbness and tingling of hand- fingertips - ? caused by lisinopril vs. other - consider changing to amlodipine      Malignant melanoma of left lower extremity including hip (H)-left anterior thigh Stage 1B (cT2a, cN0, cM0)     Melanoma of skin (H)     Mild intermittent asthma with (acute) exacerbation- allergies and cold temperature triggers     Encounter for completion of form with patient- [facility] paperwork for melanoma left leg      Past Medical History:   Diagnosis Date     Anemia      Anxiety     paxil 10mg= mild bladder retention, at 30mg = ravenously hungry/weight gain increased headaches; lexapro = bad sweating     Contraception     ocp's some = mood swings ; seasonale: frequent vaginal bleeding and weight gain     Hot flashes 11/16/2016     Hypertension goal BP (blood pressure) < 140/90  - new diagnosis 02/02/2023     Kidney stones     Dr. 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        Sincerely,        Lia Savage PA-C

## 2024-02-16 LAB
PATH REPORT.COMMENTS IMP SPEC: NORMAL
PATH REPORT.COMMENTS IMP SPEC: NORMAL
PATH REPORT.FINAL DX SPEC: NORMAL
PATH REPORT.GROSS SPEC: NORMAL
PATH REPORT.MICROSCOPIC SPEC OTHER STN: NORMAL
PATH REPORT.RELEVANT HX SPEC: NORMAL

## 2024-02-18 ENCOUNTER — MYC REFILL (OUTPATIENT)
Dept: FAMILY MEDICINE | Facility: CLINIC | Age: 49
End: 2024-02-18
Payer: COMMERCIAL

## 2024-02-18 DIAGNOSIS — J98.01 COUGH DUE TO BRONCHOSPASM: ICD-10-CM

## 2024-02-18 DIAGNOSIS — G43.019 INTRACTABLE MIGRAINE WITHOUT AURA AND WITHOUT STATUS MIGRAINOSUS: ICD-10-CM

## 2024-02-20 RX ORDER — ZOLMITRIPTAN 5 MG/1
TABLET, FILM COATED ORAL
Qty: 10 TABLET | Refills: 3 | Status: SHIPPED | OUTPATIENT
Start: 2024-02-20 | End: 2024-06-26

## 2024-02-20 RX ORDER — MONTELUKAST SODIUM 10 MG/1
1 TABLET ORAL AT BEDTIME
Qty: 90 TABLET | Refills: 3 | Status: SHIPPED | OUTPATIENT
Start: 2024-02-20 | End: 2024-08-20

## 2024-03-08 DIAGNOSIS — M54.9 UPPER BACK PAIN: ICD-10-CM

## 2024-03-08 DIAGNOSIS — M54.50 BILATERAL LOW BACK PAIN WITHOUT SCIATICA, UNSPECIFIED CHRONICITY: ICD-10-CM

## 2024-03-08 DIAGNOSIS — M54.2 NECK PAIN: ICD-10-CM

## 2024-03-08 RX ORDER — CYCLOBENZAPRINE HCL 5 MG
5-10 TABLET ORAL 2 TIMES DAILY PRN
Qty: 30 TABLET | Refills: 3 | Status: SHIPPED | OUTPATIENT
Start: 2024-03-08 | End: 2024-06-26

## 2024-03-28 ENCOUNTER — VIRTUAL VISIT (OUTPATIENT)
Dept: ONCOLOGY | Facility: CLINIC | Age: 49
End: 2024-03-28
Attending: SURGERY
Payer: COMMERCIAL

## 2024-03-28 DIAGNOSIS — C43.72 MALIGNANT MELANOMA OF LEFT LOWER EXTREMITY INCLUDING HIP (H): ICD-10-CM

## 2024-03-28 DIAGNOSIS — F33.0 MILD EPISODE OF RECURRENT MAJOR DEPRESSIVE DISORDER (H): ICD-10-CM

## 2024-03-28 DIAGNOSIS — Z80.0 FAMILY HISTORY OF COLON CANCER: ICD-10-CM

## 2024-03-28 DIAGNOSIS — F41.9 ANXIETY: ICD-10-CM

## 2024-03-28 DIAGNOSIS — J20.9 ACUTE BRONCHITIS, UNSPECIFIED ORGANISM: ICD-10-CM

## 2024-03-28 DIAGNOSIS — Z80.3 FAMILY HISTORY OF MALIGNANT NEOPLASM OF BREAST: Primary | ICD-10-CM

## 2024-03-28 DIAGNOSIS — Z80.42 FAMILY HISTORY OF PROSTATE CANCER: ICD-10-CM

## 2024-03-28 PROCEDURE — 96040 HC GENETIC COUNSELING, EACH 30 MINUTES: CPT | Mod: GT,95 | Performed by: GENETIC COUNSELOR, MS

## 2024-03-28 RX ORDER — ALBUTEROL SULFATE 90 UG/1
AEROSOL, METERED RESPIRATORY (INHALATION)
Qty: 18 G | Refills: 0 | Status: SHIPPED | OUTPATIENT
Start: 2024-03-28 | End: 2024-08-20

## 2024-03-28 RX ORDER — BUPROPION HYDROCHLORIDE 150 MG/1
450 TABLET ORAL EVERY MORNING
Qty: 270 TABLET | Refills: 0 | Status: SHIPPED | OUTPATIENT
Start: 2024-03-28 | End: 2024-06-28

## 2024-03-28 NOTE — LETTER
3/28/2024         RE: Deandra Lewis  [address] [place] [address]        Dear Colleague,    Thank you for referring your patient, Deandra Lewis, to the [facility]. Please see a copy of my visit note below.    Virtual Visit Details    Type of service:  Video Visit     Originating Location (pt. Location): Home  Distant Location (provider location):  Off-site  Platform used for Video Visit: [facility]   Time spent over video: 45 minutes    3/28/2024    Referring Provider: Tracie Higginbotham MD    Presenting Information:   I spoke with Deandra Lewis over video today for genetic counseling to discuss her personal and family history of cancer. This appointment was conducted virtually due to COVID-19 precautions. We talked today to review this history, cancer screening recommendations, and available genetic testing options.    Personal History:  Deandra is a 48 year old female. She was recently diagnosed with malignant melanoma of her left thigh in August 2023 at age 47. She underwent wide local excision on 10/9/23. She continues with surveillance. Her most recent skin exam was on 2/15/24. Other skin biopsies include: 2/15/24 - Left posterior shoulder: Compound melanocytic nevus with mild atypia, 8/28/23 - left anterior shoulder: Dermatofibroma.    She had her first menstrual period at age 13, her first child at age 27, and is premenopausal. Deandra has her ovaries, fallopian tubes and uterus in place. She reports that she has not used hormone replacement therapy. She has used oral contraceptives. She has clinical breast exams and mammograms; her most recent mammogram on 11/7/23 was negative. Her first colonoscopy on 4/24/17 detected no polyps and follow-up was recommended in 5 years. Deandra reported former tobacco use for 2 years (quit in 2004) and alcohol use of 0.25 drinks per week. She reports exposures due to [place] farm (chemicals, etc), as well as to asbestos in her  school as a child.     Family History: (Please see scanned pedigree for detailed family history information)  Siblings:  She has one sister (age 49) and one brother (age 45). She is not aware of any cancer for them, although she reports that she has not had contact with them in a number of years.  Maternal:  Her mother passed away at age 72 with no known history of cancer.   Her mother's identical twin sister is 74 years old and was diagnosed with breast cancer at age 65.   Another aunt was diagnosed with breast cancer in approximately her 50s. She was later diagnosed with leukemia and passed away due to sepsis at age 62 while waiting for a transplant.   She reports that her grandmother and grandfather could have had cancer, although she has limited information about them. She reports that they were both alcoholics and had a history of heavy smoking.   Her grandfather's brother (Deandra's great-uncle) was diagnosed with prostate cancer at an older age that Deandra believes was a more aggressive cancer.   Paternal:  Her father is 80 years old and was diagnosed with colon cancer at approximately age 55, treated with surgery. He was diagnosed with prostate cancer at age 79, but has not yet had any treatment for this. Deandra believes that this is not an aggressive cancer. He also had a benign breast mass removed in his 50s and has had lots of skin biopsies, but she does not think any skin cancers.   Her uncle was diagnosed with prostate cancer around age 84, treated with radioactive seeds. She has limited information about his diagnosis.   Her grandmother had a breast lump and may have had breast cancer, although no further evaluation of this was done due to her age. She passed away at age 82.    Deandra reports that she has limited information about her family history, as her relatives do not often share a lot of information. Deandra is not aware of any genetic testing for inherited cancer risk in any of her family  members.     Her maternal ethnicity is English, Scandinavian. Her paternal ethnicity is Frisian, [place]ese, . There is no known Ashkenazi Confucianism ancestry on either side of her family.     Discussion:  Deandra's personal and family history of cancer is suggestive of a hereditary cancer syndrome.  We reviewed the features of sporadic, familial, and hereditary cancers. In looking at Deandra's family history, it is possible that a cancer susceptibility gene is present due to her recent diagnosis of melanoma, as well as her family history of breast, prostate, and colon cancer. Deandra also has limited information about her family history, therefore, her risk for an inherited cancer predisposition syndrome may be underestimated based upon the pedigree analysis alone.    We discussed the natural history and genetics of hereditary cancer. Based on her personal diagnosis of melanoma and her family history of breast and prostate cancer, we discussed the BRCA1 and BRCA2 genes. Mutations in these genes cause a condition known as Hereditary Breast and Ovarian Cancer syndrome (HBOC). Women with a mutation in either of these genes are at increased risk for breast and ovarian cancer. There is also an increased risk for a second primary breast cancer. Men with a mutation in either of these genes are at increased risk for breast and prostate cancer. Both women and men may also be at increased risk for pancreatic cancer and melanoma.   Detailed handouts regarding these genes and other genes in which mutations are associated with an increased risk for breast cancer and melanoma will be provided to Deandra via [employer] and can be found in the after visit summary. Topics included: inheritance pattern, cancer risks, cancer screening recommendations, and also risks, benefits and limitations of testing.    Based on her personal and family history, Deandra meets current National Comprehensive Cancer Network (NCCN) criteria for genetic  testing of high-penetrance breast cancer susceptibility genes, specifically, BRCA1, BRCA2, CDH1, PALB2, PTEN, and TP53.     We discussed that there are additional genes that could cause increased risk for melanoma, breast, colon, prostate, and other cancers. As many of these genes present with overlapping features in a family and accurate cancer risk cannot always be established based upon the pedigree analysis alone, it would be reasonable for Deandra to consider panel genetic testing to analyze multiple genes at once.  We reviewed genetic testing options for Deandra based on her personal and family history: a panel of genes associated with an increased risk for certain cancers, or larger panel options to include genes associated with increased risk for multiple different cancer types. Deandra expressed an interest in learning as much information as possible from the testing. We discussed expanded panel options including the Common Hereditary Cancers panel and the Multi-Cancer panel. She opted for a Custom Panel (a combination of the Multi-Cancer Panel + Hereditary Skin Cancer Panel, including preliminary evidence genes for skin cancer and TERT)  The [facility] Multi-Cancer Panel analyzes 70 genes associated with an increased risk for cancer: AIP, ALK, APC, RACHAEL, AXIN2, BAP1, BARD1, BLM, BMPR1A, BRCA1, BRCA2, BRIP1, CDC73, CDH1, CDK4, CDKN1B, CDKN2A, CHEK2, CTNNA1, DICER1, EGFR, EPCAM, FH, FLCN, GREM1, HOXB13, KIT, LZTR1, MAX, MBD4, MEN1, MET, MITF, MLH1, MSH2, MSH3, MSH6, MUTYH, NF1, NF2, NTHL1, PALB2, PDGFRA, PMS2, POLD1, POLE, POT1, VIUPB2W, PTCH1, PTEN, RAD51C, RAD51D, RB1, RET, SDHA, SDHAF2, SDHB, SDHC, SDHD, SMAD4, SMARCA4, SMARCB1, SMARCE1, STK11, SUFU, LEAN021, TP53, TSC1, TSC2, VHL.  This panel includes genes associated with hereditary cancers across multiple major organ systems, including: breast, gynecologic (ovarian, uterine/endometrial), gastrointestinal (colorectal, gastric, pancreatic), endocrine (thyroid,  parathyroid, pituitary, adrenal glands), genitourinary (renal/urinary tract, prostate), skin (melanoma, basal cell carcinoma), and brain/nervous system.  Individuals who are found to carry a pathogenic variant in one of these genes have an increased risk of developing certain cancers/tumors. Identifying those at elevated risk may guide implementation of additional screening, surveillance and interventions.    We discussed that many genes on this panel are associated with specific hereditary cancer syndromes and have published management guidelines. Other genes have medical management guidelines available to screen for certain cancers. The remaining genes are associated with increased cancer risk and may allow us to make medical recommendations when mutations are identified. Some of the genes on this expanded panel may have limited information available about specific cancer risks and therefore, there may be limited screening guidelines available. She stated that she understood potential limitations of a larger gene panel.   Due to COVID-19 precautions consent was obtained over the phone/video today. Genetic testing via a Custom Panel (a combination of the Multi-Cancer Panel + Hereditary Skin Cancer Panel, including preliminary evidence genes for skin cancer and TERT) will be sent to [facility]. Deandra opted to schedule a blood draw for testing. Turnaround time from date when sample is received at the lab: approximately 3-4 weeks.  Medical Management: For Deandra, we reviewed that the information from genetic testing may determine:  additional cancer screening for which Deandra may qualify (i.e. mammogram and breast MRI, more frequent colonoscopies, more frequent dermatologic exams, etc.),  options for risk reducing surgeries Deandra could consider (i.e. bilateral mastectomy, surgery to remove her ovaries and/or uterus, etc.),    and targeted chemotherapies if she were to develop certain cancers in the  future (i.e. immunotherapy for individuals with Thomas syndrome, PARP inhibitors, etc.).   These recommendations will be discussed in detail once genetic testing is completed.     Plan:  1) Today Deandra elected to proceed with genetic testing via a Custom Panel (a combination of the Multi-Cancer Panel + Hereditary Skin Cancer Panel, including preliminary evidence genes for skin cancer and TERT) offered by [facility].  2) This information should be available in 4-5 weeks.  3) Deandra will be scheduled for a virtual visit (phone or video) to discuss the results.    Cely Hernandez MS, [facility] [place]ensed, Certified Genetic Counselor  Office: [phone]  Email: [email or web address]

## 2024-03-28 NOTE — PATIENT INSTRUCTIONS
Assessing Cancer Risk  Only about 5-10% of cancers are thought to be due to an inherited cancer susceptibility gene.    These families often have:  Several people with the same or related types of cancer  Cancers diagnosed at a young age (before age 50)  Individuals with more than one primary cancer  Multiple generations of the family affected with cancer    Melanoma Genes  We each inherit two copies of every gene in our bodies: one from our mother, and one from our father.  Each gene has a specific job to do.  When a gene has a mistake or  mutation  in it, it does not work like it should.  When someone inherits a mutation in a melanoma gene, this increases their risk to develop melanoma above that of the general population risk (about 2.2% lifetime risk).  Inheriting a gene mutation does not guarantee that a person will develop melanoma or other associated cancers, but it does significantly increase their risk above that of the general population.    Below is a list of genes commonly associated with melanoma. A single mutation in any of these genes increases the risk for melanoma, among other cancers.     BAP1  BAP1-tumor predisposition syndrome (BAP1-TPDS) is caused by mutations in one copy of the BAP1 gene. It is associated with an increased risk for multiple skin findings such as atypical Spitz tumors, cutaneous (skin) melanoma, and basal cell carcinoma, as well as other cancers, including uveal (eye) melanoma, malignant mesothelioma, and kidney cancer. The exact lifetime risks for these cancers are unknown at this time. Other suspected cancer risks include breast cancer, cholangiocarcinoma, non-small cell lung cancer, meningioma and neuroendocrine tumors.       BRCA2  Mutations in one copy of the BRCA2 gene result in Hereditary Breast and Ovarian Cancer (HBOC) syndrome. Individuals with HBOC have elevated breast, ovarian, male breast, prostate, pancreatic, and melanoma risks. Risks for both cutaneous (skin)  and ocular (eye) melanoma may be elevated in some families with a BRCA2 mutation, but not all.     General Population BRCA2 Mutation   Breast 12% >60%   Ovarian 1-2% 13-29%   Male Breast <1% 1.8-7.1%   Prostate 16% 19-61%   Pancreatic 2-3% 5-10%   Melanoma 2.2% Increased     CDK4  Individuals with a mutation in one copy of their CDK4 gene have familial cutaneous malignant melanoma. They are at an increased risk for developing atypical moles and melanoma, though the exact lifetime risk has not yet been defined.     CDKN2A  Mutations in one copy of the CDKN2A gene are associated with Familial Atypical Multiple Mole Melanoma (FAMMM) syndrome. It is characterized by multiple melanocytic nevi and a family history of melanoma. The likelihood of developing melanoma has been estimated to be 28-76% depending on other risk factors, including family history, geographic location, and other genetic modifiers. CDKN2A mutations are also associated with an increased risk for pancreatic cancer, among others. The likelihood of developing pancreatic cancer has been estimated to be >15%; in some families this risk may be higher.    MITF  Individuals with the E318K mutation in the MITF gene have an elevated risk for cutaneous melanoma and renal cell carcinoma, however these risk numbers are not well defined. The risk for melanoma has been estimated to be up to a 2-8 fold increased risk compared to the general population risk. Individuals who carry this MITF gene mutation may have an up to 5-fold increased risk for renal cell carcinoma. Other mutations in the MITF gene are not currently known to be associated with this increased risk.    PTEN  PTEN hamartoma tumor syndrome (PHTS) (also known as Cowden syndrome) is caused by a mutation in the PTEN gene. This condition increases the risk for breast, thyroid, endometrial, colon, melanoma, and kidney cancer. Other benign features seen in some individuals with PHTS include benign skin lesions  (facial papules, trichilemmomas, keratoses, lipomas), learning disability, autism, thyroid nodules, colon polyps, and larger head size.   Lifetime Cancer Risks    General Population PTEN Mutation   Breast 12% 40-60%*   Thyroid 1% Up to 38%   Renal 1-2% Up to 35%   Endometrial 3% Up to 28%   Colon 5% Up to 9%   Melanoma 2-3% Up to 6%   *Emerging data suggests the risk for breast cancer could be greater than 60%    RB1  Retinoblastoma is caused by mutations in one copy of the RB1 gene. It is characterized by a significantly increased risk for malignant tumors in the retina, typically found in young children. Melanomas, osteosarcomas, and soft tissues sarcomas can also be seen in affected individuals, though the exact lifetime risks are unknown.     TP53  Mutations in one copy of the TP53 gene cause Li-Fraumeni syndrome (LFS). LFS is associated with the development of many different types of cancer (i.e. sarcomas, adrenocortical tumors, breast cancer) beginning in childhood or young adulthood. Increased risk for melanoma has also been reported in families with LFS.     Genetic Testing  Genetic testing involves a blood test and will look for any harmful mutations that are associated with increased cancer risk. If possible, it is recommended that the person(s) who has had cancer be tested before other family members. That person will give us the most useful information about whether or not a specific gene is associated with the cancer in the family.     Results  There are three possible results of genetic testing:  Positive--a harmful mutation was identified  Negative--no mutation was identified  Variant of unknown significance--a variation in one of the genes was identified, but it is unclear how this impacts cancer risk in the family    Advantages and Disadvantages  There are advantages and disadvantages to genetic testing.    Advantages  May clarify your cancer risk  Can help you make medical decisions  May explain  the cancers in your family  May give useful information to your family members (if you share your results)    Disadvantages  Possible negative emotional impact of learning about inherited cancer risk  Uncertainty in interpreting a negative test result in some situations  Possible genetic discrimination concerns (see below)    Inheritance   All of the cancer syndromes reviewed above are inherited in an autosomal dominant pattern. This means that if a parent has a mutation, each of their children will have a 50% chance of inheriting that same mutation.  Therefore, each child -- of any gender -- would have a 50% chance of being at increased risk for developing cancer.                                            Image obtained from Genetics Home Reference, 2013       Genetic Information Nondiscrimination Act (RAINER)  The Genetic Information Nondiscrimination Act of 2008 (RAINER) is a federal law that protects individuals from health insurance or employment discrimination based on a genetic test result alone (with some exceptions, including employers with fewer than 15 employees, and ). Although rare, RAINER does not cover discrimination protections in terms of life insurance, long term care, or disability insurances. Visit the National Human Genome Research [place] website to learn more.    Reducing Cancer Risk  If a harmful mutation is found in a cancer risk gene, there may be certain screening recommendations or risk-reducing surgeries that can be offered. For example, screening recommendations for individuals with a melanoma-associated gene mutation may include dermatology exams, ophthalmology exams, or other screening, depending on the gene mutation identified. This information will be discussed after genetic testing is completed.    If no mutations are found on genetic testing, screening is then recommended based on personal and/or family history of cancer.     All individuals with a gene mutation should talk  to their doctor or genetic counselor about mutation specific screening as different mutations present with different risks and screening recommendations. In addition, the age at which to start screening may be modified based on family history of young cancer.    Questions to Think About Regarding Genetic Testing  What effect will the test result have on me and my relationship with my family members if I have an inherited gene mutation?  If I don t have a gene mutation?  Should I share my test results, and how will my family react to this news, which may also affect them?  Are my children ready to learn new information that may one day affect their own health?    Resources  Melanoma Research Foundation https://www.melanoma.org/   AIM at Melanoma Foundation https://www.aimatmelanoma.org/   American Cancer Society (ACS) cancer.org   National Cancer [place] (NCI) cancer.gov     Please call us if you have any questions or concerns.   Cancer Risk Management Program [phone] ([phone])  Lc Cruz, [place] [facility]  [phone]  Liz Siu, MS, [facility] [phone]  Fariba Canela, MS, [facility]  [phone]  Swapna Jessica, MS, [facility]  [phone]  Cely Hernandez, MS, [facility] [place]  [phone]  Nakia Zamora, MS, [facility] [phone]  Dorota Wynne, MS, [facility] [phone]    References  Noe E, Britney P, Sandy K, Sharron A, Ethan H. Hereditary Melanoma: Update on Syndromes and Management - Genetics of familial atypical multiple mole melanoma syndrome. Journal of the American Academy of Dermatology. 2016;74(3):395-407. doi:10.1016/j.jaad.2015.08.038.  Donta K, Cira R, Dahlia CM, Patrice-Esquivel MH. Comprehensive review of BAP1 tumor predisposition syndrome with report of two new cases. Clinical genetics. 2016;89(3):285-294. doi:10.1111/cge.03540.  Homero, Kylee, Umesh, Jared, Duglas, Tutu, . . . Lc. (2013). Cancer Risks for BRCA1 and BRCA2 Mutation Carriers: Results From Prospective Analysis of EMBRACE. Journal Of The National  Cancer [place], 105(11), 812-822.  Stacey Barragan, Luis Antonio Peterson, Lisarita Jimenez, Kate Quiroz, Pat Thompson, Moshe Marin, . . . Vernon Rogers. (1996). Germline mutations in the [id number] binding domain of CDK4 in familial melanoma. Nature Genetics, 12(1), 97-99.  EDWARD Macias, CHASTITY Jaramillo, EDWARD Siegel, KELSEY Patel, JORDY Gage., Spatz, A., . . . KELSEY Rodriguez (2007). BRCA1, BRCA2, TP53, and CDKN2A germline mutations in patients with breast cancer and cutaneous melanoma. Familial Cancer, 6(4), 453-461.  CHASTITY Villanueva, & CHAVO Pedersen (2012). Genetic alterations of PTEN in human melanoma. Cellular and Molecular Life Sciences, 69(9), 4945-2854.  Paula Hubbard, Maral Alexander, Shana Quevedo, Fabian Joseph, Bernardo Carter, Veronica Thomas, . . . Vandana Lucero. (2011). A SUMOylation-defective MITF germline mutation predisposes to melanoma and renal carcinoma. Nature, 480(1388), 94-8.  URVASHI Hart., CHASTITY Lanier, KELSEY Seymour, & JORDY Contreras. (2008). Identification of BHJRNB21 , SUABD8X , FGFR3, and RB1 mutations in melanoma by inhibition of nonsense-mediated mRNA decay. Genes, Chromosomes and Cancer, 47(12), 0293-3560.  NICKOLAS Horner, & Jacqueline C. (2015). Cowden syndrome: Recognizing and managing a not?so?rare hereditary cancer syndrome. Journal of Surgical Oncology, 111(1), 125-130.  Prevalence of the E318K MITF germline mutation in [place] melanoma patients: Associations with histological subtypes and family cancer history. (2013). Pigment Cell & Melanoma Research, 26(2), 259-262.  Noe E, Britney P, Sandy K, Sharron A, Ethan H. Hereditary Melanoma: Update on Syndromes and Management - Genetics of familial atypical multiple mole melanoma syndrome. Journal of the American Academy of Dermatology. 2016;74(3):395-407. Doi:10.1016/j.jaad.2015.08.038.  Dexter LEES, Safia ED, Jerel KG, et al. Prevalence of CDKN2A mutations in pancreatic cancer patients: implications for genetic  counseling.  Journal of Human Genetics. 2011;19(4):472-478. Doi:10.1038/ejhg.2010.198.  TUSHAR Pierce Demenais, Florence, Goldstein, Alisa M., Cee Romano Bishop, Julia Newton, Paillerets, Brigitte Bressac-de, . . . Pat Thompson (2002). Geographical Variation in the Penetrance of CDKN2A Mutations for Melanoma. Journal of the National Cancer [place], 94(12), 894-903.  Tom Quiroz Harland, Gillanders, Ben, Amanda, . . . Jak. (2006). High-risk Melanoma Susceptibility Genes and Pancreatic Cancer, Neural System Tumors, and Uveal Melanoma across [employer]. Cancer Research., 66(83), 8096-7713.         Assessing Cancer Risk  Cancer is a common diagnosis which impacts many families.  Individuals may develop cancer due to environmental factors (such as exposures and lifestyle), aging, genetic predisposition, or a combination of these factors.      Only about 5-10% of cancers are thought to be due to an inherited cancer susceptibility gene.    These families often have:  Several people with the same or related types of cancer  Cancers diagnosed at a young age (before age 50)  Individuals with more than one primary cancer  Multiple generations of the family affected with cancer    Comprehensive Breast and Gynecologic Cancer Panel  We each inherit two copies of every gene in our bodies: one from our mother, and one from our father. Each gene has a specific job to do.  When a gene has a mistake or  mutation  in it, it does not work like it should.     Some people may be candidates for genetic testing of more than one gene.  For these families, genetic testing using a cancer panel may be offered. These panels will test different genes at once known to increase the risk for breast, ovarian, uterine, and/or other cancers.    This handout will review common hereditary breast and gynecologic cancer syndromes. The genes that will be discussed in this handout are: RACHAEL, BRCA1, BRCA2, BRIP1, CDH1,  CHEK2, MLH1, MSH2, MSH6, PMS2, EPCAM, PTEN, PALB2, RAD51C, RAD51D, and TP53.    The purpose of this handout is to serve as a brief summary of the breast and gynecologic cancer risk genes that have published clinical management guidelines for individuals who are found to carry a mutation. Inheriting a mutation does not mean a person will develop cancer, but it does significantly increase their risk above the general population risk.     ______________________________________________________________________________    Hereditary Breast and Ovarian Cancer Syndrome (BRCA1 and BRCA2)  A single mutation in one of the copies of BRCA1 or BRCA2 increases the risk for breast and ovarian cancer, among others.  The risk for pancreatic cancer and melanoma may also be slightly increased in some families.  The chart below shows the chance that someone with a BRCA mutation would develop cancer in his or her lifetime1,2,3,4.       Lifetime Cancer Risks    General Population BRCA1  BRCA2   Breast  12% >60% >60%   Ovarian  1-2% 39-58% 13-29%   Prostate 12% 7-26% 19-61%   Male Breast 0.1% 0.2-1.2% 1.8-7.1%   Pancreas 1-2% Up to 5% 5-10%     A person s ethnic background is also important to consider, as individuals of Ashkenazi Jehovah's witness ancestry have a higher chance of having a BRCA gene mutation.  There are three BRCA mutations that occur more frequently in this population.      Thomas Syndrome (MLH1, MSH2, MSH6, PMS2, and EPCAM)  Currently five genes are known to cause Thomas Syndrome: MLH1, MSH2, MSH6, PMS2, and EPCAM.  A single mutation in one of the Thomas Syndrome genes increases the risk for colon, endometrial, ovarian, and stomach cancers.  Other cancers that occur less commonly in Thomas Syndrome include urinary tract, skin, and brain cancers.  The chart below shows the chance that a person with Thomas syndrome would develop cancer in his or her lifetime5.      Lifetime Cancer Risks    General Population Thomas Syndrome   Colon 5%  10-61%   Endometrial 3% 13-57%   Ovarian 1-2% 1-38%   Stomach <1% 1-9%   *Cancer risk varies depending on Thomas syndrome gene found      Cowden Syndrome (PTEN)  Cowden syndrome is a hereditary condition that increases the risk for breast, thyroid, endometrial, colon, and kidney cancer.  Cowden syndrome is caused by a mutation in the PTEN gene.  A single mutation in one of the copies of PTEN causes Cowden syndrome and increases cancer risk.  The chart below shows the chance that someone with a PTEN mutation would develop cancer in their lifetime6,7.  Other benign features seen in some individuals with Cowden syndrome include benign skin lesions (facial papules, keratoses, lipomas), learning disability, autism, thyroid nodules, colon polyps, and larger head size.     Lifetime Cancer Risks    General Population Cowden   Breast 12% 40-60%*   Thyroid 1% Up to 38%   Renal 1-2% Up to 35%   Endometrial 3% Up to 28%   Colon 5% Up to 9%   Melanoma 2-3% Up to 6%   *Emerging data suggests the risk for breast cancer could be greater than 60%               Li-Fraumeni Syndrome (TP53)  Li-Fraumeni Syndrome (LFS) is a cancer predisposition syndrome caused by a mutation in the TP53 gene. A single mutation in one of the copies of TP53 increases the risk for multiple cancers. Individuals with LFS are at an increased risk for developing cancer at a young age. The lifetime risk for development of a LFS-associated cancer is 50% by age 30 and 90% by age 60.   Core Cancers: Sarcomas, Breast, Brain, Lung, Leukemias/Lymphomas, Adrenocortical carcinomas  Other Cancers: Gastrointestinal, Thyroid, Skin, Genitourinary       Hereditary Diffuse Gastric Cancer (CDH1)  Currently, one gene is known to cause hereditary diffuse gastric cancer (HDGC): CDH1.  Individuals with HDGC are at increased risk for diffuse gastric cancer and lobular breast cancer. Of people diagnosed with HDGC, 30-50% have a mutation in the CDH1 gene.  This suggests there are  likely other genes that may cause HDGC that have not been identified yet.      Lifetime Cancer Risks    General Population HDGC   Diffuse Gastric  <1% ~80%   Breast 12% 41-60%       Additional Genes    RACHAEL  RACHAEL is a moderate-risk breast cancer gene. Women who have a mutation in RACHAEL can have between a 2-4 fold increased risk for breast cancer compared to the general population8. RACHAEL mutations have also been associated with increased risk for pancreatic cancer between 5-10%9. Individuals who inherit two RACHAEL mutations have a condition called ataxia-telangiectasia (AT).  This rare autosomal recessive condition affects the nervous system and immune system, and is associated with progressive cerebellar ataxia beginning in childhood. Individuals with ataxia-telangiectasia often have a weakened immune system and have an increased risk for childhood cancers.    PALB2  Mutations in PALB2 have been shown to increase the risk of breast cancer up to 41-60% in some families; where individuals fall within this risk range is dependent upon family [id number]. PALB2 mutations have also been associated with increased risk for pancreatic cancer between 5-10%.  Individuals who inherit two PALB2 mutations--one from their mother and one from their father--have a condition called Fanconi Anemia.  This rare autosomal recessive condition is associated with short stature, developmental delay, bone marrow failure, and increased risk for childhood cancers.    CHEK2   CHEK2 is a moderate-risk breast cancer gene.  Women who have a mutation in CHEK2 have around a 2-4 fold increased risk for breast cancer compared to the general population, and this risk may be higher depending upon family history.11,12,13 The risk of colon cancer may be twice as high as the general population risk of colon cancer of 5%. Mutations in CHEK2 have also been shown to increase the risk of other cancers, including prostate, however these cancer risks are currently not  well understood.    BRIP1, RAD51C and RAD51D  Mutations in RAD51C and RAD51D have been shown to increase the risk of ovarian cancer and breast cancer 14,. Mutations in BRIP1 have been shown to increase the risk of ovarian cancer and possibly female breast cancer 15 .       Lifetime Cancer Risk    General Population        BRIP1   RAD51C  RAD51D   Breast 12% Not well defined 20-40% 20-40%   Ovarian 1-2% 5-15% 10-15% 10-20%     ______________________________________________________________  Inheritance  All of the cancer syndromes reviewed above are inherited in an autosomal dominant pattern.  This means that if a parent has a mutation, each of their children will have a 50% chance of inheriting that same mutation. Therefore, each child --male or female-- would have a 50% chance of being at increased risk for developing cancer.    Image obtained from [employer] Home Reference, 2013     Mutations in some genes can occur de sedrick, which means that a person s mutation occurred for the first time in them and was not inherited from a parent.  Now that they have the mutation, however, it can be passed on to future generations.    Genetic Testing  Genetic testing involves a blood test and will look for any harmful mutations that are associated with increased cancer risk.  If possible, it is recommended that the person(s) who has had cancer be tested before other family members.  That person will give us the most useful information about whether or not a specific gene is associated with the cancer in the family.    Results  There are three possible results of genetic testing:  Positive--a harmful mutation was identified in one or more of the genes  Negative--no mutations were identified in any of the genes tested  Variant of unknown significance--a variation in one of the genes was identified, but it is unclear how this impacts cancer risk in the family    Advantages and Disadvantages   There are advantages and disadvantages to  genetic testing.    Advantages  May clarify your cancer risk  Can help you make medical decisions  May explain the cancers in your family  May give useful information to your family members (if you share your results)    Disadvantages  Possible negative emotional impact of learning about inherited cancer risk  Uncertainty in interpreting a negative test result in some situations  Possible genetic discrimination concerns (see below)    Genetic Information Nondiscrimination Act (RAINER)  The Genetic Information Nondiscrimination Act of 2008 (RAINER) is a federal law that protects individuals from health insurance or employment discrimination based on a genetic test result alone (with some exceptions, including employers with fewer than 15 employees, and ).  Although rare, RAINER  does not cover discrimination protections in terms of life insurance, long term care, or disability insurances.  Visit the National Human [employer] [place] website to learn more.    Reducing Cancer Risk  All of the genes described in this handout have nationally recognized cancer screening guidelines that would be recommended for individuals who test positive.  In addition to increased cancer screening, surgeries may be offered or recommended to reduce cancer risk.  Recommendations are based upon an individual s genetic test result as well as their personal and family history of cancer.    Questions to Think About Regarding Genetic Testing:  What effect will the test result have on me and my relationship with my family members if I have an inherited gene mutation?  If I don t have a gene mutation?  Should I share my test results, and how will my family react to this news, which may also affect them?  Are my children ready to learn new information that may one day affect their own health?    Hereditary Cancer Resources    FORCE: Facing Our Risk of Cancer Empowered facingourrisk.org   Bright Pink bebrightpink.org   Li-Fraumeni  Syndrome Association lfsassociation.org   PTEN World PTENworld.com   No stomach for cancer, Inc. nostomachforcancer.[email or web address]   Stomach cancer relief network Scrnet.org   Collaborative Group of the [place] on Inherited Colorectal Cancer (CGA) cgaicc.com    Cancer Care [email or web address].org   American Cancer Society (ACS) cancer.org   National Cancer [place] (NCI) cancer.gov     Please call us if you have any questions or concerns.   Cancer Risk Management Program [phone]-[facility]-CANCER ([phone])  Lc Anthony, MS [facility] [place]  [phone]  Liz Salbador, MS, [facility] [place] [phone]  Fariba Rola, MS, [facility] [place]  [phone]  Swapna Jessica, MS, [facility] [place]  [phone]  Cely Hernandez, MS, [facility] [place]  [phone]  Nakia Zamora, MS, [facility] [place] [phone]  Dorota Wynne, MS, [facility] [place] [phone]    References  Medina Vargas PDP, Brendan S, Charly ARGUETA, Jovi JE, Anjel JL, Roslyn N, Micaela H, Maricruz O, Gregg A, Richieini B, Angelito P, Mannick S, Andrew DM, Shane N, Troy E, Myra H, Gabino E, Harry J, Berenice J, Donna B, Tulinius H, Thorlacius S, Eerola H, Oscarna H, Brandy K, Angelita OP. Average risks of breast and ovarian cancer associated with BRCA1 or BRCA2 mutations detected in case series unselected for family history: a combined analysis of 222 studies. Am J Hum Keiko. 2003;72:1117-30.  Greyson N, Ceci M, Oreilly G.  BRCA1 and BRCA2 Hereditary Breast and Ovarian Cancer. Gene Reviews online. 2013.  Bill YC, Gil S, Lakesha G, Betancourt S. Breast cancer risk among male BRCA1 and BRCA2 mutation carriers. J Natl Cancer Inst. 2007;99:1811-4.  Ankush DG, Cristobal I, Enrico J, Rylee E, Katie ER, Filomena F. Risk of breast cancer in male BRCA2 carriers. J Med Keiko. 2010;47:710-1.  National Comprehensive Cancer Network. Clinical practice guidelines in oncology, colorectal cancer screening. Available online (registration required). 2015.  Elvin CRUZ, Evens J, Cherrie J, Diana DELAROSA, Cindy SCHREIBER, Jacqueline C. Lifetime cancer risks in individuals with germline  PTEN mutations. Clin Cancer Res. 2012;18:400-7.  Cira VALADEZ. Cowden Syndrome: A Critical Review of the Clinical Literature. J Keiko . 2009:18:13-27.  Deangelo ESPARZA, Brian D, Sean S, Didi P, Chavo T, Cherelle M, Darius B, Prosper H, Win R, Ania K, Davin L, Lechuga DG, Andrew D, Lc DF, Rufina MR, The Breast Cancer Susceptibility Collaboration (UK) & Alvin SPENCE. RACHAEL mutations that cause ataxia-telangiectasia are breast cancer susceptibility alleles. Nature Genetics. 2006;38:873-875  Last N , Clyde Y, Martha J, Nelli L, Mere GM , Maye ML, Gallwisam S, Pineda AG, Syngal S, Lizzette ML, Franklin J , Jaclyn R, Angelo SZ, Keyur JR, Yanna VE, Hector M, Vogelstein B, Dmitry N, Norma RH, Samson KW, and Mary Grace AP. RACHAEL mutations in patients with hereditary pancreatic cancer. Cancer Discover. 2012;2:41-46  Austyn LAUREN., et al. Breast-Cancer Risk in Families with Mutations in PALB2. NEJM. 2014; 371(6):497-506.  CHEK2 Breast Cancer Case-Control Consortium. CHEK2*1100delC and susceptibility to breast cancer: A collaborative analysis involving 10,860 breast cancer cases and 9,065 controls from 10 studies. Am J Hum Keiko, 74 (2004), pp. 9624-5086  Emily T, Amelia S, Salvador K, et al. Spectrum of Mutations in BRCA1, BRCA2, CHEK2, and TP53 in Families at High Risk of Breast Cancer. FAN. 2006;295(12):8656-8734.   Luisa C, Saundra D, Armand A, et al. Risk of breast cancer in women with a CHEK2 mutation with and without a family history of breast cancer. J Clin Oncol. 2011;29:5315-2590.  Geovany H, Emily E, John SJ, et al. Contribution of germline mutations in the RAD51B, RAD51C, and RAD51D genes to ovarian cancer in the population. J Clin Oncol. 2015;33(26):1715-3114. Doi:10.1200/JCO.2015.61.2408.  Jt T, Mohamud DANIEL, Whit P, et al. Mutations in BRIP1 confer high risk of ovarian cancer. Alejandra Keiko. 2011;43(11):8034-7619. doi:10.1038/ng.955.

## 2024-03-28 NOTE — NURSING NOTE
Is the patient currently in the state of [place]? YES    Visit mode:VIDEO    If the visit is dropped, the patient can be reconnected by: VIDEO VISIT: Send to e-mail at: [email or web address]    Will anyone else be joining the visit? NO  (If patient encounters technical issues they should call [phone]    How would you like to obtain your AVS? MyChart    Are changes needed to the allergy or medication list? N/A    Reason for visit: Consult      Cailin MCGOVERN

## 2024-03-28 NOTE — PROGRESS NOTES
Virtual Visit Details    Type of service:  Video Visit     Originating Location (pt. Location): Home  Distant Location (provider location):  Off-site  Platform used for Video Visit: Oanh   Time spent over video: 45 minutes    3/28/2024    Referring Provider: Tracie Higginbotham MD    Presenting Information:   I spoke with Deandra Lewis over video today for genetic counseling to discuss her personal and family history of cancer. This appointment was conducted virtually due to COVID-19 precautions. We talked today to review this history, cancer screening recommendations, and available genetic testing options.    Personal History:  Deandra is a 48 year old female. She was recently diagnosed with malignant melanoma of her left thigh in August 2023 at age 47. She underwent wide local excision on 10/9/23. She continues with surveillance. Her most recent skin exam was on 2/15/24. Other skin biopsies include: 2/15/24 - Left posterior shoulder: Compound melanocytic nevus with mild atypia, 8/28/23 - left anterior shoulder: Dermatofibroma.    She had her first menstrual period at age 13, her first child at age 27, and is premenopausal. Deandra has her ovaries, fallopian tubes and uterus in place. She reports that she has not used hormone replacement therapy. She has used oral contraceptives. She has clinical breast exams and mammograms; her most recent mammogram on 11/7/23 was negative. Her first colonoscopy on 4/24/17 detected no polyps and follow-up was recommended in 5 years. Deandra reported former tobacco use for 2 years (quit in 2004) and alcohol use of 0.25 drinks per week. 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     Family History: (Please see scanned pedigree for detailed family history information)  Siblings:    She has one sister (age 49) and one brother (age 45). She is not aware of any cancer for them, although she reports that she has not  had contact with them in a number of years.  Maternal:    Her mother passed away at age 72 with no known history of cancer.     Her mother's identical twin sister is 74 years old and was diagnosed with breast cancer at age 65.     Another aunt was diagnosed with breast cancer in approximately her 50s. She was later diagnosed with leukemia and passed away due to sepsis at age 62 while waiting for a transplant.     She reports that her grandmother and grandfather could have had cancer, although she has limited information about them. She reports that they were both alcoholics and had a history of heavy smoking.     Her grandfather's brother (Deandra's great-uncle) was diagnosed with prostate cancer at an older age that Deandra believes was a more aggressive cancer.   Paternal:    Her father is 80 years old and was diagnosed with colon cancer at approximately age 55, treated with surgery. He was diagnosed with prostate cancer at age 79, but has not yet had any treatment for this. Deandra believes that this is not an aggressive cancer. He also had a benign breast mass removed in his 50s and has had lots of skin biopsies, but she does not think any skin cancers.     Her uncle was diagnosed with prostate cancer around age 84, treated with radioactive seeds. She has limited information about his diagnosis.     Her grandmother had a breast lump and may have had breast cancer, although no further evaluation of this was done due to her age. She passed away at age 82.    Deandra reports that she has limited information about her family history, as her relatives do not often share a lot of information. Deandra is not aware of any genetic testing for inherited cancer risk in any of her family members.     Her maternal ethnicity is English, Scandinavian. Her paternal ethnicity is Sami, Wallisian, . There is no known Ashkenazi Methodist ancestry on either side of her family.     Discussion:    Deandra's personal and family  history of cancer is suggestive of a hereditary cancer syndrome.    We reviewed the features of sporadic, familial, and hereditary cancers. In looking at Deandra's family history, it is possible that a cancer susceptibility gene is present due to her recent diagnosis of melanoma, as well as her family history of breast, prostate, and colon cancer. Deandra also has limited information about her family history, therefore, her risk for an inherited cancer predisposition syndrome may be underestimated based upon the pedigree analysis alone.      We discussed the natural history and genetics of hereditary cancer. Based on her personal diagnosis of melanoma and her family history of breast and prostate cancer, we discussed the BRCA1 and BRCA2 genes. Mutations in these genes cause a condition known as Hereditary Breast and Ovarian Cancer syndrome (HBOC). Women with a mutation in either of these genes are at increased risk for breast and ovarian cancer. There is also an increased risk for a second primary breast cancer. Men with a mutation in either of these genes are at increased risk for breast and prostate cancer. Both women and men may also be at increased risk for pancreatic cancer and melanoma.     Detailed handouts regarding these genes and other genes in which mutations are associated with an increased risk for breast cancer and melanoma will be provided to Deandra via contrib.com and can be found in the after visit summary. Topics included: inheritance pattern, cancer risks, cancer screening recommendations, and also risks, benefits and limitations of testing.      Based on her personal and family history, Deandra meets current National Comprehensive Cancer Network (NCCN) criteria for genetic testing of high-penetrance breast cancer susceptibility genes, specifically, BRCA1, BRCA2, CDH1, PALB2, PTEN, and TP53.       We discussed that there are additional genes that could cause increased risk for melanoma, breast, colon,  prostate, and other cancers. As many of these genes present with overlapping features in a family and accurate cancer risk cannot always be established based upon the pedigree analysis alone, it would be reasonable for Deandra to consider panel genetic testing to analyze multiple genes at once.  We reviewed genetic testing options for Deandra based on her personal and family history: a panel of genes associated with an increased risk for certain cancers, or larger panel options to include genes associated with increased risk for multiple different cancer types. Deandra expressed an interest in learning as much information as possible from the testing. We discussed expanded panel options including the Common Hereditary Cancers panel and the Multi-Cancer panel. She opted for a Custom Panel (a combination of the Multi-Cancer Panel + Hereditary Skin Cancer Panel, including preliminary evidence genes for skin cancer and TERT)  The [facility] Multi-Cancer Panel analyzes 70 genes associated with an increased risk for cancer: AIP, ALK, APC, RACHAEL, AXIN2, BAP1, BARD1, BLM, BMPR1A, BRCA1, BRCA2, BRIP1, CDC73, CDH1, CDK4, CDKN1B, CDKN2A, CHEK2, CTNNA1, DICER1, EGFR, EPCAM, FH, FLCN, GREM1, HOXB13, KIT, LZTR1, MAX, MBD4, MEN1, MET, MITF, MLH1, MSH2, MSH3, MSH6, MUTYH, NF1, NF2, NTHL1, PALB2, PDGFRA, PMS2, POLD1, POLE, POT1, ODJDK0O, PTCH1, PTEN, RAD51C, RAD51D, RB1, RET, SDHA, SDHAF2, SDHB, SDHC, SDHD, SMAD4, SMARCA4, SMARCB1, SMARCE1, STK11, SUFU, AMRA224, TP53, TSC1, TSC2, VHL.  This panel includes genes associated with hereditary cancers across multiple major organ systems, including: breast, gynecologic (ovarian, uterine/endometrial), gastrointestinal (colorectal, gastric, pancreatic), endocrine (thyroid, parathyroid, pituitary, adrenal glands), genitourinary (renal/urinary tract, prostate), skin (melanoma, basal cell carcinoma), and brain/nervous system.  Individuals who are found to carry a pathogenic variant in one of these  genes have an increased risk of developing certain cancers/tumors. Identifying those at elevated risk may guide implementation of additional screening, surveillance and interventions.    We discussed that many genes on this panel are associated with specific hereditary cancer syndromes and have published management guidelines. Other genes have medical management guidelines available to screen for certain cancers. The remaining genes are associated with increased cancer risk and may allow us to make medical recommendations when mutations are identified. Some of the genes on this expanded panel may have limited information available about specific cancer risks and therefore, there may be limited screening guidelines available. She stated that she understood potential limitations of a larger gene panel.     Due to COVID-19 precautions consent was obtained over the phone/video today. Genetic testing via a Custom Panel (a combination of the Multi-Cancer Panel + Hereditary Skin Cancer Panel, including preliminary evidence genes for skin cancer and TERT) will be sent to [facility]. Deandra opted to schedule a blood draw for testing. Turnaround time from date when sample is received at the lab: approximately 3-4 weeks.    Medical Management: For Deandra, we reviewed that the information from genetic testing may determine:    additional cancer screening for which Deandra may qualify (i.e. mammogram and breast MRI, more frequent colonoscopies, more frequent dermatologic exams, etc.),    options for risk reducing surgeries Deandra could consider (i.e. bilateral mastectomy, surgery to remove her ovaries and/or uterus, etc.),      and targeted chemotherapies if she were to develop certain cancers in the future (i.e. immunotherapy for individuals with Thomas syndrome, PARP inhibitors, etc.).     These recommendations will be discussed in detail once genetic testing is completed.     Plan:  1) Today Deandra elected to  proceed with genetic testing via a Custom Panel (a combination of the Multi-Cancer Panel + Hereditary Skin Cancer Panel, including preliminary evidence genes for skin cancer and TERT) offered by [facility].  2) This information should be available in 4-5 weeks.  3) Deandra will be scheduled for a virtual visit (phone or video) to discuss the results.    Cely Hernandez MS, [facility] [place]  Licensed, Certified Genetic Counselor  Office: [phone]  Email: [email or web address]

## 2024-05-15 NOTE — PROGRESS NOTES
[facility] Dermatology Note  Encounter Date: May 16, 2024  Office Visit      Dermatology Problem List:  Last FBSE 5/16/24 - Q4-6mo until 8/2025    0. NUB, 5 mm tan macule with a central hyperpigmented globule - right superior inguinal crease, s/p bx 5/16/24  1. Malignant melanoma, BD 1.2mm, pT2a - L thigh - bx 8/28/23 - S/p WLE Dr. Higginbotham/Carla 10/9/23  Staging form: Melanoma of the Skin, AJCC 8th Edition  - Clinical: Stage IB (cT2a, cN0, cM0) - Unsigned  2. Benign biopsies:  - L anterior shoulder - bx 8/28/23 - DF  3. Dermatofibroma - L calf  4. 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  ____________________________________________    Assessment & Plan:    # NUB, 5 mm tan macule with a central hyperpigmented globule - right superior inguinal crease. DDx: DN, MM, or other.  - Shave biopsy performed today. See procedure note below.  - Photograph was obtained for clinical monitoring and inclusion in medical record.    # Hx of MM. NERD.  - ABCDEs: Counseled ABCDEs of melanoma: Asymmetry, Border (irregularity), Color (not uniform, changes in color), Diameter (greater than 6 mm which is about the size of a pencil eraser), and Evolving (any changes in preexisting moles).  - Sun protection: Counseled SPF30+ sunscreen, UPF clothing, sun avoidance, tanning bed avoidance.   - Recommended yearly dental, ophthalmology, and gynecology exams.  - Recommended skin exams for all first-degree relatives.  - Recommended follow up is 4-6 months  - Check nails between polish changes.    # Hx of DN  - Signs and Symptoms of non-melanoma skin cancer and ABCDEs of melanoma reviewed with patient. Patient encouraged to perform monthly self skin exams and educated on how to perform them. UV precautions reviewed with patient. Patient was asked about new or changing moles/lesions on body.   - Sunscreen: Apply  20 minutes prior to going outdoors and reapply every two hours, when wet or sweating. We recommend using an SPF 30 or higher, and to use one that is water resistant.       # Benign findings: multiple benign nevi, lentigines, cherry angiomas, SKs, dermatofibroma.  - edu on benign etiology  - Signs and Symptoms of non-melanoma skin cancer and ABCDEs of melanoma reviewed with patient. Patient encouraged to perform monthly self skin exams and educated on how to perform them. UV precautions reviewed with patient. Patient was asked about new or changing moles/lesions on body.   - Sunscreen: Apply 20 minutes prior to going outdoors and reapply every two hours, when wet or sweating. We recommend using an SPF 30 or higher, and to use one that is water resistant.     - RTC for changes    Procedures Performed:   - Shave biopsy procedure note, location(s): Right superior inguinal crease. After discussion of benefits and risks including but not limited to bleeding, infection, scar, incomplete removal, recurrence, and non-diagnostic biopsy, written consent and photographs were obtained. The area was cleaned with isopropyl alcohol. 0.5mL of 1% lidocaine with epinephrine was injected to obtain adequate anesthesia of lesion(s). Shave biopsy at site(s) performed. Hemostasis was achieved with aluminium chloride. Petrolatum ointment and a sterile dressing were applied. The patient tolerated the procedure and no complications were noted. The patient was provided with verbal and written post care instructions.      Follow-up: 4-6 month(s) in-person for FBSE, or earlier for new or changing lesions and pending path results    Staff and Scribe:    Scribe Disclosure:   I, Daisy Darden, am serving as a scribe to document services personally performed by Lia Savage PA-C based on data collection and the provider's statements to me.     Provider Disclosure:  I agree with above History, Review of Systems, Physical exam and Plan. I have  reviewed the content of the documentation and have edited it as needed. I have personally performed the services documented here and the documentation accurately represents those services and the decisions I have made.      Electronically signed by:    All risks, benefits and alternatives were discussed with patient.  Patient is in agreement and understands the assessment and plan.  All questions were answered.    Lia Savage PA-C, MPAS  [facility]: Phone: [phone], Fax: [phone]  [facility]: Phone: [phone],  Fax: [phone]  [facility]: Phone: [phone], Fax: [phone]  ____________________________________________    CC: Skin Check (FBSC)    Reviewed patients past medical history and pertinent chart review prior to patient's visit today.     HPI:  Ms. Deandra Lewis is a 48 year old female who presents today as a return patient for FBSE. Has a hx of MM.     Nothing bleeding, crusting, changing, or painful.. She notes she was sick the day of her last scheduled appointment with Dr. Higginbotham, so she will be contacting her office soon to reschedule. She has been approved for genetic testing and is in the process of scheduling it.     Patient is otherwise feeling well, without additional concerns.    Labs:  N/A    Physical Exam:  Vitals: There were no vitals taken for this visit.  SKIN: Full skin, which includes the head/face, both arms, chest, back, abdomen,both legs, genitalia and/or groin buttocks, digits and/or nails, was examined.   - Araiza's skin type II, <100 nevi  - There are dome shaped bright red papules on the trunk.   - Multiple regular brown pigmented macules and papules are identified on the trunk and extremities.   - Scattered brown macules on sun exposed areas.  - There are waxy stuck on tan to brown papules on the trunk.   - LYMPH: Examination of the pre/post  auricular, occipital, cervical, clavicular, axillary and inguinal lymph nodes was negative.  - There are well healed surgical scars without erythema, nodularity or telangiectasias on the prior MM sites, NERD.    - Nails are painted.  - Right superior inguinal crease: 5 mm tan macule with a central hyperpigmented globule.  - No other lesions of concern on areas examined.       Medications:  Current Outpatient Medications   Medication Sig Dispense Refill    albuterol (PROAIR HFA/PROVENTIL HFA/VENTOLIN HFA) 108 (90 Base) MCG/ACT inhaler INHALE 2 PUFFS BY MOUTH EVERY 6 HOURS AS NEEDED FOR WHEEZE OR FOR SHORTNESS OF BREATH 18 g 0    buPROPion (WELLBUTRIN XL) 150 MG 24 hr tablet TAKE 3 TABLETS BY MOUTH EVERY MORNING. 270 tablet 0    cetirizine (ZYRTEC) 10 MG tablet Take 1 tablet (10 mg) by mouth every evening 30 tablet 1    cyclobenzaprine (FLEXERIL) 5 MG tablet TAKE 1-2 TABLETS (5-10 MG) BY MOUTH 2 TIMES DAILY AS NEEDED FOR MUSCLE SPASMS 30 tablet 3    fluticasone-vilanterol (BREO ELLIPTA) 200-25 MCG/ACT inhaler Inhale 1 puff into the lungs daily 60 each 2    ibuprofen (ADVIL/MOTRIN) 200 MG tablet Take 200 mg by mouth every 6 hours as needed      methocarbamol (ROBAXIN) 500 MG tablet Take 1 tablet (500 mg) by mouth 4 times daily as needed for muscle spasms 20 tablet 0    montelukast (SINGULAIR) 10 MG tablet Take 1 tablet (10 mg) by mouth at bedtime 90 tablet 3    ZOLMitriptan (ZOMIG) 5 MG tablet TAKE 1 TABLET BY MOUTH AT ONSET OF HEADACHE FOR MIGRAINE MAY REPEAT IN 2 HOURS. MAX 2 TABS/24 HOURS 10 tablet 3    cholecalciferol 50 MCG (2000 UT) CAPS Take 1 capsule by mouth daily 90 capsule 4    ibuprofen (ADVIL/MOTRIN) 800 MG tablet Take 1 tablet (800 mg) by mouth every 6 hours as needed for moderate pain 30 tablet 0    lisinopril (ZESTRIL) 5 MG tablet TAKE 1 TABLET BY MOUTH EVERY DAY 90 tablet 1    topiramate (TOPAMAX) 25 MG tablet Take 2 TABS by mouth TWICE DAILY 360 tablet 3     No current facility-administered  medications for this visit.      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 related to previous surgeries - 4 in 3 months for sinus issues/infection    Family history of breast cancer- mother's identical twin, maternal aunt, paternal GM and dad with breast lumps     Hot flashes    Generalized hyperhidrosis- since earliest recollections - soaks clothing multiple times/week - very disruptive to life    Mild anxiety    Foster care child    Anxiety    Multiple fractures of left foot with routine healing    Family history of colon cancer- father dx'd in mid 40's - pt's first colonoscopy in 4/24/2017 - repeat in 2022     Menorrhagia with regular cycle- ? cause of iron deficiency anemia- pt would like to see ob/gyn specialists- didn't go in 2021 - better than previous    Intractable episodic tension-type headache    Strain of neck muscle, initial encounter    Sun-damaged skin    Cough due to bronchospasm- needs refills on meds     Recurrent cough    Attention deficit - symptoms - pt desires testing and possible treatment    Intractable migraine without aura and without status migrainosus    Hypertension goal BP (blood pressure) < 140/90  - new diagnosis    Morbid obesity (H)    Numbness and tingling of hand- fingertips - ? caused by lisinopril vs. 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 (cT2a, cN0, cM0)    Melanoma of skin (H)    Mild intermittent asthma with (acute) exacerbation- allergies and cold temperature triggers    Encounter for completion of form with patient- FMLA paperwork for melanoma left leg    History of malignant melanoma of skin    Neoplasm of uncertain behavior of skin    Multiple nevi     Cherry angioma    Lentigines     Past Medical History:   Diagnosis Date    Anemia     Anxiety     paxil 10mg= mild bladder retention, at 30mg = ravenously hungry/weight gain increased headaches; lexapro = bad sweating    Contraception     ocp's some = mood swings ; seasonale: frequent vaginal bleeding and weight gain    Hot flashes 11/16/2016    Hypertension goal BP (blood pressure) < 140/90  - new diagnosis 02/02/2023    Kidney stones     Dr. 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

## 2024-05-16 ENCOUNTER — OFFICE VISIT (OUTPATIENT)
Dept: FAMILY MEDICINE | Facility: CLINIC | Age: 49
End: 2024-05-16
Payer: COMMERCIAL

## 2024-05-16 DIAGNOSIS — D18.01 CHERRY ANGIOMA: ICD-10-CM

## 2024-05-16 DIAGNOSIS — D23.9 DERMATOFIBROMA: ICD-10-CM

## 2024-05-16 DIAGNOSIS — D49.2 NEOPLASM OF UNSPECIFIED BEHAVIOR OF BONE, SOFT TISSUE, AND SKIN: ICD-10-CM

## 2024-05-16 DIAGNOSIS — Z87.898 HISTORY OF ATYPICAL NEVUS: ICD-10-CM

## 2024-05-16 DIAGNOSIS — L82.1 SEBORRHEIC KERATOSES: ICD-10-CM

## 2024-05-16 DIAGNOSIS — D22.9 MULTIPLE BENIGN NEVI: ICD-10-CM

## 2024-05-16 DIAGNOSIS — L81.4 SOLAR LENTIGO: ICD-10-CM

## 2024-05-16 DIAGNOSIS — Z85.820 HISTORY OF MALIGNANT MELANOMA OF SKIN: Primary | ICD-10-CM

## 2024-05-16 DIAGNOSIS — D49.2 NEOPLASM OF SKIN: ICD-10-CM

## 2024-05-16 PROCEDURE — 11102 TANGNTL BX SKIN SINGLE LES: CPT | Performed by: PHYSICIAN ASSISTANT

## 2024-05-16 PROCEDURE — 88341 IMHCHEM/IMCYTCHM EA ADD ANTB: CPT | Performed by: PATHOLOGY

## 2024-05-16 PROCEDURE — 88305 TISSUE EXAM BY PATHOLOGIST: CPT | Performed by: PATHOLOGY

## 2024-05-16 PROCEDURE — 88342 IMHCHEM/IMCYTCHM 1ST ANTB: CPT | Performed by: PATHOLOGY

## 2024-05-16 PROCEDURE — 99213 OFFICE O/P EST LOW 20 MIN: CPT | Mod: 25 | Performed by: PHYSICIAN ASSISTANT

## 2024-05-16 NOTE — LETTER
5/16/2024         RE: Deandra Lewis  [address] [place] [address]        Dear Colleague,    [facility] you [facility], [facility] Lewis[facility]. Please see a copy of my visit note below.    [facility] Dermatology Note  Encounter Date: May 16, 2024  Office Visit      Dermatology Problem List:  Last FBSE 5/16/24 - Q4-6mo until 8/2025    0. NUB, 5 mm tan macule with a central hyperpigmented globule - right superior inguinal crease, s/p bx 5/16/24  1. Malignant melanoma, BD 1.2mm, pT2a - L thigh - bx 8/28/23 - S/p WLE Dr. Higginbotham/Carla 10/9/23  Staging form: Melanoma of the Skin, AJCC 8th Edition  - Clinical: Stage IB (cT2a, cN0, cM0) - Unsigned  2. Benign biopsies:  - L anterior shoulder - bx 8/28/23 - DF  3. Dermatofibroma - L calf  4. 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  ____________________________________________    Assessment & Plan:    # NUB, 5 mm tan macule with a central hyperpigmented globule - right superior inguinal crease. DDx: DN, MM, or other.  - Shave biopsy performed today. See procedure note below.  - Photograph was obtained for clinical monitoring and inclusion in medical record.    # Hx of MM. NERD.  - ABCDEs: Counseled ABCDEs of melanoma: Asymmetry, Border (irregularity), Color (not uniform, changes in color), Diameter (greater than 6 mm which is about the size of a pencil eraser), and Evolving (any changes in preexisting moles).  - Sun protection: Counseled SPF30+ sunscreen, UPF clothing, sun avoidance, tanning bed avoidance.   - Recommended yearly dental, ophthalmology, and gynecology exams.  - Recommended skin exams for all first-degree relatives.  - Recommended follow up is 4-6 months  - Check nails between polish changes.    # Hx of DN  - Signs and Symptoms of non-melanoma skin cancer and  ABCDEs of melanoma reviewed with patient. Patient encouraged to perform monthly self skin exams and educated on how to perform them. UV precautions reviewed with patient. Patient was asked about new or changing moles/lesions on body.   - Sunscreen: Apply 20 minutes prior to going outdoors and reapply every two hours, when wet or sweating. We recommend using an SPF 30 or higher, and to use one that is water resistant.       # Benign findings: multiple benign nevi, lentigines, cherry angiomas, SKs, dermatofibroma.  - edu on benign etiology  - Signs and Symptoms of non-melanoma skin cancer and ABCDEs of melanoma reviewed with patient. Patient encouraged to perform monthly self skin exams and educated on how to perform them. UV precautions reviewed with patient. Patient was asked about new or changing moles/lesions on body.   - Sunscreen: Apply 20 minutes prior to going outdoors and reapply every two hours, when wet or sweating. We recommend using an SPF 30 or higher, and to use one that is water resistant.     - RTC for changes    Procedures Performed:   - Shave biopsy procedure note, location(s): Right superior inguinal crease. After discussion of benefits and risks including but not limited to bleeding, infection, scar, incomplete removal, recurrence, and non-diagnostic biopsy, written consent and photographs were obtained. The area was cleaned with isopropyl alcohol. 0.5mL of 1% lidocaine with epinephrine was injected to obtain adequate anesthesia of lesion(s). Shave biopsy at site(s) performed. Hemostasis was achieved with aluminium chloride. Petrolatum ointment and a sterile dressing were applied. The patient tolerated the procedure and no complications were noted. The patient was provided with verbal and written post care instructions.      Follow-up: 4-6 month(s) in-person for FBSE, or earlier for new or changing lesions and pending path results    Staff and Scribe:    Scribe Disclosure:   Daisy ALMARAZ, am  serving as a scribe to document services personally performed by Lia Savage PA-C based on data collection and the provider's statements to me.     Provider Disclosure:  I agree with above History, Review of Systems, Physical exam and Plan. I have reviewed the content of the documentation and have edited it as needed. I have personally performed the services documented here and the documentation accurately represents those services and the decisions I have made.      Electronically signed by:    All risks, benefits and alternatives were discussed with patient.  Patient is in agreement and understands the assessment and plan.  All questions were answered.    Lia Savage PA-C, [facility]  [facility]: Phone: [phone], Fax: [phone]  [facility]: Phone: [phone],  Fax: [phone]  [facility]: Phone: [phone], Fax: [phone]  ____________________________________________    CC: Skin Check (FBSC)    Reviewed patients past medical history and pertinent chart review prior to patient's visit today.     HPI:  Ms. Deandra Lewis is a 48 year old female who presents today as a return patient for FBSE. Has a hx of MM.     Nothing bleeding, crusting, changing, or painful.. She notes she was sick the day of her last scheduled appointment with Dr. Higginbotham, so she will be contacting her office soon to reschedule. She has been approved for genetic testing and is in the process of scheduling it.     Patient is otherwise feeling well, without additional concerns.    Labs:  N/A    Physical Exam:  Vitals: There were no vitals taken for this visit.  SKIN: Full skin, which includes the head/face, both arms, chest, back, abdomen,both legs, genitalia and/or groin buttocks, digits and/or nails, was examined.   - Araiza's skin type II, <100 nevi  - There are dome shaped bright red papules on the trunk.   -  Multiple regular brown pigmented macules and papules are identified on the trunk and extremities.   - Scattered brown macules on sun exposed areas.  - There are waxy stuck on tan to brown papules on the trunk.   - LYMPH: Examination of the pre/post auricular, occipital, cervical, clavicular, axillary and inguinal lymph nodes was negative.  - There are well healed surgical scars without erythema, nodularity or telangiectasias on the prior MM sites, NERD.    - Nails are painted.  - Right superior inguinal crease: 5 mm tan macule with a central hyperpigmented globule.  - No other lesions of concern on areas examined.       Medications:  Current Outpatient Medications   Medication Sig Dispense Refill     albuterol (PROAIR HFA/PROVENTIL HFA/VENTOLIN HFA) 108 (90 Base) MCG/ACT inhaler INHALE 2 PUFFS BY MOUTH EVERY 6 HOURS AS NEEDED FOR WHEEZE OR FOR SHORTNESS OF BREATH 18 g 0     buPROPion (WELLBUTRIN XL) 150 MG 24 hr tablet TAKE 3 TABLETS BY MOUTH EVERY MORNING. 270 tablet 0     cetirizine (ZYRTEC) 10 MG tablet Take 1 tablet (10 mg) by mouth every evening 30 tablet 1     cyclobenzaprine (FLEXERIL) 5 MG tablet TAKE 1-2 TABLETS (5-10 MG) BY MOUTH 2 TIMES DAILY AS NEEDED FOR MUSCLE SPASMS 30 tablet 3     fluticasone-vilanterol (BREO ELLIPTA) 200-25 MCG/ACT inhaler Inhale 1 puff into the lungs daily 60 each 2     ibuprofen (ADVIL/MOTRIN) 200 MG tablet Take 200 mg by mouth every 6 hours as needed       methocarbamol (ROBAXIN) 500 MG tablet Take 1 tablet (500 mg) by mouth 4 times daily as needed for muscle spasms 20 tablet 0     montelukast (SINGULAIR) 10 MG tablet Take 1 tablet (10 mg) by mouth at bedtime 90 tablet 3     ZOLMitriptan (ZOMIG) 5 MG tablet TAKE 1 TABLET BY MOUTH AT ONSET OF HEADACHE FOR MIGRAINE MAY REPEAT IN 2 HOURS. MAX 2 TABS/24 HOURS 10 tablet 3     cholecalciferol 50 MCG (2000 UT) CAPS Take 1 capsule by mouth daily 90 capsule 4     ibuprofen (ADVIL/MOTRIN) 800 MG tablet Take 1 tablet (800 mg) by mouth every  6 hours as needed for moderate pain 30 tablet 0     lisinopril (ZESTRIL) 5 MG tablet TAKE 1 TABLET BY MOUTH EVERY DAY 90 tablet 1     topiramate (TOPAMAX) 25 MG tablet Take 2 TABS by mouth TWICE DAILY 360 tablet 3     No current facility-administered medications for this visit.      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 related to previous surgeries - 4 in 3 months for sinus issues/infection     Family history of breast cancer- mother's identical twin, maternal aunt, paternal GM and dad with breast lumps      Hot flashes     Generalized hyperhidrosis- since earliest recollections - soaks clothing multiple times/week - very disruptive to life     Mild anxiety     Foster care child     Anxiety     Multiple fractures of left foot with routine healing     Family history of colon cancer- father dx'd in mid 40's - pt's first colonoscopy in 4/24/2017 - repeat in 2022      Menorrhagia with regular cycle- ? cause of iron deficiency anemia- pt would like to see ob/gyn specialists- didn't go in 2021 - better than previous     Intractable episodic tension-type headache     Strain of neck muscle, initial encounter     Sun-damaged skin     Cough due to bronchospasm- needs refills on meds      Recurrent cough     Attention deficit - symptoms - pt desires testing and possible treatment     Intractable migraine without aura and without status migrainosus     Hypertension goal BP (blood pressure) < 140/90  - new diagnosis     Morbid obesity (H)     Numbness and tingling of hand- fingertips - ? caused by lisinopril vs. other - consider changing to amlodipine      Malignant melanoma of left lower extremity including hip (H)-left anterior thigh Stage 1B (cT2a, cN0, cM0)     Melanoma of skin (H)     Mild  intermittent asthma with (acute) exacerbation- allergies and cold temperature triggers     Encounter for completion of form with patient- [place] paperwork for melanoma left leg     History of malignant melanoma of skin     Neoplasm of uncertain behavior of skin     Multiple nevi     Cherry angioma     Lentigines     Past Medical History:   Diagnosis Date     Anemia      Anxiety     paxil 10mg= mild bladder retention, at 30mg = ravenously hungry/weight gain increased headaches; lexapro = bad sweating     Contraception     ocp's some = mood swings ; seasonale: frequent vaginal bleeding and weight gain     Hot flashes 11/16/2016     Hypertension goal BP (blood pressure) < 140/90  - new diagnosis 02/02/2023     Kidney stones     Dr. 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        Sincerely,        Lia Savage PA-C

## 2024-05-16 NOTE — PATIENT INSTRUCTIONS
Patient Education       Proper skin care from [facility]:    -Eliminate harsh soaps as they strip the natural oils from the skin, often resulting in dry itchy skin ( i.e. Dial, Zest, [place])  -Use mild soaps such as Cetaphil or Dove Sensitive Skin in the shower. You do not need to use soap on arms, legs, and trunk every time you shower unless visibly soiled.   -Avoid hot or cold showers.  -After showering, lightly dry off and apply moisturizing within 2-3 minutes. This will help trap moisture in the skin.   -Aggressive use of a moisturizer at least 1-2 times a day to the entire body (including -Vanicream, Cetaphil, Aquaphor or Cerave) and moisturize hands after every washing.  -We recommend using moisturizers that come in a tub that needs to be scooped out, not a pump. This has more of an oil base. It will hold moisture in your skin much better than a water base moisturizer. The above recommended are non-pore clogging.      Wear a sunscreen with at least SPF 30 on your face, ears, neck and V of the chest daily. Wear sunscreen on other areas of the body if those areas are exposed to the sun throughout the day. Sunscreens can contain physical and/or chemical blockers. Physical blockers are less likely to clog pores, these include zinc oxide and titanium dioxide. Reapply every two hour and after swimming.     Sunscreen examples: https://www.ewg.org/sunscreen/    UV radiation  UVA radiation remains constant throughout the day and throughout the year. It is a longer wavelength than UVB and therefore penetrates deeper into the skin leading to immediate and delayed tanning, photoaging, and skin cancer. 70-80% of UVA and UVB radiation occurs between the hours of 10am-2pm.  UVB radiation  UVB radiation causes the most harmful effects and is more significant during the summer months. However, snow and ice can reflect UVB radiation leading to skin damage during the winter months as well. UVB radiation is  responsible for tanning, burning, inflammation, delayed erythema (pinkness), pigmentation (brown spots), and skin cancer.     I recommend self monthly full body exams and yearly full body exams with a dermatology provider. If you develop a new or changing lesion please follow up for examination. Most skin cancers are pink and scaly or pink and pearly. However, we do see blue/brown/black skin cancers.  Consider the ABCDEs of melanoma when giving yourself your monthly full body exam ( don't forget the groin, buttocks, feet, toes, etc). A-asymmetry, B-borders, C-color, D-diameter, E-elevation or evolving. If you see any of these changes please follow up in clinic. If you cannot see your back I recommend purchasing a hand held mirror to use with a larger wall mirror.       Checking for Skin Cancer  You can find cancer early by checking your skin each month. There are 3 kinds of skin cancer. They are melanoma, basal cell carcinoma, and squamous cell carcinoma. Doing monthly skin checks is the best way to find new marks or skin changes. Follow the instructions below for checking your skin.   The ABCDEs of checking moles for melanoma   Check your moles or growths for signs of melanoma using ABCDE:   Asymmetry: the sides of the mole or growth don t match  Border: the edges are ragged, notched, or blurred  Color: the color within the mole or growth varies  Diameter: the mole or growth is larger than 6 mm (size of a pencil eraser)  Evolving: the size, shape, or color of the mole or growth is changing (evolving is not shown in the images below)    Checking for other types of skin cancer  Basal cell carcinoma or squamous cell carcinoma have symptoms such as:     A spot or mole that looks different from all other marks on your skin  Changes in how an area feels, such as itching, tenderness, or pain  Changes in the skin's surface, such as oozing, bleeding, or scaliness  A sore that does not heal  New swelling or redness beyond  the border of a mole    Who s at risk?  Anyone can get skin cancer. But you are at greater risk if you have:   Fair skin, light-colored hair, or light-colored eyes  Many moles or abnormal moles on your skin  A history of sunburns from sunlight or tanning beds  A family history of skin cancer  A history of exposure to radiation or chemicals  A weakened immune system  If you have had skin cancer in the past, you are at risk for recurring skin cancer.   How to check your skin  Do your monthly skin checkups in front of a full-length mirror. Check all parts of your body, including your:   Head (ears, face, neck, and scalp)  Torso (front, back, and sides)  Arms (tops, undersides, upper, and lower armpits)  Hands (palms, backs, and fingers, including under the nails)  Buttocks and genitals  Legs (front, back, and sides)  Feet (tops, soles, toes, including under the nails, and between toes)  If you have a lot of moles, take digital photos of them each month. Make sure to take photos both up close and from a distance. These can help you see if any moles change over time.   Most skin changes are not cancer. But if you see any changes in your skin, call your doctor right away. Only he or she can diagnose a problem. If you have skin cancer, seeing your doctor can be the first step toward getting the treatment that could save your life.   [employer] last reviewed this educational content on 4/1/2019 2000-2020 [employer]. [address] [place] [address], [place] [address]. All rights reserved. This information is not intended as a substitute for professional medical care. Always follow your healthcare professional's instructions.       When should I call my doctor?  If you are worsening or not improving, please, contact us or seek urgent care as noted below.     Who should I call with questions (adults)?  [facility] (adult and pediatric): [phone]  [facility]  [place] (adult): [phone]  [facility] ([place], [place], [place] and [place]) [phone]  For urgent needs outside of business hours call the [facility] at [phone] and ask for the dermatology resident on call to be paged  If this is a medical emergency and you are unable to reach an ER, Call [phone]      If you need a prescription refill, please contact your pharmacy. Refills are approved or denied by our Physicians during normal business hours, Monday through Fridays  Per office policy, refills will not be granted if you have not been seen within the past year (or sooner depending on your child's condition)        Wound Care After a Biopsy    What is a skin biopsy?  A skin biopsy allows the doctor to examine a very small piece of tissue under the microscope to determine the diagnosis and the best treatment for the skin condition. A local anesthetic (numbing medicine) is injected with a very small needle into the skin area to be tested. A small piece of skin is taken from the area. Sometimes a suture (stitch) is used.     What are the risks of a skin biopsy?  I will experience scar, bleeding, swelling, pain, crusting and redness. I may experience incomplete removal or recurrence. Risks of this procedure are excessive bleeding, bruising, infection, nerve damage, numbness, thick (hypertrophic or keloidal) scar and non-diagnostic biopsy.    How should I care for my wound for the first 24 hours?  Keep the wound dry and covered for 24 hours  If it bleeds, hold direct pressure on the area for 15 minutes. If bleeding does not stop, call us or go to the emergency room  Avoid strenuous exercise the first 1-2 days or as your doctor instructs you    How should I care for the wound after 24 hours?  After 24 hours, remove the bandage  You may bathe or shower as normal  If you had a scalp biopsy, you can shampoo as usual and can use shower water to clean the biopsy site  daily  Clean the wound once a day with gentle soap and water  Do not scrub, be gentle  Apply white petroleum/Vaseline after cleaning the wound with a cotton swab or a clean finger, and keep the site covered with a Bandaid /bandage. Bandages are not necessary with a scalp biopsy  If you are unable to cover the site with a Bandaid /bandage, re-apply ointment 2-3 times a day to keep the site moist. Moisture will help with healing  Avoid strenuous activity for first 1-2 days  Avoid lakes, rivers, pools, and oceans until the stitches are removed or the site is healed    How do I clean my wound?  Wash hands thoroughly with soap or use hand  before all wound care  Clean the wound with gentle soap and water  Apply white petroleum/Vaseline  to wound after it is clean  Replace the Bandaid /bandage to keep the wound covered for the first few days or as instructed by your doctor  If you had a scalp biopsy, warm shower water to the area on a daily basis should suffice    What should I use to clean my wound?   Cotton-tipped applicators (Qtips )  White petroleum jelly (Vaseline ). Use a clean new container and use Q-tips to apply.  Bandaids  as needed  Gentle soap     How should I care for my wound long term?  Do not get your wound dirty  Keep up with wound care for one week or until the area is healed.  A small scab will form and fall off by itself when the area is completely healed. The area will be red and will become pink in color as it heals. Sun protection is very important for how your scar will turn out. Sunscreen with an SPF 30 or greater is recommended once the area is healed.  You should have some soreness but it should be mild and slowly go away over several days. Talk to your doctor about using tylenol for pain,    When should I call my doctor?  If you have increased:   Pain or swelling  Pus or drainage (clear or slightly yellow drainage is ok)  Temperature over 100F  Spreading redness or warmth around  wound    When will I hear about my results?  The biopsy results can take 2 weeks to come back.  Your results will automatically release to [facility] before your provider has even reviewed them.  The clinic will call you with the results, send you a [facility] message, or have you schedule a follow-up clinic or phone time to discuss the results.  Contact our clinics if you do not hear from us in 2 weeks.    Who should I call with questions?  [facility]: [phone]  [facility]: [phone]  For urgent needs outside of business hours call the [facility] at [phone] and ask for the dermatology resident on call

## 2024-05-21 ENCOUNTER — OFFICE VISIT (OUTPATIENT)
Dept: SURGERY | Facility: CLINIC | Age: 49
End: 2024-05-21
Payer: COMMERCIAL

## 2024-05-21 ENCOUNTER — LAB (OUTPATIENT)
Dept: LAB | Facility: CLINIC | Age: 49
End: 2024-05-21
Payer: COMMERCIAL

## 2024-05-21 DIAGNOSIS — C43.9 MELANOMA OF SKIN (H): Primary | ICD-10-CM

## 2024-05-21 DIAGNOSIS — Z80.42 FAMILY HISTORY OF PROSTATE CANCER: ICD-10-CM

## 2024-05-21 DIAGNOSIS — Z80.3 FAMILY HISTORY OF MALIGNANT NEOPLASM OF BREAST: ICD-10-CM

## 2024-05-21 DIAGNOSIS — C43.72 MALIGNANT MELANOMA OF LEFT LOWER EXTREMITY INCLUDING HIP (H): ICD-10-CM

## 2024-05-21 DIAGNOSIS — Z80.0 FAMILY HISTORY OF COLON CANCER: ICD-10-CM

## 2024-05-21 LAB
PATH REPORT.COMMENTS IMP SPEC: NORMAL
PATH REPORT.FINAL DX SPEC: NORMAL
PATH REPORT.GROSS SPEC: NORMAL
PATH REPORT.MICROSCOPIC SPEC OTHER STN: NORMAL
PATH REPORT.RELEVANT HX SPEC: NORMAL

## 2024-05-21 PROCEDURE — 99212 OFFICE O/P EST SF 10 MIN: CPT | Performed by: SURGERY

## 2024-05-21 PROCEDURE — 36415 COLL VENOUS BLD VENIPUNCTURE: CPT

## 2024-05-21 PROCEDURE — 99000 SPECIMEN HANDLING OFFICE-LAB: CPT

## 2024-05-21 NOTE — LETTER
5/21/2024         RE: Deandra Lewis  [address] [place] [address]        Dear Colleague,    Thank you for referring your patient, Deandra Lewis, to the [facility]. Please see a copy of my visit note below.    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 She denies any new headaches, dizziness, vision changes, abdominal pain, bowel habit changes, rectal bleeding, melena, chest pain, shortness of breath, or unintentional weight loss.  She has not noted any masses in the groin bilaterally. She has good range of motion of her knees and hips bilaterally and denies any lower extremity swelling. She has been having migraines. She has some left lateral knee numbness.    Her last skin check was performed by Lia Savage PA-C on 5/16/2024.  A biopsy of a 5 mm pigmented lesion just superior to the right inguinal fold was taken and is pending.     She has met with Cely Hernandez GC in March 2024 but has yet to draw the labs for genetic testing.    Physical Exam  Constitutional:       Appearance: She is well-developed.   Abdominal:      General: There is no distension.      Palpations: Abdomen is soft. There is no fluid wave, hepatomegaly or mass.      Tenderness: There is no abdominal tenderness.   Lymphadenopathy:      Cervical: No cervical adenopathy.      Right cervical: No superficial, deep or  posterior cervical adenopathy.     Left cervical: No superficial, deep or posterior cervical adenopathy.      Upper Body:      Right upper body: No supraclavicular, axillary or pectoral adenopathy.      Left upper body: No supraclavicular, axillary or pectoral adenopathy.      Lower Body: No right inguinal adenopathy. No left inguinal adenopathy.      Comments: No lymphedema in bilateral lower extremities. Left femoral SLNB site unremarkable.   Skin:     General: Skin is warm and dry.               INVESTIGATIONS:  None    ASSESSMENT:    Deandra Lewis is a 48 year old female with melanoma of the LEFT lower extremity.    She is doing well without signs or symptoms of recurrence.    We reviewed that surveillance involves a history and physical exam every 6 months for the first 5 years, then annually, with imaging studies only indicated if symptoms arise. We reviewed the signs and symptoms that could arise in the setting of recurrent locoregional or metastatic disease. In addition, she will need to undergo regular dermatologic full skin survey and evaluation given that patients with a diagnosis of melanoma are at risk of recurrence (local and distant) and of subsequent de sedrick melanoma.  I also reviewed the importance of protection from sun exposure, including wearing long sleeves, hats, etc and also the use of sunscreen with SPF of at least 35, with frequent re-applications.     Total time spent with the patient was 15 minutes, of which 75% was counseling.     PLAN:  Follow up with me in 6 months  Genetic testing per genetic counseling  Follow up on biopsy from 5/16    Tracie Higginbotham MD MS [facility] FACS  Associate Professor of Surgery  Division of Surgical Oncology  [facility]     18 minutes spent on the date of the encounter doing chart review, patient visit, and documentation.      Again, thank you for allowing me to participate in the care of your patient.        Sincerely,        Tracie Higginbotham MD

## 2024-05-21 NOTE — PROGRESS NOTES
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 She denies any new headaches, dizziness, vision changes, abdominal pain, bowel habit changes, rectal bleeding, melena, chest pain, shortness of breath, or unintentional weight loss.  She has not noted any masses in the groin bilaterally. She has good range of motion of her knees and hips bilaterally and denies any lower extremity swelling. She has been having migraines. She has some left lateral knee numbness.    Her last skin check was performed by Lia Savage PA-C on 5/16/2024.  A biopsy of a 5 mm pigmented lesion just superior to the right inguinal fold was taken and is pending.     She has met with Cely Hernandez GC in March 2024 but has yet to draw the labs for genetic testing.    Physical Exam  Constitutional:       Appearance: She is well-developed.   Abdominal:      General: There is no distension.      Palpations: Abdomen is soft. There is no fluid wave, hepatomegaly or mass.      Tenderness: There is no abdominal tenderness.   Lymphadenopathy:      Cervical: No cervical adenopathy.      Right cervical: No superficial, deep or posterior cervical adenopathy.     Left cervical: No superficial, deep or posterior cervical adenopathy.      Upper Body:      Right upper body: No supraclavicular, axillary or pectoral adenopathy.      Left upper body: No supraclavicular, axillary or  pectoral adenopathy.      Lower Body: No right inguinal adenopathy. No left inguinal adenopathy.      Comments: No lymphedema in bilateral lower extremities. Left femoral SLNB site unremarkable.   Skin:     General: Skin is warm and dry.               INVESTIGATIONS:  None    ASSESSMENT:    Deandra Lewis is a 48 year old female with melanoma of the LEFT lower extremity.    She is doing well without signs or symptoms of recurrence.    We reviewed that surveillance involves a history and physical exam every 6 months for the first 5 years, then annually, with imaging studies only indicated if symptoms arise. We reviewed the signs and symptoms that could arise in the setting of recurrent locoregional or metastatic disease. In addition, she will need to undergo regular dermatologic full skin survey and evaluation given that patients with a diagnosis of melanoma are at risk of recurrence (local and distant) and of subsequent de sedrick melanoma.  I also reviewed the importance of protection from sun exposure, including wearing long sleeves, hats, etc and also the use of sunscreen with SPF of at least 35, with frequent re-applications.     Total time spent with the patient was 15 minutes, of which 75% was counseling.     PLAN:  Follow up with me in 6 months  Genetic testing per genetic counseling  Follow up on biopsy from 5/16    Tracie Higginbotham MD MS [facility] FACS  Associate Professor of Surgery  Division of Surgical Oncology  [facility]     18 minutes spent on the date of the encounter doing chart review, patient visit, and documentation.

## 2024-05-21 NOTE — NURSING NOTE
Deandra Lewis's goals for this visit include:   Chief Complaint   Patient presents with    RECHECK     6 month exam, hx of left lower thigh       She requests these members of her care team be copied on today's visit information: no    PCP: Linda Soto    Referring Provider:  Referred Self, MD  No address on file    There were no vitals taken for this visit.    Do you need any medication refills at today's visit? No    Sudha Dwyer LPN

## 2024-05-22 ENCOUNTER — TELEPHONE (OUTPATIENT)
Dept: FAMILY MEDICINE | Facility: CLINIC | Age: 49
End: 2024-05-22
Payer: COMMERCIAL

## 2024-05-22 NOTE — TELEPHONE ENCOUNTER
Patient Contact    Attempt # 1    Was call answered?  No.  Left message on voicemail with information to call nurse back at [phone].     Beatriz OLMOS RN  [facility]  [phone]

## 2024-05-22 NOTE — TELEPHONE ENCOUNTER
Pt called back and went over Lia's message below about biopsy results.  Pt states she would like to have Dr. Higginbotham from [facility] do the procedure again.  Pt will contact Dr. Higginbotham's office.  Pt has appt scheduled with Lia Savage for skin check on 8/15 and scheduled another appt 11/21.    Beatriz OLMOS RN  [facility]  [phone]

## 2024-05-22 NOTE — TELEPHONE ENCOUNTER
"----- Message from Lia Savage PA-C sent at 5/22/2024  8:45 AM CDT -----    A(1). Skin, Right superior inguinal crease, shave:  - Atypical junctional melanocytic proliferation, consistent with early melanoma in situ - (see comment)    Hi, please call Deandra and let her know her results.     This is MIS, stage 0. She will not need anything more than a surgical excision (not sure if they will do MOHS or not at this location). No lymph nodes, no consult with oncology etc. like she did with her first melanoma. This is \"in situ\" which means it is just within the skin and has not started invading other tissues. It is most common to find additional melanomas in the first 2 years after having one, so this fits that statistic.     I know we briefly discussed moving her to 6mo appointments, but I think we will keep her at 3mo appointments for now going forward.     Please have her reach out to me with further questions. Refer her to either [facility] [place] please.     Concepcion Liriano, one of my nurses should call you today to go over results. Briefly, this is a stage ZERO melanoma. Meaning it has not started to invade anywhere else or spread anywhere else. This has been caught at the earliest possible stage. You will not need to have anything more than a surgical removal at this site for treatment. My nurse will go over this in more detail when she calls. I think we should see you again in 3-4mo, and not 6 like we briefly considered :) please reach out to me if you have further questions, but my nurse should be able to answer most of them.     "

## 2024-05-28 LAB — SCANNED LAB RESULT: NORMAL

## 2024-05-29 ENCOUNTER — PATIENT OUTREACH (OUTPATIENT)
Dept: CARE COORDINATION | Facility: CLINIC | Age: 49
End: 2024-05-29
Payer: COMMERCIAL

## 2024-05-30 ENCOUNTER — OFFICE VISIT (OUTPATIENT)
Dept: FAMILY MEDICINE | Facility: CLINIC | Age: 49
End: 2024-05-30
Payer: COMMERCIAL

## 2024-05-30 DIAGNOSIS — D23.9 DERMATOFIBROMA: ICD-10-CM

## 2024-05-30 DIAGNOSIS — D49.2 NEOPLASM OF SKIN: Primary | ICD-10-CM

## 2024-05-30 PROCEDURE — 88305 TISSUE EXAM BY PATHOLOGIST: CPT | Performed by: DERMATOLOGY

## 2024-05-30 PROCEDURE — 99213 OFFICE O/P EST LOW 20 MIN: CPT | Mod: 25 | Performed by: PHYSICIAN ASSISTANT

## 2024-05-30 PROCEDURE — 11102 TANGNTL BX SKIN SINGLE LES: CPT | Performed by: PHYSICIAN ASSISTANT

## 2024-05-30 NOTE — LETTER
5/30/2024         RE: Deandra Lewis  [address] [place] [address]        Dear Colleague,    Thank you [facility] referring your patient, [facility] Lewis[facility]. Please see a copy of my visit note below.    [facility] Dermatology Note  Encounter Date: May 30, 2024  Office Visit      Dermatology Problem List:    #NUB R lower abdomen, S/p Biopsy performed on 5/30/24: Pending results.   1. Malignant melanoma, BD 1.2mm, pT2a - L thigh - bx 8/28/23 - S/p WLE Dr. Higginbotham/Carla 10/9/23  Staging form: Melanoma of the Skin, AJCC 8th Edition  - Clinical: Stage IB (cT2a, cN0, cM0) - Unsigned  2. Benign biopsies:  - L anterior shoulder - bx 8/28/23 - DF  3. Dermatofibroma - L calf, L thigh  4. 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  ____________________________________________    Assessment & Plan:  # Dermatofibroma - reassured benign  - L superior thigh there is a 6 mm papule consistent with a dermatofibroma - this dimples with lateral pressure    # Neoplasm of unspecified behavior of the skin (D49.2) on the R lower abdomen. The differential diagnosis includes DN vs MM vs other. .   - Shave biopsy performed today, see procedure note below.   - Photographed today     Procedures Performed:   - Shave biopsy procedure note, location(s): see above. After discussion of benefits and risks including but not limited to bleeding, infection, scar, incomplete removal, recurrence, and non-diagnostic biopsy, written consent and photographs were obtained. The area was cleaned with isopropyl alcohol. 0.5mL of 1% lidocaine with epinephrine was injected to obtain adequate anesthesia of lesion(s). Shave biopsy at site(s) performed. Hemostasis was achieved with aluminium chloride. Petrolatum ointment and a sterile dressing were applied. The patient tolerated the  procedure and no complications were noted. The patient was provided with verbal and written post care instructions.      Follow-up: pending path results    Staff and scribe:  .Scribe Disclosure:   I, MAXX SRIVASTAVA, am serving as a scribe; to document services personally performed by Lia Savage PA-C -based on data collection and the provider's statements to me.     Provider Disclosure:  I agree with above History, Review of Systems, Physical exam and Plan.  I have reviewed the content of the documentation and have edited it as needed. I have personally performed the services documented here and the documentation accurately represents those services and the decisions I have made.      Electronically signed by:    All risks, benefits and alternatives were discussed with patient.  Patient is in agreement and understands the assessment and plan.  All questions were answered.    Lia Savage PA-C, [facility]  [facility] [place]: Phone: [phone], Fax: [phone]  [facility]: Phone: [phone],  Fax: [phone]  [facility] [place]: Phone: [phone], Fax: [phone]  ____________________________________________    CC: Derm Problem (Biopsy groin)      Reviewed patients past medical history and pertinent chart review prior to patient's visit today.     HPI:  Ms. Deandra Lewis is a 48 year old female who presents today as a return patient for a biopsy in her groin.     Patient is otherwise feeling well, without additional concerns.    Labs:  N/A    Physical Exam:  Vitals: There were no vitals taken for this visit.  SKIN: Focused examination of abdomen and L thigh was performed.   - There is a firm tan/flesh colored papule that dimples with lateral pressure on the L superior thigh   - R lower abdomen there is a 5 mm brown macule with an irregular border ..   - No other lesions of concern on areas examined.                Medications:  Current Outpatient Medications   Medication Sig Dispense Refill     albuterol (PROAIR HFA/PROVENTIL HFA/VENTOLIN HFA) 108 (90 Base) MCG/ACT inhaler INHALE 2 PUFFS BY MOUTH EVERY 6 HOURS AS NEEDED FOR WHEEZE OR FOR SHORTNESS OF BREATH 18 g 0     buPROPion (WELLBUTRIN XL) 150 MG 24 hr tablet TAKE 3 TABLETS BY MOUTH EVERY MORNING. 270 tablet 0     cetirizine (ZYRTEC) 10 MG tablet Take 1 tablet (10 mg) by mouth every evening 30 tablet 1     cyclobenzaprine (FLEXERIL) 5 MG tablet TAKE 1-2 TABLETS (5-10 MG) BY MOUTH 2 TIMES DAILY AS NEEDED FOR MUSCLE SPASMS 30 tablet 3     fluticasone-vilanterol (BREO ELLIPTA) 200-25 MCG/ACT inhaler Inhale 1 puff into the lungs daily 60 each 2     ibuprofen (ADVIL/MOTRIN) 200 MG tablet Take 200 mg by mouth every 6 hours as needed       methocarbamol (ROBAXIN) 500 MG tablet Take 1 tablet (500 mg) by mouth 4 times daily as needed for muscle spasms 20 tablet 0     montelukast (SINGULAIR) 10 MG tablet Take 1 tablet (10 mg) by mouth at bedtime 90 tablet 3     ZOLMitriptan (ZOMIG) 5 MG tablet TAKE 1 TABLET BY MOUTH AT ONSET OF HEADACHE FOR MIGRAINE MAY REPEAT IN 2 HOURS. MAX 2 TABS/24 HOURS 10 tablet 3     cholecalciferol 50 MCG (2000 UT) CAPS Take 1 capsule by mouth daily 90 capsule 4     ibuprofen (ADVIL/MOTRIN) 800 MG tablet Take 1 tablet (800 mg) by mouth every 6 hours as needed for moderate pain 30 tablet 0     lisinopril (ZESTRIL) 5 MG tablet TAKE 1 TABLET BY MOUTH EVERY DAY 90 tablet 1     topiramate (TOPAMAX) 25 MG tablet Take 2 TABS by mouth TWICE DAILY 360 tablet 3     No current facility-administered medications for this visit.      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 related to previous surgeries - 4 in 3 months for sinus  issues/infection     Family history of breast cancer- mother's identical twin, maternal aunt, paternal GM and dad with breast lumps      Hot flashes     Generalized hyperhidrosis- since earliest recollections - soaks clothing multiple times/week - very disruptive to life     Mild anxiety     Foster care child     Anxiety     Multiple fractures of left foot with routine healing     Family history of colon cancer- father dx'd in mid 40's - pt's first colonoscopy in 4/24/2017 - repeat in 2022      Menorrhagia with regular cycle- ? cause of iron deficiency anemia- pt would like to see ob/gyn specialists- didn't go in 2021 - better than previous     Intractable episodic tension-type headache     Strain of neck muscle, initial encounter     Sun-damaged skin     Cough due to bronchospasm- needs refills on meds      Recurrent cough     Attention deficit - symptoms - pt desires testing and possible treatment     Intractable migraine without aura and without status migrainosus     Hypertension goal BP (blood pressure) < 140/90  - new diagnosis     Morbid obesity (H)     Numbness and tingling of hand- fingertips - ? caused by lisinopril vs. other - consider changing to amlodipine      Malignant melanoma of left lower extremity including hip (H)-left anterior thigh Stage 1B (cT2a, cN0, cM0)     Melanoma of skin (H)     Mild intermittent asthma with (acute) exacerbation- allergies and cold temperature triggers     Encounter for completion of form with patient- [place] paperwork for melanoma left leg     History of malignant melanoma of skin     Neoplasm of uncertain behavior of skin     Multiple nevi     Cherry angioma     Lentigines     Past Medical History:   Diagnosis Date     Anemia      Anxiety     paxil 10mg= mild bladder retention, at 30mg = ravenously hungry/weight gain increased headaches; lexapro = bad sweating     Contraception     ocp's some = mood swings ; seasonale: frequent vaginal bleeding and weight gain     Hot  flashes 11/16/2016     Hypertension goal BP (blood pressure) < 140/90  - new diagnosis 02/02/2023     Kidney stones     Dr. 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        Sincerely,        Lia Savage PA-C

## 2024-05-30 NOTE — PROGRESS NOTES
[facility] Dermatology Note  Encounter Date: May 30, 2024  Office Visit      Dermatology Problem List:    #NUB R lower abdomen, S/p Biopsy performed on 5/30/24: Pending results.   1. Malignant melanoma, BD 1.2mm, pT2a - L thigh - bx 8/28/23 - S/p WLE Dr. Higginbotham/Carla 10/9/23  Staging form: Melanoma of the Skin, AJCC 8th Edition  - Clinical: Stage IB (cT2a, cN0, cM0) - Unsigned  2. Benign biopsies:  - L anterior shoulder - bx 8/28/23 - DF  3. Dermatofibroma - L calf, L thigh  4. 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  ____________________________________________    Assessment & Plan:  # Dermatofibroma - reassured benign  - L superior thigh there is a 6 mm papule consistent with a dermatofibroma - this dimples with lateral pressure    # Neoplasm of unspecified behavior of the skin (D49.2) on the R lower abdomen. The differential diagnosis includes DN vs MM vs other. .   - Shave biopsy performed today, see procedure note below.   - Photographed today     Procedures Performed:   - Shave biopsy procedure note, location(s): see above. After discussion of benefits and risks including but not limited to bleeding, infection, scar, incomplete removal, recurrence, and non-diagnostic biopsy, written consent and photographs were obtained. The area was cleaned with isopropyl alcohol. 0.5mL of 1% lidocaine with epinephrine was injected to obtain adequate anesthesia of lesion(s). Shave biopsy at site(s) performed. Hemostasis was achieved with aluminium chloride. Petrolatum ointment and a sterile dressing were applied. The patient tolerated the procedure and no complications were noted. The patient was provided with verbal and written post care instructions.      Follow-up: pending path results    Staff and scribe:  .Scribe Disclosure:   MAXX ALMARAZ, am serving as a scribe; to  document services personally performed by Lia Savage PA-C -based on data collection and the provider's statements to me.     Provider Disclosure:  I agree with above History, Review of Systems, Physical exam and Plan.  I have reviewed the content of the documentation and have edited it as needed. I have personally performed the services documented here and the documentation accurately represents those services and the decisions I have made.      Electronically signed by:    All risks, benefits and alternatives were discussed with patient.  Patient is in agreement and understands the assessment and plan.  All questions were answered.    Lia Savage PA-C, MPAS  [facility] [place]: Phone: [phone], Fax: [phone]  [facility]: Phone: [phone],  Fax: [phone]  [facility]: Phone: [phone], Fax: [phone]  ____________________________________________    CC: Derm Problem (Biopsy groin)      Reviewed patients past medical history and pertinent chart review prior to patient's visit today.     HPI:  Ms. Deandra Lewis is a 48 year old female who presents today as a return patient for a biopsy in her groin.     Patient is otherwise feeling well, without additional concerns.    Labs:  N/A    Physical Exam:  Vitals: There were no vitals taken for this visit.  SKIN: Focused examination of abdomen and L thigh was performed.   - There is a firm tan/flesh colored papule that dimples with lateral pressure on the L superior thigh   - R lower abdomen there is a 5 mm brown macule with an irregular border ..   - No other lesions of concern on areas examined.               Medications:  Current Outpatient Medications   Medication Sig Dispense Refill    albuterol (PROAIR HFA/PROVENTIL HFA/VENTOLIN HFA) 108 (90 Base) MCG/ACT inhaler INHALE 2 PUFFS BY MOUTH EVERY 6 HOURS AS NEEDED FOR WHEEZE OR FOR SHORTNESS OF  BREATH 18 g 0    buPROPion (WELLBUTRIN XL) 150 MG 24 hr tablet TAKE 3 TABLETS BY MOUTH EVERY MORNING. 270 tablet 0    cetirizine (ZYRTEC) 10 MG tablet Take 1 tablet (10 mg) by mouth every evening 30 tablet 1    cyclobenzaprine (FLEXERIL) 5 MG tablet TAKE 1-2 TABLETS (5-10 MG) BY MOUTH 2 TIMES DAILY AS NEEDED FOR MUSCLE SPASMS 30 tablet 3    fluticasone-vilanterol (BREO ELLIPTA) 200-25 MCG/ACT inhaler Inhale 1 puff into the lungs daily 60 each 2    ibuprofen (ADVIL/MOTRIN) 200 MG tablet Take 200 mg by mouth every 6 hours as needed      methocarbamol (ROBAXIN) 500 MG tablet Take 1 tablet (500 mg) by mouth 4 times daily as needed for muscle spasms 20 tablet 0    montelukast (SINGULAIR) 10 MG tablet Take 1 tablet (10 mg) by mouth at bedtime 90 tablet 3    ZOLMitriptan (ZOMIG) 5 MG tablet TAKE 1 TABLET BY MOUTH AT ONSET OF HEADACHE FOR MIGRAINE MAY REPEAT IN 2 HOURS. MAX 2 TABS/24 HOURS 10 tablet 3    cholecalciferol 50 MCG (2000 UT) CAPS Take 1 capsule by mouth daily 90 capsule 4    ibuprofen (ADVIL/MOTRIN) 800 MG tablet Take 1 tablet (800 mg) by mouth every 6 hours as needed for moderate pain 30 tablet 0    lisinopril (ZESTRIL) 5 MG tablet TAKE 1 TABLET BY MOUTH EVERY DAY 90 tablet 1    topiramate (TOPAMAX) 25 MG tablet Take 2 TABS by mouth TWICE DAILY 360 tablet 3     No current facility-administered medications for this visit.      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 related to previous surgeries - 4 in 3 months for sinus issues/infection    Family history of breast cancer- mother's identical twin, maternal aunt, paternal GM and dad with breast lumps     Hot flashes    Generalized hyperhidrosis- since earliest recollections - soaks clothing multiple times/week - very disruptive to life     Mild anxiety    Foster care child    Anxiety    Multiple fractures of left foot with routine healing    Family history of colon cancer- father dx'd in mid 40's - pt's first colonoscopy in 4/24/2017 - repeat in 2022     Menorrhagia with regular cycle- ? cause of iron deficiency anemia- pt would like to see ob/gyn specialists- didn't go in 2021 - better than previous    Intractable episodic tension-type headache    Strain of neck muscle, initial encounter    Sun-damaged skin    Cough due to bronchospasm- needs refills on meds     Recurrent cough    Attention deficit - symptoms - pt desires testing and possible treatment    Intractable migraine without aura and without status migrainosus    Hypertension goal BP (blood pressure) < 140/90  - new diagnosis    Morbid obesity (H)    Numbness and tingling of hand- fingertips - ? caused by lisinopril vs. 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 (cT2a, cN0, cM0)    Melanoma of skin (H)    Mild intermittent asthma with (acute) exacerbation- allergies and cold temperature triggers    Encounter for completion of form with patient- [place] paperwork for melanoma left leg    History of malignant melanoma of skin    Neoplasm of uncertain behavior of skin    Multiple nevi    Cherry angioma    Lentigines     Past Medical History:   Diagnosis Date    Anemia     Anxiety     paxil 10mg= mild bladder retention, at 30mg = ravenously hungry/weight gain increased headaches; lexapro = bad sweating    Contraception     ocp's some = mood swings ; seasonale: frequent vaginal bleeding and weight gain    Hot flashes 11/16/2016    Hypertension goal BP (blood pressure) < 140/90  - new diagnosis 02/02/2023    Kidney stones     Dr. 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

## 2024-05-30 NOTE — PATIENT INSTRUCTIONS
Wound Care After a Biopsy    What is a skin biopsy?  A skin biopsy allows the doctor to examine a very small piece of tissue under the microscope to determine the diagnosis and the best treatment for the skin condition. A local anesthetic (numbing medicine) is injected with a very small needle into the skin area to be tested. A small piece of skin is taken from the area. Sometimes a suture (stitch) is used.     What are the risks of a skin biopsy?  I will experience scar, bleeding, swelling, pain, crusting and redness. I may experience incomplete removal or recurrence. Risks of this procedure are excessive bleeding, bruising, infection, nerve damage, numbness, thick (hypertrophic or keloidal) scar and non-diagnostic biopsy.    How should I care for my wound for the first 24 hours?  Keep the wound dry and covered for 24 hours  If it bleeds, hold direct pressure on the area for 15 minutes. If bleeding does not stop, call us or go to the emergency room  Avoid strenuous exercise the first 1-2 days or as your doctor instructs you    How should I care for the wound after 24 hours?  After 24 hours, remove the bandage  You may bathe or shower as normal  If you had a scalp biopsy, you can shampoo as usual and can use shower water to clean the biopsy site daily  Clean the wound once a day with gentle soap and water  Do not scrub, be gentle  Apply white petroleum/Vaseline after cleaning the wound with a cotton swab or a clean finger, and keep the site covered with a Bandaid /bandage. Bandages are not necessary with a scalp biopsy  If you are unable to cover the site with a Bandaid /bandage, re-apply ointment 2-3 times a day to keep the site moist. Moisture will help with healing  Avoid strenuous activity for first 1-2 days  Avoid lakes, rivers, pools, and oceans until the stitches are removed or the site is healed    How do I clean my wound?  Wash hands thoroughly with soap or use hand  before all wound care  Clean  the wound with gentle soap and water  Apply white petroleum/Vaseline  to wound after it is clean  Replace the Bandaid /bandage to keep the wound covered for the first few days or as instructed by your doctor  If you had a scalp biopsy, warm shower water to the area on a daily basis should suffice    What should I use to clean my wound?   Cotton-tipped applicators (Qtips )  White petroleum jelly (Vaseline ). Use a clean new container and use Q-tips to apply.  Bandaids  as needed  Gentle soap     How should I care for my wound long term?  Do not get your wound dirty  Keep up with wound care for one week or until the area is healed.  A small scab will form and fall off by itself when the area is completely healed. The area will be red and will become pink in color as it heals. Sun protection is very important for how your scar will turn out. Sunscreen with an SPF 30 or greater is recommended once the area is healed.  You should have some soreness but it should be mild and slowly go away over several days. Talk to your doctor about using tylenol for pain,    When should I call my doctor?  If you have increased:   Pain or swelling  Pus or drainage (clear or slightly yellow drainage is ok)  Temperature over 100F  Spreading redness or warmth around wound    When will I hear about my results?  The biopsy results can take 2 weeks to come back.  Your results will automatically release to [employer] before your provider has even reviewed them.  The clinic will call you with the results, send you a [employer] message, or have you schedule a follow-up clinic or phone time to discuss the results.  Contact our clinics if you do not hear from us in 2 weeks.    Who should I call with questions?  [facility]: [phone]  [facility]: [phone]  For urgent needs outside of business hours call the [facility] at [phone] and ask for the dermatology resident on call      Patient Education       Proper skin care from [facility]:    -Eliminate harsh soaps as they strip the natural oils from the skin, often resulting in dry itchy skin ( i.e. Dial, Zest, [place])  -Use mild soaps such as Cetaphil or Dove Sensitive Skin in the shower. You do not need to use soap on arms, legs, and trunk every time you shower unless visibly soiled.   -Avoid hot or cold showers.  -After showering, lightly dry off and apply moisturizing within 2-3 minutes. This will help trap moisture in the skin.   -Aggressive use of a moisturizer at least 1-2 times a day to the entire body (including -Vanicream, Cetaphil, Aquaphor or Cerave) and moisturize hands after every washing.  -We recommend using moisturizers that come in a tub that needs to be scooped out, not a pump. This has more of an oil base. It will hold moisture in your skin much better than a water base moisturizer. The above recommended are non-pore clogging.      Wear a sunscreen with at least SPF 30 on your face, ears, neck and V of the chest daily. Wear sunscreen on other areas of the body if those areas are exposed to the sun throughout the day. Sunscreens can contain physical and/or chemical blockers. Physical blockers are less likely to clog pores, these include zinc oxide and titanium dioxide. Reapply every two hour and after swimming.     Sunscreen examples: https://www.ewg.org/sunscreen/    UV radiation  UVA radiation remains constant throughout the day and throughout the year. It is a longer wavelength than UVB and therefore penetrates deeper into the skin leading to immediate and delayed tanning, photoaging, and skin cancer. 70-80% of UVA and UVB radiation occurs between the hours of 10am-2pm.  UVB radiation  UVB radiation causes the most harmful effects and is more significant during the summer months. However, snow and ice can reflect UVB radiation leading to skin damage during the winter months as well. UVB radiation is  responsible for tanning, burning, inflammation, delayed erythema (pinkness), pigmentation (brown spots), and skin cancer.     I recommend self monthly full body exams and yearly full body exams with a dermatology provider. If you develop a new or changing lesion please follow up for examination. Most skin cancers are pink and scaly or pink and pearly. However, we do see blue/brown/black skin cancers.  Consider the ABCDEs of melanoma when giving yourself your monthly full body exam ( don't forget the groin, buttocks, feet, toes, etc). A-asymmetry, B-borders, C-color, D-diameter, E-elevation or evolving. If you see any of these changes please follow up in clinic. If you cannot see your back I recommend purchasing a hand held mirror to use with a larger wall mirror.       Checking for Skin Cancer  You can find cancer early by checking your skin each month. There are 3 kinds of skin cancer. They are melanoma, basal cell carcinoma, and squamous cell carcinoma. Doing monthly skin checks is the best way to find new marks or skin changes. Follow the instructions below for checking your skin.   The ABCDEs of checking moles for melanoma   Check your moles or growths for signs of melanoma using ABCDE:   Asymmetry: the sides of the mole or growth don t match  Border: the edges are ragged, notched, or blurred  Color: the color within the mole or growth varies  Diameter: the mole or growth is larger than 6 mm (size of a pencil eraser)  Evolving: the size, shape, or color of the mole or growth is changing (evolving is not shown in the images below)    Checking for other types of skin cancer  Basal cell carcinoma or squamous cell carcinoma have symptoms such as:     A spot or mole that looks different from all other marks on your skin  Changes in how an area feels, such as itching, tenderness, or pain  Changes in the skin's surface, such as oozing, bleeding, or scaliness  A sore that does not heal  New swelling or redness beyond  the border of a mole    Who s at risk?  Anyone can get skin cancer. But you are at greater risk if you have:   Fair skin, light-colored hair, or light-colored eyes  Many moles or abnormal moles on your skin  A history of sunburns from sunlight or tanning beds  A family history of skin cancer  A history of exposure to radiation or chemicals  A weakened immune system  If you have had skin cancer in the past, you are at risk for recurring skin cancer.   How to check your skin  Do your monthly skin checkups in front of a full-length mirror. Check all parts of your body, including your:   Head (ears, face, neck, and scalp)  Torso (front, back, and sides)  Arms (tops, undersides, upper, and lower armpits)  Hands (palms, backs, and fingers, including under the nails)  Buttocks and genitals  Legs (front, back, and sides)  Feet (tops, soles, toes, including under the nails, and between toes)  If you have a lot of moles, take digital photos of them each month. Make sure to take photos both up close and from a distance. These can help you see if any moles change over time.   Most skin changes are not cancer. But if you see any changes in your skin, call your doctor right away. Only he or she can diagnose a problem. If you have skin cancer, seeing your doctor can be the first step toward getting the treatment that could save your life.   [employer] last reviewed this educational content on 4/1/2019 2000-2020 [employer]. [address] [place] [address], [place] [address]. All rights reserved. This information is not intended as a substitute for professional medical care. Always follow your healthcare professional's instructions.          Wound Care After a Biopsy    What is a skin biopsy?  A skin biopsy allows the doctor to examine a very small piece of tissue under the microscope to determine the diagnosis and the best treatment for the skin condition. A local anesthetic (numbing medicine)  is injected with a very small  needle into the skin area to be tested. A small piece of skin is taken from the area. Sometimes a suture (stitch) is used.     What are the risks of a skin biopsy?  I will experience scar, bleeding, swelling, pain, crusting and redness. I may experience incomplete removal or recurrence. Risks of this procedure are excessive bleeding, bruising, infection, nerve damage, numbness, thick (hypertrophic or keloidal) scar and non-diagnostic biopsy.    How should I care for my wound for the first 24 hours?  Keep the wound dry and covered for 24 hours  If it bleeds, hold direct pressure on the area for 15 minutes. If bleeding does not stop then go to the emergency room  Avoid strenuous exercise the first 1-2 days or as your doctor instructs you    How should I care for the wound after 24 hours?  After 24 hours, remove the bandage  You may bathe or shower as normal  If you had a scalp biopsy, you can shampoo as usual and can use shower water to clean the biopsy site daily  Clean the wound twice a day with gentle soap and water  Do not scrub, be gentle  Apply white petroleum/Vaseline after cleaning the wound with a cotton swab or a clean finger, and keep the site covered with a Bandaid /bandage. Bandages are not necessary with a scalp biopsy  If you are unable to cover the site with a Bandaid /bandage, re-apply ointment 2-3 times a day to keep the site moist. Moisture will help with healing  Avoid strenuous activity for first 1-2 days  Avoid lakes, rivers, pools, and oceans until the stitches are removed or the site is healed    How do I clean my wound?  Wash hands thoroughly with soap or use hand  before all wound care  Clean the wound with gentle soap and water  Apply white petroleum/Vaseline  to wound after it is clean  Replace the Bandaid /bandage to keep the wound covered for the first few days or as instructed by your doctor  If you had a scalp biopsy, warm shower water to the area on a daily basis should  suffice    What should I use to clean my wound?   Cotton-tipped applicators (Qtips )  White petroleum jelly (Vaseline ). Use a clean new container and use Q-tips to apply.  Bandaids   as needed  Gentle soap     How should I care for my wound long term?  Do not get your wound dirty  Keep up with wound care for one week or until the area is healed.  A small scab will form and fall off by itself when the area is completely healed. The area will be red and will become pink in color as it heals. Sun protection is very important for how your scar will turn out. Sunscreen with an SPF 30 or greater is recommended once the area is healed.  If you have stitches, stitches need to be removed in 10 days. You may return to our clinic for this or you may have it done locally at your doctor s office.  You should have some soreness but it should be mild and slowly go away over several days. Talk to your doctor about using tylenol for pain,    When should I call my doctor?  If you have increased:   Pain or swelling  Pus or drainage (clear or slightly yellow drainage is ok)  Temperature over 100F  Spreading redness or warmth around wound    When will I hear about my results?  The biopsy results can take 2-3 weeks to come back. The clinic will call you with the results, send you a [employer] message, or have you schedule a follow-up clinic or phone time to discuss the results. Contact our clinics if you do not hear from us in 3 weeks.     Who should I call with questions?  [facility]: [phone]   [facility]: [phone]  For urgent needs outside of business hours call the [facility] at [phone] and ask for the dermatology resident on call

## 2024-06-04 ENCOUNTER — VIRTUAL VISIT (OUTPATIENT)
Dept: SURGERY | Facility: CLINIC | Age: 49
End: 2024-06-04
Payer: COMMERCIAL

## 2024-06-04 DIAGNOSIS — C43.72 MALIGNANT MELANOMA OF LEFT LOWER EXTREMITY INCLUDING HIP (H): ICD-10-CM

## 2024-06-04 DIAGNOSIS — D03.59 MELANOMA IN SITU OF TORSO EXCLUDING BREAST (H): Primary | ICD-10-CM

## 2024-06-04 PROCEDURE — 99212 OFFICE O/P EST SF 10 MIN: CPT | Mod: 93 | Performed by: SURGERY

## 2024-06-04 RX ORDER — ACETAMINOPHEN 325 MG/1
975 TABLET ORAL ONCE
Status: CANCELLED | OUTPATIENT
Start: 2024-06-04 | End: 2024-06-04

## 2024-06-04 NOTE — LETTER
6/4/2024      Deandra Lewis  [address] [place] [address]      Dear Colleague,    Thank you for referring your patient, Deandra Lewis, to the [facility]. Please see a copy of my visit note below.      The above were completed by the medical assistant for the visit.    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 She is a known patient to me due to a LEFT lower extremity melanoma.     Cancer Staging   Malignant melanoma of left lower extremity including hip (H)-left anterior thigh Stage 1B (cT2a, cN0, cM0)  Staging form: Melanoma of the Skin, AJCC 8th Edition  - Clinical: Stage IB (cT2a, cN0, cM0) - Unsigned  - Pathologic: Stage IB (pT2a, pN0, cM0) - Signed by Tracie Higginbotham MD on 10/25/2023      Treatment to date:  Wide local excision of left lower anterior thigh melanoma with 2 cm margins, left femoral sentinel lymph node mapping and biopsy (10/9/2023)    HPI:    Since her last visit, the biopsy of the skin in the right groin resulted as atypical junctional melanocytic proliferation, c/w early melanoma in situ.    An additional biopsy of a lower right abdominal skin lesion was then performed on 5/30/2024, which showed a mildly dysplastic nevus.    INVESTIGATIONS:    Biopsy (5/16/2024) showed:  Final Diagnosis   A(1). Skin, Right superior inguinal crease, shave:  - Atypical junctional melanocytic proliferation, consistent with early melanoma in situ       Assessment/Plan:  Diagnoses         Codes Comments    Melanoma in situ of torso excluding breast (H)    -  Primary D03.59     Malignant melanoma of left lower extremity including hip (H)     C43.72             ASSESSMENT:  Deandra Lweis is a 48 year old female with a new RIGHT groin melanoma in situ, in the setting of a prior LEFT lower extremity melanoma.    We discussed the diagnosis and treatment for melanoma  in situ. Treatment consists of wide local excision with appropriate margins. Al staging is not recommended. The risks of a wide local excision were discussed, including scar formation, bleeding, wound infection, wound dehiscence, seroma formation, and numbness of surrounding skin. We discussed sedation vs local vs general; she prefers sedation.    We also discussed that the lower abdominal skin lesion was a completely excised mildly atypical nevus. No additional resection is indicated.    All of the above was discussed and all questions were answered.    PLAN:  Wide local excision of right groin skin lesion  Patient to report to her PCP for preoperative H&P and testing.     Tracie Higginbotham MD MSc [facility] FACS  Associate Professor of Surgery  Division of Surgical Oncology  [facility]     Phone call duration: 8 minutes    15 minutes spent on the date of the encounter doing chart review, review of test result(s), interpretation of test(s), patient visit, documentation, and care coordination.      Again, thank you for allowing me to participate in the care of your patient.        Sincerely,        Tracie Higginbotham MD

## 2024-06-04 NOTE — PROGRESS NOTES
The above were completed by the medical assistant for the visit.    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 She is a known patient to me due to a LEFT lower extremity melanoma.     Cancer Staging   Malignant melanoma of left lower extremity including hip (H)-left anterior thigh Stage 1B (cT2a, cN0, cM0)  Staging form: Melanoma of the Skin, AJCC 8th Edition  - Clinical: Stage IB (cT2a, cN0, cM0) - Unsigned  - Pathologic: Stage IB (pT2a, pN0, cM0) - Signed by Tracie Higginbotham MD on 10/25/2023      Treatment to date:  Wide local excision of left lower anterior thigh melanoma with 2 cm margins, left femoral sentinel lymph node mapping and biopsy (10/9/2023)    HPI:    Since her last visit, the biopsy of the skin in the right groin resulted as atypical junctional melanocytic proliferation, c/w early melanoma in situ.    An additional biopsy of a lower right abdominal skin lesion was then performed on 5/30/2024, which showed a mildly dysplastic nevus.    INVESTIGATIONS:    Biopsy (5/16/2024) showed:  Final Diagnosis   A(1). Skin, Right superior inguinal crease, shave:  - Atypical junctional melanocytic proliferation, consistent with early melanoma in situ       Assessment/Plan:  Diagnoses         Codes Comments    Melanoma in situ of torso excluding breast (H)    -  Primary D03.59     Malignant melanoma of left lower extremity including hip (H)     C43.72             ASSESSMENT:  Deandra Lewis is a 48 year old female with a new RIGHT groin melanoma in situ, in the setting of a prior LEFT lower extremity melanoma.    We discussed the diagnosis and treatment for melanoma in situ. Treatment consists of wide local excision with appropriate margins. Al staging is not recommended. The risks of a wide local excision were discussed, including scar formation, bleeding, wound infection, wound dehiscence,  seroma formation, and numbness of surrounding skin. We discussed sedation vs local vs general; she prefers sedation.    We also discussed that the lower abdominal skin lesion was a completely excised mildly atypical nevus. No additional resection is indicated.    All of the above was discussed and all questions were answered.    PLAN:  Wide local excision of right groin skin lesion  Patient to report to her PCP for preoperative H&P and testing.     Tracie Higginbotham MD MSc [facility] FACS  Associate Professor of Surgery  Division of Surgical Oncology  [facility]     Phone call duration: 8 minutes    15 minutes spent on the date of the encounter doing chart review, review of test result(s), interpretation of test(s), patient visit, documentation, and care coordination.

## 2024-06-04 NOTE — NURSING NOTE
Deandra Lewis's goals for this visit include: No chief complaint on file.      She requests these members of her care team be copied on today's visit information: no    PCP: Linda Soto    Referring Provider:  Referred Self, MD  No address on file    There were no vitals taken for this visit.    Do you need any medication refills at today's visit? No    Sudha Dwyer LPN

## 2024-06-06 ENCOUNTER — TELEPHONE (OUTPATIENT)
Dept: ONCOLOGY | Facility: CLINIC | Age: 49
End: 2024-06-06
Payer: COMMERCIAL

## 2024-06-06 NOTE — TELEPHONE ENCOUNTER
Called and LVM for patient to schedule surgery with Dr. Higginbotham. Provided direct call back number [phone].      Linda Art on 6/6/2024 at 8:43 AM

## 2024-06-06 NOTE — TELEPHONE ENCOUNTER
Patient called back to schedule surgery with Dr. Higginbotham. Surgery scheduled for 7/3/24 in [place].    H&P with PCP Dr. Soto scheduled for 6/26/24.  Patient is aware they will get a call from the pre-op nurses prior to surgery to confirm their arrival time.     Post-op scheduled for 7/17/24 with Dr. Higginbotham in [place].    Writer will mail surgery packet via [employer] on 6/6/24.    The patient has no further questions regarding surgery at this time. Patient has writers call back number [phone].    Linda Art on 6/6/2024 at 9:25 AM

## 2024-06-10 ENCOUNTER — VIRTUAL VISIT (OUTPATIENT)
Dept: ONCOLOGY | Facility: CLINIC | Age: 49
End: 2024-06-10
Attending: GENETIC COUNSELOR, MS
Payer: COMMERCIAL

## 2024-06-10 DIAGNOSIS — Z80.42 FAMILY HISTORY OF PROSTATE CANCER: ICD-10-CM

## 2024-06-10 DIAGNOSIS — Z80.3 FAMILY HISTORY OF MALIGNANT NEOPLASM OF BREAST: Primary | ICD-10-CM

## 2024-06-10 DIAGNOSIS — Z80.0 FAMILY HISTORY OF COLON CANCER: ICD-10-CM

## 2024-06-10 DIAGNOSIS — C43.72 MALIGNANT MELANOMA OF LEFT LOWER EXTREMITY INCLUDING HIP (H): ICD-10-CM

## 2024-06-10 PROCEDURE — 96040 HC GENETIC COUNSELING, EACH 30 MINUTES: CPT | Mod: GT,95 | Performed by: GENETIC COUNSELOR, MS

## 2024-06-10 NOTE — LETTER
"6/10/2024      Deandra Lewis  [address] [place] [address]      Dear Colleague,    Thank you for referring your patient, Deandra Lewis, to the [facility]. Please see a copy of my visit note below.    Virtual Visit Details    Type of service:  Video Visit     Originating Location (pt. Location): Home  Distant Location (provider location):  Off-site  Platform used for Video Visit: [facility]  Time spent over video: 22 minutes    6/10/2024    Referring Provider: Tracie Higginbotham MD     Presenting Information:  I spoke to Deandra over video today to discuss her genetic testing results. Her blood was drawn on 5/21/24. A Custom Panel (a combination of the Multi-Cancer Panel + Hereditary Skin Cancer Panel, including preliminary evidence genes for skin cancer and TERT) was ordered from [employer]. This testing was done because of Deandra's personal and family history of cancer.    Genetic Testing Result: NEGATIVE  Deandra is negative for mutations in the 74 genes analyzed: AIP, ALK, APC, RACHAEL, AXIN2, BAP1, BARD1, BLM, BMPR1A, BRCA1, BRCA2, BRIP1, CDC73, CDH1, CDK4, CDKN1B, CDKN2A, CHEK2, CTNNA1, DICER1, EGFR, EPCAM, FH, FLCN, GREM1, HOXB13, KIT, LZTR1, MAX, MBD4, MC1R, MEN1, MET, MITF, MLH1, MSH2, MSH3, MSH6, MUTYH, NF1, NF2, NTHL1, PALB2, PDGFRA, PMS2, POLD1, POLE, POT1, RVWER8V, PTCH1, PTCH2, PTEN, RAD51C, RAD51D, RB1, RET, SDHA, SDHAF2, SDHB, SDHC, SDHD, SMAD4, SMARCA4, SMARCB1, SMARCE1, STK11, SUFU, TERT, [id number], TP53, TSC1, TSC2, VHL, WRN.     A copy of the test report can be found in the Laboratory tab, dated 5/21/24, and named \"LABORATORY MISCELLANEOUS ORDER\". The report is scanned in as a linked document.    Interpretation:  We discussed several different interpretations of this negative test result.    One explanation may be that there is a different gene or combination of genes and environment that are associated with the cancers in this family.  It is possible that " her relatives have a mutation in one of these genes and she did not inherit it.  There is also a small possibility that there is a mutation in one of these genes, and the testing laboratory could not find it with their current testing methods.       Screening:  Based on this negative test result, it is important for Deandra and her relatives to refer back to the family history for appropriate cancer screening.    She should continue with her melanoma care as recommended by her oncology team. Her family members should discuss the family history of melanoma with their physicians.   Based on the personal and family history information she provided, Deandra has an estimated 20.2% lifetime risk of developing breast cancer based on the WONG Risk Evaluation v8 model. As such, Deandra meets current National Comprehensive Cancer Network (NCCN) guidelines for high risk breast screening. This includes annual breast MRI in addition to annual mammogram, alternating every 6 months. In addition, Deandra should be receiving clinical breast exams by her physician. We discussed that Deandra could participate in our Cancer Risk Management Program in which our nursing specialist provides an individual screening plan and assists with medical management. A referral was made to see FLORENCIA Banuelos for this service.  We also discussed her father's history of colon cancer. Per National Comprehensive Cancer Network (NCCN) guidelines, individuals with a first degree relative with colorectal cancer diagnosed at any age should begin colonoscopy at age 40, or 10 years before the earliest diagnosis of colorectal cancer, whichever is first. Colonoscopy should be repeated every 5 years, or based on colonoscopy findings. These recommendations may change based on personal and family history as well as personal preference, and should be discussed with an individual's physician.      Other population cancer screening options, such as those  recommended by the American Cancer Society and the National Comprehensive Cancer Network (NCCN), are also appropriate for Deandra and her family. These screening recommendations may change if there are changes to Deandra's personal and/or family history of cancer. Final screening recommendations should be made by each individual's primary care provider.     Inheritance:  We reviewed the autosomal dominant inheritance of mutations in these genes. We discussed that Deandra cannot/did not pass on an identifiable mutation in these genes to her children based on this test result. Mutations in these genes do not skip generations.      Additional Testing Considerations:  Although Deandra's genetic testing result was negative, other relatives may still carry a gene mutation associated with an increased risk for cancer. Genetic counseling is recommended for her siblings and maternal relatives to discuss genetic testing options. If any of her relatives do pursue genetic testing, Deandra is encouraged to contact me so that we may review the impact of their test results on her.    Summary:  We do not have an explanation for Deandra's personal or family history of cancer. While no genetic changes were identified, Deandra may still be at risk for certain cancers due to family history, environmental factors, or other genetic causes not identified by this test. Because of that, it is important that she continue with cancer screening based on her personal and family history as discussed above.    Genetic testing is rapidly advancing, and new cancer susceptibility genes will most likely be identified in the future. Therefore, I encouraged Deandra to contact me annually or if there are changes in her personal or family history. This may change how we assess her cancer risk, screening, and the testing we would offer.    Plan:  1. A copy of her results was released to her today via the online [employer] portal.   2. She plans to follow-up  with FLORENCIA Banuelos and her physicians.  3. She should contact me regularly, or sooner if her family history changes.    If Deandra has any further questions, I encouraged her to contact me at [phone].    Cely Hernandez MS, [facility]  Licensed, Certified Genetic Counselor  Office: [phone]  Email: [email or web address]

## 2024-06-10 NOTE — PROGRESS NOTES
"Virtual Visit Details    Type of service:  Video Visit     Originating Location (pt. Location): Home  Distant Location (provider location):  Off-site  Platform used for Video Visit: [employer]  Time spent over video: 22 minutes    6/10/2024    Referring Provider: Tracie Higginbotham MD     Presenting Information:  I spoke to Deandra over video today to discuss her genetic testing results. Her blood was drawn on 5/21/24. A Custom Panel (a combination of the Multi-Cancer Panel + Hereditary Skin Cancer Panel, including preliminary evidence genes for skin cancer and TERT) was ordered from [employer]. This testing was done because of Deandra's personal and family history of cancer.    Genetic Testing Result: NEGATIVE  Deandra is negative for mutations in the 74 genes analyzed: AIP, ALK, APC, RACHAEL, AXIN2, BAP1, BARD1, BLM, BMPR1A, BRCA1, BRCA2, BRIP1, CDC73, CDH1, CDK4, CDKN1B, CDKN2A, CHEK2, CTNNA1, DICER1, EGFR, EPCAM, FH, FLCN, GREM1, HOXB13, KIT, LZTR1, MAX, MBD4, MC1R, MEN1, MET, MITF, MLH1, MSH2, MSH3, MSH6, MUTYH, NF1, NF2, NTHL1, PALB2, PDGFRA, PMS2, POLD1, POLE, POT1, BVSNL3G, PTCH1, PTCH2, PTEN, RAD51C, RAD51D, RB1, RET, SDHA, SDHAF2, SDHB, SDHC, SDHD, SMAD4, SMARCA4, SMARCB1, SMARCE1, STK11, SUFU, TERT, [id number], TP53, TSC1, TSC2, VHL, WRN.     A copy of the test report can be found in the Laboratory tab, dated 5/21/24, and named \"LABORATORY MISCELLANEOUS ORDER\". The report is scanned in as a linked document.    Interpretation:  We discussed several different interpretations of this negative test result.    One explanation may be that there is a different gene or combination of genes and environment that are associated with the cancers in this family.  It is possible that her relatives have a mutation in one of these genes and she did not inherit it.  There is also a small possibility that there is a mutation in one of these genes, and the testing laboratory could not find it with their current testing methods.  "      Screening:  Based on this negative test result, it is important for Deandra and her relatives to refer back to the family history for appropriate cancer screening.    She should continue with her melanoma care as recommended by her oncology team. Her family members should discuss the family history of melanoma with their physicians.   Based on the personal and family history information she provided, Deandra has an estimated 20.2% lifetime risk of developing breast cancer based on the WONG Risk Evaluation v8 model. As such, Deandra meets current National Comprehensive Cancer Network (NCCN) guidelines for high risk breast screening. This includes annual breast MRI in addition to annual mammogram, alternating every 6 months. In addition, Deandra should be receiving clinical breast exams by her physician. We discussed that Deandra could participate in our Cancer Risk Management Program in which our nursing specialist provides an individual screening plan and assists with medical management. A referral was made to see FLORENCIA Banuelos for this service.  We also discussed her father's history of colon cancer. Per National Comprehensive Cancer Network (NCCN) guidelines, individuals with a first degree relative with colorectal cancer diagnosed at any age should begin colonoscopy at age 40, or 10 years before the earliest diagnosis of colorectal cancer, whichever is first. Colonoscopy should be repeated every 5 years, or based on colonoscopy findings. These recommendations may change based on personal and family history as well as personal preference, and should be discussed with an individual's physician.      Other population cancer screening options, such as those recommended by the American Cancer Society and the National Comprehensive Cancer Network (NCCN), are also appropriate for Deandra and her family. These screening recommendations may change if there are changes to Deandra's personal and/or family history  of cancer. Final screening recommendations should be made by each individual's primary care provider.     Inheritance:  We reviewed the autosomal dominant inheritance of mutations in these genes. We discussed that Deandra cannot/did not pass on an identifiable mutation in these genes to her children based on this test result. Mutations in these genes do not skip generations.      Additional Testing Considerations:  Although Deandra's genetic testing result was negative, other relatives may still carry a gene mutation associated with an increased risk for cancer. Genetic counseling is recommended for her siblings and maternal relatives to discuss genetic testing options. If any of her relatives do pursue genetic testing, Deandra is encouraged to contact me so that we may review the impact of their test results on her.    Summary:  We do not have an explanation for Deandra's personal or family history of cancer. While no genetic changes were identified, Deandra may still be at risk for certain cancers due to family history, environmental factors, or other genetic causes not identified by this test. Because of that, it is important that she continue with cancer screening based on her personal and family history as discussed above.    Genetic testing is rapidly advancing, and new cancer susceptibility genes will most likely be identified in the future. Therefore, I encouraged Deandra to contact me annually or if there are changes in her personal or family history. This may change how we assess her cancer risk, screening, and the testing we would offer.    Plan:  1. A copy of her results was released to her today via the online [employer] portal.   2. She plans to follow-up with FLORENCIA Banuelos and her physicians.  3. She should contact me regularly, or sooner if her family history changes.    If Deandra has any further questions, I encouraged her to contact me at [phone].    Cely Hernandez MS,  [facility]  Licensed, Certified Genetic Counselor  Office: [phone]  Email: [email or web address]

## 2024-06-10 NOTE — NURSING NOTE
Is the patient currently in the state of [place]? YES    Visit mode:VIDEO    If the visit is dropped, the patient can be reconnected by: VIDEO VISIT: Text to cell phone:   Telephone Information:   Mobile [phone]       Will anyone else be joining the visit? NO  (If patient encounters technical issues they should call [phone]    How would you like to obtain your AVS? MyChart    Are changes needed to the allergy or medication list? N/A    Are refills needed on medications prescribed by this physician? NO    Reason for visit: JOSEPH MCGOVERN

## 2024-06-11 ENCOUNTER — PATIENT OUTREACH (OUTPATIENT)
Dept: ONCOLOGY | Facility: CLINIC | Age: 49
End: 2024-06-11
Payer: COMMERCIAL

## 2024-06-11 NOTE — PROGRESS NOTES
Writer received referral to Cancer Risk Management/Genetic Counseling.    Referred for: Cancer Risk Management- High Risk Clinic      Referred by:     Provider Department Location Phone   Cely Hernandez GC  Cancer Risk [facility] [phone]     Referral reviewed for appropriate plan and sent to New Patient Scheduling ([phone]) for completion.

## 2024-06-24 SDOH — HEALTH STABILITY: PHYSICAL HEALTH: ON AVERAGE, HOW MANY MINUTES DO YOU ENGAGE IN EXERCISE AT THIS LEVEL?: 70 MIN

## 2024-06-24 SDOH — HEALTH STABILITY: PHYSICAL HEALTH: ON AVERAGE, HOW MANY DAYS PER WEEK DO YOU ENGAGE IN MODERATE TO STRENUOUS EXERCISE (LIKE A BRISK WALK)?: 4 DAYS

## 2024-06-24 ASSESSMENT — ASTHMA QUESTIONNAIRES
ACT_TOTALSCORE: 23
QUESTION_2 LAST FOUR WEEKS HOW OFTEN HAVE YOU HAD SHORTNESS OF BREATH: NOT AT ALL
QUESTION_3 LAST FOUR WEEKS HOW OFTEN DID YOUR ASTHMA SYMPTOMS (WHEEZING, COUGHING, SHORTNESS OF BREATH, CHEST TIGHTNESS OR PAIN) WAKE YOU UP AT NIGHT OR EARLIER THAN USUAL IN THE MORNING: NOT AT ALL
QUESTION_5 LAST FOUR WEEKS HOW WOULD YOU RATE YOUR ASTHMA CONTROL: WELL CONTROLLED
QUESTION_1 LAST FOUR WEEKS HOW MUCH OF THE TIME DID YOUR ASTHMA KEEP YOU FROM GETTING AS MUCH DONE AT WORK, SCHOOL OR AT HOME: NONE OF THE TIME
QUESTION_4 LAST FOUR WEEKS HOW OFTEN HAVE YOU USED YOUR RESCUE INHALER OR NEBULIZER MEDICATION (SUCH AS ALBUTEROL): ONCE A WEEK OR LESS

## 2024-06-24 ASSESSMENT — SOCIAL DETERMINANTS OF HEALTH (SDOH): HOW OFTEN DO YOU GET TOGETHER WITH FRIENDS OR RELATIVES?: PATIENT DECLINED

## 2024-06-25 ENCOUNTER — MYC MEDICAL ADVICE (OUTPATIENT)
Dept: SURGERY | Facility: CLINIC | Age: 49
End: 2024-06-25
Payer: COMMERCIAL

## 2024-06-25 ASSESSMENT — ANXIETY QUESTIONNAIRES
IF YOU CHECKED OFF ANY PROBLEMS ON THIS QUESTIONNAIRE, HOW DIFFICULT HAVE THESE PROBLEMS MADE IT FOR YOU TO DO YOUR WORK, TAKE CARE OF THINGS AT HOME, OR GET ALONG WITH OTHER PEOPLE: NOT DIFFICULT AT ALL
6. BECOMING EASILY ANNOYED OR IRRITABLE: NOT AT ALL
3. WORRYING TOO MUCH ABOUT DIFFERENT THINGS: NOT AT ALL
2. NOT BEING ABLE TO STOP OR CONTROL WORRYING: NOT AT ALL
8. IF YOU CHECKED OFF ANY PROBLEMS, HOW DIFFICULT HAVE THESE MADE IT FOR YOU TO DO YOUR WORK, TAKE CARE OF THINGS AT HOME, OR GET ALONG WITH OTHER PEOPLE?: NOT DIFFICULT AT ALL
7. FEELING AFRAID AS IF SOMETHING AWFUL MIGHT HAPPEN: NOT AT ALL
4. TROUBLE RELAXING: NOT AT ALL
7. FEELING AFRAID AS IF SOMETHING AWFUL MIGHT HAPPEN: NOT AT ALL
GAD7 TOTAL SCORE: 0
1. FEELING NERVOUS, ANXIOUS, OR ON EDGE: NOT AT ALL
GAD7 TOTAL SCORE: 0
5. BEING SO RESTLESS THAT IT IS HARD TO SIT STILL: NOT AT ALL

## 2024-06-25 ASSESSMENT — PATIENT HEALTH QUESTIONNAIRE - PHQ9: SUM OF ALL RESPONSES TO PHQ QUESTIONS 1-9: 0

## 2024-06-25 NOTE — CONFIDENTIAL NOTE
Per staff message sent today 6/25/24  to  from Pat MATT RN that pt was asking if she needed to stop exercising prior to surgery like last time. Per  no need for pt to stop exercising prior to her surgery. [employer] message sent to pt with this information and also surgery instructions..Annamarie Kerr RN

## 2024-06-26 ENCOUNTER — OFFICE VISIT (OUTPATIENT)
Dept: FAMILY MEDICINE | Facility: CLINIC | Age: 49
End: 2024-06-26
Payer: COMMERCIAL

## 2024-06-26 VITALS
SYSTOLIC BLOOD PRESSURE: 130 MMHG | OXYGEN SATURATION: 97 % | TEMPERATURE: 97.7 F | HEART RATE: 77 BPM | WEIGHT: 186 LBS | HEIGHT: 67 IN | BODY MASS INDEX: 29.19 KG/M2 | DIASTOLIC BLOOD PRESSURE: 100 MMHG | RESPIRATION RATE: 18 BRPM

## 2024-06-26 DIAGNOSIS — F33.0 MILD EPISODE OF RECURRENT MAJOR DEPRESSIVE DISORDER (H): ICD-10-CM

## 2024-06-26 DIAGNOSIS — G43.019 INTRACTABLE MIGRAINE WITHOUT AURA AND WITHOUT STATUS MIGRAINOSUS: ICD-10-CM

## 2024-06-26 DIAGNOSIS — I10 HYPERTENSION GOAL BP (BLOOD PRESSURE) < 140/90: ICD-10-CM

## 2024-06-26 DIAGNOSIS — Z85.820 HISTORY OF MELANOMA EXCISION: ICD-10-CM

## 2024-06-26 DIAGNOSIS — Z01.818 PREOP GENERAL PHYSICAL EXAM: Primary | ICD-10-CM

## 2024-06-26 DIAGNOSIS — F41.9 ANXIETY: ICD-10-CM

## 2024-06-26 DIAGNOSIS — M54.50 BILATERAL LOW BACK PAIN WITHOUT SCIATICA, UNSPECIFIED CHRONICITY: ICD-10-CM

## 2024-06-26 DIAGNOSIS — G89.18 POSTOPERATIVE PAIN: ICD-10-CM

## 2024-06-26 DIAGNOSIS — E66.01 MORBID OBESITY (H): ICD-10-CM

## 2024-06-26 DIAGNOSIS — M54.9 UPPER BACK PAIN: ICD-10-CM

## 2024-06-26 DIAGNOSIS — D03.59 MELANOMA IN SITU OF TORSO EXCLUDING BREAST (H): ICD-10-CM

## 2024-06-26 DIAGNOSIS — T88.7XXA MEDICATION SIDE EFFECTS: ICD-10-CM

## 2024-06-26 DIAGNOSIS — M54.2 NECK PAIN: ICD-10-CM

## 2024-06-26 DIAGNOSIS — Z98.890 HISTORY OF MELANOMA EXCISION: ICD-10-CM

## 2024-06-26 PROBLEM — J44.9 COPD (CHRONIC OBSTRUCTIVE PULMONARY DISEASE) (H): Status: ACTIVE | Noted: 2024-06-26

## 2024-06-26 LAB
ERYTHROCYTE [DISTWIDTH] IN BLOOD BY AUTOMATED COUNT: 13.2 % (ref 10–15)
HCT VFR BLD AUTO: 41.1 % (ref 35–47)
HGB BLD-MCNC: 13.8 G/DL (ref 11.7–15.7)
MCH RBC QN AUTO: 28.5 PG (ref 26.5–33)
MCHC RBC AUTO-ENTMCNC: 33.6 G/DL (ref 31.5–36.5)
MCV RBC AUTO: 85 FL (ref 78–100)
PLATELET # BLD AUTO: 329 10E3/UL (ref 150–450)
RBC # BLD AUTO: 4.84 10E6/UL (ref 3.8–5.2)
WBC # BLD AUTO: 6.4 10E3/UL (ref 4–11)

## 2024-06-26 PROCEDURE — 80053 COMPREHEN METABOLIC PANEL: CPT | Performed by: FAMILY MEDICINE

## 2024-06-26 PROCEDURE — 99214 OFFICE O/P EST MOD 30 MIN: CPT | Performed by: FAMILY MEDICINE

## 2024-06-26 PROCEDURE — 36415 COLL VENOUS BLD VENIPUNCTURE: CPT | Performed by: FAMILY MEDICINE

## 2024-06-26 PROCEDURE — 85027 COMPLETE CBC AUTOMATED: CPT | Performed by: FAMILY MEDICINE

## 2024-06-26 RX ORDER — METHOCARBAMOL 500 MG/1
500 TABLET, FILM COATED ORAL 4 TIMES DAILY PRN
Qty: 20 TABLET | Refills: 3 | Status: SHIPPED | OUTPATIENT
Start: 2024-06-26

## 2024-06-26 RX ORDER — ZOLMITRIPTAN 5 MG/1
TABLET, FILM COATED ORAL
Qty: 10 TABLET | Refills: 11 | Status: SHIPPED | OUTPATIENT
Start: 2024-06-26

## 2024-06-26 RX ORDER — CYCLOBENZAPRINE HCL 5 MG
5-10 TABLET ORAL 2 TIMES DAILY PRN
Qty: 30 TABLET | Refills: 3 | Status: SHIPPED | OUTPATIENT
Start: 2024-06-26

## 2024-06-26 RX ORDER — ONDANSETRON 4 MG/1
4 TABLET, ORALLY DISINTEGRATING ORAL EVERY 6 HOURS PRN
Qty: 16 TABLET | Refills: 3 | Status: SHIPPED | OUTPATIENT
Start: 2024-06-26 | End: 2024-08-20

## 2024-06-26 ASSESSMENT — ANXIETY QUESTIONNAIRES: GAD7 TOTAL SCORE: 0

## 2024-06-26 ASSESSMENT — PAIN SCALES - GENERAL: PAINLEVEL: NO PAIN (0)

## 2024-06-26 ASSESSMENT — ASTHMA QUESTIONNAIRES: ACT_TOTALSCORE: 23

## 2024-06-26 ASSESSMENT — PATIENT HEALTH QUESTIONNAIRE - PHQ9
SUM OF ALL RESPONSES TO PHQ QUESTIONS 1-9: 0
10. IF YOU CHECKED OFF ANY PROBLEMS, HOW DIFFICULT HAVE THESE PROBLEMS MADE IT FOR YOU TO DO YOUR WORK, TAKE CARE OF THINGS AT HOME, OR GET ALONG WITH OTHER PEOPLE: NOT DIFFICULT AT ALL

## 2024-06-26 NOTE — PROGRESS NOTES
Preoperative Evaluation  [facility]  [address] [address]  Phone: [phone]  Primary Provider: Linda Soto MD  Pre-op Performing Provider: Linda Soto MD  Jun 26, 2024 6/26/2024   Surgical Information   What procedure is being done? Excision of melanoma right inguinal area    Facility or Hospital where procedure/surgery will be performed: [facility]   Who is doing the procedure / surgery? Dr Tracie Higginbotham   Date of surgery / procedure: 7/3/2024   Time of surgery / procedure: Excision of melanom on right inguinal crease   Where do you plan to recover after surgery? at home with family        Fax number for surgical facility: Note does not need to be faxed, will be available electronically in Epic.    Assessment & Plan     The proposed surgical procedure is considered LOW risk.      ICD-10-CM    1. Preop general physical exam  Z01.818 CBC with platelets     Comprehensive metabolic panel (BMP + Alb, Alk Phos, ALT, AST, Total. Bili, TP)      2. Melanoma in situ of torso excluding breast (H) - right groin area - 2nd melanoma - first was left anterior thigh  D03.59       3. Mild episode of recurrent major depressive disorder (H24)  F33.0       4. Morbid obesity (H)  E66.01       5. Medication side effects- lisinopril 5mg annoying dry cough, and topiramate 25mg = blurred vision  T88.7XXA       6. History of melanoma excision  Z98.890 ondansetron (ZOFRAN ODT) 4 MG ODT tab    Z85.820       7. Neck pain  M54.2 cyclobenzaprine (FLEXERIL) 5 MG tablet      8. Upper back pain  M54.9 cyclobenzaprine (FLEXERIL) 5 MG tablet      9. Bilateral low back pain without sciatica, unspecified chronicity  M54.50 cyclobenzaprine (FLEXERIL) 5 MG tablet      10. Postoperative pain  G89.18 methocarbamol (ROBAXIN) 500 MG tablet      11. Intractable migraine without aura and without status migrainosus  G43.019 ZOLMitriptan (ZOMIG) 5 MG tablet       12. Anxiety  F41.9       13. Hypertension goal BP (blood pressure) < 140/90  - new diagnosis fall 2023 - much improved with weight loss and regular CV exercise  I10         Has lost weight. - right now BP medication not needed - is either walking and/or running multiple times a week for exercise.    Wt Readings from Last 5 Encounters:   06/26/24 84.4 kg (186 lb)   10/09/23 96.3 kg (212 lb 4.9 oz)   09/20/23 98 kg (216 lb)   09/14/23 104.3 kg (230 lb)   09/06/23 100.7 kg (222 lb)         Future Appointments 6/26/2024 - 12/23/2024        Date Visit Type Length Department Provider     7/17/2024  4:00 PM RETURN 30 min MG SURG Tracie Higginbotham MD    Location Instructions:     [facility] [place] 2nd Floor  From Sign in Desk D.               8/15/2024  7:00 AM RETURN DERMATOLOGY 15 min EC SKIN CARE Lia Savage, PAScottC              8/15/2024 10:30 AM NEW ONCOLOGY 60 min UC CANCER RISK MGMT Winnie Trinidad APRN CNP    Location Instructions:     [facility] is [place] a dense [place] area with multiple transportation and parking options. You may wish to review options for  service and self-parking in more detail on the [facility] s website at [email or web address].   This appointment is in a hospital-based location.&nbsp; Before your visit, you may want to check with your insurance company for coverage and referral options, including cost differences between services provided in different clinic settings.&nbsp; For more information visit this link on the [employer] Website:&nbsp; [email or web address]              8/20/2024  3:00 PM PREVENTATIVE ADULT 40 min RV FAMILY PRACTICE Linda Soto MD    Location Instructions:     [facility] is located at [address], [address] Free parking is available; access the lot by turning north from [address] onto [address], then west onto [address].               11/21/2024  7:00 AM RETURN DERMATOLOGY 15 min EC SKIN CARE Lia Savage, SAIDA                      - No identified additional risk factors other than previously addressed    Antiplatelet or Anticoagulation Medication Instructions   - Patient is on no antiplatelet or anticoagulation medications.    Additional Medication Instructions  Take all scheduled medications on the day of surgery  With a sip of water.     Recommendation  Approval given to proceed with proposed procedure, without further diagnostic evaluation.         Linda Soto MD        Subjective :   Deandra is a 48 year old, presenting for the following:  Pre-Op Exam (DOS: 07/03/2024, Dr Higginbotham, Melanoma, Right Groin/Inguinal Area, [facility]. ) and Recheck Medication (Would like a refill or to have some Fiorinal on hand as well as Zomig for her migraines. )          6/26/2024     4:05 PM   Additional Questions   Roomed by Earnestine Lin CMA   Accompanied by Self     HPI related to upcoming procedure: pt had initial Malignant melanoma of left lower extremity including hip (H)-left anterior thigh Stage 1B (cT2a, cN0, cM0)  9/6/2023 - with wide deep excision with 1 sentinel node on dye study that tested negative on surgical pathology.   Didn't have chemo. Pt had a new lesion appear in her right groin on  5/16/2024 and had a shave biopsy done same day =   Specimen:    Skin, Right superior inguinal crease                                                      Final Diagnosis   A(1). Skin, Right superior inguinal crease, shave:  - Atypical junctional melanocytic proliferation, consistent with early melanoma in situ            6/26/2024   Pre-Op Questionnaire   Have you ever had a heart attack or stroke? No   Have you ever had surgery on your heart or blood vessels, such as a stent placement, a coronary artery bypass, or surgery on an artery in your head, neck, heart, or legs? No   Do you have chest pain with activity? No   Do  you have a history of heart failure? No   Do you currently have a cold, bronchitis or symptoms of other infection? No   Do you have a cough, shortness of breath, or wheezing? No   Do you or anyone in your family have previous history of blood clots? No   Do you or does anyone in your family have a serious bleeding problem such as prolonged bleeding following surgeries or cuts? No   Have you ever had problems with anemia or been told to take iron pills? No   Have you had any abnormal blood loss such as black, tarry or bloody stools, or abnormal vaginal bleeding? (!) YES s/p sinus surgery - has Red-headed genetics. Hasn't had any abnormal blood loss after that.    Have you ever had a blood transfusion? No   Are you willing to have a blood transfusion if it is medically needed before, during, or after your surgery? Yes   Have you or any of your relatives ever had problems with anesthesia? (!) YES - dad has problems awakening from general anesthesia.  Pt's last surgery left thigh melanoma excision took a long time for her pain to be managed and didn't awaken from anesthesia as easily.    Do you have sleep apnea, excessive snoring or daytime drowsiness? No   Do you have any artifical heart valves or other implanted medical devices like a pacemaker, defibrillator, or continuous glucose monitor? No   Do you have artificial joints? No   Are you allergic to latex? No        Health Care Directive  Patient does not have a Health Care Directive or Living Will: Patient states has Advance Directive and will bring in a copy to clinic. - after it is completed -she's working on it.     Preoperative Review of :    reviewed - no record of controlled substances prescribed. Since her left thigh  melanoma surgery 10/2023.       Status of Chronic Conditions:  See problem list for active medical problems.  Problems all longstanding and stable, except as noted/documented.  See ROS for pertinent symptoms related to these  conditions.    Patient Active Problem List    Diagnosis Date Noted    Melanoma in situ of torso excluding breast (H) - right groin area - 2nd melanoma - first was left anterior thigh 06/04/2024     Priority: Medium    History of malignant melanoma of skin 02/15/2024     Priority: Medium    Neoplasm of uncertain behavior of skin 02/15/2024     Priority: Medium    Multiple nevi 02/15/2024     Priority: Medium    Cherry angioma 02/15/2024     Priority: Medium    Lentigines 02/15/2024     Priority: Medium    Mild intermittent asthma with (acute) exacerbation- allergies and cold temperature triggers 09/20/2023     Priority: Medium    Encounter for completion of form with patient- [facility] paperwork for melanoma left leg 09/20/2023     Priority: Medium    Melanoma of skin (H) 09/14/2023     Priority: Medium    Malignant melanoma of left lower extremity including hip (H)-left anterior thigh Stage 1B (cT2a, cN0, cM0) 09/06/2023     Priority: Medium    Numbness and tingling of hand- fingertips - ? caused by lisinopril vs. other - consider changing to amlodipine  03/20/2023     Priority: Medium    Intractable migraine without aura and without status migrainosus 02/02/2023     Priority: Medium    Hypertension goal BP (blood pressure) < 140/90  - new diagnosis 02/02/2023     Priority: Medium    Morbid obesity (H) 02/02/2023     Priority: Medium    Recurrent cough 12/01/2022     Priority: Medium    Attention deficit - symptoms - pt desires testing and possible treatment 12/01/2022     Priority: Medium    Menorrhagia with regular cycle- ? cause of iron deficiency anemia- pt would like to see ob/gyn specialists- didn't go in 2021 - better than previous 05/02/2021     Priority: Medium    Intractable episodic tension-type headache 05/02/2021     Priority: Medium    Strain of neck muscle, initial encounter 05/02/2021     Priority: Medium    Sun-damaged skin 05/02/2021     Priority: Medium    Cough due to bronchospasm- needs refills on  meds  05/02/2021     Priority: Medium    Family history of colon cancer- father dx'd in mid 40's - pt's first colonoscopy in 4/24/2017 - repeat in 2022 04/14/2021     Priority: Medium    Multiple fractures of left foot with routine healing 02/25/2021     Priority: Medium    Anxiety      Priority: Medium     paxil 10mg= mild bladder retention, increased headaches       Mild anxiety 06/01/2018     Priority: Medium    Foster care child 06/01/2018     Priority: Medium    Generalized hyperhidrosis- since earliest recollections - soaks clothing multiple times/week - very disruptive to life 01/04/2017     Priority: Medium     Familial - mother and daughter as well.       Hot flashes 11/16/2016     Priority: Medium    Family history of breast cancer- mother's identical twin, maternal aunt, paternal GM and dad with breast lumps  09/11/2014     Priority: Medium    Lactose intolerance      Priority: Medium    Kidney stones      Priority: Medium     Dr. Blank Steele - [facility] - Urology       Disturbance in sleep behavior      Priority: Medium    Other iron deficiency anemia - Anemia - ? 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 Provider to review and confirm      Chronic sinusitis 01/03/2013     Priority: Medium      Past Medical History:   Diagnosis Date    Anemia     Anxiety     paxil 10mg= mild bladder retention, at 30mg = ravenously hungry/weight gain increased headaches; lexapro = bad sweating    Contraception     ocp's some = mood swings ; seasonale: frequent vaginal bleeding and weight gain    Hot flashes 11/16/2016    Hypertension goal BP (blood  pressure) < 140/90  - new diagnosis 02/02/2023    Kidney stones     Dr. 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    left - secondary to severe oral/sinus infection and bleeding  - hosp [facility] [place] s/p multiple surgeries     Multiple fractures of left foot with routine healing 02/25/2021    Night sweats     Sleep problems     Uncomplicated asthma 20s     Past Surgical History:   Procedure Laterality Date    ABDOMEN SURGERY  2001    APPENDECTOMY      ARTHROSCOPY, ARTHROPLASTY TEMPOROMANDIBULAR JOINT, COMBINED  age 14     TMJ surgery as an 9th grader    BIOPSY NODE SENTINEL Left 10/09/2023    Procedure: Left Femoral [place] Lymph Node Mapping and Biopsy;  Surgeon: Tracie Higginbotham MD;  Location: [facility] OR    COLONOSCOPY  2017    Approximately    COMPLEX WOUND CLOSURE (UPDATE) N/A 10/09/2023    Procedure: COMPLEX CLOSURE SOFT TISSUE DEFECT LEFT THIGH 14 cm Length;  Surgeon: Pj Aguirre MD;  Location: [facility] OR    CYSTOSCOPY,URETEROSCOPY,STONE REMV  09/11/2002    with stent placements - since removed     ENDOSCOPIC BALLOON SINUPLASTY, OPTICAL TRACKING SYSTEM ENDOSCOPIC SINUS SURGERY, COMBINED  01/08/2013    Procedure: COMBINED ENDOSCOPIC BALLOON SINUPLASTY, OPTICAL TRACKING SYSTEM ENDOSCOPIC SINUS SURGERY;  Left Endo Sinus  Surgery  with Septoplasty      ENDOSCOPIC SINUS SURGERY  01/15/2013    Procedure: ENDOSCOPIC SINUS SURGERY;  removal of nasal packing with endocopic debridement of left paranasal sinuses;  Surgeon: Fabian Driver MD;  Location:  OR    ENDOSCOPIC SINUS SURGERY  04/22/2013    Procedure: ENDOSCOPIC SINUS SURGERY;  Functional Endoscopic Sinus Surgery With Biopsy ;  Surgeon: Jcarlos Hogan MD;  Location: [facility] OR    ENT SURGERY  2013    Deviated septum. Complications. Resulted in 3 more    EXCISE SOFT TISSUE TUMOR THIGH Left 10/09/2023    Procedure:  Wide Local Excision of LEFT thigh melanoma;  Surgeon: Tracie Higginbotham MD;  Location: [facility] OR     Current Outpatient Medications   Medication Sig Dispense Refill    albuterol (PROAIR HFA/PROVENTIL HFA/VENTOLIN HFA) 108 (90 Base) MCG/ACT inhaler INHALE 2 PUFFS BY MOUTH EVERY 6 HOURS AS NEEDED FOR WHEEZE OR FOR SHORTNESS OF BREATH 18 g 0    buPROPion (WELLBUTRIN XL) 150 MG 24 hr tablet TAKE 3 TABLETS BY MOUTH EVERY MORNING. 270 tablet 0    cetirizine (ZYRTEC) 10 MG tablet Take 1 tablet (10 mg) by mouth every evening 30 tablet 1    cyclobenzaprine (FLEXERIL) 5 MG tablet TAKE 1-2 TABLETS (5-10 MG) BY MOUTH 2 TIMES DAILY AS NEEDED FOR MUSCLE SPASMS 30 tablet 3    fluticasone-vilanterol (BREO ELLIPTA) 200-25 MCG/ACT inhaler Inhale 1 puff into the lungs daily 60 each 2    ibuprofen (ADVIL/MOTRIN) 200 MG tablet Take 200 mg by mouth every 6 hours as needed      methocarbamol (ROBAXIN) 500 MG tablet Take 1 tablet (500 mg) by mouth 4 times daily as needed for muscle spasms 20 tablet 0    montelukast (SINGULAIR) 10 MG tablet Take 1 tablet (10 mg) by mouth at bedtime 90 tablet 3    ZOLMitriptan (ZOMIG) 5 MG tablet TAKE 1 TABLET BY MOUTH AT ONSET OF HEADACHE FOR MIGRAINE MAY REPEAT IN 2 HOURS. MAX 2 TABS/24 HOURS 10 tablet 3    lisinopril (ZESTRIL) 5 MG tablet TAKE 1 TABLET BY MOUTH EVERY DAY 90 tablet 1    topiramate (TOPAMAX) 25 MG tablet Take 2 TABS by mouth TWICE DAILY 360 tablet 3       Allergies   Allergen Reactions    Adhesive Tape Blisters    Melon      Mouth gets itchy     Seasonal Allergies         Social History     Tobacco Use    Smoking status: Former     Current packs/day: 0.10     Average packs/day: 0.1 packs/day for 5.0 years (0.5 ttl pk-yrs)     Types: Cigarettes    Smokeless tobacco: Never    Tobacco comments:     Tried smoking in high school   Substance Use Topics    Alcohol use: Not Currently     Comment: Rarely     Family History   Problem Relation Age of Onset    Asthma Mother     Depression Mother      "Thyroid Disease Mother     Blood Disease Mother         Bleeding disorder    Bipolar Disorder Mother     Parkinsonism Mother     Dementia Mother     Anxiety Disorder Mother     Mental Illness Mother     Obesity Mother     Heart Disease Father         s/p pacemakers x 2     Diabetes Father     Cancer Father         Skin, Colon-dx'd mid-late 40's    Depression Father     Skin Cancer Father     Colon Cancer Father     Prostate Cancer Father     Anesthesia Reaction Father         Very hard to come out of anesthesia    Substance Abuse Maternal Grandmother     Substance Abuse Maternal Grandfather     Breast Cancer Paternal Grandmother     Osteoporosis Paternal Grandmother     Skin Cancer Paternal Grandfather     Allergy (Severe) Son         Dairy, Eggs, and Oatmeal    Asthma Child     Breast Cancer Maternal Aunt         another of mom's sisters     Breast Cancer Maternal Aunt         mother's identical twin     Alcohol/Drug Other         Sibling     History   Drug Use No     Comment: no herbal meds except for acidophilus              Review of Systems  Constitutional, HEENT, cardiovascular, pulmonary, GI, , musculoskeletal, neuro, skin, endocrine and psych systems are negative, except as otherwise noted.    Objective    BP (!) 130/100 (BP Location: Left arm, Patient Position: Sitting, Cuff Size: Adult Regular)   Pulse 77   Temp 97.7  F (36.5  C) (Oral)   Resp 18   Ht 1.71 m (5' 7.32\")   Wt 84.4 kg (186 lb)   LMP 06/08/2024   SpO2 97%   BMI 28.85 kg/m     Estimated body mass index is 28.85 kg/m  as calculated from the following:    Height as of this encounter: 1.71 m (5' 7.32\").    Weight as of this encounter: 84.4 kg (186 lb).  Physical Exam  GENERAL: alert and no distress  EYES: Eyes grossly normal to inspection, PERRL and conjunctivae and sclerae normal  HENT: ear canals and TM's normal, nose and mouth without ulcers or lesions  NECK: no adenopathy, no asymmetry, masses, or scars  RESP: lungs clear to " auscultation - no rales, rhonchi or wheezes  CV: regular rate and rhythm, normal S1 S2, no S3 or S4, no murmur, click or rub, no peripheral edema  ABDOMEN: soft, nontender, no hepatosplenomegaly, no masses and bowel sounds normal  MS: no gross musculoskeletal defects noted, no edema  SKIN: no suspicious lesions or rashes  NEURO: Normal strength and tone, mentation intact and speech normal  PSYCH: mentation appears normal, affect normal/bright    Recent Labs   Lab Test 09/20/23  1319   HGB 13.3         POTASSIUM 4.4   CR 0.90        Diagnostics:   Labs pending at this time.  Results will be reviewed when available.   No EKG required for low risk surgery (cataract, skin procedure, breast biopsy, etc).    Revised Cardiac Risk Index (RCRI)  The patient has the following serious cardiovascular risks for perioperative complications:   - No serious cardiac risks = 0 points     RCRI Interpretation: 0 points: Class I (very low risk - 0.4% complication rate)         Signed Electronically by:      Linda Soto MD  Copy of this evaluation report is provided to requesting physician.  Answers submitted by the patient for this visit:  Patient Health Questionnaire (Submitted on 6/26/2024)  If you checked off any problems, how difficult have these problems made it for you to do your work, take care of things at home, or get along with other people?: Not difficult at all  PHQ9 TOTAL SCORE: 0  LISA-7 (Submitted on 6/26/2024)  LISA 7 TOTAL SCORE: 0

## 2024-06-26 NOTE — PATIENT INSTRUCTIONS

## 2024-06-27 DIAGNOSIS — F41.9 ANXIETY: ICD-10-CM

## 2024-06-27 DIAGNOSIS — F33.0 MILD EPISODE OF RECURRENT MAJOR DEPRESSIVE DISORDER (H): ICD-10-CM

## 2024-06-27 LAB
ALBUMIN SERPL BCG-MCNC: 4.6 G/DL (ref 3.5–5.2)
ALP SERPL-CCNC: 103 U/L (ref 40–150)
ALT SERPL W P-5'-P-CCNC: 12 U/L (ref 0–50)
ANION GAP SERPL CALCULATED.3IONS-SCNC: 9 MMOL/L (ref 7–15)
AST SERPL W P-5'-P-CCNC: 18 U/L (ref 0–45)
BILIRUB SERPL-MCNC: 0.5 MG/DL
BUN SERPL-MCNC: 13.9 MG/DL (ref 6–20)
CALCIUM SERPL-MCNC: 9.5 MG/DL (ref 8.6–10)
CHLORIDE SERPL-SCNC: 106 MMOL/L (ref 98–107)
CREAT SERPL-MCNC: 0.78 MG/DL (ref 0.51–0.95)
DEPRECATED HCO3 PLAS-SCNC: 24 MMOL/L (ref 22–29)
EGFRCR SERPLBLD CKD-EPI 2021: >90 ML/MIN/1.73M2
GLUCOSE SERPL-MCNC: 93 MG/DL (ref 70–99)
POTASSIUM SERPL-SCNC: 5 MMOL/L (ref 3.4–5.3)
PROT SERPL-MCNC: 7 G/DL (ref 6.4–8.3)
SODIUM SERPL-SCNC: 139 MMOL/L (ref 135–145)

## 2024-06-27 NOTE — TELEPHONE ENCOUNTER
Pending Prescriptions:                       Disp   Refills    buPROPion (WELLBUTRIN XL) 150 MG 24 hr ta*270 ta*0            Sig: TAKE 3 TABLETS BY MOUTH EVERY MORNING.      BP Readings from Last 3 Encounters:   06/26/24 (!) 130/100   10/09/23 132/84   09/20/23 110/78

## 2024-06-28 RX ORDER — BUPROPION HYDROCHLORIDE 150 MG/1
450 TABLET ORAL EVERY MORNING
Qty: 270 TABLET | Refills: 1 | Status: SHIPPED | OUTPATIENT
Start: 2024-06-28

## 2024-06-28 NOTE — PROGRESS NOTES
Paperwork states NO FAX is required due to [facility] - they will get all results online    Archana K

## 2024-06-29 ENCOUNTER — ANESTHESIA EVENT (OUTPATIENT)
Dept: SURGERY | Facility: AMBULATORY SURGERY CENTER | Age: 49
End: 2024-06-29
Payer: COMMERCIAL

## 2024-06-29 NOTE — ANESTHESIA PREPROCEDURE EVALUATION
Anesthesia Pre-Procedure Evaluation    Patient: Deandra Lewis   MRN: [id number] : 1975        Procedure : Procedure(s):  Wide Local Excision of RIGHT Groin Skin Lesion          Past Medical History:   Diagnosis Date    Anemia     Anxiety     paxil 10mg= mild bladder retention, at 30mg = ravenously hungry/weight gain increased headaches; lexapro = bad sweating    Contraception     ocp's some = mood swings ; seasonale: frequent vaginal bleeding and weight gain    COPD (chronic obstructive pulmonary disease) (H) 2024    Hot flashes 2016    Hypertension goal BP (blood pressure) < 140/90  - new diagnosis 2023    Kidney stones     Dr. Blank Steele  [facility] - Urology     Lactose intolerance     Malignant melanoma of left lower extremity including hip (H) 2023    Maxillary bone loss 2014    left - secondary to severe oral/sinus infection and bleeding  - [facility] [place] s/p multiple surgeries     Multiple fractures of left foot with routine healing 2021    Night sweats     Sleep problems     Uncomplicated asthma 20s      Past Surgical History:   Procedure Laterality Date    ABDOMEN SURGERY      APPENDECTOMY      ARTHROSCOPY, ARTHROPLASTY TEMPOROMANDIBULAR JOINT, COMBINED  age 14     TMJ surgery as an 9th grader    BIOPSY NODE SENTINEL Left 10/09/2023    Procedure: Left Femoral [place] Lymph Node Mapping and Biopsy;  Surgeon: Tracie Higginbotham MD;  Location: [facility] OR    COLONOSCOPY      Approximately    COMPLEX WOUND CLOSURE (UPDATE) N/A 10/09/2023    Procedure: COMPLEX CLOSURE SOFT TISSUE DEFECT LEFT THIGH 14 cm Length;  Surgeon: Pj Aguirre MD;  Location: [facility] OR    CYSTOSCOPY,URETEROSCOPY,STONE REMV  2002    with stent placements - since removed     ENDOSCOPIC BALLOON SINUPLASTY, OPTICAL TRACKING SYSTEM ENDOSCOPIC SINUS SURGERY, COMBINED  2013    Procedure: COMBINED ENDOSCOPIC BALLOON SINUPLASTY, OPTICAL TRACKING SYSTEM ENDOSCOPIC SINUS  SURGERY;  Left Endo Sinus  Surgery  with Septoplasty      ENDOSCOPIC SINUS SURGERY  01/15/2013    Procedure: ENDOSCOPIC SINUS SURGERY;  removal of nasal packing with endocopic debridement of left paranasal sinuses;  Surgeon: Fabian Driver MD;  Location:  OR    ENDOSCOPIC SINUS SURGERY  04/22/2013    Procedure: ENDOSCOPIC SINUS SURGERY;  Functional Endoscopic Sinus Surgery With Biopsy ;  Surgeon: Jcarlos Hogan MD;  Location: [facility] OR    ENT SURGERY  2013    Deviated septum. Complications. Resulted in 3 more    EXCISE SOFT TISSUE TUMOR THIGH Left 10/09/2023    Procedure: Wide Local Excision of LEFT thigh melanoma;  Surgeon: Tracie Higginbotham MD;  Location: [facility] OR      Allergies   Allergen Reactions    Adhesive Tape Blisters    Melon      Mouth gets itchy     Seasonal Allergies     Lisinopril Cough     Annoying dry cough      Topiramate Visual Disturbance     Blurred vision       Social History     Tobacco Use    Smoking status: Former     Current packs/day: 0.10     Average packs/day: 0.1 packs/day for 5.0 years (0.5 ttl pk-yrs)     Types: Cigarettes    Smokeless tobacco: Never    Tobacco comments:     Tried smoking in high school   Substance Use Topics    Alcohol use: Not Currently     Comment: Rarely      Wt Readings from Last 1 Encounters:   06/26/24 84.4 kg (186 lb)        Anesthesia Evaluation   Pt has had prior anesthetic.         ROS/MED HX  ENT/Pulmonary:     (+)                     Mild Persistent, asthma                  Neurologic:       Cardiovascular:     (+)  hypertension- -   -  - -                                      METS/Exercise Tolerance: >4 METS    Hematologic:       Musculoskeletal:       GI/Hepatic:       Renal/Genitourinary:       Endo:       Psychiatric/Substance Use:       Infectious Disease:       Malignancy:   (+) Malignancy, History of Skin.Skin CA Active status post.      Other:            Physical Exam    Airway        Mallampati: II   TM distance: > 3 FB   Neck ROM:  full   Mouth opening: > 3 cm    Respiratory Devices and Support         Dental       (+) Minor Abnormalities - some fillings, tiny chips      Cardiovascular   cardiovascular exam normal          Pulmonary               Other findings: Placement Date: 10/09/23; Placement Time: 1354 (created via procedure documentation); Mask Ventilation: 1; Induction Type: Intravenous; Ease of Intubation: Easy; Technique: Video laryngoscopy; ETT Type: Single; Tube Size: 7 mm; VL Blade Size: Glide scope 3 (lo pro); Grade View: 1; Adjucts: Stylet; Placement Person: CRNA; Attempts: 1; Depth: 22 cm    OUTSIDE LABS:  CBC:   Lab Results   Component Value Date    WBC 6.4 06/26/2024    WBC 4.6 09/20/2023    HGB 13.8 06/26/2024    HGB 13.3 09/20/2023    HCT 41.1 06/26/2024    HCT 39.4 09/20/2023     06/26/2024     09/20/2023     BMP:   Lab Results   Component Value Date     06/26/2024     09/20/2023    POTASSIUM 5.0 06/26/2024    POTASSIUM 4.4 09/20/2023    CHLORIDE 106 06/26/2024    CHLORIDE 106 09/20/2023    CO2 24 06/26/2024    CO2 23 09/20/2023    BUN 13.9 06/26/2024    BUN 11.6 09/20/2023    CR 0.78 06/26/2024    CR 0.90 09/20/2023    GLC 93 06/26/2024    GLC 88 10/09/2023     COAGS:   Lab Results   Component Value Date    PTT 32 03/25/2013    INR 1.08 03/25/2013     POC:   Lab Results   Component Value Date    HCG Negative 09/20/2023     HEPATIC:   Lab Results   Component Value Date    ALBUMIN 4.6 06/26/2024    PROTTOTAL 7.0 06/26/2024    ALT 12 06/26/2024    AST 18 06/26/2024    ALKPHOS 103 06/26/2024    BILITOTAL 0.5 06/26/2024     OTHER:   Lab Results   Component Value Date    CHLOE 9.5 06/26/2024    TSH 1.69 12/01/2022    T4 1.20 11/16/2016       Anesthesia Plan    ASA Status:  3    NPO Status:  NPO Appropriate    Anesthesia Type: MAC.     - Reason for MAC: immobility needed, straight local not clinically adequate   Induction: Intravenous, Propofol.   Maintenance: Balanced.        Consents    Anesthesia  "Plan(s) and associated risks, benefits, and realistic alternatives discussed. Questions answered and patient/representative(s) expressed understanding.     - Discussed: Risks, Benefits and Alternatives for BOTH SEDATION and the PROCEDURE were discussed     - Discussed with:  Patient, Other (See Comment)      - Extended Intubation/Ventilatory Support Discussed: No.      - Patient is DNR/DNI Status: No     Use of blood products discussed: No .     Postoperative Care    Pain management: IV analgesics, Oral pain medications, Multi-modal analgesia.   PONV prophylaxis: Dexamethasone or Solumedrol, Background Propofol Infusion     Comments:               Marquis Munoz MD    I have reviewed the pertinent notes and labs in the chart from the past 30 days and (re)examined the patient.  Any updates or changes from those notes are reflected in this note.              # Overweight: Estimated body mass index is 28.85 kg/m  as calculated from the following:    Height as of 6/26/24: 1.71 m (5' 7.32\").    Weight as of 6/26/24: 84.4 kg (186 lb).      "

## 2024-07-03 ENCOUNTER — ANESTHESIA (OUTPATIENT)
Dept: SURGERY | Facility: AMBULATORY SURGERY CENTER | Age: 49
End: 2024-07-03
Payer: COMMERCIAL

## 2024-07-03 ENCOUNTER — HOSPITAL ENCOUNTER (OUTPATIENT)
Facility: AMBULATORY SURGERY CENTER | Age: 49
Discharge: HOME | End: 2024-07-03
Attending: SURGERY | Admitting: SURGERY
Payer: COMMERCIAL

## 2024-07-03 VITALS
DIASTOLIC BLOOD PRESSURE: 80 MMHG | HEART RATE: 71 BPM | TEMPERATURE: 97.9 F | SYSTOLIC BLOOD PRESSURE: 118 MMHG | RESPIRATION RATE: 20 BRPM | OXYGEN SATURATION: 100 %

## 2024-07-03 PROCEDURE — 11604 EXC TR-EXT MAL+MARG 3.1-4 CM: CPT | Performed by: SURGERY

## 2024-07-03 PROCEDURE — 12032 INTMD RPR S/A/T/EXT 2.6-7.5: CPT | Performed by: SURGERY

## 2024-07-03 PROCEDURE — G8916 PT W IV AB GIVEN ON TIME: HCPCS

## 2024-07-03 PROCEDURE — 12032 INTMD RPR S/A/T/EXT 2.6-7.5: CPT

## 2024-07-03 PROCEDURE — 11400 EXC TR-EXT B9+MARG 0.5 CM<: CPT | Performed by: NURSE ANESTHETIST, CERTIFIED REGISTERED

## 2024-07-03 PROCEDURE — 88305 TISSUE EXAM BY PATHOLOGIST: CPT | Performed by: DERMATOLOGY

## 2024-07-03 PROCEDURE — 11400 EXC TR-EXT B9+MARG 0.5 CM<: CPT | Performed by: STUDENT IN AN ORGANIZED HEALTH CARE EDUCATION/TRAINING PROGRAM

## 2024-07-03 PROCEDURE — G8907 PT DOC NO EVENTS ON DISCHARG: HCPCS

## 2024-07-03 PROCEDURE — 11604 EXC TR-EXT MAL+MARG 3.1-4 CM: CPT

## 2024-07-03 RX ORDER — PROPOFOL 10 MG/ML
INJECTION, EMULSION INTRAVENOUS CONTINUOUS PRN
Status: DISCONTINUED | OUTPATIENT
Start: 2024-07-03 | End: 2024-07-03

## 2024-07-03 RX ORDER — CEFAZOLIN SODIUM 2 G/50ML
2 SOLUTION INTRAVENOUS
Status: COMPLETED | OUTPATIENT
Start: 2024-07-03 | End: 2024-07-03

## 2024-07-03 RX ORDER — NALOXONE HYDROCHLORIDE 0.4 MG/ML
0.1 INJECTION, SOLUTION INTRAMUSCULAR; INTRAVENOUS; SUBCUTANEOUS
Status: DISCONTINUED | OUTPATIENT
Start: 2024-07-03 | End: 2024-07-04 | Stop reason: HOSPADM

## 2024-07-03 RX ORDER — FENTANYL CITRATE 50 UG/ML
50 INJECTION, SOLUTION INTRAMUSCULAR; INTRAVENOUS EVERY 5 MIN PRN
Status: DISCONTINUED | OUTPATIENT
Start: 2024-07-03 | End: 2024-07-04 | Stop reason: HOSPADM

## 2024-07-03 RX ORDER — ONDANSETRON 2 MG/ML
4 INJECTION INTRAMUSCULAR; INTRAVENOUS EVERY 30 MIN PRN
Status: DISCONTINUED | OUTPATIENT
Start: 2024-07-03 | End: 2024-07-04 | Stop reason: HOSPADM

## 2024-07-03 RX ORDER — CEFAZOLIN SODIUM 2 G/50ML
2 SOLUTION INTRAVENOUS SEE ADMIN INSTRUCTIONS
Status: DISCONTINUED | OUTPATIENT
Start: 2024-07-03 | End: 2024-07-04 | Stop reason: HOSPADM

## 2024-07-03 RX ORDER — PROPOFOL 10 MG/ML
INJECTION, EMULSION INTRAVENOUS PRN
Status: DISCONTINUED | OUTPATIENT
Start: 2024-07-03 | End: 2024-07-03

## 2024-07-03 RX ORDER — SODIUM CHLORIDE, SODIUM LACTATE, POTASSIUM CHLORIDE, CALCIUM CHLORIDE 600; 310; 30; 20 MG/100ML; MG/100ML; MG/100ML; MG/100ML
INJECTION, SOLUTION INTRAVENOUS CONTINUOUS PRN
Status: DISCONTINUED | OUTPATIENT
Start: 2024-07-03 | End: 2024-07-03

## 2024-07-03 RX ORDER — OXYCODONE HYDROCHLORIDE 5 MG/1
5 TABLET ORAL
Status: DISCONTINUED | OUTPATIENT
Start: 2024-07-03 | End: 2024-07-04 | Stop reason: HOSPADM

## 2024-07-03 RX ORDER — SODIUM CHLORIDE, SODIUM LACTATE, POTASSIUM CHLORIDE, CALCIUM CHLORIDE 600; 310; 30; 20 MG/100ML; MG/100ML; MG/100ML; MG/100ML
INJECTION, SOLUTION INTRAVENOUS CONTINUOUS
Status: DISCONTINUED | OUTPATIENT
Start: 2024-07-03 | End: 2024-07-04 | Stop reason: HOSPADM

## 2024-07-03 RX ORDER — ONDANSETRON 4 MG/1
4 TABLET, ORALLY DISINTEGRATING ORAL EVERY 30 MIN PRN
Status: DISCONTINUED | OUTPATIENT
Start: 2024-07-03 | End: 2024-07-04 | Stop reason: HOSPADM

## 2024-07-03 RX ORDER — ACETAMINOPHEN 325 MG/1
975 TABLET ORAL ONCE
Status: COMPLETED | OUTPATIENT
Start: 2024-07-03 | End: 2024-07-03

## 2024-07-03 RX ORDER — LIDOCAINE HYDROCHLORIDE 20 MG/ML
INJECTION, SOLUTION INFILTRATION; PERINEURAL PRN
Status: DISCONTINUED | OUTPATIENT
Start: 2024-07-03 | End: 2024-07-03

## 2024-07-03 RX ORDER — LIDOCAINE 40 MG/G
CREAM TOPICAL
Status: DISCONTINUED | OUTPATIENT
Start: 2024-07-03 | End: 2024-07-04 | Stop reason: HOSPADM

## 2024-07-03 RX ORDER — DEXAMETHASONE SODIUM PHOSPHATE 4 MG/ML
4 INJECTION, SOLUTION INTRA-ARTICULAR; INTRALESIONAL; INTRAMUSCULAR; INTRAVENOUS; SOFT TISSUE
Status: DISCONTINUED | OUTPATIENT
Start: 2024-07-03 | End: 2024-07-04 | Stop reason: HOSPADM

## 2024-07-03 RX ORDER — DEXAMETHASONE SODIUM PHOSPHATE 4 MG/ML
INJECTION, SOLUTION INTRA-ARTICULAR; INTRALESIONAL; INTRAMUSCULAR; INTRAVENOUS; SOFT TISSUE PRN
Status: DISCONTINUED | OUTPATIENT
Start: 2024-07-03 | End: 2024-07-03

## 2024-07-03 RX ORDER — FENTANYL CITRATE 50 UG/ML
INJECTION, SOLUTION INTRAMUSCULAR; INTRAVENOUS PRN
Status: DISCONTINUED | OUTPATIENT
Start: 2024-07-03 | End: 2024-07-03

## 2024-07-03 RX ORDER — OXYCODONE HYDROCHLORIDE 5 MG/1
10 TABLET ORAL
Status: DISCONTINUED | OUTPATIENT
Start: 2024-07-03 | End: 2024-07-04 | Stop reason: HOSPADM

## 2024-07-03 RX ORDER — FENTANYL CITRATE 50 UG/ML
25 INJECTION, SOLUTION INTRAMUSCULAR; INTRAVENOUS EVERY 5 MIN PRN
Status: DISCONTINUED | OUTPATIENT
Start: 2024-07-03 | End: 2024-07-04 | Stop reason: HOSPADM

## 2024-07-03 RX ADMIN — SODIUM CHLORIDE, SODIUM LACTATE, POTASSIUM CHLORIDE, CALCIUM CHLORIDE: 600; 310; 30; 20 INJECTION, SOLUTION INTRAVENOUS at 07:21

## 2024-07-03 RX ADMIN — SODIUM CHLORIDE, SODIUM LACTATE, POTASSIUM CHLORIDE, CALCIUM CHLORIDE: 600; 310; 30; 20 INJECTION, SOLUTION INTRAVENOUS at 07:38

## 2024-07-03 RX ADMIN — FENTANYL CITRATE 50 MCG: 50 INJECTION, SOLUTION INTRAMUSCULAR; INTRAVENOUS at 07:51

## 2024-07-03 RX ADMIN — PROPOFOL 50 MG: 10 INJECTION, EMULSION INTRAVENOUS at 07:52

## 2024-07-03 RX ADMIN — CEFAZOLIN SODIUM 2 G: 2 SOLUTION INTRAVENOUS at 07:40

## 2024-07-03 RX ADMIN — FENTANYL CITRATE 25 MCG: 50 INJECTION, SOLUTION INTRAMUSCULAR; INTRAVENOUS at 07:57

## 2024-07-03 RX ADMIN — LIDOCAINE HYDROCHLORIDE 50 MG: 20 INJECTION, SOLUTION INFILTRATION; PERINEURAL at 07:52

## 2024-07-03 RX ADMIN — DEXAMETHASONE SODIUM PHOSPHATE 4 MG: 4 INJECTION, SOLUTION INTRA-ARTICULAR; INTRALESIONAL; INTRAMUSCULAR; INTRAVENOUS; SOFT TISSUE at 08:00

## 2024-07-03 RX ADMIN — ACETAMINOPHEN 975 MG: 325 TABLET ORAL at 07:37

## 2024-07-03 RX ADMIN — PROPOFOL 150 MCG/KG/MIN: 10 INJECTION, EMULSION INTRAVENOUS at 07:52

## 2024-07-03 NOTE — ANESTHESIA POSTPROCEDURE EVALUATION
Patient: Deandra Lewis    Procedure: Procedure(s):  Wide Local Excision of RIGHT Groin Skin Lesion       Anesthesia Type:  MAC    Note:  Disposition: Outpatient   Postop Pain Control: Uneventful            Sign Out: Well controlled pain   PONV: No   Neuro/Psych: Uneventful            Sign Out: Acceptable/Baseline neuro status   Airway/Respiratory: Uneventful            Sign Out: Acceptable/Baseline resp. status   CV/Hemodynamics: Uneventful            Sign Out: Acceptable CV status; No obvious hypovolemia; No obvious fluid overload   Other NRE:    DID A NON-ROUTINE EVENT OCCUR?            Last vitals:  Vitals Value Taken Time   /80 07/03/24 0919   Temp 97.9  F (36.6  C) 07/03/24 0919   Pulse 71 07/03/24 0919   Resp 20 07/03/24 0919   SpO2 100 % 07/03/24 0919       Electronically Signed By: Marquis Munoz MD  July 3, 2024  12:31 PM

## 2024-07-03 NOTE — DISCHARGE INSTRUCTIONS
You had 975 mg of Tylenol at 7:40 AM. You may repeat this after 1:40 PM. Maximum amount of Tylenol/Acetaminophen in a 24 hour period is 4,000 mg.      
Never

## 2024-07-03 NOTE — OP NOTE
PATIENT NAME:  Deandra Lewis  PATIENT YOB: 1975    DATE OF SURGERY: July 3, 2024     SURGEON: Tracie Higginbotham MD  ASSISTANT(S): None    PREOPERATIVE DIAGNOSIS: Melanoma in situ, right groin  POSTOPERATIVE DIAGNOSIS: same    PROCEDURE(S): Wide local excision of right groin melanoma in situ with 0.5 cm margins    ANESTHESIA: MAC    CLINICAL NOTE:  Deandra Lewis is a 48 year old woman with a history of left lower extremity melanoma and now a right groin melanoma in situ. The risks and benefits of a wide local excision were discussed with the patient and she elected to proceed with informed consent.    OPERATIVE FINDINGS:  1. Prior scar excised.    OPERATIVE PROCEDURE:  The biopsy site was confirmed with the patient in the preoperative holding area and marked on the skin.  The patient was brought to the operating room and placed in the supine position with appropriate padding for all of the pressure points.  MAC was administered by the Anesthesia team.  A surgical safety checklist was performed to confirm the patient's identity, the site and laterality of the procedure. Perioperative antibiotics (Ancef) was provided.  VTE prophylaxis was provided with serial compression devices. The right  groin was then prepped and draped in the usual sterile fashion using Chloraprep.     0.5 cm margins were marked out circumferentially around the tumor site (where the biopsy previously took place) on the right groin.  The tumor site itself measured 10 mm x 6 mm.  I then designed an ellipse around the marked out margin, orienting the ellipse obliquely along the skin creases.  The ellipse was then excised, taking with it the subcutaneous fat.  The dissection was carried out down to the underlying Naresh's fascia.  The specimen was then marked using a marking suture; short for superior (12:00), and long for lateral (9:00).  The specimen measured 3.9 cm x 1.6 cm.  The specimen was then sent to pathology.  The cavity at this  point measured 6.0 cm x 2.7 cm and was 1.5 cm deep.  Superior and inferior flaps were then raised to facilitate primary closure of the incision without undue tension.  The wound was irrigated and hemostasis was achieved. 20 mL of 1:1 mix of 1% lidocaine with epinephrine and 0.25% marcaine without epinephrine was infiltrated into the surgical site. The incision was closed in two layers with interrupted 3-0 vicryl and a running subcuticular 4-0 monocryl. Prineo was applied.   The patient tolerated the procedure well. The sponge, needle, instrument counts were correct.  The patient was then awakened and taken to recovery in stable fashion.     I was present and scrubbed for the entire above procedure.    EBL: Nil    SPECIMEN(S):  A. Right groin skin lesion; short suture = superior (12:00), long suture = lateral (9:00)    POSTOPERATIVE PLANS:  Deandra Lewis will be discharged home today with wound care instructions and no prescription for analgesics.  She will follow-up with me in 2 weeks.     [facility] REPORT  Wide Local Excision for Primary Cutaneous Melanoma - Excision 1 (Groin)  Operation performed with curative intent Yes   Original Breslow thickness of the lesion Melanoma in situ (MIS)   Clinical margin width 0.5 cm   Depth of excision Only skin and superficial subcutaneous fat (melanoma in situ)      Tracie Higginbotham MD MSc [facility] FACS  Associate Professor of Surgery  Division of Surgical Oncology  [facility]

## 2024-07-03 NOTE — ANESTHESIA CARE TRANSFER NOTE
Patient: Deandra Lewis    Procedure: Procedure(s):  Wide Local Excision of RIGHT Groin Skin Lesion       Diagnosis: Melanoma in situ of torso excluding breast (H) [D03.59]  Diagnosis Additional Information: No value filed.    Anesthesia Type:   MAC     Note:    Oropharynx: oropharynx clear of all foreign objects and spontaneously breathing  Level of Consciousness: drowsy  Oxygen Supplementation: room air    Independent Airway: airway patency satisfactory and stable  Dentition: dentition unchanged  Vital Signs Stable: post-procedure vital signs reviewed and stable  Report to RN Given: handoff report given  Patient transferred to: Phase II    Handoff Report: Identifed the Patient, Identified the Reponsible Provider, Reviewed the pertinent medical history, Discussed the surgical course, Reviewed Intra-OP anesthesia mangement and issues during anesthesia, Set expectations for post-procedure period and Allowed opportunity for questions and acknowledgement of understanding  Vitals:  Vitals Value Taken Time   BP     Temp     Pulse     Resp     SpO2 98 % 07/03/24 0840   Vitals shown include unfiled device data.    Electronically Signed By: ANEESH Jo CRNA  July 3, 2024  8:41 AM

## 2024-07-05 ENCOUNTER — TELEPHONE (OUTPATIENT)
Dept: FAMILY MEDICINE | Facility: CLINIC | Age: 49
End: 2024-07-05
Payer: COMMERCIAL

## 2024-07-05 NOTE — TELEPHONE ENCOUNTER
Medication Question or Refill    methocarbamol (ROBAXIN) 500 MG tablet 20 tab // 3 refills    What medication are you calling about (include dose and sig)?: Pt went to  RX and only half was given to her.  She is concerned about theft and as explained by RN - it could be the only amount that insurance is willing to cover (there are 3 refills) but the Pharmacy should tell the pt this??  She wanted her provider to be made aware of this.    Preferred Pharmacy:     [facility] [id number] [place] [facility] [place] - [address]  [address]  [place] [address]  Phone: [phone] Fax: [phone]      Controlled Substance Agreement on file:   CSA -- Patient Level:    CSA: None found at the patient level.       Who prescribed the medication?: Dr. Soto    Do you need a refill? No    When did you use the medication last? na    Patient offered an appointment? No    Do you have any questions or concerns?  No      Could we send this information to you in [facility] or would you prefer to receive a phone call?:   Patient would prefer a phone call   Okay to leave a detailed message?: Yes at Cell number on file:    Telephone Information:   Mobile [phone]     Archana AMEZCUA

## 2024-07-08 NOTE — TELEPHONE ENCOUNTER
Talked to pt who rosalie went back to pharmacy and got the rest of the medication.  It was pharmacy error.    Closed encounter.    Archana AMEZCUA

## 2024-07-08 NOTE — TELEPHONE ENCOUNTER
Correct - Pharmacy should tell the pt why full 20 tabs was not prescribed.  Please inform patient.

## 2024-07-16 NOTE — PROGRESS NOTES
[facility]  [address] [address] [place] [id number]  Phone: [phone]    PATIENT NAME:  Deandra Lewis  PATIENT YOB: 1975    FOLLOW-UP  Jul 17, 2024    Deandra Lewis is a 48 year old female who returns for her 1st post-operative follow-up visit.     Cancer Staging   Malignant melanoma of left lower extremity including hip (H)-left anterior thigh Stage 1B (cT2a, cN0, cM0)  Staging form: Melanoma of the Skin, AJCC 8th Edition  - Clinical: Stage IB (cT2a, cN0, cM0) - Unsigned  - Pathologic: Stage IB (pT2a, pN0, cM0) - Signed by Tracie Higginbotham MD on 10/25/2023    Treatment to date:  Wide local excision of left lower anterior thigh melanoma with 2 cm margins, left femoral sentinel lymph node mapping and biopsy (10/9/2023)  Wide local excision of right groin melanoma in situ with 0.5 cm margins    HPI:    She underwent a WLE of a right groin MIS on 7/3/2024.  She is currently 2 week(s) post-op.  Final surgical pathology showed no residual MIS.    Since the procedure, she has been doing well. She denies any redness or swelling, or drainage from the incision, or fever/chills.  She has good range of motion and denies any lower extremity swelling.     LMP 06/08/2024    Physical Exam  Constitutional:       Appearance: She is well-developed.   Pulmonary:      Effort: No respiratory distress.   Skin:     General: Skin is warm and dry.                INVESTIGATIONS:    Surgical Pathology (7/3/2024):  Final Diagnosis   Right groin skin lesion:  - Scar from the prior surgical procedure, with no evidence of residual lesion       ASSESSMENT:    Deandra Lewis is a 48 year old female with a history of LEFT lower extremity melanoma and now RIGHT groin MIS, s/p surgery.    She is healing well.  I recommended she start moisturizing and massaging the scar with Vaseline.     We reviewed the pathology today and a copy of the report was provided. No further surgery or other treatment  is indicated. I will see her for melanoma follow up as previously discussed in 6 months.    All of the above was discussed with the patient and all questions were answered.     PLAN:  Follow up with me in 6 months  Follow up with Lia Savage PA-C of dermatology for skin checks    Tracie Higginbotham MD MS [facility] FACS  Associate Professor of Surgery  Division of Surgical Oncology  [facility]

## 2024-07-17 ENCOUNTER — OFFICE VISIT (OUTPATIENT)
Dept: SURGERY | Facility: CLINIC | Age: 49
End: 2024-07-17
Payer: COMMERCIAL

## 2024-07-17 DIAGNOSIS — D03.59 MELANOMA IN SITU OF TORSO EXCLUDING BREAST (H): Primary | ICD-10-CM

## 2024-07-17 DIAGNOSIS — C43.72 MALIGNANT MELANOMA OF LEFT LOWER EXTREMITY INCLUDING HIP (H): ICD-10-CM

## 2024-07-17 PROCEDURE — 99212 OFFICE O/P EST SF 10 MIN: CPT | Performed by: SURGERY

## 2024-07-17 NOTE — LETTER
7/17/2024      Deandra Lewis  [address] [place] [address]      Dear Colleague,    Thank you for referring your patient, Deandra Lewis, to the [facility]. Please see a copy of my visit note below.      [facility]  [address] [address] [place] [id number]  Phone: [phone]    PATIENT NAME:  Deandra Lewis  PATIENT YOB: 1975    FOLLOW-UP  Jul 17, 2024    Deandra Lewis is a 48 year old female who returns for her 1st post-operative follow-up visit.     Cancer Staging   Malignant melanoma of left lower extremity including hip (H)-left anterior thigh Stage 1B (cT2a, cN0, cM0)  Staging form: Melanoma of the Skin, AJCC 8th Edition  - Clinical: Stage IB (cT2a, cN0, cM0) - Unsigned  - Pathologic: Stage IB (pT2a, pN0, cM0) - Signed by Tracie Higginbotham MD on 10/25/2023    Treatment to date:  Wide local excision of left lower anterior thigh melanoma with 2 cm margins, left femoral sentinel lymph node mapping and biopsy (10/9/2023)  Wide local excision of right groin melanoma in situ with 0.5 cm margins    HPI:    She underwent a WLE of a right groin MIS on 7/3/2024.  She is currently 2 week(s) post-op.  Final surgical pathology showed no residual MIS.    Since the procedure, she has been doing well. She denies any redness or swelling, or drainage from the incision, or fever/chills.  She has good range of motion and denies any lower extremity swelling.     LMP 06/08/2024    Physical Exam  Constitutional:       Appearance: She is well-developed.   Pulmonary:      Effort: No respiratory distress.   Skin:     General: Skin is warm and dry.                INVESTIGATIONS:    Surgical Pathology (7/3/2024):  Final Diagnosis   Right groin skin lesion:  - Scar from the prior surgical procedure, with no evidence of residual lesion       ASSESSMENT:    Deandra Lewis is a 48 year old female with a history of LEFT lower extremity melanoma and now  RIGHT groin MIS, s/p surgery.    She is healing well.  I recommended she start moisturizing and massaging the scar with Vaseline.     We reviewed the pathology today and a copy of the report was provided. No further surgery or other treatment is indicated. I will see her for melanoma follow up as previously discussed in 6 months.    All of the above was discussed with the patient and all questions were answered.     PLAN:  Follow up with me in 6 months  Follow up with Lia Savage PA-C of dermatology for skin checks    Tracie Higginbotham MD MS [facility] FACS  Associate Professor of Surgery  Division of Surgical Oncology  [facility]       Again, thank you for allowing me to participate in the care of your patient.        Sincerely,        Tracie Higginbotham MD

## 2024-07-17 NOTE — NURSING NOTE
Deandra Lewis's goals for this visit include:   Chief Complaint   Patient presents with    Surgical Followup     2 weeks post right groin excision       She requests these members of her care team be copied on today's visit information: no    PCP: Linda Soto    Referring Provider:  Referred Self, MD  No address on file    LMP 06/08/2024     Do you need any medication refills at today's visit? No    Sudha Dwyer LPN

## 2024-07-23 NOTE — RESULT ENCOUNTER NOTE
Dear Deandra,       All your recent labs that I ordered are normal, improved, or pretty stable from previous.     Please, continue your current medications and/or supplements and follow up as we discussed at your last visit.     For additional lab test information, [email or web address] is an excellent reference.    Thank you so much for choosing [facility].  Please contact us with any questions that you may have.   We appreciate the opportunity to serve you now and look forward to supporting your healthcare needs for a long time to come!    Most Sincerely,     Linda Soto MD

## 2024-08-01 ENCOUNTER — MYC MEDICAL ADVICE (OUTPATIENT)
Dept: FAMILY MEDICINE | Facility: CLINIC | Age: 49
End: 2024-08-01

## 2024-08-01 ENCOUNTER — NURSE TRIAGE (OUTPATIENT)
Dept: FAMILY MEDICINE | Facility: CLINIC | Age: 49
End: 2024-08-01
Payer: COMMERCIAL

## 2024-08-01 NOTE — TELEPHONE ENCOUNTER
Huddled with Dr. Noyola- try [facility] for IV fluids and or iv Toradol.     Attempt #1  Called Phone # [phone]    Left a non detailed voicemail to please call back and ask for any available triage nurse regarding a follow up to your phone call from earlier. @ [phone].       Called patient to ask if she feels she can drive to [place] if ads can take her.     Writer did not check with [facility] yet, wanted to talk with patient first.

## 2024-08-01 NOTE — TELEPHONE ENCOUNTER
2nd level triage- protocol states call back within one hour. Routing to Dr. Noyola  as high priority and sent epic message ( covering for Dr. Soto)     Situation   Patient asking what else can she take her migraine?      Background   Migraines and headaches are common for her  Minor- takes flexeril and Excedrin extra strength relieves the migraine   Bad migraines- takes Zomig- typically 2 doses takes care of the migraine   Nausea and emesis is normal for her with migraines.    She states it has been a stressful month   She states Tylenol does not help.    Assessment   Has taken her Zomig 5mg  2 times this am and still throwing up.   She has never had the migraine continue after 2 doses     Woke up at 3am with a severe migraine  Took Zomig  330 am and 530 am    Location of migraine is in the normal areas for her- temples, behind eyeballs, top of skull     6-7 emesis today snce 330 am ( times if 830)    Zofran is on her medication list but gives her a headache  She can drink sips of water     Recent 6/7 days with a headache  2nd migraine of the week.     Doesn't feel like she can drive to an appointment    [facility] states there is a moderate interaction due to caffeine and Tylenol.   Major interaction identifies between ibuprofen and zomig.   Moderate interaction identifies with flexeril and Zomig     Recommendations  Explained will have to see what a provider recommends and call her back.   Sip fluids, advance diet as tolerated       Reason for Disposition   SEVERE headache (e.g., excruciating) and has had severe headaches before   SEVERE headache and not relieved by pain meds   SEVERE headache and vomiting    Additional Information   Negative: Difficult to awaken or acting confused (e.g., disoriented, slurred speech)   Negative: Weakness of the face, arm or leg on one side of the body and new-onset   Negative: Numbness of the face, arm or leg on one side of the body and new-onset   Negative: Loss of speech  or garbled speech and new-onset   Negative: Passed out (i.e., fainted, collapsed and was not responding)   Negative: Sounds like a life-threatening emergency to the triager   Negative: Followed a head injury within last 3 days   Negative: Traumatic Brain Injury (TBI) is suspected   Negative: Sinus pain of forehead and yellow or green nasal discharge   Negative: Pregnant   Negative: Unable to walk without falling   Negative: Stiff neck (can't touch chin to chest)   Negative: Possibility of carbon monoxide exposure   Negative: SEVERE headache, sudden-onset (i.e., reaching maximum intensity within seconds to 1 hour)   Negative: Loss of vision or double vision  (Exception: Same as prior migraines.)   Negative: Patient sounds very sick or weak to the triager   Negative: Fever > 103 F (39.4 C)   Negative: Fever > 100.0 F (37.8 C) and has diabetes mellitus or a weak immune system (e.g., HIV positive, cancer chemotherapy, organ transplant, splenectomy, chronic steroids)   Negative: SEVERE headache and fever    Protocols used: Headache-A-OH

## 2024-08-01 NOTE — TELEPHONE ENCOUNTER
Attempt # 2  Called Phone # [phone]       Left a non detailed voicemail to please call back and ask for any available triage nurse regarding your phone call from this morning. @ [phone].     Kelsey POWELL RN   [facility] Triage

## 2024-08-13 ENCOUNTER — MYC MEDICAL ADVICE (OUTPATIENT)
Dept: FAMILY MEDICINE | Facility: CLINIC | Age: 49
End: 2024-08-13
Payer: COMMERCIAL

## 2024-08-15 ENCOUNTER — OFFICE VISIT (OUTPATIENT)
Dept: DERMATOLOGY | Facility: CLINIC | Age: 49
End: 2024-08-15
Attending: PHYSICIAN ASSISTANT
Payer: COMMERCIAL

## 2024-08-15 DIAGNOSIS — D22.9 MULTIPLE BENIGN NEVI: Primary | ICD-10-CM

## 2024-08-15 DIAGNOSIS — L82.1 SK (SEBORRHEIC KERATOSIS): ICD-10-CM

## 2024-08-15 DIAGNOSIS — Z85.820 HISTORY OF MELANOMA: ICD-10-CM

## 2024-08-15 DIAGNOSIS — D18.01 CHERRY ANGIOMA: ICD-10-CM

## 2024-08-15 DIAGNOSIS — L81.4 LENTIGINES: ICD-10-CM

## 2024-08-15 PROCEDURE — 99213 OFFICE O/P EST LOW 20 MIN: CPT | Performed by: PHYSICIAN ASSISTANT

## 2024-08-15 ASSESSMENT — PAIN SCALES - GENERAL: PAINLEVEL: NO PAIN (0)

## 2024-08-15 NOTE — PROGRESS NOTES
[facility] Dermatology Note  Encounter Date: Aug 15, 2024  Office Visit      Dermatology Problem List:  FBSE 8/15/24  1. Melanoma  - L thigh - Malignant melanoma, BD 1.2mm, pT2a - L thigh - bx 8/28/23 - S/p WLE Dr. Higginbotham/Carla 10/9/23  - R superior inguinal crease- Atypical junctional melanocytic proliferation, consistent with early MIS, Bx 5/16/24, S/p WLE on 7/3/24 Dr. Higginbotham  2. Benign biopsies:  - L anterior shoulder - bx 8/28/23 - DF  3. Dermatofibroma - L calf, L thigh  4. Hx of Atypical nevi  - L posterior shoulder, Compound melanocytic nevus with mild atypia, Bx proven on 2/15/24   # LTM, R medial calf & L inguinal crease  - Photo 8/25/24     Social: Works indoors. Has tanning bed use hx.  ____________________________________________    Assessment & Plan:    # Dermatofibromas.   - Discussed the natural history and benign nature of this lesion. Reassurance provided that no additional treatment is necessary.        # LTM x2, R medial calf. & L inguinal crease   - Photographed today to monitor and check for changes in future appointments   - R medial calf there is a 7 mm brown macule   - L inguinal crease there is a 4 mm dark macule with dark pigment centrally.     # Hx of MM  - ABCDEs: Counseled ABCDEs of melanoma: Asymmetry, Border (irregularity), Color (not uniform, changes in color), Diameter (greater than 6 mm which is about the size of a pencil eraser), and Evolving (any changes in preexisting moles).  - Sun protection: Counseled SPF30+ sunscreen, UPF clothing, sun avoidance, tanning bed avoidance.   - Recommended yearly dental, ophthalmology, and gynecology exams.  - Recommended skin exams for all first-degree relatives.  - Recommended follow up is 3-6 months    # Benign findings: multiple benign nevi, lentigines, cherry angiomas, SKs  - edu on benign etiology  - Signs and Symptoms of non-melanoma skin cancer and ABCDEs of melanoma reviewed with patient. Patient encouraged to perform  monthly self skin exams and educated on how to perform them. UV precautions reviewed with patient. Patient was asked about new or changing moles/lesions on body.   - Sunscreen: Apply 20 minutes prior to going outdoors and reapply every two hours, when wet or sweating. We recommend using an SPF 30 or higher, and to use one that is water resistant.     - RTC for changes     Procedures Performed:   None     Follow-up: 3-6 month(s) in-person, or earlier for new or changing lesions    Staff and scribe:    Scribe Disclosure:   I, MAXX SRIVASTAVA, am serving as a scribe; to document services personally performed by Lia Savage PA-C -based on data collection and the provider's statements to me.     Provider Disclosure:  I agree with above History, Review of Systems, Physical exam and Plan.  I have reviewed the content of the documentation and have edited it as needed. I have personally performed the services documented here and the documentation accurately represents those services and the decisions I have made.      Electronically signed by:    All risks, benefits and alternatives were discussed with patient.  Patient is in agreement and understands the assessment and plan.  All questions were answered.    Lia Savage PA-C, MPAS  [facility] [place]: Phone: [phone], Fax: [phone]  [facility]: Phone: [phone],  Fax: [phone]  [facility]: Phone: [phone], Fax: [phone]  ____________________________________________    CC: Skin Check (FBSC)      Reviewed patients past medical history and pertinent chart review prior to patient's visit today.     HPI:  Ms. Deandra Lewis is a 48 year old female who presents today as a return patient for FBSE.     Today patient did not report any spots of concern. Feeling well.     Has a hx of MM on the L thigh and MIS on the R superior inguinal crease.      Patient is otherwise feeling well, without additional concerns.    Labs:  N/A    Physical Exam:  Vitals: LMP 06/08/2024   SKIN: Total skin excluding the undergarment areas was performed. The exam included the head/face, neck, both arms, chest, back, abdomen, both legs, digits and/or nails.    - - Araiza's skin type II, has <100 nevi  - There are dome shaped bright red papules on the trunk.   - Multiple regular brown pigmented macules and papules are identified on the trunk and extremities.   - Scattered brown macules on sun exposed areas.  - There are waxy stuck on tan to brown papules on the trunk.    -There are well healed surgical scars without erythema, nodularity or telangiectasias on the prior MM, NERD   - There is a firm tan/flesh colored papule that dimples with lateral pressure on the L calf ..   - R medial calf there is a 7 mm brown macule   - L inguinal crease there is a 4 mm dark macule with dark pigment centrally.   - No other lesions of concern on areas examined.   LYMPH: Examination of the pre/post auricular, occipital, cervical, clavicular, axillary and inguinal lymph nodes was negative.   R medial calf     L inguinal crease    Medications:  Current Outpatient Medications   Medication Sig Dispense Refill    albuterol (PROAIR HFA/PROVENTIL HFA/VENTOLIN HFA) 108 (90 Base) MCG/ACT inhaler INHALE 2 PUFFS BY MOUTH EVERY 6 HOURS AS NEEDED FOR WHEEZE OR FOR SHORTNESS OF BREATH 18 g 0    buPROPion (WELLBUTRIN XL) 150 MG 24 hr tablet TAKE 3 TABLETS BY MOUTH EVERY MORNING. 270 tablet 1    cetirizine (ZYRTEC) 10 MG tablet Take 1 tablet (10 mg) by mouth every evening 30 tablet 1    cyclobenzaprine (FLEXERIL) 5 MG tablet Take 1-2 tablets (5-10 mg) by mouth 2 times daily as needed for muscle spasms 30 tablet 3    fluticasone-vilanterol (BREO ELLIPTA) 200-25 MCG/ACT inhaler Inhale 1 puff into the lungs daily 60 each 2    ibuprofen (ADVIL/MOTRIN) 200 MG tablet Take 200 mg by mouth every 6 hours as needed       methocarbamol (ROBAXIN) 500 MG tablet Take 1 tablet (500 mg) by mouth 4 times daily as needed for muscle spasms 20 tablet 3    montelukast (SINGULAIR) 10 MG tablet Take 1 tablet (10 mg) by mouth at bedtime 90 tablet 3    ZOLMitriptan (ZOMIG) 5 MG tablet TAKE 1 TABLET BY MOUTH AT ONSET OF HEADACHE FOR MIGRAINE MAY REPEAT IN 2 HOURS. MAX 2 TABS/24 HOURS 10 tablet 11    ondansetron (ZOFRAN ODT) 4 MG ODT tab Take 1 tablet (4 mg) by mouth every 6 hours as needed for nausea 16 tablet 3     No current facility-administered medications for this visit.      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 related to previous surgeries - 4 in 3 months for sinus issues/infection    Family history of breast cancer- mother's identical twin, maternal aunt, paternal GM and dad with breast lumps     Hot flashes    Generalized hyperhidrosis- since earliest recollections - soaks clothing multiple times/week - very disruptive to life    Mild anxiety    Foster care child    Anxiety    Multiple fractures of left foot with routine healing    Family history of colon cancer- father dx'd in mid 40's - pt's first colonoscopy in 4/24/2017 - repeat in 2022     Menorrhagia with regular cycle- ? cause of iron deficiency anemia- pt would like to see ob/gyn specialists- didn't go in 2021 - better than previous    Intractable episodic tension-type headache    Strain of neck muscle, initial encounter    Sun-damaged skin    Cough due to bronchospasm- needs refills on meds     Recurrent cough    Attention deficit - symptoms - pt desires testing and possible treatment    Intractable migraine without aura and without status migrainosus    Hypertension goal BP (blood pressure) < 140/90 - new diagnosis fall 2023 - much improved with weight  loss and regular CV exercise    Morbid obesity (H)    Numbness and tingling of hand- fingertips - ? caused by lisinopril vs. 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 (cT2a, cN0, cM0)    Melanoma of skin (H)    Mild intermittent asthma with (acute) exacerbation- allergies and cold temperature triggers    Encounter for completion of form with patient- [facility] paperwork for melanoma left leg    History of malignant melanoma of skin    Neoplasm of uncertain behavior of skin    Multiple nevi    Cherry angioma    Lentigines    Melanoma in situ of torso excluding breast (H) - right groin area - 2nd melanoma - first was left anterior thigh    Mild episode of recurrent major depressive disorder (H24)    COPD (chronic obstructive pulmonary disease) (H)    Medication side effects- lisinopril 5mg annoying dry cough, and topiramate 25mg = blurred vision     Past Medical History:   Diagnosis Date    Anemia     Anxiety     paxil 10mg= mild bladder retention, at 30mg = ravenously hungry/weight gain increased headaches; lexapro = bad sweating    Contraception     ocp's some = mood swings ; seasonale: frequent vaginal bleeding and weight gain    COPD (chronic obstructive pulmonary disease) (H) 6/26/2024    Hot flashes 11/16/2016    Hypertension goal BP (blood pressure) < 140/90  - new diagnosis 02/02/2023    Kidney stones     Dr. 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

## 2024-08-15 NOTE — LETTER
8/15/2024      Deandra Lewis  [address] [place] [address]      Dear Colleague,    Thank you for referring your patient, Deandra Lewis, to the [facility]. Please see a copy of my visit note below.    [facility] Dermatology Note  Encounter Date: Aug 15, 2024  Office Visit      Dermatology Problem List:  FBSE 8/15/24  1. Melanoma  - L thigh - Malignant melanoma, BD 1.2mm, pT2a - L thigh - bx 8/28/23 - S/p WLE Dr. Higginbotham/Carla 10/9/23  - R superior inguinal crease- Atypical junctional melanocytic proliferation, consistent with early MIS, Bx 5/16/24, S/p WLE on 7/3/24 Dr. Higginbotham  2. Benign biopsies:  - L anterior shoulder - bx 8/28/23 - DF  3. Dermatofibroma - L calf, L thigh  4. Hx of Atypical nevi  - L posterior shoulder, Compound melanocytic nevus with mild atypia, Bx proven on 2/15/24   # LTM, R medial calf & L inguinal crease  - Photo 8/25/24     Social: Works indoors. Has tanning bed use hx.  ____________________________________________    Assessment & Plan:    # Dermatofibromas.   - Discussed the natural history and benign nature of this lesion. Reassurance provided that no additional treatment is necessary.        # LTM x2, R medial calf. & L inguinal crease   - Photographed today to monitor and check for changes in future appointments   - R medial calf there is a 7 mm brown macule   - L inguinal crease there is a 4 mm dark macule with dark pigment centrally.     # Hx of MM  - ABCDEs: Counseled ABCDEs of melanoma: Asymmetry, Border (irregularity), Color (not uniform, changes in color), Diameter (greater than 6 mm which is about the size of a pencil eraser), and Evolving (any changes in preexisting moles).  - Sun protection: Counseled SPF30+ sunscreen, UPF clothing, sun avoidance, tanning bed avoidance.   - Recommended yearly dental, ophthalmology, and gynecology exams.  - Recommended skin exams for all first-degree relatives.  - Recommended follow up is 3-6  months    # Benign findings: multiple benign nevi, lentigines, cherry angiomas, SKs  - edu on benign etiology  - Signs and Symptoms of non-melanoma skin cancer and ABCDEs of melanoma reviewed with patient. Patient encouraged to perform monthly self skin exams and educated on how to perform them. UV precautions reviewed with patient. Patient was asked about new or changing moles/lesions on body.   - Sunscreen: Apply 20 minutes prior to going outdoors and reapply every two hours, when wet or sweating. We recommend using an SPF 30 or higher, and to use one that is water resistant.     - RTC for changes     Procedures Performed:   None     Follow-up: 3-6 month(s) in-person, or earlier for new or changing lesions    Staff and scribe:    Scribe Disclosure:   I, MAXX SRIVASTAVA, am serving as a scribe; to document services personally performed by Lia Savage PA-C -based on data collection and the provider's statements to me.     Provider Disclosure:  I agree with above History, Review of Systems, Physical exam and Plan.  I have reviewed the content of the documentation and have edited it as needed. I have personally performed the services documented here and the documentation accurately represents those services and the decisions I have made.      Electronically signed by:    All risks, benefits and alternatives were discussed with patient.  Patient is in agreement and understands the assessment and plan.  All questions were answered.    Lia Savage PA-C, [facility]  [facility]: Phone: [phone], Fax: [phone]  [facility]: Phone: [phone],  Fax: [phone]  [facility]: Phone: [phone], Fax: [phone]  ____________________________________________    CC: Skin Check (FBSC)      Reviewed patients past medical history and pertinent chart review prior to patient's visit today.     HPI:  Ms. Liriano  RADHA Lewis is a 48 year old female who presents today as a return patient for FBSE.     Today patient did not report any spots of concern. Feeling well.     Has a hx of MM on the L thigh and MIS on the R superior inguinal crease.     Patient is otherwise feeling well, without additional concerns.    Labs:  N/A    Physical Exam:  Vitals: LMP 06/08/2024   SKIN: Total skin excluding the undergarment areas was performed. The exam included the head/face, neck, both arms, chest, back, abdomen, both legs, digits and/or nails.    - - Araiza's skin type II, has <100 nevi  - There are dome shaped bright red papules on the trunk.   - Multiple regular brown pigmented macules and papules are identified on the trunk and extremities.   - Scattered brown macules on sun exposed areas.  - There are waxy stuck on tan to brown papules on the trunk.    -There are well healed surgical scars without erythema, nodularity or telangiectasias on the prior MM, NERD   - There is a firm tan/flesh colored papule that dimples with lateral pressure on the L calf ..   - R medial calf there is a 7 mm brown macule   - L inguinal crease there is a 4 mm dark macule with dark pigment centrally.   - No other lesions of concern on areas examined.   LYMPH: Examination of the pre/post auricular, occipital, cervical, clavicular, axillary and inguinal lymph nodes was negative.   R medial calf     L inguinal crease    Medications:  Current Outpatient Medications   Medication Sig Dispense Refill     albuterol (PROAIR HFA/PROVENTIL HFA/VENTOLIN HFA) 108 (90 Base) MCG/ACT inhaler INHALE 2 PUFFS BY MOUTH EVERY 6 HOURS AS NEEDED FOR WHEEZE OR FOR SHORTNESS OF BREATH 18 g 0     buPROPion (WELLBUTRIN XL) 150 MG 24 hr tablet TAKE 3 TABLETS BY MOUTH EVERY MORNING. 270 tablet 1     cetirizine (ZYRTEC) 10 MG tablet Take 1 tablet (10 mg) by mouth every evening 30 tablet 1     cyclobenzaprine (FLEXERIL) 5 MG tablet Take 1-2 tablets (5-10 mg) by mouth 2 times daily as  needed for muscle spasms 30 tablet 3     fluticasone-vilanterol (BREO ELLIPTA) 200-25 MCG/ACT inhaler Inhale 1 puff into the lungs daily 60 each 2     ibuprofen (ADVIL/MOTRIN) 200 MG tablet Take 200 mg by mouth every 6 hours as needed       methocarbamol (ROBAXIN) 500 MG tablet Take 1 tablet (500 mg) by mouth 4 times daily as needed for muscle spasms 20 tablet 3     montelukast (SINGULAIR) 10 MG tablet Take 1 tablet (10 mg) by mouth at bedtime 90 tablet 3     ZOLMitriptan (ZOMIG) 5 MG tablet TAKE 1 TABLET BY MOUTH AT ONSET OF HEADACHE FOR MIGRAINE MAY REPEAT IN 2 HOURS. MAX 2 TABS/24 HOURS 10 tablet 11     ondansetron (ZOFRAN ODT) 4 MG ODT tab Take 1 tablet (4 mg) by mouth every 6 hours as needed for nausea 16 tablet 3     No current facility-administered medications for this visit.      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 related to previous surgeries - 4 in 3 months for sinus issues/infection     Family history of breast cancer- mother's identical twin, maternal aunt, paternal GM and dad with breast lumps      Hot flashes     Generalized hyperhidrosis- since earliest recollections - soaks clothing multiple times/week - very disruptive to life     Mild anxiety     Foster care child     Anxiety     Multiple fractures of left foot with routine healing     Family history of colon cancer- father dx'd in mid 40's - pt's first colonoscopy in 4/24/2017 - repeat in 2022      Menorrhagia with regular cycle- ? cause of iron deficiency anemia- pt would like to see ob/gyn specialists- didn't go in 2021 - better than previous     Intractable episodic tension-type headache     Strain of neck muscle, initial encounter     Sun-damaged skin     Cough due to bronchospasm- needs refills on meds       Recurrent cough     Attention deficit - symptoms - pt desires testing and possible treatment     Intractable migraine without aura and without status migrainosus     Hypertension goal BP (blood pressure) < 140/90 - new diagnosis fall 2023 - much improved with weight loss and regular CV exercise     Morbid obesity (H)     Numbness and tingling of hand- fingertips - ? caused by lisinopril vs. other - consider changing to amlodipine      Malignant melanoma of left lower extremity including hip (H)-left anterior thigh Stage 1B (cT2a, cN0, cM0)     Melanoma of skin (H)     Mild intermittent asthma with (acute) exacerbation- allergies and cold temperature triggers     Encounter for completion of form with patient- [place] paperwork for melanoma left leg     History of malignant melanoma of skin     Neoplasm of uncertain behavior of skin     Multiple nevi     Cherry angioma     Lentigines     Melanoma in situ of torso excluding breast (H) - right groin area - 2nd melanoma - first was left anterior thigh     Mild episode of recurrent major depressive disorder (H24)     COPD (chronic obstructive pulmonary disease) (H)     Medication side effects- lisinopril 5mg annoying dry cough, and topiramate 25mg = blurred vision     Past Medical History:   Diagnosis Date     Anemia      Anxiety     paxil 10mg= mild bladder retention, at 30mg = ravenously hungry/weight gain increased headaches; lexapro = bad sweating     Contraception     ocp's some = mood swings ; seasonale: frequent vaginal bleeding and weight gain     COPD (chronic obstructive pulmonary disease) (H) 6/26/2024     Hot flashes 11/16/2016     Hypertension goal BP (blood pressure) < 140/90  - new diagnosis 02/02/2023     Kidney stones     Dr. 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        Sincerely,        Lia Savage PA-C

## 2024-08-15 NOTE — PATIENT INSTRUCTIONS
Proper skin care from [facility]:    -Eliminate harsh soaps as they strip the natural oils from the skin, often resulting in dry itchy skin ( i.e. Dial, Zest, [place] Spring)  -Use mild soaps such as Cetaphil or Dove Sensitive Skin in the shower. You do not need to use soap on arms, legs, and trunk every time you shower unless visibly soiled.   -Avoid hot or cold showers.  -After showering, lightly dry off and apply moisturizing within 2-3 minutes. This will help trap moisture in the skin.   -Aggressive use of a moisturizer at least 1-2 times a day to the entire body (including -Vanicream, Cetaphil, Aquaphor or Cerave) and moisturize hands after every washing.  -We recommend using moisturizers that come in a tub that needs to be scooped out, not a pump. This has more of an oil base. It will hold moisture in your skin much better than a water base moisturizer. The above recommended are non-pore clogging.      Wear a sunscreen with at least SPF 30 on your face, ears, neck and V of the chest daily. Wear sunscreen on other areas of the body if those areas are exposed to the sun throughout the day. Sunscreens can contain physical and/or chemical blockers. Physical blockers are less likely to clog pores, these include zinc oxide and titanium dioxide. Reapply every two hour and after swimming.     Sunscreen examples: https://www.ewg.org/sunscreen/    UV radiation  UVA radiation remains constant throughout the day and throughout the year. It is a longer wavelength than UVB and therefore penetrates deeper into the skin leading to immediate and delayed tanning, photoaging, and skin cancer. 70-80% of UVA and UVB radiation occurs between the hours of 10am-2pm.  UVB radiation  UVB radiation causes the most harmful effects and is more significant during the summer months. However, snow and ice can reflect UVB radiation leading to skin damage during the winter months as well. UVB radiation is responsible for tanning,  burning, inflammation, delayed erythema (pinkness), pigmentation (brown spots), and skin cancer.     I recommend self monthly full body exams and yearly full body exams with a dermatology provider. If you develop a new or changing lesion please follow up for examination. Most skin cancers are pink and scaly or pink and pearly. However, we do see blue/brown/black skin cancers.  Consider the ABCDEs of melanoma when giving yourself your monthly full body exam ( don't forget the groin, buttocks, feet, toes, etc). A-asymmetry, B-borders, C-color, D-diameter, E-elevation or evolving. If you see any of these changes please follow up in clinic. If you cannot see your back I recommend purchasing a hand held mirror to use with a larger wall mirror.       Checking for Skin Cancer  You can find cancer early by checking your skin each month. There are 3 kinds of skin cancer. They are melanoma, basal cell carcinoma, and squamous cell carcinoma. Doing monthly skin checks is the best way to find new marks or skin changes. Follow the instructions below for checking your skin.   The ABCDEs of checking moles for melanoma   Check your moles or growths for signs of melanoma using ABCDE:   Asymmetry: the sides of the mole or growth don t match  Border: the edges are ragged, notched, or blurred  Color: the color within the mole or growth varies  Diameter: the mole or growth is larger than 6 mm (size of a pencil eraser)  Evolving: the size, shape, or color of the mole or growth is changing (evolving is not shown in the images below)    Checking for other types of skin cancer  Basal cell carcinoma or squamous cell carcinoma have symptoms such as:     A spot or mole that looks different from all other marks on your skin  Changes in how an area feels, such as itching, tenderness, or pain  Changes in the skin's surface, such as oozing, bleeding, or scaliness  A sore that does not heal  New swelling or redness beyond the border of a  mole    Who s at risk?  Anyone can get skin cancer. But you are at greater risk if you have:   Fair skin, light-colored hair, or light-colored eyes  Many moles or abnormal moles on your skin  A history of sunburns from sunlight or tanning beds  A family history of skin cancer  A history of exposure to radiation or chemicals  A weakened immune system  If you have had skin cancer in the past, you are at risk for recurring skin cancer.   How to check your skin  Do your monthly skin checkups in front of a full-length mirror. Check all parts of your body, including your:   Head (ears, face, neck, and scalp)  Torso (front, back, and sides)  Arms (tops, undersides, upper, and lower armpits)  Hands (palms, backs, and fingers, including under the nails)  Buttocks and genitals  Legs (front, back, and sides)  Feet (tops, soles, toes, including under the nails, and between toes)  If you have a lot of moles, take digital photos of them each month. Make sure to take photos both up close and from a distance. These can help you see if any moles change over time.   Most skin changes are not cancer. But if you see any changes in your skin, call your doctor right away. Only he or she can diagnose a problem. If you have skin cancer, seeing your doctor can be the first step toward getting the treatment that could save your life.   [employer] last reviewed this educational content on 4/1/2019 2000-2020 [employer]. [address] [place] [address], [place] [address]. All rights reserved. This information is not intended as a substitute for professional medical care. Always follow your healthcare professional's instructions.       When should I call my doctor?  If you are worsening or not improving, please, contact us or seek urgent care as noted below.     Who should I call with questions (adults)?    [facility] [phone]  For urgent needs outside of business hours call the [facility] at  [phone] and ask for the dermatology resident on call to be paged  If this is a medical emergency and you are unable to reach an ER, Call [phone]      If you need a prescription refill, please contact your pharmacy. Refills are approved or denied by our Physicians during normal business hours, Monday through Fridays  Per office policy, refills will not be granted if you have not been seen within the past year (or sooner depending on your child's condition)

## 2024-08-17 SDOH — HEALTH STABILITY: PHYSICAL HEALTH: ON AVERAGE, HOW MANY MINUTES DO YOU ENGAGE IN EXERCISE AT THIS LEVEL?: 50 MIN

## 2024-08-17 SDOH — HEALTH STABILITY: PHYSICAL HEALTH: ON AVERAGE, HOW MANY DAYS PER WEEK DO YOU ENGAGE IN MODERATE TO STRENUOUS EXERCISE (LIKE A BRISK WALK)?: 5 DAYS

## 2024-08-17 ASSESSMENT — SOCIAL DETERMINANTS OF HEALTH (SDOH): HOW OFTEN DO YOU GET TOGETHER WITH FRIENDS OR RELATIVES?: TWICE A WEEK

## 2024-08-20 ENCOUNTER — OFFICE VISIT (OUTPATIENT)
Dept: FAMILY MEDICINE | Facility: CLINIC | Age: 49
End: 2024-08-20
Payer: COMMERCIAL

## 2024-08-20 VITALS
HEART RATE: 108 BPM | DIASTOLIC BLOOD PRESSURE: 80 MMHG | WEIGHT: 183.3 LBS | RESPIRATION RATE: 18 BRPM | BODY MASS INDEX: 28.77 KG/M2 | OXYGEN SATURATION: 100 % | HEIGHT: 67 IN | SYSTOLIC BLOOD PRESSURE: 138 MMHG | TEMPERATURE: 98 F

## 2024-08-20 DIAGNOSIS — J98.01 COUGH DUE TO BRONCHOSPASM: ICD-10-CM

## 2024-08-20 DIAGNOSIS — Z80.3 FAMILY HISTORY OF BREAST CANCER: ICD-10-CM

## 2024-08-20 DIAGNOSIS — D48.5 NEOPLASM OF UNCERTAIN BEHAVIOR OF SKIN: ICD-10-CM

## 2024-08-20 DIAGNOSIS — Z12.11 SCREEN FOR COLON CANCER: ICD-10-CM

## 2024-08-20 DIAGNOSIS — Z30.09 COUNSELING FOR BIRTH CONTROL, INTRAUTERINE DEVICE: ICD-10-CM

## 2024-08-20 DIAGNOSIS — F33.0 MILD EPISODE OF RECURRENT MAJOR DEPRESSIVE DISORDER (H): ICD-10-CM

## 2024-08-20 DIAGNOSIS — Z12.4 CERVICAL CANCER SCREENING: ICD-10-CM

## 2024-08-20 DIAGNOSIS — I10 HYPERTENSION GOAL BP (BLOOD PRESSURE) < 140/90: ICD-10-CM

## 2024-08-20 DIAGNOSIS — Z12.31 VISIT FOR SCREENING MAMMOGRAM: ICD-10-CM

## 2024-08-20 DIAGNOSIS — R63.4 WEIGHT LOSS: ICD-10-CM

## 2024-08-20 DIAGNOSIS — N88.9 ABNORMALITY OF CERVIX: ICD-10-CM

## 2024-08-20 DIAGNOSIS — D03.59 MELANOMA IN SITU OF TORSO EXCLUDING BREAST (H): ICD-10-CM

## 2024-08-20 DIAGNOSIS — Z13.6 CARDIOVASCULAR SCREENING; LDL GOAL LESS THAN 130: ICD-10-CM

## 2024-08-20 DIAGNOSIS — Z23 NEED FOR HEPATITIS B VACCINATION: ICD-10-CM

## 2024-08-20 DIAGNOSIS — F41.9 ANXIETY: ICD-10-CM

## 2024-08-20 DIAGNOSIS — C43.72 MALIGNANT MELANOMA OF LEFT LOWER EXTREMITY INCLUDING HIP (H): ICD-10-CM

## 2024-08-20 DIAGNOSIS — Z80.0 FAMILY HISTORY OF COLON CANCER: ICD-10-CM

## 2024-08-20 DIAGNOSIS — J45.21 MILD INTERMITTENT ASTHMA WITH (ACUTE) EXACERBATION: ICD-10-CM

## 2024-08-20 DIAGNOSIS — Z00.01 ENCOUNTER FOR ROUTINE ADULT MEDICAL EXAM WITH ABNORMAL FINDINGS: Primary | ICD-10-CM

## 2024-08-20 DIAGNOSIS — J20.9 ACUTE BRONCHITIS, UNSPECIFIED ORGANISM: ICD-10-CM

## 2024-08-20 LAB
CLUE CELLS: NORMAL
TRICHOMONAS, WET PREP: NORMAL
WBC'S/HIGH POWER FIELD, WET PREP: NORMAL
YEAST, WET PREP: NORMAL

## 2024-08-20 PROCEDURE — 87210 SMEAR WET MOUNT SALINE/INK: CPT | Performed by: FAMILY MEDICINE

## 2024-08-20 PROCEDURE — 87491 CHLMYD TRACH DNA AMP PROBE: CPT | Performed by: FAMILY MEDICINE

## 2024-08-20 PROCEDURE — 87624 HPV HI-RISK TYP POOLED RSLT: CPT | Performed by: FAMILY MEDICINE

## 2024-08-20 PROCEDURE — 99214 OFFICE O/P EST MOD 30 MIN: CPT | Mod: 25 | Performed by: FAMILY MEDICINE

## 2024-08-20 PROCEDURE — 96127 BRIEF EMOTIONAL/BEHAV ASSMT: CPT | Performed by: FAMILY MEDICINE

## 2024-08-20 PROCEDURE — G0145 SCR C/V CYTO,THINLAYER,RESCR: HCPCS | Performed by: FAMILY MEDICINE

## 2024-08-20 PROCEDURE — 99396 PREV VISIT EST AGE 40-64: CPT | Performed by: FAMILY MEDICINE

## 2024-08-20 PROCEDURE — 87591 N.GONORRHOEAE DNA AMP PROB: CPT | Performed by: FAMILY MEDICINE

## 2024-08-20 RX ORDER — FLUTICASONE FUROATE AND VILANTEROL 200; 25 UG/1; UG/1
1 POWDER RESPIRATORY (INHALATION) DAILY
Qty: 60 EACH | Refills: 11 | Status: SHIPPED | OUTPATIENT
Start: 2024-08-20

## 2024-08-20 RX ORDER — MONTELUKAST SODIUM 10 MG/1
1 TABLET ORAL AT BEDTIME
Qty: 90 TABLET | Refills: 3 | Status: SHIPPED | OUTPATIENT
Start: 2024-08-20

## 2024-08-20 RX ORDER — ALBUTEROL SULFATE 90 UG/1
AEROSOL, METERED RESPIRATORY (INHALATION)
Qty: 18 G | Refills: 11 | Status: SHIPPED | OUTPATIENT
Start: 2024-08-20

## 2024-08-20 ASSESSMENT — PATIENT HEALTH QUESTIONNAIRE - PHQ9
SUM OF ALL RESPONSES TO PHQ QUESTIONS 1-9: 0
SUM OF ALL RESPONSES TO PHQ QUESTIONS 1-9: 0
10. IF YOU CHECKED OFF ANY PROBLEMS, HOW DIFFICULT HAVE THESE PROBLEMS MADE IT FOR YOU TO DO YOUR WORK, TAKE CARE OF THINGS AT HOME, OR GET ALONG WITH OTHER PEOPLE: NOT DIFFICULT AT ALL

## 2024-08-20 ASSESSMENT — ANXIETY QUESTIONNAIRES
1. FEELING NERVOUS, ANXIOUS, OR ON EDGE: NOT AT ALL
6. BECOMING EASILY ANNOYED OR IRRITABLE: NOT AT ALL
GAD7 TOTAL SCORE: 0
4. TROUBLE RELAXING: NOT AT ALL
8. IF YOU CHECKED OFF ANY PROBLEMS, HOW DIFFICULT HAVE THESE MADE IT FOR YOU TO DO YOUR WORK, TAKE CARE OF THINGS AT HOME, OR GET ALONG WITH OTHER PEOPLE?: NOT DIFFICULT AT ALL
GAD7 TOTAL SCORE: 0
7. FEELING AFRAID AS IF SOMETHING AWFUL MIGHT HAPPEN: NOT AT ALL
IF YOU CHECKED OFF ANY PROBLEMS ON THIS QUESTIONNAIRE, HOW DIFFICULT HAVE THESE PROBLEMS MADE IT FOR YOU TO DO YOUR WORK, TAKE CARE OF THINGS AT HOME, OR GET ALONG WITH OTHER PEOPLE: NOT DIFFICULT AT ALL
5. BEING SO RESTLESS THAT IT IS HARD TO SIT STILL: NOT AT ALL
3. WORRYING TOO MUCH ABOUT DIFFERENT THINGS: NOT AT ALL
2. NOT BEING ABLE TO STOP OR CONTROL WORRYING: NOT AT ALL
7. FEELING AFRAID AS IF SOMETHING AWFUL MIGHT HAPPEN: NOT AT ALL
GAD7 TOTAL SCORE: 0

## 2024-08-20 NOTE — PROGRESS NOTES
Preventive Care Visit  [facility] [place]  Lindaashley Soto MD, Family Medicine  Aug 20, 2024      Assessment & Plan       ICD-10-CM    1. Encounter for routine adult medical exam with abnormal findings  Z00.01 PRIMARY CARE FOLLOW-UP SCHEDULING      2. Malignant melanoma of left lower extremity including hip (H)-left anterior thigh Stage 1B (cT2a, cN0, cM0)  C43.72       3. Melanoma in situ of torso excluding breast (H) - right groin area - 2nd melanoma - first was left anterior thigh  D03.59       4. Weight loss- 40 lbs with better nutrition and running over the last 6 months  R63.4       5. Hypertension goal BP (blood pressure) < 140/90 - new diagnosis fall 2023 - much improved with weight loss and regular CV exercise  I10       6. Screen for colon cancer  Z12.11 Colonoscopy Screening  Referral      7. Family history of breast cancer- mother's identical twin, maternal aunt, paternal GM and dad with breast lumps   Z80.3       8. Family history of colon cancer- father dx'd in mid 40's - pt's first colonoscopy in 4/24/2017 - repeat in 2022   Z80.0       9. Cervical cancer screening  Z12.4 Pap Screen with HPV - Recommended Age 30 - 65 Years      10. Visit for screening mammogram  Z12.31 MA Screening Bilateral w/ Connor      11. Need for hepatitis B vaccination- pt will check with her insurance on this  Z23       12. Mild intermittent asthma with (acute) exacerbation- allergies and cold temperature triggers  J45.21 PRIMARY CARE FOLLOW-UP SCHEDULING     Asthma Action Plan (AAP)      13. Acute bronchitis, unspecified organism - needs refills on meds - not exacerbated right now   J20.9 albuterol (PROAIR HFA/PROVENTIL HFA/VENTOLIN HFA) 108 (90 Base) MCG/ACT inhaler     fluticasone-vilanterol (BREO ELLIPTA) 200-25 MCG/ACT inhaler      14. Cough due to bronchospasm- needs refills on meds   J98.01 fluticasone-vilanterol (BREO ELLIPTA) 200-25 MCG/ACT inhaler     montelukast (SINGULAIR) 10 MG  "tablet      15. Counseling for birth control, intrauterine device- pt is thinking about HRT for perimenopausal symptoms, possibly Mirena IUD and oral/transdermal estrogen  Z30.09 Ob/Gyn  Referral      16. Abnormality of cervix- inflamed , sligthly cystic and hypervascular cervix from 4:00-8;00 inferior to os  N88.9 Wet prep - Clinic Collect     NEISSERIA GONORRHOEA PCR     CHLAMYDIA TRACHOMATIS PCR      17. Neoplasm of uncertain behavior of skin- left pubic area and left breast inferior areola - slightly irregular pigmentation - images taken - see media section - pt will watch closely - nted 8/20/2024  D48.5       18. CARDIOVASCULAR SCREENING; LDL GOAL LESS THAN 130  Z13.6 TSH with free T4 reflex     Lipid panel reflex to direct LDL Fasting     Comprehensive metabolic panel     CBC with platelets      19. Anxiety  F41.9 PRIMARY CARE FOLLOW-UP SCHEDULING      20. Mild episode of recurrent major depressive disorder (H24)  F33.0 PRIMARY CARE FOLLOW-UP SCHEDULING      asthma action plan and ACT done today.     Patient has been advised of split billing requirements and indicates understanding: Yes  Please, call our clinic or go to the ER immediately if signs or symptoms worsen or fail to improve as anticipated.     Photos taken of 2 newer pigmented nevi per pt request - left inferior areola and left pubic area - filed under media section.     Pt will watch her pigmented and other nevi closely and Please, call our clinic or go to the ER immediately if signs or symptoms worsen or fail to improve as anticipated. If any lesions are changing or growing quickly, itching, bleeding and/or otherwise symptomatic - pt will call us or her dermatologist immediately.     Return in about 6 months (around 2/20/2025) for depression, anxiety, w/ Dr. HOUSER for 40 min appt, as video visit.   BMI  Estimated body mass index is 28.71 kg/m  as calculated from the following:    Height as of this encounter: 1.702 m (5' 7\").    Weight as of " this encounter: 83.1 kg (183 lb 4.8 oz).   Weight management plan: Discussed healthy diet and exercise guidelines    Counseling  Appropriate preventive services were addressed with this patient via screening, questionnaire, or discussion as appropriate for fall prevention, nutrition, physical activity, Tobacco-use cessation, social engagement, weight loss and cognition.  Checklist reviewing preventive services available has been given to the patient.  Reviewed patient's diet, addressing concerns and/or questions.   The patient was instructed to see the dentist every 6 months.       MEDICATIONS:   Orders Placed This Encounter   Medications    albuterol (PROAIR HFA/PROVENTIL HFA/VENTOLIN HFA) 108 (90 Base) MCG/ACT inhaler     Sig: INHALE 2 PUFFS BY MOUTH EVERY 6 HOURS AS NEEDED FOR WHEEZE OR FOR SHORTNESS OF BREATH     Dispense:  18 g     Refill:  11     Pharmacy may dispense brand covered by insurance (Proair, or proventil or ventolin or generic albuterol inhaler); ### Profile Rx: patient will contact pharmacy when needed ###    fluticasone-vilanterol (BREO ELLIPTA) 200-25 MCG/ACT inhaler     Sig: Inhale 1 puff into the lungs daily     Dispense:  60 each     Refill:  11     ### Profile Rx: patient will contact pharmacy when needed ###    montelukast (SINGULAIR) 10 MG tablet     Sig: Take 1 tablet (10 mg) by mouth at bedtime     Dispense:  90 tablet     Refill:  3     ### Profile Rx: patient will contact pharmacy when needed ###          - Continue other medications without change  Regular exercise  See Patient Instructions         Linda Soto MD      Marbella Liriano is a 48 year old, presenting for the following:  Physical  and the following other medical problems:      1. Encounter for routine adult medical exam with abnormal findings    2. Malignant melanoma of left lower extremity including hip (H)-left anterior thigh Stage 1B (cT2a, cN0, cM0)    3. Melanoma in situ of torso excluding breast (H) -  right groin area - 2nd melanoma - first was left anterior thigh    4. Weight loss- 40 lbs with better nutrition and running over the last 6 months    5. Hypertension goal BP (blood pressure) < 140/90 - new diagnosis fall 2023 - much improved with weight loss and regular CV exercise    6. Screen for colon cancer    7. Family history of breast cancer- mother's identical twin, maternal aunt, paternal GM and dad with breast lumps     8. Family history of colon cancer- father dx'd in mid 40's - pt's first colonoscopy in 4/24/2017 - repeat in 2022     9. Cervical cancer screening    10. Visit for screening mammogram    11. Need for hepatitis B vaccination- pt will check with her insurance on this    12. Mild intermittent asthma with (acute) exacerbation- allergies and cold temperature triggers    13. Acute bronchitis, unspecified organism - needs refills on meds - not exacerbated right now     14. Cough due to bronchospasm- needs refills on meds     15. Counseling for birth control, intrauterine device- pt is thinking about HRT for perimenopausal symptoms, possibly Mirena IUD and oral/transdermal estrogen    16. Abnormality of cervix- inflamed , sligthly cystic and hypervascular cervix from 4:00-8;00 inferior to os    17. Neoplasm of uncertain behavior of skin- left pubic area and left breast inferior areola - slightly irregular pigmentation - images taken - see media section - pt will watch closely - nted 8/20/2024    18. CARDIOVASCULAR SCREENING; LDL GOAL LESS THAN 130    19. Anxiety    20. Mild episode of recurrent major depressive disorder (H24)            8/20/2024     2:33 PM   Additional Questions   Roomed by Vijaya RYAN   Accompanied by self        Health Care Directive:   Patient does not have a Health Care Directive or Living Will: Discussed advance care planning with patient; however, patient declined at this time.    HPI      Hypertension Follow-up:     Do you check your blood pressure regularly outside of the  clinic? No   Are you following a low salt diet? No  Are your blood pressures ever more than 140 on the top number (systolic) OR more   than 90 on the bottom number (diastolic), for example 140/90? No    Anxiety :   How are you doing with your anxiety since your last visit? Improved   Are you having other symptoms that might be associated with anxiety? No  Have you had a significant life event? Health Concerns   Are you feeling depressed? No  Do you have any concerns with your use of alcohol or other drugs? No    Social History     Tobacco Use    Smoking status: Former     Current packs/day: 0.10     Average packs/day: 0.1 packs/day for 5.0 years (0.5 ttl pk-yrs)     Types: Cigarettes    Smokeless tobacco: Never    Tobacco comments:     Tried smoking in high school   Vaping Use    Vaping status: Never Used   Substance Use Topics    Alcohol use: Not Currently     Comment: Rarely    Drug use: No     Comment: no herbal meds except for acidophilus          2/14/2024     4:00 PM 6/25/2024    10:27 PM 8/20/2024     2:15 PM   LISA-7 SCORE   Total Score 0 (minimal anxiety) 0 (minimal anxiety) 0 (minimal anxiety)   Total Score 0 0 0         2/14/2024     4:00 PM 6/25/2024    10:24 PM 8/20/2024     2:15 PM   PHQ   PHQ-9 Total Score 0 0 0   Q9: Thoughts of better off dead/self-harm past 2 weeks Not at all Not at all Not at all         8/20/2024     2:15 PM   Last PHQ-9   1.  Little interest or pleasure in doing things 0   2.  Feeling down, depressed, or hopeless 0   3.  Trouble falling or staying asleep, or sleeping too much 0   4.  Feeling tired or having little energy 0   5.  Poor appetite or overeating 0   6.  Feeling bad about yourself 0   7.  Trouble concentrating 0   8.  Moving slowly or restless 0   Q9: Thoughts of better off dead/self-harm past 2 weeks 0   PHQ-9 Total Score 0         8/20/2024     2:15 PM   LISA-7    1. Feeling nervous, anxious, or on edge 0   2. Not being able to stop or control worrying 0   3. Worrying  "too much about different things 0   4. Trouble relaxing 0   5. Being so restless that it is hard to sit still 0   6. Becoming easily annoyed or irritable 0   7. Feeling afraid, as if something awful might happen 0   LISA-7 Total Score 0   If you checked any problems, how difficult have they made it for you to do your work, take care of things at home, or get along with other people? Not difficult at all       History   Smoking Status    Former    Types: Cigarettes   Smokeless Tobacco    Never     No results found for: \"FEV1\", \"[id number]\"    Migraine : no problems since starting to take benadryl for her allergies at night.   Since your last clinic visit, how have your headaches changed?  Improved  How often are you getting headaches or migraines? Not at all   Are you able to do normal daily activities when you have a migraine? No  Are you taking rescue/relief medications? (Select all that apply) No  How helpful is your rescue/relief medication? N/A  Are you taking any medications to prevent migraines? (Select all that apply)  No  In the past 4 weeks, how often have you gone to urgent care or the emergency room because of your headaches?  0        8/17/2024   General Health   How would you rate your overall physical health? Good   Feel stress (tense, anxious, or unable to sleep) Not at all            8/17/2024   Nutrition   Three or more servings of calcium each day? (!) I DON'T KNOW   Diet: Regular (no restrictions)   How many servings of fruit and vegetables per day? (!) 2-3   How many sweetened beverages each day? 0-1            8/17/2024   Exercise   Days per week of moderate/strenous exercise 5 days   Average minutes spent exercising at this level 50 min            8/17/2024   Social Factors   Frequency of gathering with friends or relatives Twice a week   Worry food won't last until get money to buy more No   Food not last or not have enough money for food? No   Do you have housing? (Housing is defined as stable " permanent housing and does not include staying ouside in a car, in a tent, in an abandoned building, in an overnight shelter, or couch-surfing.) Yes   Are you worried about losing your housing? No   Lack of transportation? No   Unable to get utilities (heat,electricity)? No            8/17/2024   Dental   Dentist two times every year? (!) NO            6/24/2024   TB Screening   Were you born outside of the [place]? No          Today's PHQ-9 Score:       8/20/2024     2:15 PM   PHQ-9 SCORE   PHQ-9 Total Score MyChart 0   PHQ-9 Total Score 0         8/17/2024   Substance Use   Alcohol more than 3/day or more than 7/wk No   Do you use any other substances recreationally? No        Social History     Tobacco Use    Smoking status: Former     Current packs/day: 0.10     Average packs/day: 0.1 packs/day for 5.0 years (0.5 ttl pk-yrs)     Types: Cigarettes    Smokeless tobacco: Never    Tobacco comments:     Tried smoking in high school   Vaping Use    Vaping status: Never Used   Substance Use Topics    Alcohol use: Not Currently     Comment: Rarely    Drug use: No     Comment: no herbal meds except for acidophilus            11/7/2023   LAST FHS-7 RESULTS   Any relative bilateral breast cancer No   Any male have breast cancer No   Any ONE woman have BOTH breast AND ovarian cancer Yes   Any woman with breast cancer before 50yrs No   2 or more relatives with breast AND/OR ovarian cancer Yes   2 or more relatives with breast AND/OR bowel cancer Yes      Had genetic testing - going for follow up with genetic counseling in the next month.     Mammogram Screening - Mammogram every 1-2 years updated in Health Maintenance based on mutual decision making        8/17/2024   STI Screening   New sexual partner(s) since last STI/HIV test? No        History of abnormal Pap smear: No - age 30- 64 PAP with HPV every 5 years recommended        Latest Ref Rng & Units 4/14/2021     6:40 PM 4/14/2021     6:26 PM 7/27/2018    10:26 AM   PAP / HPV    PAP (Historical)   NIL     HPV 16 DNA NEG^Negative Negative   Negative    HPV 18 DNA NEG^Negative Negative   Negative    Other HR HPV NEG^Negative Negative   Negative      ASCVD Risk   The 10-year ASCVD risk score (Marlyn KWOK, et al., 2019) is: 0.9%    Values used to calculate the score:      Age: 48 years      Sex: Female      Is Non- : No      Diabetic: No      Tobacco smoker: No      Systolic Blood Pressure: 138 mmHg      Is BP treated: No      HDL Cholesterol: 51 mg/dL      Total Cholesterol: 165 mg/dL        8/17/2024   Contraception/Family Planning   Questions about contraception or family planning (!) YES ? Mirena iud .       Reviewed and updated as needed this visit by Provider                    Past Medical History:   Diagnosis Date    Anemia     Anxiety     paxil 10mg= mild bladder retention, at 30mg = ravenously hungry/weight gain increased headaches; lexapro = bad sweating    Contraception     ocp's some = mood swings ; seasonale: frequent vaginal bleeding and weight gain    Hot flashes 11/16/2016    Hypertension goal BP (blood pressure) < 140/90  - new diagnosis 02/02/2023    Kidney stones     Dr. 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    left - secondary to severe oral/sinus infection and bleeding  - [facility] s/p multiple surgeries     Multiple fractures of left foot with routine healing 02/25/2021    Night sweats     Sleep problems     Uncomplicated asthma 20s     Past Surgical History:   Procedure Laterality Date    ABDOMEN SURGERY  2001    APPENDECTOMY      ARTHROSCOPY, ARTHROPLASTY TEMPOROMANDIBULAR JOINT, COMBINED  age 14     TMJ surgery as an 9th grader    BIOPSY NODE SENTINEL Left 10/09/2023    Procedure: Left Femoral [place] Lymph Node Mapping and Biopsy;  Surgeon: Tracie Higginbotham MD;  Location:  OR     COLONOSCOPY  2017    Approximately    COMPLEX WOUND CLOSURE (UPDATE) N/A 10/09/2023    Procedure: COMPLEX CLOSURE SOFT TISSUE DEFECT LEFT THIGH 14 cm Length;  Surgeon: Pj Aguirre MD;  Location: [facility] OR    CYSTOSCOPY,URETEROSCOPY,STONE REMV  2002    with stent placements - since removed     ENDOSCOPIC BALLOON SINUPLASTY, OPTICAL TRACKING SYSTEM ENDOSCOPIC SINUS SURGERY, COMBINED  2013    Procedure: COMBINED ENDOSCOPIC BALLOON SINUPLASTY, OPTICAL TRACKING SYSTEM ENDOSCOPIC SINUS SURGERY;  Left Endo Sinus  Surgery  with Septoplasty      ENDOSCOPIC SINUS SURGERY  01/15/2013    Procedure: ENDOSCOPIC SINUS SURGERY;  removal of nasal packing with endocopic debridement of left paranasal sinuses;  Surgeon: Fabian Driver MD;  Location:  OR    ENDOSCOPIC SINUS SURGERY  2013    Procedure: ENDOSCOPIC SINUS SURGERY;  Functional Endoscopic Sinus Surgery With Biopsy ;  Surgeon: Jcarlos Hogan MD;  Location: [facility] OR    ENT SURGERY      Deviated septum. Complications. Resulted in 3 more    EXCISE MASS TRUNK Right 7/3/2024    Procedure: Wide Local Excision of RIGHT Groin Skin Lesion;  Surgeon: Tracie Higginbotham MD;  Location: [facility] OR    EXCISE SOFT TISSUE TUMOR THIGH Left 10/09/2023    Procedure: Wide Local Excision of LEFT thigh melanoma;  Surgeon: Tracie Higginbotham MD;  Location: [facility] OR     OB History    Para Term  AB Living   3 3 3 0 0 3   SAB IAB Ectopic Multiple Live Births   0 0 0 0 0      # Outcome Date GA Lbr Alvaro/2nd Weight Sex Type Anes PTL Lv   3 Term            2 Term            1 Term               Obstetric Comments   Hx of kidney stones with last 2 pregnancies -  x 3 uncomplicated      Lab work is in process  Labs reviewed in EPIC  BP Readings from Last 3 Encounters:   24 138/80   24 118/80   24 (!) 130/100    Wt Readings from Last 3 Encounters:   24 83.1 kg (183 lb 4.8 oz)   24 84.4 kg (186 lb)   10/09/23 96.3 kg (212 lb  4.9 oz)                  Patient Active Problem List   Diagnosis    Chronic sinusitis    Bleeding, nose    Sinusitis    Lactose intolerance    Kidney stones    Maxillary bone loss    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 related to previous surgeries - 4 in 3 months for sinus issues/infection    Family history of breast cancer- mother's identical twin, maternal aunt, paternal GM and dad with breast lumps - had genetic counseling    Hot flashes    Generalized hyperhidrosis- since earliest recollections - soaks clothing multiple times/week - very disruptive to life    Mild anxiety    Foster care child    Anxiety    Multiple fractures of left foot with routine healing    Family history of colon cancer- father dx'd in mid 40's - pt's first colonoscopy in 4/24/2017 - repeat in 2022     Menorrhagia with regular cycle- ? cause of iron deficiency anemia- pt would like to see ob/gyn specialists- didn't go in 2021 - better than previous    Intractable episodic tension-type headache    Strain of neck muscle, initial encounter    Sun-damaged skin    Cough due to bronchospasm- needs refills on meds     Recurrent cough    Attention deficit - symptoms - pt desires testing and possible treatment    Intractable migraine without aura and without status migrainosus    Hypertension goal BP (blood pressure) < 140/90 - new diagnosis fall 2023 - much improved with weight loss and regular CV exercise    Morbid obesity (H)    Numbness and tingling of hand- fingertips - ? caused by lisinopril vs. 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 (cT2a, cN0, cM0)    Melanoma of skin (H)    Mild intermittent asthma with (acute) exacerbation- allergies and cold temperature triggers    Encounter for completion of form with patient- [place] paperwork for melanoma left leg    History of malignant melanoma of skin    Neoplasm of  uncertain behavior of skin    Multiple nevi    Cherry angioma    Lentigines    Melanoma in situ of torso excluding breast (H) - right groin area - 2nd melanoma - first was left anterior thigh    Mild episode of recurrent major depressive disorder (H24)    Medication side effects- lisinopril 5mg annoying dry cough, and topiramate 25mg = blurred vision     Past Surgical History:   Procedure Laterality Date    ABDOMEN SURGERY  2001    APPENDECTOMY      ARTHROSCOPY, ARTHROPLASTY TEMPOROMANDIBULAR JOINT, COMBINED  age 14     TMJ surgery as an 7th grader    BIOPSY NODE SENTINEL Left 10/09/2023    Procedure: Left Femoral [place] Lymph Node Mapping and Biopsy;  Surgeon: Tracie Higginbotham MD;  Location: [facility] OR    COLONOSCOPY  2017    Approximately    COMPLEX WOUND CLOSURE (UPDATE) N/A 10/09/2023    Procedure: COMPLEX CLOSURE SOFT TISSUE DEFECT LEFT THIGH 14 cm Length;  Surgeon: Pj Aguirre MD;  Location: [facility] OR    CYSTOSCOPY,URETEROSCOPY,STONE REMV  09/11/2002    with stent placements - since removed     ENDOSCOPIC BALLOON SINUPLASTY, OPTICAL TRACKING SYSTEM ENDOSCOPIC SINUS SURGERY, COMBINED  01/08/2013    Procedure: COMBINED ENDOSCOPIC BALLOON SINUPLASTY, OPTICAL TRACKING SYSTEM ENDOSCOPIC SINUS SURGERY;  Left Endo Sinus  Surgery  with Septoplasty      ENDOSCOPIC SINUS SURGERY  01/15/2013    Procedure: ENDOSCOPIC SINUS SURGERY;  removal of nasal packing with endocopic debridement of left paranasal sinuses;  Surgeon: Fabian Driver MD;  Location:  OR    ENDOSCOPIC SINUS SURGERY  04/22/2013    Procedure: ENDOSCOPIC SINUS SURGERY;  Functional Endoscopic Sinus Surgery With Biopsy ;  Surgeon: Jcarlos Hogan MD;  Location: [facility] OR    ENT SURGERY  2013    Deviated septum. Complications. Resulted in 3 more    EXCISE MASS TRUNK Right 7/3/2024    Procedure: Wide Local Excision of RIGHT Groin Skin Lesion;  Surgeon: Tracie Higginbotham MD;  Location:  OR    EXCISE SOFT TISSUE TUMOR THIGH Left 10/09/2023     Procedure: Wide Local Excision of LEFT thigh melanoma;  Surgeon: Tracie Higginbotham MD;  Location: [facility] OR       Social History     Tobacco Use    Smoking status: Former     Current packs/day: 0.10     Average packs/day: 0.1 packs/day for 5.0 years (0.5 ttl pk-yrs)     Types: Cigarettes    Smokeless tobacco: Never    Tobacco comments:     Tried smoking in high school   Substance Use Topics    Alcohol use: Not Currently     Comment: Rarely     Family History   Problem Relation Age of Onset    Asthma Mother     Depression Mother     Thyroid Disease Mother     Blood Disease Mother         Bleeding disorder    Bipolar Disorder Mother     Parkinsonism Mother     Dementia Mother     Anxiety Disorder Mother     Mental Illness Mother     Obesity Mother     Heart Disease Father         s/p pacemakers x 2     Diabetes Father     Cancer Father         Skin, Colon-dx'd mid-late 40's    Depression Father     Skin Cancer Father     Colon Cancer Father 55    Prostate Cancer Father 79    Anesthesia Reaction Father         Very hard to come out of anesthesia    Substance Abuse Maternal Grandmother     Substance Abuse Maternal Grandfather     Breast Cancer Paternal Grandmother         Possible    Osteoporosis Paternal Grandmother     Skin Cancer Paternal Grandfather     Allergy (Severe) Son         Dairy, Eggs, and Oatmeal    Asthma Child     Breast Cancer Maternal Aunt 55    Leukemia Maternal Aunt 60    Breast Cancer Maternal Aunt 65        mother's identical twin     Alcohol/Drug Other         Sibling    Prostate Cancer Paternal Uncle 84         Current Outpatient Medications   Medication Sig Dispense Refill    albuterol (PROAIR HFA/PROVENTIL HFA/VENTOLIN HFA) 108 (90 Base) MCG/ACT inhaler INHALE 2 PUFFS BY MOUTH EVERY 6 HOURS AS NEEDED FOR WHEEZE OR FOR SHORTNESS OF BREATH 18 g 0    buPROPion (WELLBUTRIN XL) 150 MG 24 hr tablet TAKE 3 TABLETS BY MOUTH EVERY MORNING. 270 tablet 1    cetirizine (ZYRTEC) 10 MG tablet Take 1 tablet  "(10 mg) by mouth every evening 30 tablet 1    cyclobenzaprine (FLEXERIL) 5 MG tablet Take 1-2 tablets (5-10 mg) by mouth 2 times daily as needed for muscle spasms 30 tablet 3    fluticasone-vilanterol (BREO ELLIPTA) 200-25 MCG/ACT inhaler Inhale 1 puff into the lungs daily 60 each 2    ibuprofen (ADVIL/MOTRIN) 200 MG tablet Take 200 mg by mouth every 6 hours as needed      methocarbamol (ROBAXIN) 500 MG tablet Take 1 tablet (500 mg) by mouth 4 times daily as needed for muscle spasms 20 tablet 3    montelukast (SINGULAIR) 10 MG tablet Take 1 tablet (10 mg) by mouth at bedtime 90 tablet 3    ZOLMitriptan (ZOMIG) 5 MG tablet TAKE 1 TABLET BY MOUTH AT ONSET OF HEADACHE FOR MIGRAINE MAY REPEAT IN 2 HOURS. MAX 2 TABS/24 HOURS 10 tablet 11     Allergies   Allergen Reactions    Adhesive Tape Blisters    Melon      Mouth gets itchy     Seasonal Allergies     Lisinopril Cough     Annoying dry cough      Topiramate Visual Disturbance     Blurred vision      Recent Labs   Lab Test 06/26/24  1801 09/20/23  1319 12/01/22  1419 12/01/22  1419 05/04/21  0731 12/04/19  1001   LDL  --   --   --  101* 84 80   HDL  --   --   --  51 54 45*   TRIG  --   --   --  67 86 68   ALT 12 11  --  11 15 28   CR 0.78 0.90   < > 0.74 0.76 0.66   GFRESTIMATED >90 79   < > >90 >90 >90   GFRESTBLACK  --   --   --   --  >90 >90   POTASSIUM 5.0 4.4   < > 4.2 4.5 4.0   TSH  --   --   --  1.69 1.49 0.65    < > = values in this interval not displayed.          Review of Systems  Constitutional, HEENT, cardiovascular, pulmonary, GI, , musculoskeletal, neuro, skin, endocrine and psych systems are negative, except as otherwise noted.     Objective    Exam  /80   Pulse 108   Temp 98  F (36.7  C) (Tympanic)   Resp 18   Ht 1.702 m (5' 7\")   Wt 83.1 kg (183 lb 4.8 oz)   LMP 07/05/2024 (Approximate)   SpO2 100%   BMI 28.71 kg/m     Estimated body mass index is 28.71 kg/m  as calculated from the following:    Height as of this encounter: 1.702 m (5' " "7\").    Weight as of this encounter: 83.1 kg (183 lb 4.8 oz).    Physical Exam  GENERAL: alert and no distress  EYES: Eyes grossly normal to inspection, PERRL and conjunctivae and sclerae normal  HENT: ear canals and TM's normal, nose and mouth without ulcers or lesions  NECK: no adenopathy, no asymmetry, masses, or scars  RESP: lungs clear to auscultation - no rales, rhonchi or wheezes  BREAST: normal without masses, tenderness or nipple discharge and no palpable axillary masses or adenopathy  CV: regular rate and rhythm, normal S1 S2, no S3 or S4, no murmur, click or rub, no peripheral edema  ABDOMEN: soft, nontender, no hepatosplenomegaly, no masses and bowel sounds normal   (female) w/bimanual: normal female external genitalia, normal urethral meatus, normal vaginal mucosa, and abnormal appearance to the inferior cervix - slightly inflamed and slightly cystic with increased vascularity  normal adnexa and uterus on bimanual exam without masses or discharge. There is a first degree uterine prolapse noted. No further uterine descent was noted with valsalva.   MS: no gross musculoskeletal defects noted, no edema  SKIN:  Neoplasm of uncertain behavior of skin- left pubic area and left breast inferior areola - slightly irregular pigmentation - images taken - see media section - pt will watch closely - nted 8/20/2024  no suspicious lesions or rashes.  There are 2 dermatofibroma appearing lesions - one left mid anterior thigh and one left posterior mid calf area - pt will keep a close eye on these - if they are changing, itching, growing and/or bleeding,she will call asap.   NEURO: Normal strength and tone, mentation intact and speech normal  PSYCH: mentation appears normal, affect normal/bright        Signed Electronically by: Linda Soto MD    "

## 2024-08-20 NOTE — LETTER
My Asthma Action Plan    Name: Deandra Lewis   YOB: 1975  Date: 8/20/2024   My doctor: Linda Soto MD   My clinic: [facility] [place]        My Control Medicine: Fluticasone furoate + vilanterol (Breo Ellipta)  -  200/25mcg 1 inhalation daily to twice daily during asthma exacerbations   My Rescue Medicine: Albuterol (Proair/Ventolin/Proventil HFA) 2-4 puffs EVERY 4 HOURS as needed. Use a spacer if recommended by your provider.   My Asthma Severity:   Intermittent / Exercise Induced  Know your asthma triggers: other possible triggers smoke, upper respiratory infections, dust mites, pollens, animal dander, insects/rodents, mold, humidity, aspirin, strong odors and fumes, occupational exposure, exercise or sports, emotions, cold air, and Gastric Reflux  Pt notes most triggered by :   upper respiratory infections            GREEN ZONE   Good Control  I feel good  No cough or wheeze  Can work, sleep and play without asthma symptoms       Take your asthma control medicine every day.     If exercise triggers your asthma, take your rescue medication  15 minutes before exercise or sports, and  During exercise if you have asthma symptoms  Spacer to use with inhaler: If you have a spacer, make sure to use it with your inhaler             YELLOW ZONE Getting Worse  I have ANY of these:  I do not feel good  Cough or wheeze  Chest feels tight  Wake up at night   Keep taking your Green Zone medications  Start taking your rescue medicine:  every 20 minutes for up to 1 hour. Then every 4 hours for 24-48 hours.  If you stay in the Yellow Zone for more than 12-24 hours, contact your doctor.  If you do not return to the Green Zone in 12-24 hours or you get worse, start taking your oral steroid medicine if prescribed by your provider.           RED ZONE Medical Alert - Get Help  I have ANY of these:  I feel awful  Medicine is not helping  Breathing getting harder  Trouble walking or  talking  Nose opens wide to breathe       Take your rescue medicine NOW  If your provider has prescribed an oral steroid medicine, start taking it NOW  Call your doctor NOW  If you are still in the Red Zone after 20 minutes and you have not reached your doctor:  Take your rescue medicine again and  Call 911 or go to the emergency room right away    See your regular doctor within 2 weeks of an Emergency Room or Urgent Care visit for follow-up treatment.          Annual Reminders:  Meet with Asthma Educator,  Flu Shot in the Fall, consider Pneumonia Vaccination for patients with asthma (aged 19 and older).    Pharmacy: [facility] [id number] [address]    Electronically signed by Linda Soto MD   Date: 08/20/24                      Asthma Triggers  How To Control Things That Make Your Asthma Worse    Triggers are things that make your asthma worse.  Look at the list below to help you find your triggers and what you can do about them.  You can help prevent asthma flare-ups by staying away from your triggers.      Trigger                                                          What you can do   Cigarette Smoke  Tobacco smoke can make asthma worse. Do not allow smoking in your home, car or around you.  Be sure no one smokes at a child s day care or school.  If you smoke, ask your health care provider for ways to help you quit.  Ask family members to quit too.  Ask your health care provider for a referral to Quit Plan to help you quit smoking, or call 7-465-386-PLAN.     Colds, Flu, Bronchitis  These are common triggers of asthma. Wash your hands often.  Don t touch your eyes, nose or mouth.  Get a flu shot every year.     Dust Mites  These are tiny bugs that live in cloth or carpet. They are too small to see. Wash sheets and blankets in hot water every week.   Encase pillows and mattress in dust mite proof covers.  Avoid having carpet if you can. If you have carpet, vacuum weekly.   Use a  dust mask and HEPA vacuum.   Pollen and Outdoor Mold  Some people are allergic to trees, grass, or weed pollen, or molds. Try to keep your windows closed.  Limit time out doors when pollen count is high.   Ask you health care provider about taking medicine during allergy season.     Animal Dander  Some people are allergic to skin flakes, urine or saliva from pets with fur or feathers. Keep pets with fur or feathers out of your home.    If you can t keep the pet outdoors, then keep the pet out of your bedroom.  Keep the bedroom door closed.  Keep pets off cloth furniture and away from stuffed toys.     Mice, Rats, and Cockroaches   Some people are allergic to the waste from these pests.   Cover food and garbage.  Clean up spills and food crumbs.  Store grease in the refrigerator.   Keep food out of the bedroom.   Indoor Mold  This can be a trigger if your home has high moisture. Fix leaking faucets, pipes, or other sources of water.   Clean moldy surfaces.  Dehumidify basement if it is damp and smelly.   Smoke, Strong Odors, and Sprays  These can reduce air quality. Stay away from strong odors and sprays, such as perfume, powder, hair spray, paints, smoke incense, paint, cleaning products, candles and new carpet.   Exercise or Sports  Some people with asthma have this trigger. Be active!  Ask your doctor about taking medicine before sports or exercise to prevent symptoms.    Warm up for 5-10 minutes before and after sports or exercise.     Other Triggers of Asthma  Cold air:  Cover your nose and mouth with a scarf.  Sometimes laughing or crying can be a trigger.  Some medicines and food can trigger asthma.

## 2024-08-20 NOTE — PATIENT INSTRUCTIONS
Patient Education   Preventive Care Advice   This is general advice given by our system to help you stay healthy. However, your care team may have specific advice just for you. Please talk to your care team about your preventive care needs.  Nutrition  Eat 5 or more servings of fruits and vegetables each day.  Try wheat bread, brown rice and whole grain pasta (instead of white bread, rice, and pasta).  Get enough calcium and vitamin D. Check the label on foods and aim for 100% of the RDA (recommended daily allowance).  Lifestyle  Exercise at least 150 minutes each week  (30 minutes a day, 5 days a week).  Do muscle strengthening activities 2 days a week. These help control your weight and prevent disease.  No smoking.  Wear sunscreen to prevent skin cancer.  Have a dental exam and cleaning every 6 months.  Yearly exams  See your health care team every year to talk about:  Any changes in your health.  Any medicines your care team has prescribed.  Preventive care, family planning, and ways to prevent chronic diseases.  Shots (vaccines)   HPV shots (up to age 26), if you've never had them before.  Hepatitis B shots (up to age 59), if you've never had them before.  COVID-19 shot: Get this shot when it's due.  Flu shot: Get a flu shot every year.  Tetanus shot: Get a tetanus shot every 10 years.  Pneumococcal, hepatitis A, and RSV shots: Ask your care team if you need these based on your risk.  Shingles shot (for age 50 and up)  General health tests  Diabetes screening:  Starting at age 35, Get screened for diabetes at least every 3 years.  If you are younger than age 35, ask your care team if you should be screened for diabetes.  Cholesterol test: At age 39, start having a cholesterol test every 5 years, or more often if advised.  Bone density scan (DEXA): At age 50, ask your care team if you should have this scan for osteoporosis (brittle bones).  Hepatitis C: Get tested at least once in your life.  STIs (sexually  transmitted infections)  Before age 24: Ask your care team if you should be screened for STIs.  After age 24: Get screened for STIs if you're at risk. You are at risk for STIs (including HIV) if:  You are sexually active with more than one person.  You don't use condoms every time.  You or a partner was diagnosed with a sexually transmitted infection.  If you are at risk for HIV, ask about PrEP medicine to prevent HIV.  Get tested for HIV at least once in your life, whether you are at risk for HIV or not.  Cancer screening tests  Cervical cancer screening: If you have a cervix, begin getting regular cervical cancer screening tests starting at age 21.  Breast cancer scan (mammogram): If you've ever had breasts, begin having regular mammograms starting at age 40. This is a scan to check for breast cancer.  Colon cancer screening: It is important to start screening for colon cancer at age 45.  Have a colonoscopy test every 10 years (or more often if you're at risk) Or, ask your provider about stool tests like a FIT test every year or Cologuard test every 3 years.  To learn more about your testing options, visit:   .  For help making a decision, visit:   https://bit.ly/ev58315.  Prostate cancer screening test: If you have a prostate, ask your care team if a prostate cancer screening test (PSA) at age 55 is right for you.  Lung cancer screening: If you are a current or former smoker ages 50 to 80, ask your care team if ongoing lung cancer screenings are right for you.  For informational purposes only. Not to replace the advice of your health care provider. Copyright   2023 [facility]. All rights reserved. Clinically reviewed by the [facility] Transitions Program. Krush [id number] - REV 01/24.  Eating Healthy Foods: Care Instructions  With every meal, you can make healthy food choices. Try to eat a variety of fruits, vegetables, whole grains, lean proteins, and low-fat dairy products. This can help  "you get the right balance of nutrients, including vitamins and minerals. Small changes add up over time. You can start by adding one healthy food to your meals each day.    Try to make half your plate fruits and vegetables, one-fourth whole grains, and one-fourth lean proteins. Try including dairy with your meals.   Eat more fruits and vegetables. Try to have them with most meals and snacks.   Foods for healthy eating    Fruits    These can be fresh, frozen, canned, or dried.  Try to choose whole fruit rather than fruit juice.  Eat a variety of colors.    Vegetables    These can be fresh, frozen, canned, or dried.  Beans, peas, and lentils count too.    Whole grains    Choose whole-grain breads, cereals, and noodles.  Try brown rice.    Lean proteins    These can include lean meat, poultry, fish, and eggs.  You can also have tofu, beans, peas, lentils, nuts, and seeds.    Dairy    Try milk, yogurt, and cheese.  Choose low-fat or fat-free when you can.  If you need to, use lactose-free milk or fortified plant-based milk products, such as soy milk.    Water    Drink water when you're thirsty.  Limit sugar-sweetened drinks, including soda, fruit drinks, and sports drinks.  Where can you learn more?  Go to https://www.[employer].com.net/patiented  Enter T756 in the search box to learn more about \"Eating Healthy Foods: Care Instructions.\"  Current as of: September 20, 2023               Content Version: 14.0    8707-1701 [employer].   Care instructions adapted under license by your healthcare professional. If you have questions about a medical condition or this instruction, always ask your healthcare professional. [employer] disclaims any warranty or liability for your use of this information.    Thank you so much or choosing [facility]  for your Health Care. It was a pleasure seeing you at your visit today! Please contact us with any questions or concerns you may have.  " "                 Linda Soto MD                              To reach your [facility] care team after hours call:   [phone] press #2 \"to speak with your care team\".  This will get you to our clinic instead of routing to central [facility]  scheduling.     PLEASE NOTE OUR HOURS HAVE CHANGED secondary to COVID-19 coronavirus pandemic, as we are trying to minimize patient exposure to the virus,  which is now widespread in the state.  These hours may change with very little notice.  We apologize for any inconvenience.       Our current clinic hours are:          Monday- Thursday   7:00am - 6:00pm  in person.      Friday  7:00am- 5:00pm                       Saturday and Sunday : Closed to in person and virtual visits        We have telephone and virtual visit times available between    7:00am - 6pm on Monday-Friday as well.                                                Phone:  [phone]      Our pharmacy hours: Monday through Friday 8:00am to 5:00pm                        Saturday - 9:00 am to 12 noon       Sunday : Closed.              Phone:  [phone]              ###  Please note: at this time we are not accepting any walk-in visits. ###      There is also information available at our web site:  [email or web address]    If your provider ordered any lab tests and you do not receive the results within 10 business days, please call the clinic.    If you need a medication refill please contact your pharmacy.  Please allow 3 business days for your refill to be completed.    Our clinic offers telephone visits and e visits.  Please ask one of your team members to explain more.      Use [employer] (secure email communication and access to your chart) to send your primary care provider a message or make an appointment. Ask someone on your Team how to sign up for [employer].                    "

## 2024-08-21 LAB
C TRACH DNA SPEC QL NAA+PROBE: NEGATIVE
N GONORRHOEA DNA SPEC QL NAA+PROBE: NEGATIVE

## 2024-08-22 LAB
HPV HR 12 DNA CVX QL NAA+PROBE: NEGATIVE
HPV16 DNA CVX QL NAA+PROBE: NEGATIVE
HPV18 DNA CVX QL NAA+PROBE: NEGATIVE
HUMAN PAPILLOMA VIRUS FINAL DIAGNOSIS: NORMAL

## 2024-08-26 LAB
BKR AP ASSOCIATED HPV REPORT: NORMAL
BKR LAB AP GYN ADEQUACY: NORMAL
BKR LAB AP GYN INTERPRETATION: NORMAL
BKR LAB AP LMP: NORMAL
BKR LAB AP PREVIOUS ABNORMAL: NORMAL
PATH REPORT.COMMENTS IMP SPEC: NORMAL
PATH REPORT.COMMENTS IMP SPEC: NORMAL
PATH REPORT.RELEVANT HX SPEC: NORMAL

## 2024-09-23 NOTE — PROGRESS NOTES
Virtual Visit Details    Type of service:  Video Visit     Originating Location (pt. Location): Home  Distant Location (provider location):  Off-site  Platform used for Video Visit: Oanh    Oncology Risk Management Consultation:  Date on this visit: 9/30/2024    Deandra Lewis is referred by Cely Hernandez CGC for an oncology risk management consultation. She requires high risk screening and surveillance to reduce her risk of cancer secondary to having a family history of breast cancer in her paternal grandmother and two maternal aunts, including her mother's identical twin. She is considered to be at high risk for breast cancer and has a 20.2% lifetime risk for breast cancer by the WONG model.     Primary Physician: Linda Soto MD    History Of Present Illness:  Ms. Lewis is a 48 year old female who presents with a family history of breast cancer.    Pertinent history:  Menarche at: 13  First child at: 27, three children  Menopausal status: likely perimenopausal, cycles are irregular and she is having insomnia.  Ovaries, fallopian tubes and uterus intact  Breast Density: heterogeneously dense  Hx of HRT: no history  Hx of breatfeeding: breast fed all children for a total of approximately   Hx of breast biopsies: No history  Hx of melanoma on her left thigh, diagnosed in August of 2023. This was excised. Had a second melanoma diagnosed in her right groin. This was excised. Neither needed any further treatment.   Hx of atypia or malignancy: none  Hx of stroke, TIA, PE, or DVT: No history  Hx clotting disorders: no known clotting disorders  Alcohol: One drink per month  Smoking: Formerly used for 2 years, quit in 2004  Other health habits: Runs multiple times per week. Eats a well balanced diet. 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     Genetic testing: NEGATIVE  5/21/2024: Deandra is negative for mutations in the 74 genes analyzed: AIP, ALK, APC, RACHAEL, AXIN2, BAP1, BARD1, BLM, BMPR1A, BRCA1, BRCA2, BRIP1, CDC73,  "CDH1, CDK4, CDKN1B, CDKN2A, CHEK2, CTNNA1, DICER1, EGFR, EPCAM, FH, FLCN, GREM1, HOXB13, KIT, LZTR1, MAX, MBD4, MC1R, MEN1, MET, MITF, MLH1, MSH2, MSH3, MSH6, MUTYH, NF1, NF2, NTHL1, PALB2, PDGFRA, PMS2, POLD1, POLE, POT1, BBNIQ8K, PTCH1, PTCH2, PTEN, RAD51C, RAD51D, RB1, RET, SDHA, SDHAF2, SDHB, SDHC, SDHD, SMAD4, SMARCA4, SMARCB1, SMARCE1, STK11, SUFU, TERT, EIEV816, TP53, TSC1, TSC2, VHL, WRN.      A copy of the test report can be found in the Laboratory tab, dated 5/21/24, and named \"LABORATORY MISCELLANEOUS ORDER\". The report is scanned in as a linked document.    Pertinent screening history:  8/16/2017: Screening mammogram, [email or web address]  5/31/2018: Diagnostic mammogram and left breast US for left breast pain, [email or web address]  10/29/2019: Screening tomosynthesis mammogram, BiRads1  12/8/2020: Screening tomosynthesis mammogram, BiRads1  10/25/2022: Screening tomosynthesis mammogram, BiRads1  11/7/2023: Screening tomosynthesis mammogram, BiRads1    At this visit, she denies new fatigue, breast pain, asymmetry, lumps, masses, thickening, nipple discharge and skin changes in her breasts. Reports a dark-colored freckle on her left areola. This is being followed by her dermatologist.       Past Medical/Surgical History:    Past Medical History:   Diagnosis Date    Anemia     Anxiety     paxil 10mg= mild bladder retention, at 30mg = ravenously hungry/weight gain increased headaches; lexapro = bad sweating    Contraception     ocp's some = mood swings ; seasonale: frequent vaginal bleeding and weight gain    Hot flashes 11/16/2016    Hypertension goal BP (blood pressure) < 140/90  - new diagnosis 02/02/2023    Kidney stones     Dr. 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    left - secondary to severe oral/sinus infection and bleeding  - [facility] [place]/p multiple surgeries     Multiple " fractures of left foot with routine healing 02/25/2021    Night sweats     Sleep problems     Uncomplicated asthma 20s     Past Surgical History:   Procedure Laterality Date    ABDOMEN SURGERY  2001    APPENDECTOMY      ARTHROSCOPY, ARTHROPLASTY TEMPOROMANDIBULAR JOINT, COMBINED  age 14     TMJ surgery as an 9th grader    BIOPSY NODE SENTINEL Left 10/09/2023    Procedure: Left Femoral Bell Buckle Lymph Node Mapping and Biopsy;  Surgeon: Tracie Higginbotham MD;  Location: [facility] OR    COLONOSCOPY  2017    Approximately    COMPLEX WOUND CLOSURE (UPDATE) N/A 10/09/2023    Procedure: COMPLEX CLOSURE SOFT TISSUE DEFECT LEFT THIGH 14 cm Length;  Surgeon: Pj Aguirre MD;  Location: [facility] OR    CYSTOSCOPY,URETEROSCOPY,STONE REMV  09/11/2002    with stent placements - since removed     ENDOSCOPIC BALLOON SINUPLASTY, OPTICAL TRACKING SYSTEM ENDOSCOPIC SINUS SURGERY, COMBINED  01/08/2013    Procedure: COMBINED ENDOSCOPIC BALLOON SINUPLASTY, OPTICAL TRACKING SYSTEM ENDOSCOPIC SINUS SURGERY;  Left Endo Sinus  Surgery  with Septoplasty      ENDOSCOPIC SINUS SURGERY  01/15/2013    Procedure: ENDOSCOPIC SINUS SURGERY;  removal of nasal packing with endocopic debridement of left paranasal sinuses;  Surgeon: Fabian Driver MD;  Location:  OR    ENDOSCOPIC SINUS SURGERY  04/22/2013    Procedure: ENDOSCOPIC SINUS SURGERY;  Functional Endoscopic Sinus Surgery With Biopsy ;  Surgeon: Jcarlos Hogan MD;  Location: [facility] OR    ENT SURGERY  2013    Deviated septum. Complications. Resulted in 3 more    EXCISE MASS TRUNK Right 7/3/2024    Procedure: Wide Local Excision of RIGHT Groin Skin Lesion;  Surgeon: Tracie Higginbotham MD;  Location:  OR    EXCISE SOFT TISSUE TUMOR THIGH Left 10/09/2023    Procedure: Wide Local Excision of LEFT thigh melanoma;  Surgeon: Tracie Higginbothma MD;  Location: [facility] OR       Allergies:  Allergies as of 09/30/2024 - Reviewed 09/30/2024   Allergen Reaction Noted    Adhesive tape Blisters  01/08/2013    Melon  04/14/2021    Seasonal allergies  03/25/2013    Lisinopril Cough 06/26/2024    Topiramate Visual Disturbance 06/26/2024       Current Medications:  Current Outpatient Medications   Medication Sig Dispense Refill    albuterol (PROAIR HFA/PROVENTIL HFA/VENTOLIN HFA) 108 (90 Base) MCG/ACT inhaler INHALE 2 PUFFS BY MOUTH EVERY 6 HOURS AS NEEDED FOR WHEEZE OR FOR SHORTNESS OF BREATH 18 g 11    buPROPion (WELLBUTRIN XL) 150 MG 24 hr tablet TAKE 3 TABLETS BY MOUTH EVERY MORNING. 270 tablet 1    cetirizine (ZYRTEC) 10 MG tablet Take 1 tablet (10 mg) by mouth every evening 30 tablet 1    cyclobenzaprine (FLEXERIL) 5 MG tablet Take 1-2 tablets (5-10 mg) by mouth 2 times daily as needed for muscle spasms 30 tablet 3    fluticasone-vilanterol (BREO ELLIPTA) 200-25 MCG/ACT inhaler Inhale 1 puff into the lungs daily 60 each 11    ibuprofen (ADVIL/MOTRIN) 200 MG tablet Take 200 mg by mouth every 6 hours as needed      methocarbamol (ROBAXIN) 500 MG tablet Take 1 tablet (500 mg) by mouth 4 times daily as needed for muscle spasms 20 tablet 3    montelukast (SINGULAIR) 10 MG tablet Take 1 tablet (10 mg) by mouth at bedtime 90 tablet 3    ZOLMitriptan (ZOMIG) 5 MG tablet TAKE 1 TABLET BY MOUTH AT ONSET OF HEADACHE FOR MIGRAINE MAY REPEAT IN 2 HOURS. MAX 2 TABS/24 HOURS 10 tablet 11        Family History:  Family History   Problem Relation Age of Onset    Asthma Mother     Depression Mother     Thyroid Disease Mother     Blood Disease Mother         Bleeding disorder    Bipolar Disorder Mother     Parkinsonism Mother     Dementia Mother     Anxiety Disorder Mother     Mental Illness Mother     Obesity Mother     Heart Disease Father         s/p pacemakers x 2     Diabetes Father     Depression Father     Skin Cancer Father     Colon Cancer Father 55    Prostate Cancer Father 79    Anesthesia Reaction Father         Very hard to come out of anesthesia    Substance Abuse Maternal Grandmother     Substance Abuse  Maternal Grandfather     Breast Cancer Paternal Grandmother         Possible    Osteoporosis Paternal Grandmother     Skin Cancer Paternal Grandfather         non-melanoma    Allergy (Severe) Son         Dairy, Eggs, and Oatmeal    Asthma Child     Breast Cancer Maternal Aunt 55    Leukemia Maternal Aunt 60    Breast Cancer Maternal Aunt 65        mother's identical twin     Prostate Cancer Paternal Uncle 84    Alcohol/Drug Other         Sibling       Social History:  Social History     Socioeconomic History    Marital status:      Spouse name: Noam    Number of children: 3    Years of education: 16    Highest education level: Not on file   Occupational History    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       Employer: SELF     Comment: Bachelor's in psychology & family studies     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   Tobacco Use    Smoking status: Former     Current packs/day: 0.10     Average packs/day: 0.1 packs/day for 5.0 years (0.5 ttl pk-yrs)     Types: Cigarettes     Passive exposure: Past    Smokeless tobacco: Never    Tobacco comments:     Tried smoking in high school   Vaping Use    Vaping status: Never Used   Substance and Sexual Activity    Alcohol use: Not Currently     Comment: Rarely    Drug use: No     Comment: no herbal meds except for acidophilus     Sexual activity: Not Currently     Partners: Male     Birth control/protection: Abstinence     Comment: condoms - natural family planning    Other Topics Concern    Parent/sibling w/ CABG, MI or angioplasty before 65F 55M? Yes     Service No    Blood Transfusions No    Caffeine Concern Yes     Comment: 2 sodas/day     Occupational Exposure Not Asked    Hobby Hazards Not Asked    Sleep Concern Not Asked    Stress Concern Not Asked    Weight Concern Not Asked    Special Diet Not Asked    Back Care Not Asked    Exercise Yes     Comment: started recently again     Bike Helmet Not Asked    Seat Belt Yes      Comment: always     Self-Exams Yes     Comment: SBE encouraged monthly    Social History Narrative    Not on file     Social Determinants of Health     Financial Resource Strain: Low Risk  (8/17/2024)    Financial Resource Strain     Within the past 12 months, have you or your family members you live with been unable to get utilities (heat, electricity) when it was really needed?: No   Food Insecurity: Low Risk  (8/17/2024)    Food Insecurity     Within the past 12 months, did you worry that your food would run out before you got money to buy more?: No     Within the past 12 months, did the food you bought just not last and you didn t have money to get more?: No   Transportation Needs: Low Risk  (8/17/2024)    Transportation Needs     Within the past 12 months, has lack of transportation kept you from medical appointments, getting your medicines, non-medical meetings or appointments, work, or from getting things that you need?: No   Physical Activity: Sufficiently Active (8/17/2024)    Exercise Vital Sign     Days of Exercise per Week: 5 days     Minutes of Exercise per Session: 50 min   Stress: No Stress Concern Present (8/17/2024)    [place] [employer] - Occupational Stress Questionnaire     Feeling of Stress : Not at all   Social Connections: Unknown (8/17/2024)    Social Connection and Isolation Panel [NHANES]     Frequency of Communication with Friends and Family: Not on file     Frequency of Social Gatherings with Friends and Family: Twice a week     Attends Gnosticism Services: Not on file     Active Member of Clubs or Organizations: Not on file     Attends Club or Organization Meetings: Not on file     Marital Status: Not on file   Interpersonal Safety: Low Risk  (8/20/2024)    Interpersonal Safety     Do you feel physically and emotionally safe where you currently live?: Yes     Within the past 12 months, have you been hit, slapped, kicked or otherwise physically hurt by someone?: No     " Within the past 12 months, have you been humiliated or emotionally abused in other ways by your partner or ex-partner?: No   Housing Stability: Low Risk  (8/17/2024)    Housing Stability     Do you have housing? : Yes     Are you worried about losing your housing?: No       Physical Exam:  Ht 1.727 m (5' 8\")   Wt 82.6 kg (182 lb)   LMP 07/05/2024 (Approximate)   BMI 27.67 kg/m    GENERAL: alert and no distress  EYES: Eyes grossly normal to inspection.  No discharge or erythema, or obvious scleral/conjunctival abnormalities.  RESP: No audible wheeze, cough, or visible cyanosis.    SKIN: Visible skin clear. No significant rash, abnormal pigmentation or lesions.  NEURO: Cranial nerves grossly intact.  Mentation and speech appropriate for age.  PSYCH: Appropriate affect, tone, and pace of words    Laboratory/Imaging Studies  No results found for any visits on 09/30/24.    ASSESSMENT    Deandra comes to the Cancer Risk Management Program to discuss screening recommendations related to an increased risk of developing breast cancer. She has no concerns with her breast tissue at this visit. We reviewed her personal and family history. We discussed that an annual breast MRI would be recommended for her, in addition to an annual mammogram. We discussed that we get the most benefit from staggering these six months apart in order to decreased the interval between screenings.     We reviewed signs and symptoms to be watchful for in between visits including breast lumps, thickening, swelling, tenderness, nipple discharge or changes in skin of breasts. We reviewed the recommendation for breast self awareness.    We also discussed following a healthy lifestyle plan recommended by both NCCN and the American Cancer Society that can reduce the risk of cancer:  1. Limit alcohol consumption to less than 1 drink per day and no more than three drinks per week  (1 drink=5 oz.wine, 12 oz. Beer or 1.5 oz. 80-proof liquor).  2. Exercise " per American Cancer Society guidelines of at least 150 minutes of moderate-intensity activity or 75 minutes of vigorous activity each week. (Or a combination of both.) Exercise should be spread  out over the week.  3. Maintain a healthy weight with a Body Mass Index between 19-24.9.  4. Do not use tobacco products and limit exposure to passive smoke.  5. Eat a diet with a variety of fruits, vegetables, and whole grains.        Individualized Surveillance Plan for women  With 20% or greater lifetime risk of breast cancer   Per NCCN Breast Cancer Screening and Diagnosis Guidelines Version 2.2024   Recommended screening Test or procedure Last done Next Scheduled    Clinical encounter Clinical exam every 6-12 months.   Refer to genetic counseling if not already done.  Consider risk reduction strategies.   September 2024 April 2025   However, some family histories with breast cancers at a very young age, may warrant screening starting earlier.    *May begin at age 40 if breast cancers in the family occur at later ages.    Annual mammogram beginning 10 years younger than the earliest breast cancer in the family but not prior to age 30.    Recommend annual breast MRI to begin 10 years younger than the earliest breast cancer in the family but not prior to age 25.    Breast MRIs are preferably done on day 7-15 of the menstrual cycle in premenopausal women.   11/7/2023: Screening tomosynthesis mammogram, [email or web address]   Breast MRI soon    Return to clinic with a mammogram following our visit in April 2025   Breast screening for patients at high risk due to thoracic radiation between the ages of 10-30   Annual clinical exam beginning 8 years after radiation therapy.    Annual screening mammogram beginning at age 30 or 8 years after radiation therapy    Annual breast MRI, beginning at age 25 or 8 years after radiation therapy.     NA   NA   Women who have a lifetime risk of >20% based on history of LCIS or ADH/ALH Annual screening  mammogram beginning at age of LCIS or ADH/ALH but not prior to age 30.    Consider annual MRI to begin at age of diagnosis of LCIS or ADH/ALH but not prior to age 25.    Strongly recommend risk reducing strategies if possible   NA   NA    Recommend risk reducing strategies for women with 1.7% 5 year risk of breast cancer.           I spent a total of 53 minutes on the day of the visit. Please see the note for further information on patient assessment and treatment.     Winnie Trinidad, FELIBERTO, APRN, [facility]-BC  Clinical Nurse Specialist  Cancer Risk Management Program  [facility]    Cc:  ARIEL Garcia MD

## 2024-09-30 ENCOUNTER — VIRTUAL VISIT (OUTPATIENT)
Dept: ONCOLOGY | Facility: CLINIC | Age: 49
End: 2024-09-30
Attending: GENETIC COUNSELOR, MS
Payer: COMMERCIAL

## 2024-09-30 VITALS — WEIGHT: 182 LBS | HEIGHT: 68 IN | BODY MASS INDEX: 27.58 KG/M2

## 2024-09-30 DIAGNOSIS — R92.30 DENSE BREAST: ICD-10-CM

## 2024-09-30 DIAGNOSIS — Z12.39 BREAST CANCER SCREENING, HIGH RISK PATIENT: Primary | ICD-10-CM

## 2024-09-30 PROCEDURE — 99215 OFFICE O/P EST HI 40 MIN: CPT | Mod: 95

## 2024-09-30 ASSESSMENT — PAIN SCALES - GENERAL: PAINLEVEL: NO PAIN (0)

## 2024-09-30 NOTE — NURSING NOTE
Patient reviewed medications and allergies in [employer] during e-check in and said everything looked correct.      Current patient location: [facility] [address]  [place] [address]    Is the patient currently in the state of [place]? YES    Visit mode:VIDEO    If the visit is dropped, the patient can be reconnected by: VIDEO VISIT: Text to cell phone:   Telephone Information:   Mobile [phone]    and VIDEO VISIT: Send to e-mail at: [email or web address]    Will anyone else be joining the visit? NO  (If patient encounters technical issues they should call [phone])    Are changes needed to the allergy or medication list? Pt stated no med changes    Are refills needed on medications prescribed by this physician? NO    Rooming Documentation:  Not applicable    Reason for visit: Consult    Linda MCGOVERN

## 2024-09-30 NOTE — PATIENT INSTRUCTIONS
Individualized Surveillance Plan for women  With 20% or greater lifetime risk of breast cancer   Per NCCN Breast Cancer Screening and Diagnosis Guidelines Version 2.2024   Recommended screening Test or procedure Last done Next Scheduled    Clinical encounter Clinical exam every 6-12 months.   Refer to genetic counseling if not already done.  Consider risk reduction strategies.   September 2024 April 2025   However, some family histories with breast cancers at a very young age, may warrant screening starting earlier.    *May begin at age 40 if breast cancers in the family occur at later ages.    Annual mammogram beginning 10 years younger than the earliest breast cancer in the family but not prior to age 30.    Recommend annual breast MRI to begin 10 years younger than the earliest breast cancer in the family but not prior to age 25.    Breast MRIs are preferably done on day 7-15 of the menstrual cycle in premenopausal women.   11/7/2023: Screening tomosynthesis mammogram, BiRads1   Breast MRI soon    Return to clinic with a mammogram following our visit in April 2025   Breast screening for patients at high risk due to thoracic radiation between the ages of 10-30   Annual clinical exam beginning 8 years after radiation therapy.    Annual screening mammogram beginning at age 30 or 8 years after radiation therapy    Annual breast MRI, beginning at age 25 or 8 years after radiation therapy.     NA   NA   Women who have a lifetime risk of >20% based on history of LCIS or ADH/ALH Annual screening mammogram beginning at age of LCIS or ADH/ALH but not prior to age 30.    Consider annual MRI to begin at age of diagnosis of LCIS or ADH/ALH but not prior to age 25.    Strongly recommend risk reducing strategies if possible   NA   NA    Recommend risk reducing strategies for women with 1.7% 5 year risk of breast cancer.

## 2024-09-30 NOTE — LETTER
9/30/2024      Deandra Lewis  [address] [place] [address]      Dear Colleague,    Thank you for referring your patient, Deandra Lewis, to the [facility]. Please see a copy of my visit note below.    Virtual Visit Details    Type of service:  Video Visit     Originating Location (pt. Location): Home  Distant Location (provider location):  Off-site  Platform used for Video Visit: [facility]    Oncology Risk Management Consultation:  Date on this visit: 9/30/2024    Deandra Lewis is referred by Cely Hernandez CGC for an oncology risk management consultation. She requires high risk screening and surveillance to reduce her risk of cancer secondary to having a family history of breast cancer in her paternal grandmother and two maternal aunts, including her mother's identical twin. She is considered to be at high risk for breast cancer and has a 20.2% lifetime risk for breast cancer by the WONG model.     Primary Physician: Linda Soto MD    History Of Present Illness:  Ms. Lewis is a 48 year old female who presents with a family history of breast cancer.    Pertinent history:  Menarche at: 13  First child at: 27, three children  Menopausal status: likely perimenopausal, cycles are irregular and she is having insomnia.  Ovaries, fallopian tubes and uterus intact  Breast Density: heterogeneously dense  Hx of HRT: no history  Hx of breatfeeding: breast fed all children for a total of approximately   Hx of breast biopsies: No history  Hx of melanoma on her left thigh, diagnosed in August of 2023. This was excised. Had a second melanoma diagnosed in her right groin. This was excised. Neither needed any further treatment.   Hx of atypia or malignancy: none  Hx of stroke, TIA, PE, or DVT: No history  Hx clotting disorders: no known clotting disorders  Alcohol: One drink per month  Smoking: Formerly used for 2 years, quit in 2004  Other health habits: Runs multiple times per week.  "Eats a well balanced diet. Plays the Binpress and Greek horn.     Genetic testing: NEGATIVE  5/21/2024: Deandra is negative for mutations in the 74 genes analyzed: AIP, ALK, APC, RACHAEL, AXIN2, BAP1, BARD1, BLM, BMPR1A, BRCA1, BRCA2, BRIP1, CDC73, CDH1, CDK4, CDKN1B, CDKN2A, CHEK2, CTNNA1, DICER1, EGFR, EPCAM, FH, FLCN, GREM1, HOXB13, KIT, LZTR1, MAX, MBD4, MC1R, MEN1, MET, MITF, MLH1, MSH2, MSH3, MSH6, MUTYH, NF1, NF2, NTHL1, PALB2, PDGFRA, PMS2, POLD1, POLE, POT1, LPDIG8T, PTCH1, PTCH2, PTEN, RAD51C, RAD51D, RB1, RET, SDHA, SDHAF2, SDHB, SDHC, SDHD, SMAD4, SMARCA4, SMARCB1, SMARCE1, STK11, SUFU, TERT, DRZJ906, TP53, TSC1, TSC2, VHL, WRN.      A copy of the test report can be found in the Laboratory tab, dated 5/21/24, and named \"LABORATORY MISCELLANEOUS ORDER\". The report is scanned in as a linked document.    Pertinent screening history:  8/16/2017: Screening mammogram, BiRads1  5/31/2018: Diagnostic mammogram and left breast US for left breast pain, BiRads1  10/29/2019: Screening tomosynthesis mammogram, BiRads1  12/8/2020: Screening tomosynthesis mammogram, BiRads1  10/25/2022: Screening tomosynthesis mammogram, BiRads1  11/7/2023: Screening tomosynthesis mammogram, BiRads1    At this visit, she denies new fatigue, breast pain, asymmetry, lumps, masses, thickening, nipple discharge and skin changes in her breasts. Reports a dark-colored freckle on her left areola. This is being followed by her dermatologist.       Past Medical/Surgical History:    Past Medical History:   Diagnosis Date     Anemia      Anxiety     paxil 10mg= mild bladder retention, at 30mg = ravenously hungry/weight gain increased headaches; lexapro = bad sweating     Contraception     ocp's some = mood swings ; seasonale: frequent vaginal bleeding and weight gain     Hot flashes 11/16/2016     Hypertension goal BP (blood pressure) < 140/90  - new diagnosis 02/02/2023     Kidney stones     Dr. 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    left - secondary to severe oral/sinus infection and bleeding  - [facility] [place] s/p multiple surgeries      Multiple fractures of left foot with routine healing 02/25/2021     Night sweats      Sleep problems      Uncomplicated asthma 20s     Past Surgical History:   Procedure Laterality Date     ABDOMEN SURGERY  2001     APPENDECTOMY       ARTHROSCOPY, ARTHROPLASTY TEMPOROMANDIBULAR JOINT, COMBINED  age 14     TMJ surgery as an 7th grader     BIOPSY NODE SENTINEL Left 10/09/2023    Procedure: Left Femoral [place] Lymph Node Mapping and Biopsy;  Surgeon: Tracie Higginbotham MD;  Location:  OR     COLONOSCOPY  2017    Approximately     COMPLEX WOUND CLOSURE (UPDATE) N/A 10/09/2023    Procedure: COMPLEX CLOSURE SOFT TISSUE DEFECT LEFT THIGH 14 cm Length;  Surgeon: Pj Aguirre MD;  Location:  OR     CYSTOSCOPY,URETEROSCOPY,STONE REMV  09/11/2002    with stent placements - since removed      ENDOSCOPIC BALLOON SINUPLASTY, OPTICAL TRACKING SYSTEM ENDOSCOPIC SINUS SURGERY, COMBINED  01/08/2013    Procedure: COMBINED ENDOSCOPIC BALLOON SINUPLASTY, OPTICAL TRACKING SYSTEM ENDOSCOPIC SINUS SURGERY;  Left Endo Sinus  Surgery  with Septoplasty       ENDOSCOPIC SINUS SURGERY  01/15/2013    Procedure: ENDOSCOPIC SINUS SURGERY;  removal of nasal packing with endocopic debridement of left paranasal sinuses;  Surgeon: Fabian Driver MD;  Location:  OR     ENDOSCOPIC SINUS SURGERY  04/22/2013    Procedure: ENDOSCOPIC SINUS SURGERY;  Functional Endoscopic Sinus Surgery With Biopsy ;  Surgeon: Jcarlos Hogan MD;  Location:  OR     ENT SURGERY  2013    Deviated septum. Complications. Resulted in 3 more     EXCISE MASS TRUNK Right 7/3/2024    Procedure: Wide Local Excision of RIGHT Groin Skin Lesion;  Surgeon: Tracie Higginbotham MD;  Location:  OR     EXCISE SOFT TISSUE  TUMOR THIGH Left 10/09/2023    Procedure: Wide Local Excision of LEFT thigh melanoma;  Surgeon: Tracie Higginbotham MD;  Location: [facility] OR       Allergies:  Allergies as of 09/30/2024 - Reviewed 09/30/2024   Allergen Reaction Noted     Adhesive tape Blisters 01/08/2013     Melon  04/14/2021     Seasonal allergies  03/25/2013     Lisinopril Cough 06/26/2024     Topiramate Visual Disturbance 06/26/2024       Current Medications:  Current Outpatient Medications   Medication Sig Dispense Refill     albuterol (PROAIR HFA/PROVENTIL HFA/VENTOLIN HFA) 108 (90 Base) MCG/ACT inhaler INHALE 2 PUFFS BY MOUTH EVERY 6 HOURS AS NEEDED FOR WHEEZE OR FOR SHORTNESS OF BREATH 18 g 11     buPROPion (WELLBUTRIN XL) 150 MG 24 hr tablet TAKE 3 TABLETS BY MOUTH EVERY MORNING. 270 tablet 1     cetirizine (ZYRTEC) 10 MG tablet Take 1 tablet (10 mg) by mouth every evening 30 tablet 1     cyclobenzaprine (FLEXERIL) 5 MG tablet Take 1-2 tablets (5-10 mg) by mouth 2 times daily as needed for muscle spasms 30 tablet 3     fluticasone-vilanterol (BREO ELLIPTA) 200-25 MCG/ACT inhaler Inhale 1 puff into the lungs daily 60 each 11     ibuprofen (ADVIL/MOTRIN) 200 MG tablet Take 200 mg by mouth every 6 hours as needed       methocarbamol (ROBAXIN) 500 MG tablet Take 1 tablet (500 mg) by mouth 4 times daily as needed for muscle spasms 20 tablet 3     montelukast (SINGULAIR) 10 MG tablet Take 1 tablet (10 mg) by mouth at bedtime 90 tablet 3     ZOLMitriptan (ZOMIG) 5 MG tablet TAKE 1 TABLET BY MOUTH AT ONSET OF HEADACHE FOR MIGRAINE MAY REPEAT IN 2 HOURS. MAX 2 TABS/24 HOURS 10 tablet 11        Family History:  Family History   Problem Relation Age of Onset     Asthma Mother      Depression Mother      Thyroid Disease Mother      Blood Disease Mother         Bleeding disorder     Bipolar Disorder Mother      Parkinsonism Mother      Dementia Mother      Anxiety Disorder Mother      Mental Illness Mother      Obesity Mother      Heart Disease Father          s/p pacemakers x 2      Diabetes Father      Depression Father      Skin Cancer Father      Colon Cancer Father 55     Prostate Cancer Father 79     Anesthesia Reaction Father         Very hard to come out of anesthesia     Substance Abuse Maternal Grandmother      Substance Abuse Maternal Grandfather      Breast Cancer Paternal Grandmother         Possible     Osteoporosis Paternal Grandmother      Skin Cancer Paternal Grandfather         non-melanoma     Allergy (Severe) Son         Dairy, Eggs, and Oatmeal     Asthma Child      Breast Cancer Maternal Aunt 55     Leukemia Maternal Aunt 60     Breast Cancer Maternal Aunt 65        mother's identical twin      Prostate Cancer Paternal Uncle 84     Alcohol/Drug Other         Sibling       Social History:  Social History     Socioeconomic History     Marital status:      Spouse name: Noam     Number of children: 3     Years of education: 16     Highest education level: Not on file   Occupational History     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       Employer: SELF     Comment: Bachelor's in psychology & family studies     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   Tobacco Use     Smoking status: Former     Current packs/day: 0.10     Average packs/day: 0.1 packs/day for 5.0 years (0.5 ttl pk-yrs)     Types: Cigarettes     Passive exposure: Past     Smokeless tobacco: Never     Tobacco comments:     Tried smoking in high school   Vaping Use     Vaping status: Never Used   Substance and Sexual Activity     Alcohol use: Not Currently     Comment: Rarely     Drug use: No     Comment: no herbal meds except for acidophilus      Sexual activity: Not Currently     Partners: Male     Birth control/protection: Abstinence     Comment: condoms - natural family planning    Other Topics Concern     Parent/sibling w/ CABG, MI or angioplasty before 65F 55M? Yes      Service No     Blood Transfusions No     Caffeine Concern Yes     Comment:  2 sodas/day      Occupational Exposure Not Asked     Hobby Hazards Not Asked     Sleep Concern Not Asked     Stress Concern Not Asked     Weight Concern Not Asked     Special Diet Not Asked     Back Care Not Asked     Exercise Yes     Comment: started recently again      Bike Helmet Not Asked     Seat Belt Yes     Comment: always      Self-Exams Yes     Comment: SBE encouraged monthly    Social History Narrative     Not on file     Social Determinants of Health     Financial Resource Strain: Low Risk  (8/17/2024)    Financial Resource Strain      Within the past 12 months, have you or your family members you live with been unable to get utilities (heat, electricity) when it was really needed?: No   Food Insecurity: Low Risk  (8/17/2024)    Food Insecurity      Within the past 12 months, did you worry that your food would run out before you got money to buy more?: No      Within the past 12 months, did the food you bought just not last and you didn t have money to get more?: No   Transportation Needs: Low Risk  (8/17/2024)    Transportation Needs      Within the past 12 months, has lack of transportation kept you from medical appointments, getting your medicines, non-medical meetings or appointments, work, or from getting things that you need?: No   Physical Activity: Sufficiently Active (8/17/2024)    Exercise Vital Sign      Days of Exercise per Week: 5 days      Minutes of Exercise per Session: 50 min   Stress: No Stress Concern Present (8/17/2024)    [place] [employer] - Occupational Stress Questionnaire      Feeling of Stress : Not at all   Social Connections: Unknown (8/17/2024)    Social Connection and Isolation Panel [NHANES]      Frequency of Communication with Friends and Family: Not on file      Frequency of Social Gatherings with Friends and Family: Twice a week      Attends Roman [employer] Services: Not on file      Active Member of Clubs or Organizations: Not on file      Attends Club or  "Organization Meetings: Not on file      Marital Status: Not on file   Interpersonal Safety: Low Risk  (8/20/2024)    Interpersonal Safety      Do you feel physically and emotionally safe where you currently live?: Yes      Within the past 12 months, have you been hit, slapped, kicked or otherwise physically hurt by someone?: No      Within the past 12 months, have you been humiliated or emotionally abused in other ways by your partner or ex-partner?: No   Housing Stability: Low Risk  (8/17/2024)    Housing Stability      Do you have housing? : Yes      Are you worried about losing your housing?: No       Physical Exam:  Ht 1.727 m (5' 8\")   Wt 82.6 kg (182 lb)   LMP 07/05/2024 (Approximate)   BMI 27.67 kg/m    GENERAL: alert and no distress  EYES: Eyes grossly normal to inspection.  No discharge or erythema, or obvious scleral/conjunctival abnormalities.  RESP: No audible wheeze, cough, or visible cyanosis.    SKIN: Visible skin clear. No significant rash, abnormal pigmentation or lesions.  NEURO: Cranial nerves grossly intact.  Mentation and speech appropriate for age.  PSYCH: Appropriate affect, tone, and pace of words    Laboratory/Imaging Studies  No results found for any visits on 09/30/24.    ASSESSMENT    Deandra comes to the Cancer Risk Management Program to discuss screening recommendations related to an increased risk of developing breast cancer. She has no concerns with her breast tissue at this visit. We reviewed her personal and family history. We discussed that an annual breast MRI would be recommended for her, in addition to an annual mammogram. We discussed that we get the most benefit from staggering these six months apart in order to decreased the interval between screenings.     We reviewed signs and symptoms to be watchful for in between visits including breast lumps, thickening, swelling, tenderness, nipple discharge or changes in skin of breasts. We reviewed the recommendation for breast self " awareness.    We also discussed following a healthy lifestyle plan recommended by both NCCN and the American Cancer Society that can reduce the risk of cancer:  1. Limit alcohol consumption to less than 1 drink per day and no more than three drinks per week  (1 drink=5 oz.wine, 12 oz. Beer or 1.5 oz. 80-proof liquor).  2. Exercise per American Cancer Society guidelines of at least 150 minutes of moderate-intensity activity or 75 minutes of vigorous activity each week. (Or a combination of both.) Exercise should be spread  out over the week.  3. Maintain a healthy weight with a Body Mass Index between 19-24.9.  4. Do not use tobacco products and limit exposure to passive smoke.  5. Eat a diet with a variety of fruits, vegetables, and whole grains.        Individualized Surveillance Plan for women  With 20% or greater lifetime risk of breast cancer   Per NCCN Breast Cancer Screening and Diagnosis Guidelines Version 2.2024   Recommended screening Test or procedure Last done Next Scheduled    Clinical encounter Clinical exam every 6-12 months.   Refer to genetic counseling if not already done.  Consider risk reduction strategies.   September 2024 April 2025   However, some family histories with breast cancers at a very young age, may warrant screening starting earlier.    *May begin at age 40 if breast cancers in the family occur at later ages.    Annual mammogram beginning 10 years younger than the earliest breast cancer in the family but not prior to age 30.    Recommend annual breast MRI to begin 10 years younger than the earliest breast cancer in the family but not prior to age 25.    Breast MRIs are preferably done on day 7-15 of the menstrual cycle in premenopausal women.   11/7/2023: Screening tomosynthesis mammogram, BiRads1   Breast MRI soon    Return to clinic with a mammogram following our visit in April 2025   Breast screening for patients at high risk due to thoracic radiation between the ages of 10-30    Annual clinical exam beginning 8 years after radiation therapy.    Annual screening mammogram beginning at age 30 or 8 years after radiation therapy    Annual breast MRI, beginning at age 25 or 8 years after radiation therapy.     NA   NA   Women who have a lifetime risk of >20% based on history of LCIS or ADH/ALH Annual screening mammogram beginning at age of LCIS or ADH/ALH but not prior to age 30.    Consider annual MRI to begin at age of diagnosis of LCIS or ADH/ALH but not prior to age 25.    Strongly recommend risk reducing strategies if possible   NA   NA    Recommend risk reducing strategies for women with 1.7% 5 year risk of breast cancer.           I spent a total of 53 minutes on the day of the visit. Please see the note for further information on patient assessment and treatment.     Winnie Trinidad DNP, ANEESH, [facility]  Clinical Nurse Specialist  Cancer Risk Management Program  [facility]    Cc:  Cely Hernandez [facility]  Linda Soto MD              Again, thank you for allowing me to participate in the care of your patient.        Sincerely,        ANEESH Banuelos CNP

## 2024-10-07 ENCOUNTER — NURSE TRIAGE (OUTPATIENT)
Dept: FAMILY MEDICINE | Facility: CLINIC | Age: 49
End: 2024-10-07
Payer: COMMERCIAL

## 2024-10-07 NOTE — TELEPHONE ENCOUNTER
S-(situation): wants to restart BP medication    B-(background): was on BP medication then stopped.     A-(assessment): thinks she needs to go back on BP medication. Can tell its going up with stress at work. Can tell based on how I feel and based on the redness in the neck.  Cannot leave work today for appt. Denies weakness, lightheadedness, numbness tingling, vision changes.     R-(recommendations): scheduled appt tomorrow.      Next 5 appointments (look out 90 days)      Oct 08, 2024 2:30 PM  (Arrive by 2:10 PM)  Provider Visit with Molly Joy PA-C  [facility] ([facility] )  Arrive at: [facility] [address] [place] [id number] [phone]           RAJEEV MCKEON RN on 10/7/2024 at 2:38 PM   [facility]

## 2024-10-08 ENCOUNTER — OFFICE VISIT (OUTPATIENT)
Dept: FAMILY MEDICINE | Facility: CLINIC | Age: 49
End: 2024-10-08
Payer: COMMERCIAL

## 2024-10-08 VITALS
HEIGHT: 67 IN | HEART RATE: 95 BPM | RESPIRATION RATE: 18 BRPM | DIASTOLIC BLOOD PRESSURE: 90 MMHG | BODY MASS INDEX: 28.99 KG/M2 | SYSTOLIC BLOOD PRESSURE: 136 MMHG | TEMPERATURE: 97.3 F | OXYGEN SATURATION: 100 % | WEIGHT: 184.7 LBS

## 2024-10-08 DIAGNOSIS — I10 HYPERTENSION GOAL BP (BLOOD PRESSURE) < 140/90: Primary | ICD-10-CM

## 2024-10-08 PROCEDURE — G2211 COMPLEX E/M VISIT ADD ON: HCPCS

## 2024-10-08 PROCEDURE — 99214 OFFICE O/P EST MOD 30 MIN: CPT

## 2024-10-08 RX ORDER — TELMISARTAN 20 MG/1
20 TABLET ORAL DAILY
Qty: 30 TABLET | Refills: 1 | Status: SHIPPED | OUTPATIENT
Start: 2024-10-08 | End: 2024-11-03

## 2024-10-08 NOTE — PATIENT INSTRUCTIONS
- Start taking Telmisartan 20mg daily.  - Check blood pressures at home in the morning and evening. Please keep a log of these at home pressures and bring them in to next appointment.   - Schedule an appointment with me in 2 weeks for BP recheck.

## 2024-10-08 NOTE — PROGRESS NOTES
Assessment & Plan     Hypertension goal BP (blood pressure) < 140/90 - new diagnosis fall 2023 - much improved with weight loss and regular CV exercise  Deandra is hypertensive today at 136/90. Patient is symptomatic and would like to start medications again and this is reasonable. Will start telmisartan 20mg daily. Advised patient to  at-home BP cuff and start logging BP in morning and at night and bring the log into her follow up appointment with me in 2 weeks. This will help better determine her normal BP at home and help us determine dose adjustments if necessary.   - telmisartan (MICARDIS) 20 MG tablet  Dispense: 30 tablet; Refill: 1    Patient should follow up in 2 weeks for BP follow up or sooner for new or worsening symptoms. All questions answered to patient's satisfaction. Warning signs of when to seek emergency care were discussed.         Subjective   Deandra is a 48 year old, presenting for the following health issues:  Hypertension        10/8/2024     2:10 PM   Additional Questions   Roomed by Liz HOPE CMA   Accompanied by self     History of Present Illness       Hypertension: She presents for follow up of hypertension.  She does not check blood pressure  regularly outside of the clinic. Outpatient blood pressures have not been over 140/90. She does not follow a low salt diet.     She eats 2-3 servings of fruits and vegetables daily.She consumes 0 sweetened beverage(s) daily.She exercises with enough effort to increase her heart rate 30 to 60 minutes per day.  She exercises with enough effort to increase her heart rate 5 days per week.   She is taking medications regularly.     Patient states blood pressure had been very high, work very stressful and can tell blood pressure is very elevated because she is becoming flushed around her neck and can feel her heart pounding. Would like to start medication again to help.    BP has been an issue in the past. Then lost weight and BP improved. Now job is  "becoming increasingly more stressful and she can feel her BP raising due to work stressors.  from  and is having marital stress and two teenagers at home. Is taking medications for anxiety and feels her anxiety is well under control. Has a history of headaches/migraines and these have not changed with increased BP.    Has been researching different blood pressure medications and discussing with physician friends. They suggested the ARB Telmisartan . Patient shares she has tried other BP meds in the past and experienced impacted vision and dry cough with lisinopril and topiramate.     Deandra is picking up a blood pressure cuff to have at home and will start logging home pressures.    Review of Systems  Constitutional, neuro, ENT, endocrine, pulmonary, cardiac, gastrointestinal, genitourinary, musculoskeletal, integument and psychiatric systems are negative, except as otherwise noted.      Objective    BP (!) 136/90   Pulse 95   Temp 97.3  F (36.3  C) (Tympanic)   Resp 18   Ht 1.702 m (5' 7\")   Wt 83.8 kg (184 lb 11.2 oz)   LMP 07/05/2024 (Approximate)   SpO2 100%   BMI 28.93 kg/m    Body mass index is 28.93 kg/m .  Physical Exam  Constitutional:       Appearance: Normal appearance.   HENT:      Head: Normocephalic.      Nose: Nose normal.      Mouth/Throat:      Mouth: Mucous membranes are moist.      Pharynx: Oropharynx is clear.   Eyes:      General: No scleral icterus.     Extraocular Movements: Extraocular movements intact.      Conjunctiva/sclera: Conjunctivae normal.   Cardiovascular:      Rate and Rhythm: Normal rate and regular rhythm.      Heart sounds: Normal heart sounds. No murmur heard.     No friction rub. No gallop.   Pulmonary:      Effort: Pulmonary effort is normal. No respiratory distress.      Breath sounds: Normal breath sounds. No stridor. No wheezing, rhonchi or rales.   Musculoskeletal:         General: Normal range of motion.      Cervical back: Normal range of motion. "   Skin:     General: Skin is warm and dry.   Neurological:      General: No focal deficit present.      Mental Status: She is alert. Mental status is at baseline.   Psychiatric:         Mood and Affect: Mood normal.         Behavior: Behavior normal.         Thought Content: Thought content normal.         Judgment: Judgment normal.           Signed Electronically by: Molly Joy PA-C

## 2024-10-22 ENCOUNTER — OFFICE VISIT (OUTPATIENT)
Dept: FAMILY MEDICINE | Facility: CLINIC | Age: 49
End: 2024-10-22
Payer: COMMERCIAL

## 2024-10-22 VITALS
SYSTOLIC BLOOD PRESSURE: 126 MMHG | HEART RATE: 86 BPM | BODY MASS INDEX: 28.88 KG/M2 | DIASTOLIC BLOOD PRESSURE: 86 MMHG | TEMPERATURE: 96.8 F | OXYGEN SATURATION: 99 % | RESPIRATION RATE: 16 BRPM | HEIGHT: 67 IN | WEIGHT: 184 LBS

## 2024-10-22 DIAGNOSIS — I10 HYPERTENSION GOAL BP (BLOOD PRESSURE) < 140/90: Primary | ICD-10-CM

## 2024-10-22 PROCEDURE — 99214 OFFICE O/P EST MOD 30 MIN: CPT

## 2024-10-22 PROCEDURE — G2211 COMPLEX E/M VISIT ADD ON: HCPCS

## 2024-10-22 RX ORDER — TELMISARTAN 20 MG/1
40 TABLET ORAL DAILY
Qty: 60 TABLET | Refills: 1 | Status: SHIPPED | OUTPATIENT
Start: 2024-10-22 | End: 2024-11-04 | Stop reason: DRUGHIGH

## 2024-10-22 NOTE — PROGRESS NOTES
Assessment & Plan     Hypertension goal BP (blood pressure) < 140/90 - new diagnosis fall 2023 - much improved with weight loss and regular CV exercise  Patient shares that her symptoms have much improved after starting Telmisartan. She has had no side effects and blood pressure in clinic is below goal. However, patient is still noticing some redness in her neck and can still slightly feel her heart rate bounding. She would like to increase dose to 40mg daily. This is reasonable, but discussed with patient that if she begins to feel lightheaded, dizzy, or faint, she should let us know right away as this could be a sign that her blood pressure is being overly controlled and we need to back down to the 20mg once daily dose.   - telmisartan (MICARDIS) 20 MG tablet  Dispense: 60 tablet; Refill: 1    Patient should follow up in with PCP or sooner for new or worsening symptoms. All questions answered to patient's satisfaction. Warning signs of when to seek emergency care were discussed.     Molly Joy PA-C      Marbella Liriano is a 48 year old, presenting for the following health issues:  Recheck Medication        10/22/2024     4:00 PM   Additional Questions   Roomed by Maryann GARCIA CMA     History of Present Illness       Hypertension: She presents for follow up of hypertension.  She does not check blood pressure  regularly outside of the clinic. Outpatient blood pressures have not been over 140/90. She does not follow a low salt diet.     She eats 2-3 servings of fruits and vegetables daily.She consumes 0 sweetened beverage(s) daily.She exercises with enough effort to increase her heart rate 30 to 60 minutes per day.  She exercises with enough effort to increase her heart rate 5 days per week.   She is taking medications regularly.     Feels like the facial flushing has continued, but is improved. She would like to increase the dose. No side effects. BP today was 126/86.     Review of Systems  Constitutional, neuro,  "ENT, endocrine, pulmonary, cardiac, gastrointestinal, genitourinary, musculoskeletal, integument and psychiatric systems are negative, except as otherwise noted.      Objective    /86   Pulse 86   Temp 96.8  F (36  C) (Tympanic)   Resp 16   Ht 1.702 m (5' 7\")   Wt 83.5 kg (184 lb)   LMP 10/01/2024 (Approximate)   SpO2 99%   BMI 28.82 kg/m    Body mass index is 28.82 kg/m .  Physical Exam   GENERAL: alert and no distress  NECK: no adenopathy, no asymmetry, masses, or scars  RESP: lungs clear to auscultation - no rales, rhonchi or wheezes  CV: regular rate and rhythm, normal S1 S2, no S3 or S4, no murmur, click or rub, no peripheral edema  MS: no gross musculoskeletal defects noted, no edema  SKIN: no suspicious lesions or rashes  PSYCH: mentation appears normal, affect normal/bright      Signed Electronically by: Molly Joy PA-C    "

## 2024-11-01 DIAGNOSIS — I10 HYPERTENSION GOAL BP (BLOOD PRESSURE) < 140/90: ICD-10-CM

## 2024-11-03 RX ORDER — TELMISARTAN 20 MG/1
40 TABLET ORAL DAILY
Qty: 180 TABLET | Refills: 1 | Status: SHIPPED | OUTPATIENT
Start: 2024-11-03 | End: 2024-11-04

## 2024-11-04 DIAGNOSIS — I10 HYPERTENSION GOAL BP (BLOOD PRESSURE) < 140/90: ICD-10-CM

## 2024-11-04 RX ORDER — TELMISARTAN 40 MG/1
40 TABLET ORAL DAILY
Qty: 90 TABLET | Refills: 1 | Status: SHIPPED | OUTPATIENT
Start: 2024-11-04

## 2024-11-04 NOTE — TELEPHONE ENCOUNTER
I did this already yesterday with the 2-20mg tabs = 40mg daily - looks like they'd like the 40mg tabs instead.

## 2024-11-11 DIAGNOSIS — F33.0 MILD EPISODE OF RECURRENT MAJOR DEPRESSIVE DISORDER (H): ICD-10-CM

## 2024-11-11 DIAGNOSIS — F41.9 ANXIETY: ICD-10-CM

## 2024-11-11 RX ORDER — BUPROPION HYDROCHLORIDE 150 MG/1
450 TABLET ORAL EVERY MORNING
Qty: 270 TABLET | Refills: 1 | OUTPATIENT
Start: 2024-11-11

## 2024-11-21 ENCOUNTER — OFFICE VISIT (OUTPATIENT)
Dept: DERMATOLOGY | Facility: CLINIC | Age: 49
End: 2024-11-21
Payer: COMMERCIAL

## 2024-11-21 DIAGNOSIS — D49.2 NEOPLASM OF SKIN: ICD-10-CM

## 2024-11-21 DIAGNOSIS — Z85.820 HISTORY OF MELANOMA: ICD-10-CM

## 2024-11-21 DIAGNOSIS — L82.1 SK (SEBORRHEIC KERATOSIS): ICD-10-CM

## 2024-11-21 DIAGNOSIS — L81.4 LENTIGINES: ICD-10-CM

## 2024-11-21 DIAGNOSIS — D22.9 MULTIPLE BENIGN NEVI: Primary | ICD-10-CM

## 2024-11-21 DIAGNOSIS — D18.01 CHERRY ANGIOMA: ICD-10-CM

## 2024-11-21 NOTE — PATIENT INSTRUCTIONS
Wound Care After a Biopsy    What is a skin biopsy?  A skin biopsy allows the doctor to examine a very small piece of tissue under the microscope to determine the diagnosis and the best treatment for the skin condition. A local anesthetic (numbing medicine) is injected with a very small needle into the skin area to be tested. A small piece of skin is taken from the area. Sometimes a suture (stitch) is used.     What are the risks of a skin biopsy?  I will experience scar, bleeding, swelling, pain, crusting and redness. I may experience incomplete removal or recurrence. Risks of this procedure are excessive bleeding, bruising, infection, nerve damage, numbness, thick (hypertrophic or keloidal) scar and non-diagnostic biopsy.    How should I care for my wound for the first 24 hours?  Keep the wound dry and covered for 24 hours  If it bleeds, hold direct pressure on the area for 15 minutes. If bleeding does not stop, call us or go to the emergency room  Avoid strenuous exercise the first 1-2 days or as your doctor instructs you    How should I care for the wound after 24 hours?  After 24 hours, remove the bandage  You may bathe or shower as normal  If you had a scalp biopsy, you can shampoo as usual and can use shower water to clean the biopsy site daily  Clean the wound once a day with gentle soap and water  Do not scrub, be gentle  Apply white petroleum/Vaseline after cleaning the wound with a cotton swab or a clean finger, and keep the site covered with a Bandaid /bandage. Bandages are not necessary with a scalp biopsy  If you are unable to cover the site with a Bandaid /bandage, re-apply ointment 2-3 times a day to keep the site moist. Moisture will help with healing  Avoid strenuous activity for first 1-2 days  Avoid lakes, rivers, pools, and oceans until the stitches are removed or the site is healed    How do I clean my wound?  Wash hands thoroughly with soap or use hand  before all wound  care  Clean the wound with gentle soap and water  Apply white petroleum/Vaseline  to wound after it is clean  Replace the Bandaid /bandage to keep the wound covered for the first few days or as instructed by your doctor  If you had a scalp biopsy, warm shower water to the area on a daily basis should suffice    What should I use to clean my wound?   Cotton-tipped applicators (Qtips )  White petroleum jelly (Vaseline ). Use a clean new container and use Q-tips to apply.  Bandaids  as needed  Gentle soap     How should I care for my wound long term?  Do not get your wound dirty  Keep up with wound care for one week or until the area is healed.  If you have stitches, stitches need to be removed in days. You may return to our clinic for this or you may have it done locally at your doctor s office.  A small scab will form and fall off by itself when the area is completely healed. The area will be red and will become pink in color as it heals. Sun protection is very important for how your scar will turn out. Sunscreen with an SPF 30 or greater is recommended once the area is healed.  You should have some soreness but it should be mild and slowly go away over several days. Talk to your doctor about using tylenol for pain,    When should I call my doctor?  If you have increased:   Pain or swelling  Pus or drainage (clear or slightly yellow drainage is ok)  Temperature over 100F  Spreading redness or warmth around wound    When will I hear about my results?  The biopsy results can take 2 weeks to come back.  Your results will automatically release to [employer] before your provider has even reviewed them.  The clinic will call you with the results, send you a [employer] message, or have you schedule a follow-up clinic or phone time to discuss the results.  Contact our clinics if you do not hear from us in 2 weeks.    Who should I call with questions?  [facility]: [phone]  [facility]  [facility]: [phone]  For urgent needs outside of business hours call the [facility] at [phone] and ask for the dermatology resident on call     Proper skin care from [facility]:    -Eliminate harsh soaps as they strip the natural oils from the skin, often resulting in dry itchy skin ( i.e. Dial, Zest, [place])  -Use mild soaps such as Cetaphil or Dove Sensitive Skin in the shower. You do not need to use soap on arms, legs, and trunk every time you shower unless visibly soiled.   -Avoid hot or cold showers.  -After showering, lightly dry off and apply moisturizing within 2-3 minutes. This will help trap moisture in the skin.   -Aggressive use of a moisturizer at least 1-2 times a day to the entire body (including -Vanicream, Cetaphil, Aquaphor or Cerave) and moisturize hands after every washing.  -We recommend using moisturizers that come in a tub that needs to be scooped out, not a pump. This has more of an oil base. It will hold moisture in your skin much better than a water base moisturizer. The above recommended are non-pore clogging.      Wear a sunscreen with at least SPF 30 on your face, ears, neck and V of the chest daily. Wear sunscreen on other areas of the body if those areas are exposed to the sun throughout the day. Sunscreens can contain physical and/or chemical blockers. Physical blockers are less likely to clog pores, these include zinc oxide and titanium dioxide. Reapply every two hour and after swimming.     Sunscreen examples: https://www.ewg.org/sunscreen/    UV radiation  UVA radiation remains constant throughout the day and throughout the year. It is a longer wavelength than UVB and therefore penetrates deeper into the skin leading to immediate and delayed tanning, photoaging, and skin cancer. 70-80% of UVA and UVB radiation occurs between the hours of 10am-2pm.  UVB radiation  UVB radiation causes the most harmful effects and is more significant  during the summer months. However, snow and ice can reflect UVB radiation leading to skin damage during the winter months as well. UVB radiation is responsible for tanning, burning, inflammation, delayed erythema (pinkness), pigmentation (brown spots), and skin cancer.     I recommend self monthly full body exams and yearly full body exams with a dermatology provider. If you develop a new or changing lesion please follow up for examination. Most skin cancers are pink and scaly or pink and pearly. However, we do see blue/brown/black skin cancers.  Consider the ABCDEs of melanoma when giving yourself your monthly full body exam ( don't forget the groin, buttocks, feet, toes, etc). A-asymmetry, B-borders, C-color, D-diameter, E-elevation or evolving. If you see any of these changes please follow up in clinic. If you cannot see your back I recommend purchasing a hand held mirror to use with a larger wall mirror.       Checking for Skin Cancer  You can find cancer early by checking your skin each month. There are 3 kinds of skin cancer. They are melanoma, basal cell carcinoma, and squamous cell carcinoma. Doing monthly skin checks is the best way to find new marks or skin changes. Follow the instructions below for checking your skin.   The ABCDEs of checking moles for melanoma   Check your moles or growths for signs of melanoma using ABCDE:   Asymmetry: the sides of the mole or growth don t match  Border: the edges are ragged, notched, or blurred  Color: the color within the mole or growth varies  Diameter: the mole or growth is larger than 6 mm (size of a pencil eraser)  Evolving: the size, shape, or color of the mole or growth is changing (evolving is not shown in the images below)    Checking for other types of skin cancer  Basal cell carcinoma or squamous cell carcinoma have symptoms such as:     A spot or mole that looks different from all other marks on your skin  Changes in how an area feels, such as itching,  tenderness, or pain  Changes in the skin's surface, such as oozing, bleeding, or scaliness  A sore that does not heal  New swelling or redness beyond the border of a mole    Who s at risk?  Anyone can get skin cancer. But you are at greater risk if you have:   Fair skin, light-colored hair, or light-colored eyes  Many moles or abnormal moles on your skin  A history of sunburns from sunlight or tanning beds  A family history of skin cancer  A history of exposure to radiation or chemicals  A weakened immune system  If you have had skin cancer in the past, you are at risk for recurring skin cancer.   How to check your skin  Do your monthly skin checkups in front of a full-length mirror. Check all parts of your body, including your:   Head (ears, face, neck, and scalp)  Torso (front, back, and sides)  Arms (tops, undersides, upper, and lower armpits)  Hands (palms, backs, and fingers, including under the nails)  Buttocks and genitals  Legs (front, back, and sides)  Feet (tops, soles, toes, including under the nails, and between toes)  If you have a lot of moles, take digital photos of them each month. Make sure to take photos both up close and from a distance. These can help you see if any moles change over time.   Most skin changes are not cancer. But if you see any changes in your skin, call your doctor right away. Only he or she can diagnose a problem. If you have skin cancer, seeing your doctor can be the first step toward getting the treatment that could save your life.   [employer] last reviewed this educational content on 4/1/2019 2000-2020 [employer]. [address] [place] [address], [place] [address]. All rights reserved. This information is not intended as a substitute for professional medical care. Always follow your healthcare professional's instructions.       When should I call my doctor?  If you are worsening or not improving, please, contact us or seek urgent care as noted below.     Who should I  call with questions (adults)?    [facility] [phone]  For urgent needs outside of business hours call the [facility] at [phone] and ask for the dermatology resident on call to be paged  If this is a medical emergency and you are unable to reach an ER, Call [phone]      If you need a prescription refill, please contact your pharmacy. Refills are approved or denied by our Physicians during normal business hours, Monday through Fridays  Per office policy, refills will not be granted if you have not been seen within the past year (or sooner depending on your child's condition)

## 2024-11-21 NOTE — LETTER
11/21/2024      Deandra Lewis  [address] [place] [address]      Dear Colleague,    Thank you for referring your patient, Deandra Lewis, to the [facility]. Please see a copy of my visit note below.    [facility] Dermatology Note  Encounter Date: Nov 21, 2024  Office Visit      Dermatology Problem List:  FBSE 8/15/24  0. NUB - L inguinal crease - bx 11/21/24  1. Melanoma  - L thigh - Malignant melanoma, BD 1.2mm, pT2a - L thigh - bx 8/28/23 - S/p WLE Dr. Higginbotham/Carla 10/9/23  - R superior inguinal crease- Atypical junctional melanocytic proliferation, consistent with early MIS, Bx 5/16/24, S/p WLE on 7/3/24 Dr. Higginbotham  2. Benign biopsies:  - L anterior shoulder - bx 8/28/23 - DF  3. Dermatofibroma - L calf, L thigh  4. Hx of Atypical nevi  - L posterior shoulder, Compound melanocytic nevus with mild atypia, Bx proven on 2/15/24   # LTM, R medial calf - Photo 8/25/24     Social: Works indoors. Has tanning bed use hx.  ____________________________________________    Assessment & Plan:    # Dermatofibromas.   - Discussed the natural history and benign nature of this lesion. Reassurance provided that no additional treatment is necessary.        # NUB - L inguinal crease  - shave   - Photograph was obtained for clinical monitoring and inclusion in medical record.    # LTM x2, R medial calf.  - Photographed today to monitor and check for changes in future appointments   - R medial calf there is a 7 mm brown macule     # Hx of MM  - ABCDEs: Counseled ABCDEs of melanoma: Asymmetry, Border (irregularity), Color (not uniform, changes in color), Diameter (greater than 6 mm which is about the size of a pencil eraser), and Evolving (any changes in preexisting moles).  - Sun protection: Counseled SPF30+ sunscreen, UPF clothing, sun avoidance, tanning bed avoidance.   - Recommended yearly dental, ophthalmology, and gynecology exams.  - Recommended skin exams for all  first-degree relatives.  - Recommended follow up is 3-6 months    # Benign findings: multiple benign nevi, lentigines, cherry angiomas, SKs  - edu on benign etiology  - Signs and Symptoms of non-melanoma skin cancer and ABCDEs of melanoma reviewed with patient. Patient encouraged to perform monthly self skin exams and educated on how to perform them. UV precautions reviewed with patient. Patient was asked about new or changing moles/lesions on body.   - Sunscreen: Apply 20 minutes prior to going outdoors and reapply every two hours, when wet or sweating. We recommend using an SPF 30 or higher, and to use one that is water resistant.     - RTC for changes     Procedures Performed:   None     Follow-up: 3-6 month(s) in-person, or earlier for new or changing lesions    Staff and scribe:    Scribe Disclosure:   I, MAXX SRIVASTAVA, am serving as a scribe; to document services personally performed by Lia Savage PA-C -based on data collection and the provider's statements to me.     Provider Disclosure:  I agree with above History, Review of Systems, Physical exam and Plan.  I have reviewed the content of the documentation and have edited it as needed. I have personally performed the services documented here and the documentation accurately represents those services and the decisions I have made.      Electronically signed by:    All risks, benefits and alternatives were discussed with patient.  Patient is in agreement and understands the assessment and plan.  All questions were answered.    Lia Savage PA-C, MPAS  [facility]: Phone: [phone], Fax: [phone]  [facility]: Phone: [phone],  Fax: [phone]  [facility]: Phone: [phone], Fax: [phone]  ____________________________________________    CC: Skin Check (3 month FBSC)      Reviewed patients past medical history and pertinent  chart review prior to patient's visit today.     HPI:  Ms. Deandra Lewis is a 48 year old female who presents today as a return patient for FBSE.     Today patient did not report any spots of concern. Feeling well.     Has a hx of MM on the L thigh and MIS on the R superior inguinal crease.     Patient is otherwise feeling well, without additional concerns.    Labs:  N/A    Physical Exam:  Vitals: LMP 10/01/2024 (Approximate)   SKIN: Total skin excluding the undergarment areas was performed. The exam included the head/face, neck, both arms, chest, back, abdomen, both legs, digits and/or nails.    - - Araiza's skin type II, has <100 nevi  - There are dome shaped bright red papules on the trunk.   - Multiple regular brown pigmented macules and papules are identified on the trunk and extremities.   - Scattered brown macules on sun exposed areas.  - There are waxy stuck on tan to brown papules on the trunk.    -There are well healed surgical scars without erythema, nodularity or telangiectasias on the prior MM, NERD   - There is a firm tan/flesh colored papule that dimples with lateral pressure on the L calf ..   - R medial calf there is a 7 mm brown macule   - L inguinal crease there is a 4 mm dark macule with dark pigment centrally.   - No other lesions of concern on areas examined.   LYMPH: Examination of the pre/post auricular, occipital, cervical, clavicular, axillary and inguinal lymph nodes was negative.     L inguinal crease    Medications:  Current Outpatient Medications   Medication Sig Dispense Refill     albuterol (PROAIR HFA/PROVENTIL HFA/VENTOLIN HFA) 108 (90 Base) MCG/ACT inhaler INHALE 2 PUFFS BY MOUTH EVERY 6 HOURS AS NEEDED FOR WHEEZE OR FOR SHORTNESS OF BREATH 18 g 11     buPROPion (WELLBUTRIN XL) 150 MG 24 hr tablet TAKE 3 TABLETS BY MOUTH EVERY MORNING. 270 tablet 1     cetirizine (ZYRTEC) 10 MG tablet Take 1 tablet (10 mg) by mouth every evening 30 tablet 1     cyclobenzaprine (FLEXERIL) 5 MG  tablet Take 1-2 tablets (5-10 mg) by mouth 2 times daily as needed for muscle spasms 30 tablet 3     fluticasone-vilanterol (BREO ELLIPTA) 200-25 MCG/ACT inhaler Inhale 1 puff into the lungs daily 60 each 11     ibuprofen (ADVIL/MOTRIN) 200 MG tablet Take 200 mg by mouth every 6 hours as needed       methocarbamol (ROBAXIN) 500 MG tablet Take 1 tablet (500 mg) by mouth 4 times daily as needed for muscle spasms 20 tablet 3     montelukast (SINGULAIR) 10 MG tablet Take 1 tablet (10 mg) by mouth at bedtime 90 tablet 3     telmisartan (MICARDIS) 40 MG tablet Take 1 tablet (40 mg) by mouth daily. 90 tablet 1     ZOLMitriptan (ZOMIG) 5 MG tablet TAKE 1 TABLET BY MOUTH AT ONSET OF HEADACHE FOR MIGRAINE MAY REPEAT IN 2 HOURS. MAX 2 TABS/24 HOURS 10 tablet 11     No current facility-administered medications for this visit.      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 related to previous surgeries - 4 in 3 months for sinus issues/infection     Family history of breast cancer- mother's identical twin, maternal aunt, paternal GM and dad with breast lumps - had genetic counseling     Hot flashes     Generalized hyperhidrosis- since earliest recollections - soaks clothing multiple times/week - very disruptive to life     Mild anxiety     Foster care child     Anxiety     Multiple fractures of left foot with routine healing     Family history of colon cancer- father dx'd in mid 40's - pt's first colonoscopy in 4/24/2017 - repeat in 2022      Menorrhagia with regular cycle- ? cause of iron deficiency anemia- pt would like to see ob/gyn specialists- didn't go in 2021 - better than previous     Intractable episodic tension-type headache     Strain of neck muscle, initial encounter     Sun-damaged skin      Cough due to bronchospasm- needs refills on meds      Recurrent cough     Attention deficit - symptoms - pt desires testing and possible treatment     Intractable migraine without aura and without status migrainosus     Hypertension goal BP (blood pressure) < 140/90 - new diagnosis fall 2023 - much improved with weight loss and regular CV exercise     Morbid obesity (H)     Numbness and tingling of hand- fingertips - ? caused by lisinopril vs. other - consider changing to amlodipine      Malignant melanoma of left lower extremity including hip (H)-left anterior thigh Stage 1B (cT2a, cN0, cM0)     Melanoma of skin (H)     Mild intermittent asthma with (acute) exacerbation- allergies and cold temperature triggers     Encounter for completion of form with patient- [place] paperwork for melanoma left leg     History of malignant melanoma of skin     Neoplasm of uncertain behavior of skin- left pubic area and left breast inferior areola - slightly irregular pigmentation - images taken - see media section - pt will watch closely - nted 8/20/2024     Multiple nevi     Cherry angioma     Lentigines     Melanoma in situ of torso excluding breast (H) - right groin area - 2nd melanoma - first was left anterior thigh     Mild episode of recurrent major depressive disorder (H)     Medication side effects- lisinopril 5mg annoying dry cough, and topiramate 25mg = blurred vision     Weight loss- 40 lbs with better nutrition and running over the last 6 months     Need for hepatitis B vaccination- pt will check with her insurance on this     Counseling for birth control, intrauterine device- pt is thinking about HRT for perimenopausal symptoms, possibly Mirena IUD and oral/transdermal estrogen     Abnormality of cervix- inflamed , sligthly cystic and hypervascular cervix from 4:00-8;00 inferior to os     CARDIOVASCULAR SCREENING; LDL GOAL LESS THAN 130     Past Medical History:   Diagnosis Date     Anemia      Anxiety     paxil  10mg= mild bladder retention, at 30mg = ravenously hungry/weight gain increased headaches; lexapro = bad sweating     Contraception     ocp's some = mood swings ; seasonale: frequent vaginal bleeding and weight gain     Hot flashes 11/16/2016     Hypertension goal BP (blood pressure) < 140/90  - new diagnosis 02/02/2023     Kidney stones     Dr. 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        Sincerely,        Lia Savage PA-C

## 2024-11-21 NOTE — PROGRESS NOTES
[facility] Dermatology Note  Encounter Date: Nov 21, 2024  Office Visit      Dermatology Problem List:  FBSE 8/15/24  0. NUB - L inguinal crease - bx 11/21/24  1. Melanoma  - L thigh - Malignant melanoma, BD 1.2mm, pT2a - L thigh - bx 8/28/23 - S/p WLE Dr. Higginbotham/Carla 10/9/23  - R superior inguinal crease- Atypical junctional melanocytic proliferation, consistent with early MIS, Bx 5/16/24, S/p WLE on 7/3/24 Dr. Higginbotham  2. Benign biopsies:  - L anterior shoulder - bx 8/28/23 - DF  3. Dermatofibroma - L calf, L thigh  4. Hx of Atypical nevi  - L posterior shoulder, Compound melanocytic nevus with mild atypia, Bx proven on 2/15/24   # LTM, R medial calf - Photo 8/25/24     Social: Works indoors. Has tanning bed use hx.  ____________________________________________    Assessment & Plan:    # Dermatofibromas.   - Discussed the natural history and benign nature of this lesion. Reassurance provided that no additional treatment is necessary.        # NUB - L inguinal crease  - shave   - Photograph was obtained for clinical monitoring and inclusion in medical record.    # LTM x2, R medial calf.  - Photographed today to monitor and check for changes in future appointments   - R medial calf there is a 7 mm brown macule     # Hx of MM  - ABCDEs: Counseled ABCDEs of melanoma: Asymmetry, Border (irregularity), Color (not uniform, changes in color), Diameter (greater than 6 mm which is about the size of a pencil eraser), and Evolving (any changes in preexisting moles).  - Sun protection: Counseled SPF30+ sunscreen, UPF clothing, sun avoidance, tanning bed avoidance.   - Recommended yearly dental, ophthalmology, and gynecology exams.  - Recommended skin exams for all first-degree relatives.  - Recommended follow up is 3-6 months    # Benign findings: multiple benign nevi, lentigines, cherry angiomas, SKs  - edu on benign etiology  - Signs and Symptoms of non-melanoma skin cancer and ABCDEs of melanoma reviewed  with patient. Patient encouraged to perform monthly self skin exams and educated on how to perform them. UV precautions reviewed with patient. Patient was asked about new or changing moles/lesions on body.   - Sunscreen: Apply 20 minutes prior to going outdoors and reapply every two hours, when wet or sweating. We recommend using an SPF 30 or higher, and to use one that is water resistant.     - RTC for changes     Procedures Performed:   None     Follow-up: 3-6 month(s) in-person, or earlier for new or changing lesions    Staff and scribe:    Scribe Disclosure:   I, MAXX SRIVASTAVA, am serving as a scribe; to document services personally performed by Lia Savage PA-C -based on data collection and the provider's statements to me.     Provider Disclosure:  I agree with above History, Review of Systems, Physical exam and Plan.  I have reviewed the content of the documentation and have edited it as needed. I have personally performed the services documented here and the documentation accurately represents those services and the decisions I have made.      Electronically signed by:    All risks, benefits and alternatives were discussed with patient.  Patient is in agreement and understands the assessment and plan.  All questions were answered.    Lia Savage PA-C, MPAS  [facility]: Phone: [phone], Fax: [phone]  [facility]: Phone: [phone],  Fax: [phone]  [facility]: Phone: [phone], Fax: [phone]  ____________________________________________    CC: Skin Check (3 month FBSC)      Reviewed patients past medical history and pertinent chart review prior to patient's visit today.     HPI:  Ms. Deandra Lewis is a 48 year old female who presents today as a return patient for FBSE.     Today patient did not report any spots of concern. Feeling well.     Has a hx of MM on the L thigh  and MIS on the R superior inguinal crease.     Patient is otherwise feeling well, without additional concerns.    Labs:  N/A    Physical Exam:  Vitals: LMP 10/01/2024 (Approximate)   SKIN: Total skin excluding the undergarment areas was performed. The exam included the head/face, neck, both arms, chest, back, abdomen, both legs, digits and/or nails.    - - Araiza's skin type II, has <100 nevi  - There are dome shaped bright red papules on the trunk.   - Multiple regular brown pigmented macules and papules are identified on the trunk and extremities.   - Scattered brown macules on sun exposed areas.  - There are waxy stuck on tan to brown papules on the trunk.    -There are well healed surgical scars without erythema, nodularity or telangiectasias on the prior MM, NERD   - There is a firm tan/flesh colored papule that dimples with lateral pressure on the L calf ..   - R medial calf there is a 7 mm brown macule   - L inguinal crease there is a 4 mm dark macule with dark pigment centrally.   - No other lesions of concern on areas examined.   LYMPH: Examination of the pre/post auricular, occipital, cervical, clavicular, axillary and inguinal lymph nodes was negative.     L inguinal crease    Medications:  Current Outpatient Medications   Medication Sig Dispense Refill    albuterol (PROAIR HFA/PROVENTIL HFA/VENTOLIN HFA) 108 (90 Base) MCG/ACT inhaler INHALE 2 PUFFS BY MOUTH EVERY 6 HOURS AS NEEDED FOR WHEEZE OR FOR SHORTNESS OF BREATH 18 g 11    buPROPion (WELLBUTRIN XL) 150 MG 24 hr tablet TAKE 3 TABLETS BY MOUTH EVERY MORNING. 270 tablet 1    cetirizine (ZYRTEC) 10 MG tablet Take 1 tablet (10 mg) by mouth every evening 30 tablet 1    cyclobenzaprine (FLEXERIL) 5 MG tablet Take 1-2 tablets (5-10 mg) by mouth 2 times daily as needed for muscle spasms 30 tablet 3    fluticasone-vilanterol (BREO ELLIPTA) 200-25 MCG/ACT inhaler Inhale 1 puff into the lungs daily 60 each 11    ibuprofen (ADVIL/MOTRIN) 200 MG tablet Take  200 mg by mouth every 6 hours as needed      methocarbamol (ROBAXIN) 500 MG tablet Take 1 tablet (500 mg) by mouth 4 times daily as needed for muscle spasms 20 tablet 3    montelukast (SINGULAIR) 10 MG tablet Take 1 tablet (10 mg) by mouth at bedtime 90 tablet 3    telmisartan (MICARDIS) 40 MG tablet Take 1 tablet (40 mg) by mouth daily. 90 tablet 1    ZOLMitriptan (ZOMIG) 5 MG tablet TAKE 1 TABLET BY MOUTH AT ONSET OF HEADACHE FOR MIGRAINE MAY REPEAT IN 2 HOURS. MAX 2 TABS/24 HOURS 10 tablet 11     No current facility-administered medications for this visit.      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 related to previous surgeries - 4 in 3 months for sinus issues/infection    Family history of breast cancer- mother's identical twin, maternal aunt, paternal GM and dad with breast lumps - had genetic counseling    Hot flashes    Generalized hyperhidrosis- since earliest recollections - soaks clothing multiple times/week - very disruptive to life    Mild anxiety    Foster care child    Anxiety    Multiple fractures of left foot with routine healing    Family history of colon cancer- father dx'd in mid 40's - pt's first colonoscopy in 4/24/2017 - repeat in 2022     Menorrhagia with regular cycle- ? cause of iron deficiency anemia- pt would like to see ob/gyn specialists- didn't go in 2021 - better than previous    Intractable episodic tension-type headache    Strain of neck muscle, initial encounter    Sun-damaged skin    Cough due to bronchospasm- needs refills on meds     Recurrent cough    Attention deficit - symptoms - pt desires testing and possible treatment    Intractable migraine without aura and without status migrainosus    Hypertension goal BP (blood pressure) < 140/90 - new diagnosis  fall 2023 - much improved with weight loss and regular CV exercise    Morbid obesity (H)    Numbness and tingling of hand- fingertips - ? caused by lisinopril vs. 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 (cT2a, cN0, cM0)    Melanoma of skin (H)    Mild intermittent asthma with (acute) exacerbation- allergies and cold temperature triggers    Encounter for completion of form with patient- [place] paperwork for melanoma left leg    History of malignant melanoma of skin    Neoplasm of uncertain behavior of skin- left pubic area and left breast inferior areola - slightly irregular pigmentation - images taken - see media section - pt will watch closely - nted 8/20/2024    Multiple nevi    Cherry angioma    Lentigines    Melanoma in situ of torso excluding breast (H) - right groin area - 2nd melanoma - first was left anterior thigh    Mild episode of recurrent major depressive disorder (H)    Medication side effects- lisinopril 5mg annoying dry cough, and topiramate 25mg = blurred vision    Weight loss- 40 lbs with better nutrition and running over the last 6 months    Need for hepatitis B vaccination- pt will check with her insurance on this    Counseling for birth control, intrauterine device- pt is thinking about HRT for perimenopausal symptoms, possibly Mirena IUD and oral/transdermal estrogen    Abnormality of cervix- inflamed , sligthly cystic and hypervascular cervix from 4:00-8;00 inferior to os    CARDIOVASCULAR SCREENING; LDL GOAL LESS THAN 130     Past Medical History:   Diagnosis Date    Anemia     Anxiety     paxil 10mg= mild bladder retention, at 30mg = ravenously hungry/weight gain increased headaches; lexapro = bad sweating    Contraception     ocp's some = mood swings ; seasonale: frequent vaginal bleeding and weight gain    Hot flashes 11/16/2016    Hypertension goal BP (blood pressure) < 140/90  - new diagnosis  02/02/2023    Kidney stones     Dr. 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

## 2024-11-29 ASSESSMENT — ANXIETY QUESTIONNAIRES
1. FEELING NERVOUS, ANXIOUS, OR ON EDGE: NOT AT ALL
2. NOT BEING ABLE TO STOP OR CONTROL WORRYING: NOT AT ALL
3. WORRYING TOO MUCH ABOUT DIFFERENT THINGS: NOT AT ALL
IF YOU CHECKED OFF ANY PROBLEMS ON THIS QUESTIONNAIRE, HOW DIFFICULT HAVE THESE PROBLEMS MADE IT FOR YOU TO DO YOUR WORK, TAKE CARE OF THINGS AT HOME, OR GET ALONG WITH OTHER PEOPLE: NOT DIFFICULT AT ALL
6. BECOMING EASILY ANNOYED OR IRRITABLE: NOT AT ALL
5. BEING SO RESTLESS THAT IT IS HARD TO SIT STILL: NOT AT ALL
GAD7 TOTAL SCORE: 0
7. FEELING AFRAID AS IF SOMETHING AWFUL MIGHT HAPPEN: NOT AT ALL
GAD7 TOTAL SCORE: 0
GAD7 TOTAL SCORE: 0
4. TROUBLE RELAXING: NOT AT ALL
7. FEELING AFRAID AS IF SOMETHING AWFUL MIGHT HAPPEN: NOT AT ALL
8. IF YOU CHECKED OFF ANY PROBLEMS, HOW DIFFICULT HAVE THESE MADE IT FOR YOU TO DO YOUR WORK, TAKE CARE OF THINGS AT HOME, OR GET ALONG WITH OTHER PEOPLE?: NOT DIFFICULT AT ALL

## 2024-11-29 ASSESSMENT — PATIENT HEALTH QUESTIONNAIRE - PHQ9
SUM OF ALL RESPONSES TO PHQ QUESTIONS 1-9: 0
SUM OF ALL RESPONSES TO PHQ QUESTIONS 1-9: 0

## 2024-12-02 ENCOUNTER — VIRTUAL VISIT (OUTPATIENT)
Dept: FAMILY MEDICINE | Facility: CLINIC | Age: 49
End: 2024-12-02
Payer: COMMERCIAL

## 2024-12-02 ENCOUNTER — MYC MEDICAL ADVICE (OUTPATIENT)
Dept: FAMILY MEDICINE | Facility: CLINIC | Age: 49
End: 2024-12-02

## 2024-12-02 DIAGNOSIS — D03.59 MELANOMA IN SITU OF TORSO EXCLUDING BREAST (H): ICD-10-CM

## 2024-12-02 DIAGNOSIS — I10 HYPERTENSION GOAL BP (BLOOD PRESSURE) < 140/90: ICD-10-CM

## 2024-12-02 DIAGNOSIS — D22.72: ICD-10-CM

## 2024-12-02 DIAGNOSIS — N95.1 SYMPTOMS, SUCH AS FLUSHING, SLEEPLESSNESS, HEADACHE, LACK OF CONCENTRATION, ASSOCIATED WITH THE MENOPAUSE: Primary | ICD-10-CM

## 2024-12-02 DIAGNOSIS — F41.9 MILD ANXIETY: ICD-10-CM

## 2024-12-02 PROCEDURE — 99214 OFFICE O/P EST MOD 30 MIN: CPT | Mod: 95 | Performed by: FAMILY MEDICINE

## 2024-12-02 RX ORDER — PROGESTERONE 100 MG/1
100 CAPSULE ORAL DAILY
Qty: 90 CAPSULE | Refills: 3 | Status: SHIPPED | OUTPATIENT
Start: 2024-12-02

## 2024-12-02 RX ORDER — ESTRADIOL 0.03 MG/D
1 FILM, EXTENDED RELEASE TRANSDERMAL
Qty: 9 PATCH | Refills: 11 | Status: SHIPPED | OUTPATIENT
Start: 2024-12-02

## 2024-12-02 NOTE — PROGRESS NOTES
Deandra is a 48 year old who is being evaluated via a billable video visit.    How would you like to obtain your AVS? MyChart  If the video visit is dropped, the invitation should be resent by: Text to cell phone: [phone]  Will anyone else be joining your video visit? No    Assessment & Plan :       ICD-10-CM    1. Symptoms, such as flushing, sleeplessness, headache, lack of concentration, associated with the menopause  N95.1 estradiol (VIVELLE-DOT) 0.025 MG/24HR bi-weekly patch     progesterone (PROMETRIUM) 100 MG capsule      2. Mild anxiety  F41.9       3. Melanoma in situ of torso excluding breast (H) - right groin area - 2nd melanoma - first was left anterior thigh  D03.59       4. Lentiginous compound nevus of thigh, left - inguinal crease - melanocytic - s/p shave biopsy = benign findings, clear margins 11/21/2024  D22.72       5. Hypertension goal BP (blood pressure) < 140/90 - new diagnosis fall 2023 - much improved with weight loss and regular CV exercise  I10         Return in about 3 months (around 2/20/2025) for anxiety and menopause , as video visit .    Future Appointments 12/2/2024 - 5/31/2025        Date Visit Type Length Department Provider     12/31/2024 10:45 AM MR BREAST BILATERAL WWO 45 min MG MRI MGMR1    Location Instructions:     [address] [address]  Parking Imaging customers can park in the lots on either side of the driveway leading to the West Entrance of the building.  Entrance and check-in location Enter through the West Entrance doors. Proceed straight ahead, through the lobby, to Check-In B (adjacent to the [facility]). Please check-in at with the registration staff at the desk labeled Check-In B.              1/21/2025  2:30 PM RETURN GENERAL SURGERY 30 min MG SURG Tracie Higginbotham MD    Location Instructions:     [facility] 2nd Floor  From Sign in Desk D.                2/13/2025  1:00 PM RETURN DERMATOLOGY 15 min EC DERMATOLOGY Lia Savage, SAIDA              2/20/2025  8:00 AM OFFICE VISIT 40 min RV FAMILY PRACTICE Linda Soto MD    Location Instructions:     [facility] is located at [address] [place] [address], along [address]. Free parking is available; access the lot by turning north from [address] onto [facility], then west onto [facility].              5/12/2025  8:00 AM RETURN CCSL 30 min MG CANCER CLINIC Winnie Trinidad APRN CNP    Location Instructions:     This appointment is in a hospital-based location.&nbsp; Before your visit, you may want to check with your insurance company for coverage and referral options, including cost differences between services provided in different clinic settings.&nbsp; For more information visit this link on the [employer] [place] Website:&nbsp; [email or web address]              5/12/2025  8:30 AM MA SCREENING BILATERAL W/ ZACARIAS 15 min MG MAMMOGRAPHY MGMA1    Location Instructions:     [address] [place] [address]  Parking Imaging customers can park in the lots on either side of the driveway leading to the West Entrance of the building.  Entrance and check-in location Enter through the West Entrance doors. Proceed straight ahead, through the lobby, to Check-In B (adjacent to the [facility]). Please check-in at with the registration staff at the desk labeled Check-In B.                   Pt recommended the poem Comfort - by Maira Torres for stress relief.       Recommend Omron - brand - for upper arm blood pressures for at home use.     Ok to cut telmisartan 40mg tabs in half and see if taking 20mg again works just as well for blood pressure. Get BP rechecked in our pharmacy before and after making that change.     You can do your BP checks in any [place] pharmacy:     To make a pharmacy only appointment online, please go to  [email or web address] and select \"Click here to schedule a Vaccination or Blood Pressure Check at available [facility] \" at the bottom of the first page.  You can then select a location, then date and time bubbles that best fits your schedule.         Pt did genetic testing for family history of breast cancer - doing every 6 month screening with alternating breast MRI and mammography w/ zuleima.  First breast MRI 12/31/2024.     Discussed HRT in detail - combination Hormone replacement therapy risks of breast cancer = slightly increased over the general population and blood clots ( decreased slightly with transdermal patch form of estrogen) , and then benefits were discussed in view of WHI data and subsequent studies.  Pt would like to try low dose vivelle dot patch and oral micronized progesterone. Ordered.     Please, call our clinic or go to the ER immediately if signs or symptoms worsen or fail to improve as anticipated.   Continue to follow up with Dermatology as directed by them for skin issues.      MEDICATIONS:   Orders Placed This Encounter   Medications    estradiol (VIVELLE-DOT) 0.025 MG/24HR bi-weekly patch     Sig: Place 1 patch over 96 hours onto the skin twice a week.     Dispense:  9 patch     Refill:  11    progesterone (PROMETRIUM) 100 MG capsule     Sig: Take 1 capsule (100 mg) by mouth daily.     Dispense:  90 capsule     Refill:  3          - Continue other medications without change  Work on weight loss  Regular exercise  See Patient Instructions       Linda Soto MD      Marbella Liriano is a 48 year old, presenting for the following health issues:  Hypertension  and the following other medical problems:      1. Symptoms, such as flushing, sleeplessness, headache, lack of concentration, associated with the menopause    2. Mild anxiety    3. Melanoma in situ of torso excluding breast (H) - right groin area - 2nd melanoma - first was left anterior thigh    4. Lentiginous " compound nevus of thigh, left - inguinal crease - melanocytic - s/p shave biopsy = benign findings, clear margins 11/21/2024    5. Hypertension goal BP (blood pressure) < 140/90 - new diagnosis fall 2023 - much improved with weight loss and regular CV exercise            12/2/2024    11:03 AM   Additional Questions   Roomed by krystal almeida   Accompanied by self     History of Present Illness       Hypertension: She presents for follow up of hypertension.  She does not check blood pressure  regularly outside of the clinic. Outpatient blood pressures have not been over 140/90. She does not follow a low salt diet.     She eats 2-3 servings of fruits and vegetables daily.She consumes 0 sweetened beverage(s) daily.She exercises with enough effort to increase her heart rate 30 to 60 minutes per day.  She exercises with enough effort to increase her heart rate 5 days per week.   She is taking medications regularly.     Recent skin biopsy - lentiginous compound melanocytic nevus - appears benign from the report.  Not result noted yet from Lia Savage PA-C.      Re: BP - doing well on telmisartan 40mg tabs, but felt a little better on the 20mg tabs - taking 1/day.   Her neck gets flushed  when bp is high. Decreased headaches from previous. Has only had to use her migraine meds once in the last month.     BP Readings from Last 3 Encounters:   10/22/24 126/86   10/08/24 (!) 136/90   08/20/24 138/80     Creatinine   Date Value Ref Range Status   06/26/2024 0.78 0.51 - 0.95 mg/dL Final   05/04/2021 0.76 0.52 - 1.04 mg/dL Final     GFR Estimate   Date Value Ref Range Status   06/26/2024 >90 >60 mL/min/1.73m2 Final   09/20/2023 79 >60 mL/min/1.73m2 Final   12/01/2022 >90 >60 mL/min/1.73m2 Final   05/04/2021 >90 >60 mL/min/[1.73_m2] Final         Depression and Anxiety - doing really well.   How are you doing with your depression since your last visit? No change  How are you doing with your anxiety since your last visit?  No  change  Are you having other symptoms that might be associated with depression or anxiety? No  Have you had a significant life event? No   Do you have any concerns with your use of alcohol or other drugs? No    Perimenopausal symptoms - having brain fog, hot flashes, night sweats, sleep disturbance, day sweats- pouring sweat and soaking clothing at times.   At 3:30am tends to awaken and has difficulty falling back asleep.      Last regular menses was 7/2024.  Tried melatonin for sleep = no effect.   Mammogram is up to date and upcoming breast MRI later this month.        Social History     Tobacco Use    Smoking status: Former     Current packs/day: 0.10     Average packs/day: 0.1 packs/day for 5.0 years (0.5 ttl pk-yrs)     Types: Cigarettes     Passive exposure: Past    Smokeless tobacco: Never    Tobacco comments:     Tried smoking in high school   Vaping Use    Vaping status: Never Used   Substance Use Topics    Alcohol use: Not Currently     Comment: Rarely    Drug use: No     Comment: no herbal meds except for acidophilus          6/25/2024    10:24 PM 8/20/2024     2:15 PM 11/29/2024     8:48 PM   PHQ   PHQ-9 Total Score 0 0 0    Q9: Thoughts of better off dead/self-harm past 2 weeks Not at all  Not at all  Not at all        Patient-reported         6/25/2024    10:27 PM 8/20/2024     2:15 PM 11/29/2024     8:48 PM   LISA-7 SCORE   Total Score 0 (minimal anxiety) 0 (minimal anxiety) 0 (minimal anxiety)   Total Score 0 0 0        Patient-reported         11/29/2024     8:48 PM   Last PHQ-9   1.  Little interest or pleasure in doing things 0    2.  Feeling down, depressed, or hopeless 0    3.  Trouble falling or staying asleep, or sleeping too much 0    4.  Feeling tired or having little energy 0    5.  Poor appetite or overeating 0    6.  Feeling bad about yourself 0    7.  Trouble concentrating 0    8.  Moving slowly or restless 0    Q9: Thoughts of better off dead/self-harm past 2 weeks 0    PHQ-9 Total Score  0        Patient-reported         11/29/2024     8:48 PM   LISA-7    1. Feeling nervous, anxious, or on edge 0    2. Not being able to stop or control worrying 0    3. Worrying too much about different things 0    4. Trouble relaxing 0    5. Being so restless that it is hard to sit still 0    6. Becoming easily annoyed or irritable 0    7. Feeling afraid, as if something awful might happen 0    LISA-7 Total Score 0    If you checked any problems, how difficult have they made it for you to do your work, take care of things at home, or get along with other people? Not difficult at all        Patient-reported       Patient Active Problem List   Diagnosis    Chronic sinusitis    Bleeding, nose    Sinusitis    Lactose intolerance    Kidney stones    Maxillary bone loss    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 related to previous surgeries - 4 in 3 months for sinus issues/infection    Family history of breast cancer- mother's identical twin, maternal aunt, paternal GM and dad with breast lumps - had genetic counseling    Hot flashes    Generalized hyperhidrosis- since earliest recollections - soaks clothing multiple times/week - very disruptive to life    Mild anxiety    Foster care child    Anxiety    Multiple fractures of left foot with routine healing    Family history of colon cancer- father dx'd in mid 40's - pt's first colonoscopy in 4/24/2017 - repeat in 2022     Menorrhagia with regular cycle- ? cause of iron deficiency anemia- pt would like to see ob/gyn specialists- didn't go in 2021 - better than previous    Intractable episodic tension-type headache    Strain of neck muscle, initial encounter    Sun-damaged skin    Cough due to bronchospasm- needs refills on meds     Recurrent cough    Attention deficit - symptoms - pt desires testing and possible treatment    Intractable migraine without aura and without status migrainosus    Hypertension goal BP (blood pressure) < 140/90 -  new diagnosis fall 2023 - much improved with weight loss and regular CV exercise    Morbid obesity (H)    Numbness and tingling of hand- fingertips - ? caused by lisinopril vs. 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 (cT2a, cN0, cM0)    Melanoma of skin (H)    Mild intermittent asthma with (acute) exacerbation- allergies and cold temperature triggers    Encounter for completion of form with patient- DESTIN paperwork for melanoma left leg    History of malignant melanoma of skin    Neoplasm of uncertain behavior of skin- left pubic area and left breast inferior areola - slightly irregular pigmentation - images taken - see media section - pt will watch closely - nted 8/20/2024    Multiple nevi    Cherry angioma    Lentigines    Melanoma in situ of torso excluding breast (H) - right groin area - 2nd melanoma - first was left anterior thigh    Mild episode of recurrent major depressive disorder (H)    Medication side effects- lisinopril 5mg annoying dry cough, and topiramate 25mg = blurred vision    Weight loss- 40 lbs with better nutrition and running over the last 6 months    Need for hepatitis B vaccination- pt will check with her insurance on this    Counseling for birth control, intrauterine device- pt is thinking about HRT for perimenopausal symptoms, possibly Mirena IUD and oral/transdermal estrogen    Abnormality of cervix- inflamed , sligthly cystic and hypervascular cervix from 4:00-8;00 inferior to os    CARDIOVASCULAR SCREENING; LDL GOAL LESS THAN 130       Current Outpatient Medications   Medication Sig Dispense Refill    albuterol (PROAIR HFA/PROVENTIL HFA/VENTOLIN HFA) 108 (90 Base) MCG/ACT inhaler INHALE 2 PUFFS BY MOUTH EVERY 6 HOURS AS NEEDED FOR WHEEZE OR FOR SHORTNESS OF BREATH 18 g 11    buPROPion (WELLBUTRIN XL) 150 MG 24 hr tablet TAKE 3 TABLETS BY MOUTH EVERY MORNING. 270 tablet 1    cetirizine (ZYRTEC) 10  MG tablet Take 1 tablet (10 mg) by mouth every evening 30 tablet 1    cyclobenzaprine (FLEXERIL) 5 MG tablet Take 1-2 tablets (5-10 mg) by mouth 2 times daily as needed for muscle spasms 30 tablet 3    fluticasone-vilanterol (BREO ELLIPTA) 200-25 MCG/ACT inhaler Inhale 1 puff into the lungs daily 60 each 11    methocarbamol (ROBAXIN) 500 MG tablet Take 1 tablet (500 mg) by mouth 4 times daily as needed for muscle spasms 20 tablet 3    montelukast (SINGULAIR) 10 MG tablet Take 1 tablet (10 mg) by mouth at bedtime 90 tablet 3    telmisartan (MICARDIS) 40 MG tablet Take 1 tablet (40 mg) by mouth daily. 90 tablet 1    ZOLMitriptan (ZOMIG) 5 MG tablet TAKE 1 TABLET BY MOUTH AT ONSET OF HEADACHE FOR MIGRAINE MAY REPEAT IN 2 HOURS. MAX 2 TABS/24 HOURS 10 tablet 11    ibuprofen (ADVIL/MOTRIN) 200 MG tablet Take 200 mg by mouth every 6 hours as needed (Patient not taking: Reported on 12/2/2024)            Allergies   Allergen Reactions    Adhesive Tape Blisters    Melon      Mouth gets itchy     Seasonal Allergies     Lisinopril Cough     Annoying dry cough      Topiramate Visual Disturbance     Blurred vision             Review of Systems  Constitutional, HEENT, cardiovascular, pulmonary, GI, , musculoskeletal, neuro, skin, endocrine and psych systems are negative, except as otherwise noted.      Objective           Vitals:  No vitals were obtained today due to virtual visit.    Physical Exam   GENERAL: alert and no distress  EYES: Eyes grossly normal to inspection.  No discharge or erythema, or obvious scleral/conjunctival abnormalities.  RESP: No audible wheeze, cough, or visible cyanosis.    SKIN: Visible skin clear. No significant rash, abnormal pigmentation or lesions.  NEURO: Cranial nerves grossly intact.  Mentation and speech appropriate for age.  PSYCH: Appropriate affect, tone, and pace of words    Last Comprehensive Metabolic Panel:  Lab Results   Component Value Date     06/26/2024    POTASSIUM 5.0  06/26/2024    CHLORIDE 106 06/26/2024    CO2 24 06/26/2024    ANIONGAP 9 06/26/2024    GLC 93 06/26/2024    BUN 13.9 06/26/2024    CR 0.78 06/26/2024    GFRESTIMATED >90 06/26/2024    CHLOE 9.5 06/26/2024       CBC RESULTS:   Recent Labs   Lab Test 06/26/24  1801   WBC 6.4   RBC 4.84   HGB 13.8   HCT 41.1   MCV 85   MCH 28.5   MCHC 33.6   RDW 13.2              Video-Visit Details    Type of service:  Video Visit   Originating Location (pt. Location): Home  Distant Location (provider location):  On-site  Platform used for Video Visit: Oanh  Signed Electronically by: Linda Soto MD

## 2024-12-02 NOTE — PATIENT INSTRUCTIONS
" [address] [address]  Office: [phone]   Fax:    [phone]     Recommend Omron - brand - for upper arm blood pressures for at home use.     Ok to cut telmisartan 40mg tabs in half and see if taking 20mg again works just as well for blood pressure. Get BP rechecked in our pharmacy before and after making that change.   You can do your BP checks in any [place] pharmacy:     To make a pharmacy only appointment online, please go to [email or web address] and select \"Click here to schedule a Vaccination or Blood Pressure Check at available [facility] \" at the bottom of the first page.  You can then select a location, then date and time bubbles that best fits your schedule.               Pt did genetic testing for family history of breast cancer - doing every 6 month screening with alternating breast MRI and mammography w/ zuleima.  First breast MRI 12/31/2024.     Discussed HRT in detail - combination Hormone replacement therapy risks of breast cancer = slightly increased over the general population and blood clots ( decreased slightly with transdermal patch form of estrogen) , and then benefits were discussed in view of WHI data and subsequent studies.  Pt would like to try low dose vivelle dot patch and oral micronized progesterone. Ordered.     Please, call our clinic or go to the ER immediately if signs or symptoms worsen or fail to improve as anticipated.   Continue to follow up with Dermatology as directed by them for skin issues.      Return in about 3 months (around 2/20/2025) for anxiety and menopause , as video visit .    Future Appointments 12/2/2024 - 5/31/2025        Date Visit Type Length Department Provider     12/31/2024 10:45 AM MR BREAST BILATERAL WWO 45 min MG MRI MGMR1    Location Instructions:     [address] [address]  Parking Imaging customers can park in the lots on " either side of the driveway leading to the West Entrance of the building.  Entrance and check-in location Enter through the West Entrance doors. Proceed straight ahead, through the lobby, to Check-In B (adjacent to the [facility]). Please check-in at with the registration staff at the desk labeled Check-In B.              1/21/2025  2:30 PM RETURN GENERAL SURGERY 30 min MG SURG Tracie Higginbotham MD    Location Instructions:     [facility] 2nd Floor  From Sign in Desk D.               2/13/2025  1:00 PM RETURN DERMATOLOGY 15 min EC DERMATOLOGY Lia Savage, SAIDA              2/20/2025  8:00 AM OFFICE VISIT 40 min RV FAMILY PRACTICE Linda Soto MD    Location Instructions:     [facility] is located at [address] [place] [address], [address]. Free parking is available; access the lot by turning north from [address] onto [facility], then west onto [facility].              5/12/2025  8:00 AM RETURN CCSL 30 min MG CANCER CLINIC Winnie Trinidad APRN CNP    Location Instructions:     This appointment is in a hospital-based location.&nbsp; Before your visit, you may want to check with your insurance company for coverage and referral options, including cost differences between services provided in different clinic settings.&nbsp; For more information visit this link on the [employer] [place] Website:&nbsp; [email or web address]              5/12/2025  8:30 AM MA SCREENING BILATERAL W/ ZACARIAS 15 min MG MAMMOGRAPHY MGMA1    Location Instructions:     [facility] [id number] [place] [id number]  Parking Imaging customers can park in the lots on either side of the driveway leading to the West Entrance of the building.  Entrance and check-in location Enter through the West Entrance doors. Proceed straight ahead, through the lobby, to Check-In B (adjacent to the [facility]). Please  "check-in at with the registration staff at the desk labeled Check-In B.                             Thank you so much for doing a video visit with us today. And, again, thank you  for choosing our [facility].  Please contact us with any questions that you may have.   We appreciate the opportunity to serve you now and look forward to supporting your healthcare needs for a long time to come!    Our clinic for the foreseeable future and until further notice , will continue to offer virtual visits, including telephone visits, video visits,  and e-visits, in addition to in person visits.  Please call to schedule another one if you need it!        Most Sincerely,     Linda Soto MD                                              To reach your [facility] care team after hours call:   [phone] press #2 \"to speak with your care team\".  This will get you to our clinic instead of routing to central [facility]  scheduling.     PLEASE NOTE OUR HOURS HAVE CHANGED secondary to COVID-19 coronavirus pandemic, as we are trying to minimize patient exposure to the virus,  which is now widespread in the [facility].  These hours may change with very little notice.  We apologize for any inconvenience.       Our current clinic hours are:         Monday- Thursday   7:00am - 6:00pm  in person.      Friday  7:00am- 5:00pm in person                       Saturday and Sunday : Closed to in person and virtual visits            We have telephone and virtual visit times available in the above time frames Monday through Friday.                                                Phone:  [phone] ( press 2 to speak to your care team).     Our pharmacy hours: Monday  through Friday 8:00am to 5:00pm                        Saturday - 9:00 am to 12 noon         Sunday : Closed.     ###  Please note: at this time we are not accepting any walk-in visits. ###      There is also information " available at our web site:  [email or web address]    If your provider ordered any lab tests and you do not receive the results within 10 business days, please call the clinic.    If you need a medication refill please contact your pharmacy.  Please allow 3 business days for your refill to be completed.    Our clinic offers telephone visits and e visits.  Please ask one of your team members to explain more.      Use [employer] (secure email communication and access to your chart) to send your primary care provider a message or make an appointment. Ask someone on your Team how to sign up for [employer].

## 2024-12-21 DIAGNOSIS — F33.0 MILD EPISODE OF RECURRENT MAJOR DEPRESSIVE DISORDER (H): ICD-10-CM

## 2024-12-21 DIAGNOSIS — F41.9 ANXIETY: ICD-10-CM

## 2024-12-23 ENCOUNTER — MYC REFILL (OUTPATIENT)
Dept: FAMILY MEDICINE | Facility: CLINIC | Age: 49
End: 2024-12-23
Payer: COMMERCIAL

## 2024-12-23 DIAGNOSIS — F33.0 MILD EPISODE OF RECURRENT MAJOR DEPRESSIVE DISORDER (H): ICD-10-CM

## 2024-12-23 DIAGNOSIS — F41.9 ANXIETY: ICD-10-CM

## 2024-12-23 RX ORDER — BUPROPION HYDROCHLORIDE 150 MG/1
450 TABLET ORAL EVERY MORNING
Qty: 270 TABLET | Refills: 1 | OUTPATIENT
Start: 2024-12-23

## 2024-12-23 RX ORDER — BUPROPION HYDROCHLORIDE 150 MG/1
450 TABLET ORAL EVERY MORNING
Qty: 270 TABLET | Refills: 1 | Status: SHIPPED | OUTPATIENT
Start: 2024-12-23

## 2025-01-02 ENCOUNTER — TELEPHONE (OUTPATIENT)
Dept: ONCOLOGY | Facility: CLINIC | Age: 50
End: 2025-01-02
Payer: COMMERCIAL

## 2025-01-02 DIAGNOSIS — R92.8 ABNORMAL MRI, BREAST: Primary | ICD-10-CM

## 2025-01-05 ENCOUNTER — MYC REFILL (OUTPATIENT)
Dept: FAMILY MEDICINE | Facility: CLINIC | Age: 50
End: 2025-01-05
Payer: COMMERCIAL

## 2025-01-05 DIAGNOSIS — N95.1 SYMPTOMS, SUCH AS FLUSHING, SLEEPLESSNESS, HEADACHE, LACK OF CONCENTRATION, ASSOCIATED WITH THE MENOPAUSE: ICD-10-CM

## 2025-01-06 DIAGNOSIS — F41.9 ANXIETY: Primary | ICD-10-CM

## 2025-01-06 RX ORDER — ESTRADIOL 0.03 MG/D
1 FILM, EXTENDED RELEASE TRANSDERMAL
Qty: 9 PATCH | Refills: 11 | OUTPATIENT
Start: 2025-01-06

## 2025-01-06 RX ORDER — LORAZEPAM 1 MG/1
1 TABLET ORAL
Qty: 1 TABLET | Refills: 0 | Status: SHIPPED | OUTPATIENT
Start: 2025-01-06

## 2025-01-15 ENCOUNTER — ANCILLARY PROCEDURE (OUTPATIENT)
Dept: MRI IMAGING | Facility: CLINIC | Age: 50
End: 2025-01-15
Payer: COMMERCIAL

## 2025-01-15 ENCOUNTER — ANCILLARY PROCEDURE (OUTPATIENT)
Dept: MAMMOGRAPHY | Facility: CLINIC | Age: 50
End: 2025-01-15
Payer: COMMERCIAL

## 2025-01-15 DIAGNOSIS — R92.8 ABNORMAL MRI, BREAST: ICD-10-CM

## 2025-01-15 PROCEDURE — 88360 TUMOR IMMUNOHISTOCHEM/MANUAL: CPT | Performed by: STUDENT IN AN ORGANIZED HEALTH CARE EDUCATION/TRAINING PROGRAM

## 2025-01-15 PROCEDURE — A9585 GADOBUTROL INJECTION: HCPCS | Performed by: RADIOLOGY

## 2025-01-15 PROCEDURE — 19085 BX BREAST 1ST LESION MR IMAG: CPT | Mod: RT | Performed by: RADIOLOGY

## 2025-01-15 PROCEDURE — 88305 TISSUE EXAM BY PATHOLOGIST: CPT | Performed by: STUDENT IN AN ORGANIZED HEALTH CARE EDUCATION/TRAINING PROGRAM

## 2025-01-15 PROCEDURE — 88342 IMHCHEM/IMCYTCHM 1ST ANTB: CPT | Mod: XS | Performed by: STUDENT IN AN ORGANIZED HEALTH CARE EDUCATION/TRAINING PROGRAM

## 2025-01-15 RX ORDER — GADOBUTROL 604.72 MG/ML
8.5 INJECTION INTRAVENOUS ONCE
Status: COMPLETED | OUTPATIENT
Start: 2025-01-15 | End: 2025-01-15

## 2025-01-15 RX ADMIN — GADOBUTROL 8.5 ML: 604.72 INJECTION INTRAVENOUS at 13:46

## 2025-01-20 ENCOUNTER — TELEPHONE (OUTPATIENT)
Dept: GENERAL RADIOLOGY | Facility: CLINIC | Age: 50
End: 2025-01-20
Payer: COMMERCIAL

## 2025-01-20 LAB
PATH REPORT.COMMENTS IMP SPEC: NORMAL
PATH REPORT.FINAL DX SPEC: NORMAL
PATH REPORT.GROSS SPEC: NORMAL
PATH REPORT.MICROSCOPIC SPEC OTHER STN: NORMAL
PATH REPORT.RELEVANT HX SPEC: NORMAL
PHOTO IMAGE: NORMAL

## 2025-01-20 NOTE — TELEPHONE ENCOUNTER
Spoke with patient regarding right breast biopsy results, which indicate negative for atypia or malignancy. Notified patient that the radiologist's recommendation is 6 month follow-up MRI. Patient states she already has a mammogram scheduled for May and is wondering if she should still keep that as well. Message sent to Winnie Trinidad to clarify. Patient verbalized understanding of these results and all questions answered to her satisfaction.

## 2025-01-21 DIAGNOSIS — R92.8 ABNORMAL MRI, BREAST: Primary | ICD-10-CM

## 2025-01-21 DIAGNOSIS — Z12.39 BREAST CANCER SCREENING, HIGH RISK PATIENT: ICD-10-CM

## 2025-02-13 ENCOUNTER — OFFICE VISIT (OUTPATIENT)
Dept: DERMATOLOGY | Facility: CLINIC | Age: 50
End: 2025-02-13
Payer: COMMERCIAL

## 2025-02-13 DIAGNOSIS — D22.9 MULTIPLE BENIGN NEVI: ICD-10-CM

## 2025-02-13 DIAGNOSIS — D49.2 NEOPLASM OF SKIN: ICD-10-CM

## 2025-02-13 DIAGNOSIS — Z85.820 HISTORY OF MELANOMA: Primary | ICD-10-CM

## 2025-02-13 DIAGNOSIS — L82.1 SK (SEBORRHEIC KERATOSIS): ICD-10-CM

## 2025-02-13 DIAGNOSIS — D18.01 CHERRY ANGIOMA: ICD-10-CM

## 2025-02-13 DIAGNOSIS — L81.4 LENTIGINES: ICD-10-CM

## 2025-02-13 ASSESSMENT — PAIN SCALES - GENERAL: PAINLEVEL_OUTOF10: NO PAIN (0)

## 2025-02-13 NOTE — PROGRESS NOTES
[facility] Dermatology Note  Encounter Date: Feb 13, 2025  Office Visit      Dermatology Problem List:  FBSE 2/13/25    #NUB R medial calf, S/p Biopsy performed on 2/13/15, pending results   1. Melanoma  - L thigh - Malignant melanoma, BD 1.2mm, pT2a - L thigh - bx 8/28/23 - S/p WLE Dr. Higginbotham/Carla 10/9/23  - R superior inguinal crease- Atypical junctional melanocytic proliferation, consistent with early MIS, Bx 5/16/24, S/p WLE on 7/3/24 Dr. Higginbotham  2. Benign biopsies:  - L anterior shoulder - bx 8/28/23 - DF  - L inguinal crease, Lentigo, Bx 11/21/24  3. Dermatofibroma - L calf, L thigh  4. 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  ____________________________________________    Assessment & Plan:    # Dermatofibromas. X 1 L calf   - Discussed the natural history and benign nature of this lesion. Reassurance provided that no additional treatment is necessary.        # Neoplasm of unspecified behavior of the skin (D49.2) on the R medial calf. The differential diagnosis includes DN vs MM vs other. .   - Shave biopsy performed today, see procedure note below.   - Photographed today     # Hx of MM x2  - ABCDEs: Counseled ABCDEs of melanoma: Asymmetry, Border (irregularity), Color (not uniform, changes in color), Diameter (greater than 6 mm which is about the size of a pencil eraser), and Evolving (any changes in preexisting moles).  - Sun protection: Counseled SPF30+ sunscreen, UPF clothing, sun avoidance, tanning bed avoidance.   - Recommended yearly dental, ophthalmology, and gynecology exams.  - Recommended skin exams for all first-degree relatives.  - Recommended follow up is 3-6 months    # Benign findings: multiple benign nevi, lentigines, cherry angiomas, SKs  - edu on benign etiology  - Signs and Symptoms of non-melanoma skin cancer and ABCDEs of melanoma reviewed with  patient. Patient encouraged to perform monthly self skin exams and educated on how to perform them. UV precautions reviewed with patient. Patient was asked about new or changing moles/lesions on body.   - Sunscreen: Apply 20 minutes prior to going outdoors and reapply every two hours, when wet or sweating. We recommend using an SPF 30 or higher, and to use one that is water resistant.     - RTC for changes     Procedures Performed:   - Shave biopsy procedure note, location(s): see above. After discussion of benefits and risks including but not limited to bleeding, infection, scar, incomplete removal, recurrence, and non-diagnostic biopsy, written consent and photographs were obtained. The area was cleaned with isopropyl alcohol. 0.5mL of 1% lidocaine with epinephrine was injected to obtain adequate anesthesia of lesion(s). Shave biopsy at site(s) performed. Hemostasis was achieved with aluminium chloride. Petrolatum ointment and a sterile dressing were applied. The patient tolerated the procedure and no complications were noted. The patient was provided with verbal and written post care instructions.       Follow-up: 3-6 month(s) in-person, or earlier for new or changing lesions    Staff and scribe:    Scribe Disclosure:   I, MAXX SRIVASTAVA, am serving as a scribe; to document services personally performed by Lia Savage PA-C -based on data collection and the provider's statements to me.     Provider Disclosure:  I agree with above History, Review of Systems, Physical exam and Plan.  I have reviewed the content of the documentation and have edited it as needed. I have personally performed the services documented here and the documentation accurately represents those services and the decisions I have made.      Electronically signed by:    All risks, benefits and alternatives were discussed with patient.  Patient is in agreement and understands the assessment and plan.  All questions were answered.    Lia  SAIDA Savage, MPAS  [facility]: Phone: [phone], Fax: [phone]  [facility]: Phone: [phone],  Fax: [phone]  [facility] [place] [facility]: Phone: [phone], Fax: [phone]  ____________________________________________    CC: Skin Check (FBSC)      Reviewed patients past medical history and pertinent chart review prior to patient's visit today.     HPI:  Ms. Deandra Lewis is a 49 year old female who presents today as a return patient for FBSE.     Today patient did not report any spots of concern. Feeling well.     Has a hx of MM on the L thigh and MIS on the R superior inguinal crease.     Patient is otherwise feeling well, without additional concerns.    Labs:  N/A    Physical Exam:  Vitals: There were no vitals taken for this visit.  SKIN: Full skin excluding the undergarment areas was performed. The exam included the head/face, neck, both arms, chest, back, abdomen, both legs, buttocks, groin, digits and/or nails.    - - Araiza's skin type II, has <100 nevi  - There are dome shaped bright red papules on the trunk.   - Multiple regular brown pigmented macules and papules are identified on the trunk and extremities.   - Scattered brown macules on sun exposed areas.  - There are waxy stuck on tan to brown papules on the trunk.    -There are well healed surgical scars without erythema, nodularity or telangiectasias on the prior MM, NERD   - There is a firm tan/flesh colored papule that dimples with lateral pressure on the L calf ..   - R medial calf there is a 7 mm brown macule   - No other lesions of concern on areas examined.   LYMPH: Examination of the pre/post auricular, occipital, cervical, clavicular, axillary and inguinal lymph nodes was negative.       Medications:  Current Outpatient Medications   Medication Sig Dispense Refill    buPROPion (WELLBUTRIN XL) 150 MG 24 hr tablet Take 3  tablets (450 mg) by mouth every morning. 270 tablet 1    cetirizine (ZYRTEC) 10 MG tablet Take 1 tablet (10 mg) by mouth every evening 30 tablet 1    cyclobenzaprine (FLEXERIL) 5 MG tablet Take 1-2 tablets (5-10 mg) by mouth 2 times daily as needed for muscle spasms 30 tablet 3    estradiol (VIVELLE-DOT) 0.025 MG/24HR bi-weekly patch Place 1 patch over 96 hours onto the skin twice a week. 9 patch 11    ibuprofen (ADVIL/MOTRIN) 200 MG tablet Take 200 mg by mouth every 6 hours as needed.      methocarbamol (ROBAXIN) 500 MG tablet Take 1 tablet (500 mg) by mouth 4 times daily as needed for muscle spasms 20 tablet 3    progesterone (PROMETRIUM) 100 MG capsule Take 1 capsule (100 mg) by mouth daily. 90 capsule 3    telmisartan (MICARDIS) 40 MG tablet Take 1 tablet (40 mg) by mouth daily. 90 tablet 1    ZOLMitriptan (ZOMIG) 5 MG tablet TAKE 1 TABLET BY MOUTH AT ONSET OF HEADACHE FOR MIGRAINE MAY REPEAT IN 2 HOURS. MAX 2 TABS/24 HOURS 10 tablet 11    albuterol (PROAIR HFA/PROVENTIL HFA/VENTOLIN HFA) 108 (90 Base) MCG/ACT inhaler INHALE 2 PUFFS BY MOUTH EVERY 6 HOURS AS NEEDED FOR WHEEZE OR FOR SHORTNESS OF BREATH (Patient not taking: Reported on 2/13/2025) 18 g 11    fluticasone-vilanterol (BREO ELLIPTA) 200-25 MCG/ACT inhaler Inhale 1 puff into the lungs daily (Patient not taking: Reported on 2/13/2025) 60 each 11    LORazepam (ATIVAN) 1 MG tablet Take 1 tablet (1 mg) by mouth once as needed for anxiety (Prior to breast biopsy). Take with you to your breast biopsy. The nurse will let you know when to take this medication. (Patient not taking: Reported on 2/13/2025) 1 tablet 0    montelukast (SINGULAIR) 10 MG tablet Take 1 tablet (10 mg) by mouth at bedtime (Patient not taking: Reported on 2/13/2025) 90 tablet 3     No current facility-administered medications for this visit.      Past Medical/Surgical History:   Patient Active Problem List   Diagnosis    Chronic sinusitis    Bleeding, nose    Sinusitis    Lactose  intolerance    Kidney stones    Maxillary bone loss    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 related to previous surgeries - 4 in 3 months for sinus issues/infection    Family history of breast cancer- mother's identical twin, maternal aunt, paternal GM and dad with breast lumps - had genetic counseling    Hot flashes    Generalized hyperhidrosis- since earliest recollections - soaks clothing multiple times/week - very disruptive to life    Mild anxiety    Foster care child    Anxiety    Multiple fractures of left foot with routine healing    Family history of colon cancer- father dx'd in mid 40's - pt's first colonoscopy in 4/24/2017 - repeat in 2022     Menorrhagia with regular cycle- ? cause of iron deficiency anemia- pt would like to see ob/gyn specialists- didn't go in 2021 - better than previous    Intractable episodic tension-type headache    Strain of neck muscle, initial encounter    Sun-damaged skin    Cough due to bronchospasm- needs refills on meds     Recurrent cough    Attention deficit - symptoms - pt desires testing and possible treatment    Intractable migraine without aura and without status migrainosus    Hypertension goal BP (blood pressure) < 140/90 - new diagnosis fall 2023 - much improved with weight loss and regular CV exercise    Morbid obesity (H)    Numbness and tingling of hand- fingertips - ? caused by lisinopril vs. 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 (cT2a, cN0, cM0)    Melanoma of skin (H)    Mild intermittent asthma with (acute) exacerbation- allergies and cold temperature triggers    Encounter for completion of form with patient- FMLA paperwork for melanoma left leg    History of malignant melanoma of skin    Neoplasm of uncertain behavior of skin- left pubic area and left breast inferior areola - slightly irregular pigmentation - images taken - see  media section - pt will watch closely - nted 8/20/2024    Multiple nevi    Cherry angioma    Lentigines    Melanoma in situ of torso excluding breast (H) - right groin area - 2nd melanoma - first was left anterior thigh    Mild episode of recurrent major depressive disorder    Medication side effects- lisinopril 5mg annoying dry cough, and topiramate 25mg = blurred vision    Weight loss- 40 lbs with better nutrition and running over the last 6 months    Need for hepatitis B vaccination- pt will check with her insurance on this    Counseling for birth control, intrauterine device- pt is thinking about HRT for perimenopausal symptoms, possibly Mirena IUD and oral/transdermal estrogen    Abnormality of cervix- inflamed , sligthly cystic and hypervascular cervix from 4:00-8;00 inferior to os    CARDIOVASCULAR SCREENING; LDL GOAL LESS THAN 130    Lentiginous compound nevus of thigh, left - inguinal crease - melanocytic - s/p shave biopsy = benign findings, clear margins 11/21/2024     Past Medical History:   Diagnosis Date    Anemia     Anxiety     paxil 10mg= mild bladder retention, at 30mg = ravenously hungry/weight gain increased headaches; lexapro = bad sweating    Contraception     ocp's some = mood swings ; seasonale: frequent vaginal bleeding and weight gain    Hot flashes 11/16/2016    Hypertension goal BP (blood pressure) < 140/90  - new diagnosis 02/02/2023    Kidney stones     Dr. 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

## 2025-02-13 NOTE — PATIENT INSTRUCTIONS
Wound Care After a Biopsy    What is a skin biopsy?  A skin biopsy allows the doctor to examine a very small piece of tissue under the microscope to determine the diagnosis and the best treatment for the skin condition. A local anesthetic (numbing medicine)  is injected with a very small needle into the skin area to be tested. A small piece of skin is taken from the area. Sometimes a suture (stitch) is used.     What are the risks of a skin biopsy?  I will experience scar, bleeding, swelling, pain, crusting and redness. I may experience incomplete removal or recurrence. Risks of this procedure are excessive bleeding, bruising, infection, nerve damage, numbness, thick (hypertrophic or keloidal) scar and non-diagnostic biopsy.    How should I care for my wound for the first 24 hours?  Keep the wound dry and covered for 24 hours  If it bleeds, hold direct pressure on the area for 15 minutes. If bleeding does not stop then go to the emergency room  Avoid strenuous exercise the first 1-2 days or as your doctor instructs you    How should I care for the wound after 24 hours?  After 24 hours, remove the bandage  You may bathe or shower as normal  If you had a scalp biopsy, you can shampoo as usual and can use shower water to clean the biopsy site daily  Clean the wound twice a day with gentle soap and water  Do not scrub, be gentle  Apply white petroleum/Vaseline after cleaning the wound with a cotton swab or a clean finger, and keep the site covered with a Bandaid /bandage. Bandages are not necessary with a scalp biopsy  If you are unable to cover the site with a Bandaid /bandage, re-apply ointment 2-3 times a day to keep the site moist. Moisture will help with healing  Avoid strenuous activity for first 1-2 days  Avoid lakes, rivers, pools, and oceans until the stitches are removed or the site is healed    How do I clean my wound?  Wash hands thoroughly with soap or use hand  before all wound care  Clean the  wound with gentle soap and water  Apply white petroleum/Vaseline  to wound after it is clean  Replace the Bandaid /bandage to keep the wound covered for the first few days or as instructed by your doctor  If you had a scalp biopsy, warm shower water to the area on a daily basis should suffice    What should I use to clean my wound?   Cotton-tipped applicators (Qtips )  White petroleum jelly (Vaseline ). Use a clean new container and use Q-tips to apply.  Bandaids   as needed  Gentle soap     How should I care for my wound long term?  Do not get your wound dirty  Keep up with wound care for one week or until the area is healed.  A small scab will form and fall off by itself when the area is completely healed. The area will be red and will become pink in color as it heals. Sun protection is very important for how your scar will turn out. Sunscreen with an SPF 30 or greater is recommended once the area is healed.  If you have stitches, stitches need to be removed in 10 days. You may return to our clinic for this or you may have it done locally at your doctor s office.  You should have some soreness but it should be mild and slowly go away over several days. Talk to your doctor about using tylenol for pain,    When should I call my doctor?  If you have increased:   Pain or swelling  Pus or drainage (clear or slightly yellow drainage is ok)  Temperature over 100F  Spreading redness or warmth around wound    When will I hear about my results?  The biopsy results can take 2-3 weeks to come back. The clinic will call you with the results, send you a Shark Puncht message, or have you schedule a follow-up clinic or phone time to discuss the results. Contact our clinics if you do not hear from us in 3 weeks.     Who should I call with questions?  [facility]: [phone]   [facility]: [phone]  For urgent needs outside of business hours call the [facility]  at [phone] and ask for the dermatology resident on call            Proper skin care from [facility]:    -Eliminate harsh soaps as they strip the natural oils from the skin, often resulting in dry itchy skin ( i.e. Dial, Zest, [place] Spring)  -Use mild soaps such as Cetaphil or Dove Sensitive Skin in the shower. You do not need to use soap on arms, legs, and trunk every time you shower unless visibly soiled.   -Avoid hot or cold showers.  -After showering, lightly dry off and apply moisturizing within 2-3 minutes. This will help trap moisture in the skin.   -Aggressive use of a moisturizer at least 1-2 times a day to the entire body (including -Vanicream, Cetaphil, Aquaphor or Cerave) and moisturize hands after every washing.  -We recommend using moisturizers that come in a tub that needs to be scooped out, not a pump. This has more of an oil base. It will hold moisture in your skin much better than a water base moisturizer. The above recommended are non-pore clogging.      Wear a sunscreen with at least SPF 30 on your face, ears, neck and V of the chest daily. Wear sunscreen on other areas of the body if those areas are exposed to the sun throughout the day. Sunscreens can contain physical and/or chemical blockers. Physical blockers are less likely to clog pores, these include zinc oxide and titanium dioxide. Reapply every two hour and after swimming.     Sunscreen examples: https://www.ewg.org/sunscreen/    UV radiation  UVA radiation remains constant throughout the day and throughout the year. It is a longer wavelength than UVB and therefore penetrates deeper into the skin leading to immediate and delayed tanning, photoaging, and skin cancer. 70-80% of UVA and UVB radiation occurs between the hours of 10am-2pm.  UVB radiation  UVB radiation causes the most harmful effects and is more significant during the summer months. However, snow and ice can reflect UVB radiation leading to skin damage during  the winter months as well. UVB radiation is responsible for tanning, burning, inflammation, delayed erythema (pinkness), pigmentation (brown spots), and skin cancer.     I recommend self monthly full body exams and yearly full body exams with a dermatology provider. If you develop a new or changing lesion please follow up for examination. Most skin cancers are pink and scaly or pink and pearly. However, we do see blue/brown/black skin cancers.  Consider the ABCDEs of melanoma when giving yourself your monthly full body exam ( don't forget the groin, buttocks, feet, toes, etc). A-asymmetry, B-borders, C-color, D-diameter, E-elevation or evolving. If you see any of these changes please follow up in clinic. If you cannot see your back I recommend purchasing a hand held mirror to use with a larger wall mirror.       Checking for Skin Cancer  You can find cancer early by checking your skin each month. There are 3 kinds of skin cancer. They are melanoma, basal cell carcinoma, and squamous cell carcinoma. Doing monthly skin checks is the best way to find new marks or skin changes. Follow the instructions below for checking your skin.   The ABCDEs of checking moles for melanoma   Check your moles or growths for signs of melanoma using ABCDE:   Asymmetry: the sides of the mole or growth don t match  Border: the edges are ragged, notched, or blurred  Color: the color within the mole or growth varies  Diameter: the mole or growth is larger than 6 mm (size of a pencil eraser)  Evolving: the size, shape, or color of the mole or growth is changing (evolving is not shown in the images below)    Checking for other types of skin cancer  Basal cell carcinoma or squamous cell carcinoma have symptoms such as:     A spot or mole that looks different from all other marks on your skin  Changes in how an area feels, such as itching, tenderness, or pain  Changes in the skin's surface, such as oozing, bleeding, or scaliness  A sore that does  not heal  New swelling or redness beyond the border of a mole    Who s at risk?  Anyone can get skin cancer. But you are at greater risk if you have:   Fair skin, light-colored hair, or light-colored eyes  Many moles or abnormal moles on your skin  A history of sunburns from sunlight or tanning beds  A family history of skin cancer  A history of exposure to radiation or chemicals  A weakened immune system  If you have had skin cancer in the past, you are at risk for recurring skin cancer.   How to check your skin  Do your monthly skin checkups in front of a full-length mirror. Check all parts of your body, including your:   Head (ears, face, neck, and scalp)  Torso (front, back, and sides)  Arms (tops, undersides, upper, and lower armpits)  Hands (palms, backs, and fingers, including under the nails)  Buttocks and genitals  Legs (front, back, and sides)  Feet (tops, soles, toes, including under the nails, and between toes)  If you have a lot of moles, take digital photos of them each month. Make sure to take photos both up close and from a distance. These can help you see if any moles change over time.   Most skin changes are not cancer. But if you see any changes in your skin, call your doctor right away. Only he or she can diagnose a problem. If you have skin cancer, seeing your doctor can be the first step toward getting the treatment that could save your life.   [employer] last reviewed this educational content on 4/1/2019 2000-2020 [employer]. [address] [place] [address], [place] [address]. All rights reserved. This information is not intended as a substitute for professional medical care. Always follow your healthcare professional's instructions.       When should I call my doctor?  If you are worsening or not improving, please, contact us or seek urgent care as noted below.     Who should I call with questions (adults)?    [facility] [place] [phone]  For urgent needs  outside of business hours call the [facility] at [phone] and ask for the dermatology resident on call to be paged  If this is a medical emergency and you are unable to reach an ER, Call [phone]      If you need a prescription refill, please contact your pharmacy. Refills are approved or denied by our Physicians during normal business hours, Monday through Friday.  Per office policy, refills will not be granted if you have not been seen within the past year (or sooner depending on the condition).

## 2025-02-13 NOTE — LETTER
2/13/2025      Deandra Lewis  [address] [place] [address]      Dear Colleague,    Thank you for referring your patient, Deandra Lewis, to the [facility]. Please see a copy of my visit note below.    [facility] Dermatology Note  Encounter Date: Feb 13, 2025  Office Visit      Dermatology Problem List:  FBSE 2/13/25    #NUB R medial calf, S/p Biopsy performed on 2/13/15, pending results   1. Melanoma  - L thigh - Malignant melanoma, BD 1.2mm, pT2a - L thigh - bx 8/28/23 - S/p WLE Dr. Higginbotham/Carla 10/9/23  - R superior inguinal crease- Atypical junctional melanocytic proliferation, consistent with early MIS, Bx 5/16/24, S/p WLE on 7/3/24 Dr. Higginbotham  2. Benign biopsies:  - L anterior shoulder - bx 8/28/23 - DF  - L inguinal crease, Lentigo, Bx 11/21/24  3. Dermatofibroma - L calf, L thigh  4. 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  ____________________________________________    Assessment & Plan:    # Dermatofibromas. X 1 L calf   - Discussed the natural history and benign nature of this lesion. Reassurance provided that no additional treatment is necessary.        # Neoplasm of unspecified behavior of the skin (D49.2) on the R medial calf. The differential diagnosis includes DN vs MM vs other. .   - Shave biopsy performed today, see procedure note below.   - Photographed today     # Hx of MM x2  - ABCDEs: Counseled ABCDEs of melanoma: Asymmetry, Border (irregularity), Color (not uniform, changes in color), Diameter (greater than 6 mm which is about the size of a pencil eraser), and Evolving (any changes in preexisting moles).  - Sun protection: Counseled SPF30+ sunscreen, UPF clothing, sun avoidance, tanning bed avoidance.   - Recommended yearly dental, ophthalmology, and gynecology exams.  - Recommended skin exams for all first-degree  relatives.  - Recommended follow up is 3-6 months    # Benign findings: multiple benign nevi, lentigines, cherry angiomas, SKs  - edu on benign etiology  - Signs and Symptoms of non-melanoma skin cancer and ABCDEs of melanoma reviewed with patient. Patient encouraged to perform monthly self skin exams and educated on how to perform them. UV precautions reviewed with patient. Patient was asked about new or changing moles/lesions on body.   - Sunscreen: Apply 20 minutes prior to going outdoors and reapply every two hours, when wet or sweating. We recommend using an SPF 30 or higher, and to use one that is water resistant.     - RTC for changes     Procedures Performed:   - Shave biopsy procedure note, location(s): see above. After discussion of benefits and risks including but not limited to bleeding, infection, scar, incomplete removal, recurrence, and non-diagnostic biopsy, written consent and photographs were obtained. The area was cleaned with isopropyl alcohol. 0.5mL of 1% lidocaine with epinephrine was injected to obtain adequate anesthesia of lesion(s). Shave biopsy at site(s) performed. Hemostasis was achieved with aluminium chloride. Petrolatum ointment and a sterile dressing were applied. The patient tolerated the procedure and no complications were noted. The patient was provided with verbal and written post care instructions.       Follow-up: 3-6 month(s) in-person, or earlier for new or changing lesions    Staff and scribe:    Scribe Disclosure:   I, MAXX SRIVASTAVA, am serving as a scribe; to document services personally performed by Lia Savage PA-C -based on data collection and the provider's statements to me.     Provider Disclosure:  I agree with above History, Review of Systems, Physical exam and Plan.  I have reviewed the content of the documentation and have edited it as needed. I have personally performed the services documented here and the documentation accurately represents those services  and the decisions I have made.      Electronically signed by:    All risks, benefits and alternatives were discussed with patient.  Patient is in agreement and understands the assessment and plan.  All questions were answered.    Lia Savage PA-C, MPAS  [facility]: Phone: [phone], Fax: [phone]  [facility]: Phone: [phone],  Fax: [phone]  [facility]: Phone: [phone], Fax: [phone]  ____________________________________________    CC: Skin Check (FBSC)      Reviewed patients past medical history and pertinent chart review prior to patient's visit today.     HPI:  Ms. Deandra Lewis is a 49 year old female who presents today as a return patient for FBSE.     Today patient did not report any spots of concern. Feeling well.     Has a hx of MM on the L thigh and MIS on the R superior inguinal crease.     Patient is otherwise feeling well, without additional concerns.    Labs:  N/A    Physical Exam:  Vitals: There were no vitals taken for this visit.  SKIN: Full skin excluding the undergarment areas was performed. The exam included the head/face, neck, both arms, chest, back, abdomen, both legs, buttocks, groin, digits and/or nails.    - - Araiza's skin type II, has <100 nevi  - There are dome shaped bright red papules on the trunk.   - Multiple regular brown pigmented macules and papules are identified on the trunk and extremities.   - Scattered brown macules on sun exposed areas.  - There are waxy stuck on tan to brown papules on the trunk.    -There are well healed surgical scars without erythema, nodularity or telangiectasias on the prior MM, NERD   - There is a firm tan/flesh colored papule that dimples with lateral pressure on the L calf ..   - R medial calf there is a 7 mm brown macule   - No other lesions of concern on areas examined.   LYMPH: Examination of the pre/post  auricular, occipital, cervical, clavicular, axillary and inguinal lymph nodes was negative.       Medications:  Current Outpatient Medications   Medication Sig Dispense Refill     buPROPion (WELLBUTRIN XL) 150 MG 24 hr tablet Take 3 tablets (450 mg) by mouth every morning. 270 tablet 1     cetirizine (ZYRTEC) 10 MG tablet Take 1 tablet (10 mg) by mouth every evening 30 tablet 1     cyclobenzaprine (FLEXERIL) 5 MG tablet Take 1-2 tablets (5-10 mg) by mouth 2 times daily as needed for muscle spasms 30 tablet 3     estradiol (VIVELLE-DOT) 0.025 MG/24HR bi-weekly patch Place 1 patch over 96 hours onto the skin twice a week. 9 patch 11     ibuprofen (ADVIL/MOTRIN) 200 MG tablet Take 200 mg by mouth every 6 hours as needed.       methocarbamol (ROBAXIN) 500 MG tablet Take 1 tablet (500 mg) by mouth 4 times daily as needed for muscle spasms 20 tablet 3     progesterone (PROMETRIUM) 100 MG capsule Take 1 capsule (100 mg) by mouth daily. 90 capsule 3     telmisartan (MICARDIS) 40 MG tablet Take 1 tablet (40 mg) by mouth daily. 90 tablet 1     ZOLMitriptan (ZOMIG) 5 MG tablet TAKE 1 TABLET BY MOUTH AT ONSET OF HEADACHE FOR MIGRAINE MAY REPEAT IN 2 HOURS. MAX 2 TABS/24 HOURS 10 tablet 11     albuterol (PROAIR HFA/PROVENTIL HFA/VENTOLIN HFA) 108 (90 Base) MCG/ACT inhaler INHALE 2 PUFFS BY MOUTH EVERY 6 HOURS AS NEEDED FOR WHEEZE OR FOR SHORTNESS OF BREATH (Patient not taking: Reported on 2/13/2025) 18 g 11     fluticasone-vilanterol (BREO ELLIPTA) 200-25 MCG/ACT inhaler Inhale 1 puff into the lungs daily (Patient not taking: Reported on 2/13/2025) 60 each 11     LORazepam (ATIVAN) 1 MG tablet Take 1 tablet (1 mg) by mouth once as needed for anxiety (Prior to breast biopsy). Take with you to your breast biopsy. The nurse will let you know when to take this medication. (Patient not taking: Reported on 2/13/2025) 1 tablet 0     montelukast (SINGULAIR) 10 MG tablet Take 1 tablet (10 mg) by mouth at bedtime (Patient not taking:  Reported on 2/13/2025) 90 tablet 3     No current facility-administered medications for this visit.      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 related to previous surgeries - 4 in 3 months for sinus issues/infection     Family history of breast cancer- mother's identical twin, maternal aunt, paternal GM and dad with breast lumps - had genetic counseling     Hot flashes     Generalized hyperhidrosis- since earliest recollections - soaks clothing multiple times/week - very disruptive to life     Mild anxiety     Foster care child     Anxiety     Multiple fractures of left foot with routine healing     Family history of colon cancer- father dx'd in mid 40's - pt's first colonoscopy in 4/24/2017 - repeat in 2022      Menorrhagia with regular cycle- ? cause of iron deficiency anemia- pt would like to see ob/gyn specialists- didn't go in 2021 - better than previous     Intractable episodic tension-type headache     Strain of neck muscle, initial encounter     Sun-damaged skin     Cough due to bronchospasm- needs refills on meds      Recurrent cough     Attention deficit - symptoms - pt desires testing and possible treatment     Intractable migraine without aura and without status migrainosus     Hypertension goal BP (blood pressure) < 140/90 - new diagnosis fall 2023 - much improved with weight loss and regular CV exercise     Morbid obesity (H)     Numbness and tingling of hand- fingertips - ? caused by lisinopril vs. other - consider changing to amlodipine      Malignant melanoma of left lower extremity including hip (H)-left anterior thigh Stage 1B (cT2a, cN0, cM0)     Melanoma of skin (H)     Mild intermittent asthma with (acute) exacerbation- allergies and cold temperature triggers      Encounter for completion of form with patient- [place] paperwork for melanoma left leg     History of malignant melanoma of skin     Neoplasm of uncertain behavior of skin- left pubic area and left breast inferior areola - slightly irregular pigmentation - images taken - see media section - pt will watch closely - nted 8/20/2024     Multiple nevi     Cherry angioma     Lentigines     Melanoma in situ of torso excluding breast (H) - right groin area - 2nd melanoma - first was left anterior thigh     Mild episode of recurrent major depressive disorder     Medication side effects- lisinopril 5mg annoying dry cough, and topiramate 25mg = blurred vision     Weight loss- 40 lbs with better nutrition and running over the last 6 months     Need for hepatitis B vaccination- pt will check with her insurance on this     Counseling for birth control, intrauterine device- pt is thinking about HRT for perimenopausal symptoms, possibly Mirena IUD and oral/transdermal estrogen     Abnormality of cervix- inflamed , sligthly cystic and hypervascular cervix from 4:00-8;00 inferior to os     CARDIOVASCULAR SCREENING; LDL GOAL LESS THAN 130     Lentiginous compound nevus of thigh, left - inguinal crease - melanocytic - s/p shave biopsy = benign findings, clear margins 11/21/2024     Past Medical History:   Diagnosis Date     Anemia      Anxiety     paxil 10mg= mild bladder retention, at 30mg = ravenously hungry/weight gain increased headaches; lexapro = bad sweating     Contraception     ocp's some = mood swings ; seasonale: frequent vaginal bleeding and weight gain     Hot flashes 11/16/2016     Hypertension goal BP (blood pressure) < 140/90  - new diagnosis 02/02/2023     Kidney stones     Dr. Blank Steele - [facility]      Lactose intolerance      Malignant melanoma (H)      Malignant melanoma of left lower extremity including hip (H) 09/06/2023     Maxillary bone loss 01/2014    left - secondary to severe  oral/sinus infection and bleeding  - hosp [facility] [place] s/p multiple surgeries      Multiple fractures of left foot with routine healing 02/25/2021     Night sweats      Sleep problems      Uncomplicated asthma 20s                        Again, thank you for allowing me to participate in the care of your patient.        Sincerely,        Lia Savage PA-C    Electronically signed

## 2025-02-18 ASSESSMENT — ASTHMA QUESTIONNAIRES
QUESTION_1 LAST FOUR WEEKS HOW MUCH OF THE TIME DID YOUR ASTHMA KEEP YOU FROM GETTING AS MUCH DONE AT WORK, SCHOOL OR AT HOME: NONE OF THE TIME
ACT_TOTALSCORE: 25
QUESTION_2 LAST FOUR WEEKS HOW OFTEN HAVE YOU HAD SHORTNESS OF BREATH: NOT AT ALL
QUESTION_5 LAST FOUR WEEKS HOW WOULD YOU RATE YOUR ASTHMA CONTROL: COMPLETELY CONTROLLED
QUESTION_4 LAST FOUR WEEKS HOW OFTEN HAVE YOU USED YOUR RESCUE INHALER OR NEBULIZER MEDICATION (SUCH AS ALBUTEROL): NOT AT ALL
ACT_TOTALSCORE: 25
QUESTION_3 LAST FOUR WEEKS HOW OFTEN DID YOUR ASTHMA SYMPTOMS (WHEEZING, COUGHING, SHORTNESS OF BREATH, CHEST TIGHTNESS OR PAIN) WAKE YOU UP AT NIGHT OR EARLIER THAN USUAL IN THE MORNING: NOT AT ALL

## 2025-02-18 ASSESSMENT — ANXIETY QUESTIONNAIRES
1. FEELING NERVOUS, ANXIOUS, OR ON EDGE: NOT AT ALL
4. TROUBLE RELAXING: NEARLY EVERY DAY
7. FEELING AFRAID AS IF SOMETHING AWFUL MIGHT HAPPEN: NOT AT ALL
5. BEING SO RESTLESS THAT IT IS HARD TO SIT STILL: NOT AT ALL
7. FEELING AFRAID AS IF SOMETHING AWFUL MIGHT HAPPEN: NOT AT ALL
2. NOT BEING ABLE TO STOP OR CONTROL WORRYING: NOT AT ALL
6. BECOMING EASILY ANNOYED OR IRRITABLE: NOT AT ALL
8. IF YOU CHECKED OFF ANY PROBLEMS, HOW DIFFICULT HAVE THESE MADE IT FOR YOU TO DO YOUR WORK, TAKE CARE OF THINGS AT HOME, OR GET ALONG WITH OTHER PEOPLE?: NOT DIFFICULT AT ALL
GAD7 TOTAL SCORE: 3
GAD7 TOTAL SCORE: 3
IF YOU CHECKED OFF ANY PROBLEMS ON THIS QUESTIONNAIRE, HOW DIFFICULT HAVE THESE PROBLEMS MADE IT FOR YOU TO DO YOUR WORK, TAKE CARE OF THINGS AT HOME, OR GET ALONG WITH OTHER PEOPLE: NOT DIFFICULT AT ALL
GAD7 TOTAL SCORE: 3
3. WORRYING TOO MUCH ABOUT DIFFERENT THINGS: NOT AT ALL

## 2025-02-18 ASSESSMENT — PATIENT HEALTH QUESTIONNAIRE - PHQ9
10. IF YOU CHECKED OFF ANY PROBLEMS, HOW DIFFICULT HAVE THESE PROBLEMS MADE IT FOR YOU TO DO YOUR WORK, TAKE CARE OF THINGS AT HOME, OR GET ALONG WITH OTHER PEOPLE: NOT DIFFICULT AT ALL
SUM OF ALL RESPONSES TO PHQ QUESTIONS 1-9: 0
SUM OF ALL RESPONSES TO PHQ QUESTIONS 1-9: 0

## 2025-02-19 ENCOUNTER — MYC MEDICAL ADVICE (OUTPATIENT)
Dept: SURGERY | Facility: CLINIC | Age: 50
End: 2025-02-19

## 2025-02-19 ENCOUNTER — OFFICE VISIT (OUTPATIENT)
Dept: SURGERY | Facility: CLINIC | Age: 50
End: 2025-02-19
Attending: SURGERY
Payer: COMMERCIAL

## 2025-02-19 DIAGNOSIS — D03.59 MELANOMA IN SITU OF TORSO EXCLUDING BREAST (H): ICD-10-CM

## 2025-02-19 DIAGNOSIS — C43.72 MALIGNANT MELANOMA OF LEFT LOWER EXTREMITY INCLUDING HIP (H): ICD-10-CM

## 2025-02-19 NOTE — NURSING NOTE
Deandra Lewis's goals for this visit include:   Chief Complaint   Patient presents with    RECHECK     History of melanoma, EXAM       She requests these members of her care team be copied on today's visit information: yes    PCP: Linda Soto    Referring Provider:  Tracie Higginbotham MD  [address] [place] [address] [address]  [place] [address]    There were no vitals taken for this visit.    Do you need any medication refills at today's visit? No    Sudha Dwyer LPN

## 2025-02-19 NOTE — PROGRESS NOTES
[facility]  [id number] [place] [id number]  Phone: [phone]    PATIENT NAME:  Deandra Lewis  PATIENT YOB: 1975    FOLLOW-UP  Feb 19, 2025    Deandra Lewis is a 49 year old female who returns for follow-up for:     Cancer Staging   Malignant melanoma of left lower extremity including hip (H)-left anterior thigh Stage 1B (cT2a, cN0, cM0)  Staging form: Melanoma of the Skin, AJCC 8th Edition  - Clinical: Stage IB (cT2a, cN0, cM0) - Unsigned  - Pathologic: Stage IB (pT2a, pN0, cM0) - Signed by Tracie Higginbotham MD on 10/25/2023      Treatment to date:  Wide local excision of left lower anterior thigh melanoma with 2 cm margins, left femoral sentinel lymph node mapping and biopsy (10/9/2023)  Wide local excision of right groin melanoma in situ with 0.5 cm margins (7/3/2024)    HPI:    Since her last visit, she has been doing well. She denies any headaches, dizziness, vision changes, abdominal pain, bowel habit changes, rectal bleeding, melena, chest pain, shortness of breath, or unintentional weight loss.  She has not noted any masses in either groin bilaterally. She has good range of motion of her hips bilaterally and denies any lower extremity swelling.    Her last skin check was performed by Lia Savage PA-C on 2/13/2025.  A shave biopsy at the right calf was performed, results are pending.  Her next check is planned for 5/22/2025.     There were no vitals taken for this visit.   Physical Exam  Constitutional:       Appearance: She is well-developed.   Abdominal:      General: There is no distension.      Palpations: Abdomen is soft. There is no fluid wave, hepatomegaly or mass.      Tenderness: There is no abdominal tenderness.   Lymphadenopathy:      Cervical: No cervical adenopathy.      Right cervical: No superficial, deep or posterior cervical adenopathy.     Left cervical: No superficial, deep or posterior cervical adenopathy.      Upper  Body:      Right upper body: No supraclavicular, axillary or pectoral adenopathy.      Left upper body: No supraclavicular, axillary or pectoral adenopathy.      Lower Body: No right inguinal adenopathy. No left inguinal adenopathy.      Comments: No popliteal adenopathy bilaterally. No lymphedema in bilateral lower extremities.    Skin:     General: Skin is warm and dry.                 INVESTIGATIONS:    Biopsy (11/21/2024) showed:  Final Diagnosis   A. Left inguinal crease:  - Lentiginous compound melanocytic nevus - (see description)     ASSESSMENT:    Deandra Lewis is a 49 year old female with truncal MIS and left lower extremity cutaneous melanoma, nearly 1.5 years s/p surgery.    She is doing well without signs or symptoms of recurrence.    We reviewed that surveillance involves a history and physical exam every 6 months for the first 5 years, then annually, with imaging studies only indicated if symptoms arise. We reviewed the signs and symptoms that could arise in the setting of recurrent locoregional or metastatic disease. In addition, she will need to undergo regular dermatologic full skin survey and evaluation given that patients with a diagnosis of melanoma are at risk of recurrence (local and distant) and of subsequent de sedrick melanoma.  I also reviewed the importance of protection from sun exposure, including wearing long sleeves, hats, etc and also the use of sunscreen with SPF of at least 35, with frequent re-applications.     Total time spent with the patient was 15 minutes, of which 75% was counseling.     PLAN:  Follow up with me in 6 months  Continued skin checks per dermatology    Tracie Higginbotham MD MS [facility] FACS  Associate Professor of Surgery  Division of Surgical Oncology  [facility]     18 minutes spent on the date of the encounter doing chart review, patient visit, and documentation.

## 2025-02-19 NOTE — LETTER
2/19/2025      Deandra Lewis  [address] [place] [address]      Dear Colleague,    Thank you for referring your patient, Deandra Lewis, to the [facility]. Please see a copy of my visit note below.      [facility]  [address] [place] [id number]  Phone: [phone]    PATIENT NAME:  Deandra Lewis  PATIENT YOB: 1975    FOLLOW-UP  Feb 19, 2025    Deandra Lewis is a 49 year old female who returns for follow-up for:     Cancer Staging   Malignant melanoma of left lower extremity including hip (H)-left anterior thigh Stage 1B (cT2a, cN0, cM0)  Staging form: Melanoma of the Skin, AJCC 8th Edition  - Clinical: Stage IB (cT2a, cN0, cM0) - Unsigned  - Pathologic: Stage IB (pT2a, pN0, cM0) - Signed by Tracie Higginbotham MD on 10/25/2023      Treatment to date:  Wide local excision of left lower anterior thigh melanoma with 2 cm margins, left femoral sentinel lymph node mapping and biopsy (10/9/2023)  Wide local excision of right groin melanoma in situ with 0.5 cm margins (7/3/2024)    HPI:    Since her last visit, she has been doing well. She denies any headaches, dizziness, vision changes, abdominal pain, bowel habit changes, rectal bleeding, melena, chest pain, shortness of breath, or unintentional weight loss.  She has not noted any masses in either groin bilaterally. She has good range of motion of her hips bilaterally and denies any lower extremity swelling.    Her last skin check was performed by Lia Savage PA-C on 2/13/2025.  A shave biopsy at the right calf was performed, results are pending.  Her next check is planned for 5/22/2025.     There were no vitals taken for this visit.   Physical Exam  Constitutional:       Appearance: She is well-developed.   Abdominal:      General: There is no distension.      Palpations: Abdomen is soft. There is no fluid wave, hepatomegaly or mass.      Tenderness: There is no  abdominal tenderness.   Lymphadenopathy:      Cervical: No cervical adenopathy.      Right cervical: No superficial, deep or posterior cervical adenopathy.     Left cervical: No superficial, deep or posterior cervical adenopathy.      Upper Body:      Right upper body: No supraclavicular, axillary or pectoral adenopathy.      Left upper body: No supraclavicular, axillary or pectoral adenopathy.      Lower Body: No right inguinal adenopathy. No left inguinal adenopathy.      Comments: No popliteal adenopathy bilaterally. No lymphedema in bilateral lower extremities.    Skin:     General: Skin is warm and dry.                 INVESTIGATIONS:    Biopsy (11/21/2024) showed:  Final Diagnosis   A. Left inguinal crease:  - Lentiginous compound melanocytic nevus - (see description)     ASSESSMENT:    Deandra Lewis is a 49 year old female with truncal MIS and left lower extremity cutaneous melanoma, nearly 1.5 years s/p surgery.    She is doing well without signs or symptoms of recurrence.    We reviewed that surveillance involves a history and physical exam every 6 months for the first 5 years, then annually, with imaging studies only indicated if symptoms arise. We reviewed the signs and symptoms that could arise in the setting of recurrent locoregional or metastatic disease. In addition, she will need to undergo regular dermatologic full skin survey and evaluation given that patients with a diagnosis of melanoma are at risk of recurrence (local and distant) and of subsequent de sedrick melanoma.  I also reviewed the importance of protection from sun exposure, including wearing long sleeves, hats, etc and also the use of sunscreen with SPF of at least 35, with frequent re-applications.     Total time spent with the patient was 15 minutes, of which 75% was counseling.     PLAN:  Follow up with me in 6 months  Continued skin checks per dermatology    Tracie Higginbotham MD MS [facility] FACS  Associate Professor of Surgery  Division of  Surgical Oncology  [facility]     18 minutes spent on the date of the encounter doing chart review, patient visit, and documentation.      Again, thank you for allowing me to participate in the care of your patient.        Sincerely,        Tracie Higginbotham MD    Electronically signed

## 2025-02-20 ENCOUNTER — VIRTUAL VISIT (OUTPATIENT)
Dept: FAMILY MEDICINE | Facility: CLINIC | Age: 50
End: 2025-02-20
Attending: FAMILY MEDICINE
Payer: COMMERCIAL

## 2025-02-20 ENCOUNTER — TELEPHONE (OUTPATIENT)
Dept: ONCOLOGY | Facility: CLINIC | Age: 50
End: 2025-02-20

## 2025-02-20 DIAGNOSIS — C43.72 MALIGNANT MELANOMA OF LEFT LOWER EXTREMITY INCLUDING HIP (H): ICD-10-CM

## 2025-02-20 DIAGNOSIS — Z85.820 HISTORY OF MALIGNANT MELANOMA OF SKIN: ICD-10-CM

## 2025-02-20 DIAGNOSIS — D03.71 MELANOMA IN SITU OF RIGHT LOWER EXTREMITY INCLUDING HIP (H): Primary | ICD-10-CM

## 2025-02-20 DIAGNOSIS — Z80.3 FAMILY HISTORY OF BREAST CANCER: ICD-10-CM

## 2025-02-20 DIAGNOSIS — F33.0 MILD EPISODE OF RECURRENT MAJOR DEPRESSIVE DISORDER: ICD-10-CM

## 2025-02-20 DIAGNOSIS — D03.71 MELANOMA IN SITU OF RIGHT LOWER EXTREMITY INCLUDING HIP (H): ICD-10-CM

## 2025-02-20 DIAGNOSIS — E66.01 MORBID OBESITY (H): ICD-10-CM

## 2025-02-20 DIAGNOSIS — I10 HYPERTENSION GOAL BP (BLOOD PRESSURE) < 140/90: ICD-10-CM

## 2025-02-20 DIAGNOSIS — F41.9 ANXIETY: ICD-10-CM

## 2025-02-20 DIAGNOSIS — N95.1 SYMPTOMS, SUCH AS FLUSHING, SLEEPLESSNESS, HEADACHE, LACK OF CONCENTRATION, ASSOCIATED WITH THE MENOPAUSE: Primary | ICD-10-CM

## 2025-02-20 DIAGNOSIS — J45.21 MILD INTERMITTENT ASTHMA WITH (ACUTE) EXACERBATION: ICD-10-CM

## 2025-02-20 RX ORDER — ACETAMINOPHEN 325 MG/1
975 TABLET ORAL ONCE
OUTPATIENT
Start: 2025-02-20 | End: 2025-02-20

## 2025-02-20 RX ORDER — CEFAZOLIN SODIUM 2 G/50ML
2 SOLUTION INTRAVENOUS SEE ADMIN INSTRUCTIONS
OUTPATIENT
Start: 2025-02-20

## 2025-02-20 RX ORDER — CEFAZOLIN SODIUM 2 G/50ML
2 SOLUTION INTRAVENOUS
OUTPATIENT
Start: 2025-02-20

## 2025-02-20 RX ORDER — ESTRADIOL 0.05 MG/D
1 PATCH, EXTENDED RELEASE TRANSDERMAL
Qty: 9 PATCH | Refills: 11 | Status: SHIPPED | OUTPATIENT
Start: 2025-02-20

## 2025-02-20 NOTE — PROGRESS NOTES
Deandra is a 49 year old who is being evaluated via a billable video visit via [employer].   How would you like to obtain your AVS? [employer]  If the video visit is dropped, the invitation should be resent by: Text to cell phone: [phone]  Will anyone else be joining your video visit? No      Assessment & Plan :       ICD-10-CM    1. Symptoms, such as flushing, sleeplessness, headache, lack of concentration, associated with the menopause  N95.1 estradiol (VIVELLE-DOT) 0.05 MG/24HR bi-weekly patch      2. Melanoma in situ of right lower extremity including hip (H)- right medial calf - awaiting wider excision - noted 2/20/2025  D03.71       3. Mild intermittent asthma with (acute) exacerbation- allergies and cold temperature triggers  J45.21 PRIMARY CARE FOLLOW-UP SCHEDULING      4. Anxiety  F41.9 PRIMARY CARE FOLLOW-UP SCHEDULING      5. Mild episode of recurrent major depressive disorder  F33.0 PRIMARY CARE FOLLOW-UP SCHEDULING      6. Morbid obesity (H)  E66.01       7. Malignant melanoma of left lower extremity including hip (H)-left anterior thigh Stage 1B (cT2a, cN0, cM0)  C43.72       8. Family history of breast cancer- mother's identical twin, maternal aunt, paternal GM and dad with breast lumps - had genetic counseling  Z80.3       9. History of malignant melanoma of skin  Z85.820       10. Hypertension goal BP (blood pressure) < 140/90 - new diagnosis fall 2023 - much improved with weight loss and regular CV exercise  I10         Please, call our clinic or go to the ER immediately if signs or symptoms worsen or fail to improve as anticipated.     Needs to come in for lab only for her fasting labs. We've entered future orders for this and you can do this as a lab only appointment at our clinic.  Please call [phone] and press #2 to speak with your care team to schedule this or you can schedule it on [employer] at your convenience.  You can also have this done at any [facility] without appointment  "during regular outpatient lab hours.   [facility] outpatient lab hours 7am-7pm Monday-Friday, and 9am -noon Saturdays and Sundays .  They close promptly at 7pm weekdays and 12 noon on the weekends.     Needs wider excision on her right medial calf - pt will call Lia Savage's office to schedule that as soon as possible.     Increase estrogen in vivelle-dot patch to 0.05mg - per genetics cancer counseling pt is still a candidate for HRT.  Will continue to monitor this carefully.  Again, pt is aware of the  risks, benefits, side effects, and alternatives to HRT in detail - especially, but not exclusive of risks of combination Hormone replacement therapy with risk of  breast cancer = slightly increased over the general population and of blood clots ( decreased slightly with transdermal patch form of estrogen) , and then benefits were discussed in view of [facility] data and subsequent studies.      BMI  Estimated body mass index is 28.82 kg/m  as calculated from the following:    Height as of 10/22/24: 1.702 m (5' 7\").    Weight as of 10/22/24: 83.5 kg (184 lb).   Weight management plan: Discussed healthy diet and exercise guidelines    Return in about 6 months (around 8/20/2025) for Wellness/Preventative Visit, depression, anxiety, blood pressure, w/ Dr. HOUSER for 40 min appt. - appt scheduled.       MEDICATIONS:   Orders Placed This Encounter   Medications    estradiol (VIVELLE-DOT) 0.05 MG/24HR bi-weekly patch     Sig: Place 1 patch over 96 hours onto the skin twice a week.     Dispense:  9 patch     Refill:  11          - Continue other medications without change  Work on weight loss  Regular exercise  See Patient Instructions       Linda Soto MD     Marbella Liriano is a 49 year old, presenting for the following health issues:  RECHECK  and the following other medical problems:      1. Symptoms, such as flushing, sleeplessness, headache, lack of concentration, associated with the menopause    2. " "Melanoma in situ of right lower extremity including hip (H)- right medial calf - awaiting wider excision - noted 2/20/2025    3. Mild intermittent asthma with (acute) exacerbation- allergies and cold temperature triggers    4. Anxiety    5. Mild episode of recurrent major depressive disorder    6. Morbid obesity (H)    7. Malignant melanoma of left lower extremity including hip (H)-left anterior thigh Stage 1B (cT2a, cN0, cM0)    8. Family history of breast cancer- mother's identical twin, maternal aunt, paternal GM and dad with breast lumps - had genetic counseling    9. History of malignant melanoma of skin    10. Hypertension goal BP (blood pressure) < 140/90 - new diagnosis fall 2023 - much improved with weight loss and regular CV exercise            2/20/2025     7:37 AM   Additional Questions   Roomed by Archana NANDINI   Accompanied by Self       Via the Health Maintenance questionnaire, the patient has reported the following services have been completed -Mammogram: [facility] 2024-12-31, this information has not been sent to the abstraction team.  History of Present Illness       Reason for visit:  Dr HOUSER said to schedule    She eats 2-3 servings of fruits and vegetables daily.She consumes 0 sweetened beverage(s) daily.She exercises with enough effort to increase her heart rate 30 to 60 minutes per day.  She exercises with enough effort to increase her heart rate 5 days per week.   She is taking medications regularly.     Had a breast biopsy on 1/15/2025: = good.   RIGHT breast,  MRI guided core biopsy:  -Usual ductal hyperplasia with focal apocrine metaplasia and duct ectasia  -Negative for atypia or malignancy    Had a skin biopsy right calf done on 2/13/2025 with Dermatology : Right medial calf:  \" Atypical intraepidermal melanocytic proliferation - (see comment and description)  Electronically signed by Heavenly Dwyer MD on 2/19/2025 at 4:41 PM  .  Comment:   While not entirely diagnostic, the lesion " "exhibits features of melanoma in situ including junctional confluence and atypia, and is  best managed as early melanoma in situ. A re-excision is recommended at this site to ensure its complete removal\"    Menopause symptoms - sleeping a little better , but still getting more hot flashes and sweats during the day than she'd like on estradiol (VIVELLE-DOT) 0.025 MG/24HR bi-weekly patch and micronized progesterone 100mg po nightly.    She'd like to go up on her dosage of estrogen patch.      Has lost some weight - lost 60 lbs in the last year and has been running for exercise.  Feels like her \"old self\" - moving more easily with confidence.    Wt Readings from Last 5 Encounters:   10/22/24 83.5 kg (184 lb)   10/08/24 83.8 kg (184 lb 11.2 oz)   09/30/24 82.6 kg (182 lb)   08/20/24 83.1 kg (183 lb 4.8 oz)   06/26/24 84.4 kg (186 lb)     Estimated body mass index is 28.82 kg/m  as calculated from the following:    Height as of 10/22/24: 1.702 m (5' 7\").    Weight as of 10/22/24: 83.5 kg (184 lb).      Hypertension: has been running in the 130's/80's at home.   BP Readings from Last 3 Encounters:   10/22/24 126/86   10/08/24 (!) 136/90   08/20/24 138/80      Has been doing well re: her asthma. Not currently exacerbated at all.     Anxiety : doing well with current meds.  Lots of stress - works for [employer] and [employer] making a lot of cuts right now.    How are you doing with your anxiety since your last visit? No change  Are you having other symptoms that might be associated with anxiety? No  Have you had a significant life event? Financial Concerns   Are you feeling depressed? No  Do you have any concerns with your use of alcohol or other drugs? No    Social History     Tobacco Use    Smoking status: Former     Current packs/day: 0.10     Average packs/day: 0.1 packs/day for 5.0 years (0.5 ttl pk-yrs)     Types: Cigarettes     Passive exposure: Past    Smokeless tobacco: Never    Tobacco comments:     " Tried smoking in high school   Vaping Use    Vaping status: Never Used   Substance Use Topics    Alcohol use: Not Currently     Comment: Rarely    Drug use: No     Comment: no herbal meds except for acidophilus          8/20/2024     2:15 PM 11/29/2024     8:48 PM 2/18/2025     4:57 PM   LISA-7 SCORE   Total Score 0 (minimal anxiety) 0 (minimal anxiety) 3 (minimal anxiety)   Total Score 0 0  3        Patient-reported         8/20/2024     2:15 PM 11/29/2024     8:48 PM 2/18/2025     4:57 PM   PHQ   PHQ-9 Total Score 0 0  0    Q9: Thoughts of better off dead/self-harm past 2 weeks Not at all Not at all Not at all       Patient-reported         2/18/2025     4:57 PM   Last PHQ-9   1.  Little interest or pleasure in doing things 0   2.  Feeling down, depressed, or hopeless 0   3.  Trouble falling or staying asleep, or sleeping too much 0   4.  Feeling tired or having little energy 0   5.  Poor appetite or overeating 0   6.  Feeling bad about yourself 0   7.  Trouble concentrating 0   8.  Moving slowly or restless 0   Q9: Thoughts of better off dead/self-harm past 2 weeks 0   PHQ-9 Total Score 0        Patient-reported         2/18/2025     4:57 PM   LISA-7    1. Feeling nervous, anxious, or on edge 0   2. Not being able to stop or control worrying 0   3. Worrying too much about different things 0   4. Trouble relaxing 3   5. Being so restless that it is hard to sit still 0   6. Becoming easily annoyed or irritable 0   7. Feeling afraid, as if something awful might happen 0   LISA-7 Total Score 3    If you checked any problems, how difficult have they made it for you to do your work, take care of things at home, or get along with other people? 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  related to previous surgeries - 4 in 3 months for sinus issues/infection    Family history of breast cancer- mother's identical twin, maternal aunt, paternal GM and dad with breast lumps - had genetic counseling    Hot flashes    Generalized hyperhidrosis- since earliest recollections - soaks clothing multiple times/week - very disruptive to life    Mild anxiety    Foster care child    Anxiety    Multiple fractures of left foot with routine healing    Family history of colon cancer- father dx'd in mid 40's - pt's first colonoscopy in 4/24/2017 - repeat in 2022     Menorrhagia with regular cycle- ? cause of iron deficiency anemia- pt would like to see ob/gyn specialists- didn't go in 2021 - better than previous    Intractable episodic tension-type headache    Strain of neck muscle, initial encounter    Sun-damaged skin    Cough due to bronchospasm- needs refills on meds     Recurrent cough    Attention deficit - symptoms - pt desires testing and possible treatment    Intractable migraine without aura and without status migrainosus    Hypertension goal BP (blood pressure) < 140/90 - new diagnosis fall 2023 - much improved with weight loss and regular CV exercise    Morbid obesity (H)    Numbness and tingling of hand- fingertips - ? caused by lisinopril vs. 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 (cT2a, cN0, cM0)    Melanoma of skin (H)    Mild intermittent asthma with (acute) exacerbation- allergies and cold temperature triggers    Encounter for completion of form with patient- FMLA paperwork for melanoma left leg    History of malignant melanoma of skin    Neoplasm of uncertain behavior of skin- left pubic area and left breast inferior areola - slightly irregular pigmentation - images taken - see media section - pt will watch closely - nted 8/20/2024    Multiple nevi    Cherry angioma    Lentigines    Melanoma in situ of  torso excluding breast (H) - right groin area - 2nd melanoma - first was left anterior thigh    Mild episode of recurrent major depressive disorder    Medication side effects- lisinopril 5mg annoying dry cough, and topiramate 25mg = blurred vision    Weight loss- 40 lbs with better nutrition and running over the last 6 months    Need for hepatitis B vaccination- pt will check with her insurance on this    Counseling for birth control, intrauterine device- pt is thinking about HRT for perimenopausal symptoms, possibly Mirena IUD and oral/transdermal estrogen    Abnormality of cervix- inflamed , sligthly cystic and hypervascular cervix from 4:00-8;00 inferior to os    CARDIOVASCULAR SCREENING; LDL GOAL LESS THAN 130    Lentiginous compound nevus of thigh, left - inguinal crease - melanocytic - s/p shave biopsy = benign findings, clear margins 11/21/2024    Melanoma in situ of right lower extremity including hip (H)       Current Outpatient Medications   Medication Sig Dispense Refill    albuterol (PROAIR HFA/PROVENTIL HFA/VENTOLIN HFA) 108 (90 Base) MCG/ACT inhaler INHALE 2 PUFFS BY MOUTH EVERY 6 HOURS AS NEEDED FOR WHEEZE OR FOR SHORTNESS OF BREATH 18 g 11    buPROPion (WELLBUTRIN XL) 150 MG 24 hr tablet Take 3 tablets (450 mg) by mouth every morning. 270 tablet 1    cetirizine (ZYRTEC) 10 MG tablet Take 1 tablet (10 mg) by mouth every evening 30 tablet 1    cyclobenzaprine (FLEXERIL) 5 MG tablet Take 1-2 tablets (5-10 mg) by mouth 2 times daily as needed for muscle spasms 30 tablet 3    estradiol (VIVELLE-DOT) 0.025 MG/24HR bi-weekly patch Place 1 patch over 96 hours onto the skin twice a week. 9 patch 11    estradiol (VIVELLE-DOT) 0.05 MG/24HR bi-weekly patch Place 1 patch over 96 hours onto the skin twice a week. 9 patch 11    ibuprofen (ADVIL/MOTRIN) 200 MG tablet Take 200 mg by mouth every 6 hours as needed.      methocarbamol (ROBAXIN) 500 MG tablet Take 1 tablet (500 mg) by mouth 4 times daily as needed for  muscle spasms 20 tablet 3    progesterone (PROMETRIUM) 100 MG capsule Take 1 capsule (100 mg) by mouth daily. 90 capsule 3    telmisartan (MICARDIS) 40 MG tablet Take 1 tablet (40 mg) by mouth daily. 90 tablet 1    ZOLMitriptan (ZOMIG) 5 MG tablet TAKE 1 TABLET BY MOUTH AT ONSET OF HEADACHE FOR MIGRAINE MAY REPEAT IN 2 HOURS. MAX 2 TABS/24 HOURS 10 tablet 11    fluticasone-vilanterol (BREO ELLIPTA) 200-25 MCG/ACT inhaler Inhale 1 puff into the lungs daily (Patient not taking: Reported on 2/13/2025) 60 each 11    montelukast (SINGULAIR) 10 MG tablet Take 1 tablet (10 mg) by mouth at bedtime (Patient not taking: Reported on 2/13/2025) 90 tablet 3          Allergies   Allergen Reactions    Adhesive Tape Blisters    Melon      Mouth gets itchy     Seasonal Allergies     Lisinopril Cough     Annoying dry cough      Topiramate Visual Disturbance     Blurred vision                 Review of Systems  Constitutional, HEENT, cardiovascular, pulmonary, GI, , musculoskeletal, neuro, skin, endocrine and psych systems are negative, except as otherwise noted.      Objective    Vitals - Patient Reported  Weight (Patient Reported): 77.1 kg (170 lb)      Vitals:  No vitals were obtained today due to virtual visit.    Physical Exam :   GENERAL: alert and no distress  EYES: Eyes grossly normal to inspection.  No discharge or erythema, or obvious scleral/conjunctival abnormalities.  RESP: No audible wheeze, cough, or visible cyanosis.    SKIN: Visible skin clear. No significant rash, abnormal pigmentation or lesions.  NEURO: Cranial nerves grossly intact.  Mentation and speech appropriate for age.  PSYCH: Appropriate affect, tone, and pace of words.     Office Visit on 02/13/2025   Component Date Value Ref Range Status    Case Report 02/13/2025    Final                    Value:Surgical Pathology Report                         Case: [id number]                                  Authorizing Provider:  iLa Savage PA-C      "Collected:           02/13/2025 01:15 PM          Ordering Location:     [facility]   Received:            02/13/2025 02:28 PM                                 Colleen Platt                                                                 Pathologist:           Heavenly Dwyer MD                                                          Specimen:    Skin, Right medial calf                                                                    Final Diagnosis 02/13/2025    Final                    Value:A. Right medial calf:  - Atypical intraepidermal melanocytic proliferation - (see comment and description)       Comment 02/13/2025    Final                    Value:While not entirely diagnostic, the lesion exhibits features of melanoma in situ including junctional confluence and atypia, and is best managed as early melanoma in situ. A re-excision is recommended at this site to ensure its complete removal.     This case was reviewed at the dermatopathology consensus conference at the [facility] on 02/19/25.       Clinical Information 02/13/2025    Final                    Value:The patient is a 49 year old female.        Gross Description 02/13/2025    Final                    Value:A(1). Skin, Right medial calf:  The specimen is received in formalin with proper patient identification, labeled \"right medial calf\".  The specimen consists of a 0.7 x 0.6 cm irregular white skin shave containing a 0.5 x 0.5 cm red-orange flattened lesion.  The resection margin is inked blue.  The specimen is bisected and entirely submitted in cassette A1.       Microscopic Description 02/13/2025    Final                    Value:The specimen exhibits a primarily single-celled junctional melanocytic proliferation with moderate to severe cytologic atypia and architectural disorder including short zones of junctional confluence and mid-spinous pagetoid scatter.  SOX10  and MART-1 highlight the melanocytes and PRAME " demonstrates scattered positivity within junctional melanocytes.  The margins are narrowly free of involvement in the plane of section.        Disclaimer 02/13/2025    Final                    Value:Analyte Specific Reagents (ASRs) are used in many laboratory tests necessary for standard medical care and generally do not require FDA approval. This test was developed and its performance characteristics determined by [facility]. It has not been cleared or approved by the U.S. Food and Drug Administration.  [facility] are certified for the performance of high-complexity clinical testing under the Clinical Laboratory Improvement Amendments of 1988 (CLIA), and in keeping with the certification requirements, the laboratory has verified this test's accuracy, precision and/or validity of the method.        Performing Labs 02/13/2025    Final                    Value:The technical component of this testing was completed at [facility].    Stain controls for all stains resulted within this report have been reviewed and show appropriate reactivity.            Video-Visit Details:     Type of service:  Video Visit   Originating Location (pt. Location): Home    Distant Location (provider location):  On-site  Platform used for Video Visit: Kay  Signed Electronically by: Linda Soto MD

## 2025-02-20 NOTE — NURSING NOTE
Received a staff message today from  to myself and her surgery scheduler Bandar that patient needs to be scheduled for a wide local excision of the right calf and this excision can be done at the  OR. Called to update patient and discuss any other questions or concerns that she has. No answer. Left detailed message with reason for call and with 's message.  Direct call back number provided on voice mail as well..Annamarie Kerr RN

## 2025-02-20 NOTE — TELEPHONE ENCOUNTER
Called patient to schedule surgery with Dr. Higginbotham    Spoke with:  patient    Date of Surgery: 3/5/2025    Arrival time Discussed with Patient:  No    Location of surgery: [facility]     Pre-Op H&P: PCP, [facility] [place]    Post-Op Appts: 4/1/25, 3:30 PM, MG     Imaging:  No      Discussed with patient pre-op RN will call 2-3 days prior to surgery with arrival time and instructions:  Yes     Packet sent out: Yes  via [employer] Message [DATE] 2/21/25      Informed patient that they will need an adult  to bring patient home from surgery: Yes  : patient confirmed        All patients questions were answered and patient was instructed to review surgical packet and call back with any questions or concerns.       Bandar Neves on 2/20/2025 at 3:45 PM

## 2025-02-20 NOTE — TELEPHONE ENCOUNTER
Called and spoke with patient regarding scheduling surgery with Dr. Higginbotham.    Patient stated at time of clinic appointment on 2/19, test results were not back; patient was uncertain of the plan moving forward and was unaware surgery was being scheduled. Patient requested call from Dr. Higginbotham/RN to update and discuss plan. Writer confirmed understanding.    Bandar Neves on 2/20/2025 at 8:55 AM

## 2025-02-20 NOTE — NURSING NOTE
Pt called back and notified of message below and also reviewed Lia Savage's message dated 2/19/25. Pt voiced understanding and states she is comfortable seeing  for this. Pt advised RN will update surgery scheduler Bandar to reach out to pt to get this excision scheduled. No further questions per pt at this time and pt thanked RN for the attempts at reaching her..Annamarie Kerr RN

## 2025-02-21 ENCOUNTER — TELEPHONE (OUTPATIENT)
Dept: FAMILY MEDICINE | Facility: CLINIC | Age: 50
End: 2025-02-21
Payer: COMMERCIAL

## 2025-02-21 NOTE — TELEPHONE ENCOUNTER
Patient calls, is in need of a pre-op physical. Surgery is slated for 3/5/25. Will forward to team per team request.

## 2025-02-21 NOTE — TELEPHONE ENCOUNTER
Provider does not have anything open - routing to PCP to review possibly times to do a pre op with pt     Gris Calloway RN, BSN  [facility]

## 2025-02-24 NOTE — TELEPHONE ENCOUNTER
Pt calling back - was advised could be worked into PCP schedule for pre-op     Okay to use  2/26 SD/POA slot   Or  VV for in-person pre-op this week?    Please advise.

## 2025-02-25 NOTE — TELEPHONE ENCOUNTER
Talked to pt who just wants to make sure provider is aware pt needs to be back in [place] for 2 interviews by noon.  Imperative.  Please let her know if advance if provider is running behind.  Pt will be done with interviews by 230p,.

## 2025-02-26 ENCOUNTER — OFFICE VISIT (OUTPATIENT)
Dept: FAMILY MEDICINE | Facility: CLINIC | Age: 50
End: 2025-02-26
Payer: COMMERCIAL

## 2025-02-26 VITALS
OXYGEN SATURATION: 99 % | TEMPERATURE: 97.9 F | BODY MASS INDEX: 27.23 KG/M2 | HEART RATE: 79 BPM | WEIGHT: 173.5 LBS | DIASTOLIC BLOOD PRESSURE: 68 MMHG | RESPIRATION RATE: 18 BRPM | SYSTOLIC BLOOD PRESSURE: 120 MMHG | HEIGHT: 67 IN

## 2025-02-26 DIAGNOSIS — J98.01 COUGH DUE TO BRONCHOSPASM: ICD-10-CM

## 2025-02-26 DIAGNOSIS — C43.72 MALIGNANT MELANOMA OF LEFT LOWER EXTREMITY INCLUDING HIP (H): ICD-10-CM

## 2025-02-26 DIAGNOSIS — Z23 NEED FOR HEPATITIS B VACCINATION: ICD-10-CM

## 2025-02-26 DIAGNOSIS — D03.59 MELANOMA IN SITU OF TORSO EXCLUDING BREAST (H): ICD-10-CM

## 2025-02-26 DIAGNOSIS — Z01.818 PREOP GENERAL PHYSICAL EXAM: Primary | ICD-10-CM

## 2025-02-26 DIAGNOSIS — D03.71 MELANOMA IN SITU OF RIGHT LOWER EXTREMITY INCLUDING HIP (H): ICD-10-CM

## 2025-02-26 DIAGNOSIS — Z13.6 CARDIOVASCULAR SCREENING; LDL GOAL LESS THAN 130: ICD-10-CM

## 2025-02-26 DIAGNOSIS — J20.9 ACUTE BRONCHITIS, UNSPECIFIED ORGANISM: ICD-10-CM

## 2025-02-26 DIAGNOSIS — R63.4 WEIGHT LOSS: ICD-10-CM

## 2025-02-26 DIAGNOSIS — I10 HYPERTENSION GOAL BP (BLOOD PRESSURE) < 140/90: ICD-10-CM

## 2025-02-26 LAB
ALBUMIN SERPL BCG-MCNC: 4.4 G/DL (ref 3.5–5.2)
ALP SERPL-CCNC: 89 U/L (ref 40–150)
ALT SERPL W P-5'-P-CCNC: 8 U/L (ref 0–50)
ANION GAP SERPL CALCULATED.3IONS-SCNC: 11 MMOL/L (ref 7–15)
AST SERPL W P-5'-P-CCNC: 17 U/L (ref 0–45)
BILIRUB SERPL-MCNC: 0.7 MG/DL
BUN SERPL-MCNC: 7.6 MG/DL (ref 6–20)
CALCIUM SERPL-MCNC: 9.5 MG/DL (ref 8.8–10.4)
CHLORIDE SERPL-SCNC: 105 MMOL/L (ref 98–107)
CHOLEST SERPL-MCNC: 179 MG/DL
CREAT SERPL-MCNC: 0.8 MG/DL (ref 0.51–0.95)
EGFRCR SERPLBLD CKD-EPI 2021: 90 ML/MIN/1.73M2
ERYTHROCYTE [DISTWIDTH] IN BLOOD BY AUTOMATED COUNT: 12.9 % (ref 10–15)
FASTING STATUS PATIENT QL REPORTED: YES
FASTING STATUS PATIENT QL REPORTED: YES
GLUCOSE SERPL-MCNC: 81 MG/DL (ref 70–99)
HCO3 SERPL-SCNC: 24 MMOL/L (ref 22–29)
HCT VFR BLD AUTO: 40.8 % (ref 35–47)
HDLC SERPL-MCNC: 60 MG/DL
HGB BLD-MCNC: 13.9 G/DL (ref 11.7–15.7)
LDLC SERPL CALC-MCNC: 103 MG/DL
MCH RBC QN AUTO: 29.4 PG (ref 26.5–33)
MCHC RBC AUTO-ENTMCNC: 34.1 G/DL (ref 31.5–36.5)
MCV RBC AUTO: 86 FL (ref 78–100)
NONHDLC SERPL-MCNC: 119 MG/DL
PLATELET # BLD AUTO: 341 10E3/UL (ref 150–450)
POTASSIUM SERPL-SCNC: 4.4 MMOL/L (ref 3.4–5.3)
PROT SERPL-MCNC: 6.6 G/DL (ref 6.4–8.3)
RBC # BLD AUTO: 4.72 10E6/UL (ref 3.8–5.2)
SODIUM SERPL-SCNC: 140 MMOL/L (ref 135–145)
TRIGL SERPL-MCNC: 78 MG/DL
TSH SERPL DL<=0.005 MIU/L-ACNC: 1.35 UIU/ML (ref 0.3–4.2)
WBC # BLD AUTO: 5.5 10E3/UL (ref 4–11)

## 2025-02-26 RX ORDER — FLUTICASONE FUROATE AND VILANTEROL 200; 25 UG/1; UG/1
1 POWDER RESPIRATORY (INHALATION) DAILY
Status: SHIPPED
Start: 2025-02-26

## 2025-02-26 RX ORDER — MONTELUKAST SODIUM 10 MG/1
1 TABLET ORAL AT BEDTIME
Status: SHIPPED
Start: 2025-02-26

## 2025-02-26 NOTE — PROGRESS NOTES
Preoperative Evaluation  [address] [address]  Phone: [phone]  Primary Provider: Linda Soto MD  Pre-op Performing Provider: Linda Soto MD  Feb 26, 2025   {Provider  Link to PREOP SmartSet  REQUIRED to apply standard patient instructions and medication directions to the AVS :108946}  {ROOMER review and update patient entered surgical information if needed :546671}  { After Pre-op is completed, use lists to pull documentation into note Link to complete Pre-Op    :207030}  {Pull Surgical Information into note after flowsheet completed:009298}  Fax number for surgical facility: {:463538}    {Provider Charting Preference for Preop :420519}    Marbella Liriano is a 49 year old, presenting for the following:  Pre-Op Exam          2/26/2025    10:30 AM   Additional Questions   Roomed by krystal almeida   Accompanied by self     Via the Health Maintenance questionnaire, the patient has reported the following services have been completed -Mammogram: [facility] 2024-12-31, this information has not been sent to the abstraction team.  HPI related to upcoming procedure: ***  {Pull Pre-Op Questionnaire into note after flowsheet completed:324056}  Health Care Directive  Patient does not have a Health Care Directive: {ADVANCE_DIRECTIVE_STATUS:663578}    Preoperative Review of   {Mnpmpreport:594091}  {Review MNPMP for all patients per ICSI MNPMP Profile:333842}    {Chronic problem details (Optional) :566543}    Patient Active Problem List    Diagnosis Date Noted    Melanoma in situ of right lower extremity including hip (H) 02/20/2025     Priority: Medium    Lentiginous compound nevus of thigh, left - inguinal crease - melanocytic - s/p shave biopsy = benign findings, clear margins 11/21/2024 12/02/2024     Priority: Medium    Weight loss- 40 lbs with better nutrition and running over the last 6 months 08/20/2024      Priority: Medium    Need for hepatitis B vaccination- pt will check with her insurance on this 08/20/2024     Priority: Medium    Counseling for birth control, intrauterine device- pt is thinking about HRT for perimenopausal symptoms, possibly Mirena IUD and oral/transdermal estrogen 08/20/2024     Priority: Medium    Abnormality of cervix- inflamed , sligthly cystic and hypervascular cervix from 4:00-8;00 inferior to os 08/20/2024     Priority: Medium    CARDIOVASCULAR SCREENING; LDL GOAL LESS THAN 130 08/20/2024     Priority: Medium    Mild episode of recurrent major depressive disorder 06/26/2024     Priority: Medium    Medication side effects- lisinopril 5mg annoying dry cough, and topiramate 25mg = blurred vision 06/26/2024     Priority: Medium    Melanoma in situ of torso excluding breast (H) - right groin area - 2nd melanoma - first was left anterior thigh 06/04/2024     Priority: Medium    History of malignant melanoma of skin 02/15/2024     Priority: Medium    Neoplasm of uncertain behavior of skin- left pubic area and left breast inferior areola - slightly irregular pigmentation - images taken - see media section - pt will watch closely - nted 8/20/2024 02/15/2024     Priority: Medium    Multiple nevi 02/15/2024     Priority: Medium    Cherry angioma 02/15/2024     Priority: Medium    Lentigines 02/15/2024     Priority: Medium    Mild intermittent asthma with (acute) exacerbation- allergies and cold temperature triggers 09/20/2023     Priority: Medium    Encounter for completion of form with patient- [facility] paperwork for melanoma left leg 09/20/2023     Priority: Medium    Melanoma of skin (H) 09/14/2023     Priority: Medium    Malignant melanoma of left lower extremity including hip (H)-left anterior thigh Stage 1B (cT2a, cN0, cM0) 09/06/2023     Priority: Medium    Numbness and tingling of hand- fingertips - ? caused by lisinopril vs. other - consider changing to amlodipine  03/20/2023     Priority: Medium     Intractable migraine without aura and without status migrainosus 02/02/2023     Priority: Medium    Hypertension goal BP (blood pressure) < 140/90 - new diagnosis fall 2023 - much improved with weight loss and regular CV exercise 02/02/2023     Priority: Medium    Morbid obesity (H) 02/02/2023     Priority: Medium    Recurrent cough 12/01/2022     Priority: Medium    Attention deficit - symptoms - pt desires testing and possible treatment 12/01/2022     Priority: Medium    Menorrhagia with regular cycle- ? cause of iron deficiency anemia- pt would like to see ob/gyn specialists- didn't go in 2021 - better than previous 05/02/2021     Priority: Medium    Intractable episodic tension-type headache 05/02/2021     Priority: Medium    Strain of neck muscle, initial encounter 05/02/2021     Priority: Medium    Sun-damaged skin 05/02/2021     Priority: Medium    Cough due to bronchospasm- needs refills on meds  05/02/2021     Priority: Medium    Family history of colon cancer- father dx'd in mid 40's - pt's first colonoscopy in 4/24/2017 - repeat in 2022 04/14/2021     Priority: Medium    Multiple fractures of left foot with routine healing 02/25/2021     Priority: Medium    Anxiety      Priority: Medium     paxil 10mg= mild bladder retention, increased headaches       Mild anxiety 06/01/2018     Priority: Medium    Foster care child 06/01/2018     Priority: Medium    Generalized hyperhidrosis- since earliest recollections - soaks clothing multiple times/week - very disruptive to life 01/04/2017     Priority: Medium     Familial - mother and daughter as well.       Hot flashes 11/16/2016     Priority: Medium    Family history of breast cancer- mother's identical twin, maternal aunt, paternal GM and dad with breast lumps - had genetic counseling 09/11/2014     Priority: Medium    Lactose intolerance      Priority: Medium    Kidney stones      Priority: Medium     Dr. Blank Steele - [facility] - Urology        Disturbance in sleep behavior      Priority: Medium    Other iron deficiency anemia - Anemia - ? 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 Provider to review and confirm      Chronic sinusitis 01/03/2013     Priority: Medium      Past Medical History:   Diagnosis Date    Anemia     Anxiety     paxil 10mg= mild bladder retention, at 30mg = ravenously hungry/weight gain increased headaches; lexapro = bad sweating    Contraception     ocp's some = mood swings ; seasonale: frequent vaginal bleeding and weight gain    Hot flashes 11/16/2016    Hypertension goal BP (blood pressure) < 140/90  - new diagnosis 02/02/2023    Kidney stones     Dr. Blank Steele - [facility] - Urology     Lactose intolerance     Malignant melanoma (H)     Malignant melanoma of left lower extremity including hip (H) 09/06/2023    Maxillary bone loss 01/2014    left - secondary to severe oral/sinus infection and bleeding  - hosp [facility] [place] s/p multiple surgeries     Multiple fractures of left foot with routine healing 02/25/2021    Night sweats     Sleep problems     Uncomplicated asthma 20s     Past Surgical History:   Procedure Laterality Date    ABDOMEN SURGERY  2001    APPENDECTOMY      ARTHROSCOPY, ARTHROPLASTY TEMPOROMANDIBULAR JOINT, COMBINED  age 14     TMJ surgery as an 7th grader    BIOPSY NODE SENTINEL Left 10/09/2023    Procedure: Left Femoral Mobile Lymph Node Mapping and Biopsy;  Surgeon: Tracie Higginbotham MD;  [facility]    COLONOSCOPY  2017    Approximately    COMPLEX WOUND CLOSURE (UPDATE) N/A 10/09/2023    Procedure: COMPLEX CLOSURE SOFT  TISSUE DEFECT LEFT THIGH 14 cm Length;  Surgeon: Pj Aguirre MD;  [facility]    CYSTOSCOPY,URETEROSCOPY,STONE REMV  09/11/2002    with stent placements - since removed     ENDOSCOPIC BALLOON SINUPLASTY, OPTICAL TRACKING SYSTEM ENDOSCOPIC SINUS SURGERY, COMBINED  01/08/2013    Procedure: COMBINED ENDOSCOPIC BALLOON SINUPLASTY, OPTICAL TRACKING SYSTEM ENDOSCOPIC SINUS SURGERY;  Left Endo Sinus  Surgery  with Septoplasty      ENDOSCOPIC SINUS SURGERY  01/15/2013    Procedure: ENDOSCOPIC SINUS SURGERY;  removal of nasal packing with endocopic debridement of left paranasal sinuses;  Surgeon: Fabian Driver MD;  Location:  OR    ENDOSCOPIC SINUS SURGERY  04/22/2013    Procedure: ENDOSCOPIC SINUS SURGERY;  Functional Endoscopic Sinus Surgery With Biopsy ;  Surgeon: Jcarlos Hogan MD;  [facility]    ENT SURGERY  2013    Deviated septum. Complications. Resulted in 3 more    EXCISE MASS TRUNK Right 7/3/2024    Procedure: Wide Local Excision of RIGHT Groin Skin Lesion;  Surgeon: Tracie Higginbotham MD;  Location:  OR    EXCISE SOFT TISSUE TUMOR THIGH Left 10/09/2023    Procedure: Wide Local Excision of LEFT thigh melanoma;  Surgeon: Tracie Higginbotham MD;  [facility]     Current Outpatient Medications   Medication Sig Dispense Refill    albuterol (PROAIR HFA/PROVENTIL HFA/VENTOLIN HFA) 108 (90 Base) MCG/ACT inhaler INHALE 2 PUFFS BY MOUTH EVERY 6 HOURS AS NEEDED FOR WHEEZE OR FOR SHORTNESS OF BREATH 18 g 11    buPROPion (WELLBUTRIN XL) 150 MG 24 hr tablet Take 3 tablets (450 mg) by mouth every morning. 270 tablet 1    cetirizine (ZYRTEC) 10 MG tablet Take 1 tablet (10 mg) by mouth every evening 30 tablet 1    cyclobenzaprine (FLEXERIL) 5 MG tablet Take 1-2 tablets (5-10 mg) by mouth 2 times daily as needed for muscle spasms 30 tablet 3    estradiol (VIVELLE-DOT) 0.025 MG/24HR bi-weekly patch Place 1 patch over 96 hours onto the skin twice a week. 9 patch 11    estradiol (VIVELLE-DOT)  "0.05 MG/24HR bi-weekly patch Place 1 patch over 96 hours onto the skin twice a week. 9 patch 11    fluticasone-vilanterol (BREO ELLIPTA) 200-25 MCG/ACT inhaler Inhale 1 puff into the lungs daily (Patient not taking: Reported on 2/13/2025) 60 each 11    ibuprofen (ADVIL/MOTRIN) 200 MG tablet Take 200 mg by mouth every 6 hours as needed.      methocarbamol (ROBAXIN) 500 MG tablet Take 1 tablet (500 mg) by mouth 4 times daily as needed for muscle spasms 20 tablet 3    montelukast (SINGULAIR) 10 MG tablet Take 1 tablet (10 mg) by mouth at bedtime (Patient not taking: Reported on 2/13/2025) 90 tablet 3    progesterone (PROMETRIUM) 100 MG capsule Take 1 capsule (100 mg) by mouth daily. 90 capsule 3    telmisartan (MICARDIS) 40 MG tablet Take 1 tablet (40 mg) by mouth daily. 90 tablet 1    ZOLMitriptan (ZOMIG) 5 MG tablet TAKE 1 TABLET BY MOUTH AT ONSET OF HEADACHE FOR MIGRAINE MAY REPEAT IN 2 HOURS. MAX 2 TABS/24 HOURS 10 tablet 11       Allergies   Allergen Reactions    Adhesive Tape Blisters    Melon      Mouth gets itchy     Seasonal Allergies     Lisinopril Cough     Annoying dry cough      Topiramate Visual Disturbance     Blurred vision         Social History     Tobacco Use    Smoking status: Former     Current packs/day: 0.10     Average packs/day: 0.1 packs/day for 5.0 years (0.5 ttl pk-yrs)     Types: Cigarettes     Passive exposure: Past    Smokeless tobacco: Never    Tobacco comments:     Tried smoking in high school   Substance Use Topics    Alcohol use: Not Currently     Comment: Rarely     {FAMILY HISTORY (Optional):311640958}  History   Drug Use No     Comment: no herbal meds except for acidophilus            {ROS Picklists (Optional):322024}    Objective    LMP 01/01/2025 (Approximate)    Estimated body mass index is 28.82 kg/m  as calculated from the following:    Height as of 10/22/24: 1.702 m (5' 7\").    Weight as of 10/22/24: 83.5 kg (184 lb).  Physical Exam  {Exam List :293563}    Recent Labs   Lab " Test 24  1801   HGB 13.8         POTASSIUM 5.0   CR 0.78        Diagnostics  {LABS:197310}   {EK}    Revised Cardiac Risk Index (RCRI)  The patient has the following serious cardiovascular risks for perioperative complications:  {PREOP REVISED CARDIAC RISK INDEX (RCRI) :772316}     RCRI Interpretation: {REVISED CARDIAC RISK INTERPRETATION :687867}         Signed Electronically by: Linda Soto MD  A copy of this evaluation report is provided to the requesting physician.    {Provider Resources  Preop [facility] Preop Guidelines  Revised Cardiac Risk Index :172363}   {Email feedback regarding this note to [email or web address]   :647247}

## 2025-02-26 NOTE — PROGRESS NOTES
Preoperative Evaluation  [facility]  [address] [place] [address] [facility] [address]  Phone: [phone]  Primary Provider: Linda Soto MD  Pre-op Performing Provider: Linda Soto MD  Feb 26, 2025 2/26/2025   Surgical Information   What procedure is being done? Melanoma in situ of right lower extremity including hip (H) - right medial calf    Facility or Hospital where procedure/surgery will be performed: [facility]       Who is doing the procedure / surgery?   Tracie Higginbotham MD    Date of surgery / procedure: 3/5/25    Time of surgery / procedure: 9:00 am    Where do you plan to recover after surgery? at home with family        Proxy-reported     Fax number for surgical facility: Note does not need to be faxed, will be available electronically in Epic.    Assessment & Plan :     The proposed surgical procedure is considered LOW risk.      ICD-10-CM    1. Preop general physical exam  Z01.818 CBC with platelets     Comprehensive metabolic panel (BMP + Alb, Alk Phos, ALT, AST, Total. Bili, TP)     CBC with platelets     Comprehensive metabolic panel (BMP + Alb, Alk Phos, ALT, AST, Total. Bili, TP)      2. Melanoma in situ of right lower extremity including hip (H)- right medial calf - needs wider excision  D03.71       3. Malignant melanoma of left lower extremity including hip (H)-left anterior thigh Stage 1B (cT2a, cN0, cM0)  C43.72       4. Hypertension goal BP (blood pressure) < 140/90 - new diagnosis fall 2023 - much improved with weight loss and regular CV exercise  I10       5. Melanoma in situ of torso excluding breast (H) - right groin area - 2nd melanoma - first was left anterior thigh  D03.59       6. Acute bronchitis, unspecified organism - needs refills on meds - not exacerbated right now   J20.9 fluticasone-vilanterol (BREO ELLIPTA) 200-25 MCG/ACT inhaler      7. Cough due to bronchospasm-  needs refills on meds   J98.01 fluticasone-vilanterol (BREO ELLIPTA) 200-25 MCG/ACT inhaler     montelukast (SINGULAIR) 10 MG tablet      8. Weight loss- 40 lbs with better nutrition and running over the last 6 months  R63.4       9. Need for hepatitis B vaccination  Z23 HEPATITIS B, ADULT 20+ (ENGERIX-B/RECOMBIVAX HB)      10. CARDIOVASCULAR SCREENING; LDL GOAL LESS THAN 130  Z13.6 TSH with free T4 reflex     Lipid panel reflex to direct LDL Fasting        Please, call our clinic or go to the ER immediately if signs or symptoms worsen or fail to improve as anticipated.             - No identified additional risk factors other than previously addressed    Antiplatelet or Anticoagulation Medication Instructions   - We reviewed the medication list and the patient is not on an antiplatelet or anticoagulation medications.    Additional Medication Instructions   - Herbal medications and vitamins: DO NOT TAKE 14 days prior to surgery.   - ACE/ARB/ARNI (telmisartan) : DO NOT TAKE on day of surgery (minimum 11 hours for general anesthesia).   - SSRIs, SNRIs, TCAs, Antipsychotics: Your Wellbutrin - Continue without modification. Take with sip of water morning of surgery.     Recommendation  Approval given to proceed with proposed procedure, without further diagnostic evaluation.            Marbella Liriano is a 49 year old, presenting for the following:  Pre-Op Exam  and the following other medical problems:     1. Preop general physical exam    2. Melanoma in situ of right lower extremity including hip (H)- right medial calf - needs wider excision    3. Malignant melanoma of left lower extremity including hip (H)-left anterior thigh Stage 1B (cT2a, cN0, cM0)    4. Hypertension goal BP (blood pressure) < 140/90 - new diagnosis fall 2023 - much improved with weight loss and regular CV exercise    5. Melanoma in situ of torso excluding breast (H) - right groin area - 2nd melanoma - first was left anterior thigh    6. Acute  bronchitis, unspecified organism - needs refills on meds - not exacerbated right now     7. Cough due to bronchospasm- needs refills on meds     8. Weight loss- 40 lbs with better nutrition and running over the last 6 months    9. Need for hepatitis B vaccination    10. CARDIOVASCULAR SCREENING; LDL GOAL LESS THAN 130              2/26/2025    10:30 AM   Additional Questions   Roomed by krystal almeida   Accompanied by self     Via the Health Maintenance questionnaire, the patient has reported the following services have been completed -Mammogram: [facility] 2024-12-31, this information has not been sent to the abstraction team.      HPI related to upcoming procedure: hx of multiple malignant melanomas in the last 2 years - most recently had a new lesion 7mm brown macule  with irregular borders biopsied right medial calf on 2/13/2025 = melanoma in situ.  Now needs wider excision.         2/26/2025   Pre-Op Questionnaire   Have you ever had a heart attack or stroke? No    Have you ever had surgery on your heart or blood vessels, such as a stent placement, a coronary artery bypass, or surgery on an artery in your head, neck, heart, or legs? No    Do you have chest pain with activity? No    Do you have a history of heart failure? No    Do you currently have a cold, bronchitis or symptoms of other infection? No    Do you have a cough, shortness of breath, or wheezing? No    Do you or anyone in your family have previous history of blood clots? No    Do you or does anyone in your family have a serious bleeding problem such as prolonged bleeding following surgeries or cuts? No    Have you ever had problems with anemia or been told to take iron pills? No    Have you had any abnormal blood loss such as black, tarry or bloody stools, or abnormal vaginal bleeding? (!) YES - had some unusual bleeding with sinus surgery in the past.     Have you ever had a blood transfusion? No    Are you willing to have a blood transfusion  if it is medically needed before, during, or after your surgery? Yes    Have you or any of your relatives ever had problems with anesthesia? (!) YES - dad - difficulty waking up     Do you have sleep apnea, excessive snoring or daytime drowsiness? No    Do you have any artifical heart valves or other implanted medical devices like a pacemaker, defibrillator, or continuous glucose monitor? No    Do you have artificial joints? No    Are you allergic to latex? No        Proxy-reported     Health Care Directive  Patient does not have a Health Care Directive: Discussed advance care planning with patient; however, patient declined at this time.    Preoperative Review of    reviewed - no record of controlled substances prescribed.  PDMP Review         Value Time User    State PDMP site checked  Yes 2/26/2025 11:25 AM Linad Soto MD          No suspicious activity noted. . --Linda Soto MD       Status of Chronic Conditions:  See problem list for active medical problems.  Problems all longstanding and stable, except as noted/documented.  See ROS for pertinent symptoms related to these conditions.    Patient Active Problem List    Diagnosis Date Noted    Malignant melanoma of left lower extremity including hip (H)-left anterior thigh Stage 1B (cT2a, cN0, cM0) 09/06/2023     Priority: High    Generalized hyperhidrosis- since earliest recollections - soaks clothing multiple times/week - very disruptive to life 01/04/2017     Priority: High     Familial - mother and daughter as well.       Melanoma in situ of right lower extremity including hip (H) 02/20/2025     Priority: Medium    Lentiginous compound nevus of thigh, left - inguinal crease - melanocytic - s/p shave biopsy = benign findings, clear margins 11/21/2024 12/02/2024     Priority: Medium    Weight loss- 40 lbs with better nutrition and running over the last 6 months 08/20/2024     Priority: Medium    Need for hepatitis B vaccination- pt  will check with her insurance on this 08/20/2024     Priority: Medium    Counseling for birth control, intrauterine device- pt is thinking about HRT for perimenopausal symptoms, possibly Mirena IUD and oral/transdermal estrogen 08/20/2024     Priority: Medium    Abnormality of cervix- inflamed , sligthly cystic and hypervascular cervix from 4:00-8;00 inferior to os 08/20/2024     Priority: Medium    CARDIOVASCULAR SCREENING; LDL GOAL LESS THAN 130 08/20/2024     Priority: Medium    Mild episode of recurrent major depressive disorder 06/26/2024     Priority: Medium    Medication side effects- lisinopril 5mg annoying dry cough, and topiramate 25mg = blurred vision 06/26/2024     Priority: Medium    Melanoma in situ of torso excluding breast (H) - right groin area - 2nd melanoma - first was left anterior thigh 06/04/2024     Priority: Medium    History of malignant melanoma of skin 02/15/2024     Priority: Medium    Neoplasm of uncertain behavior of skin- left pubic area and left breast inferior areola - slightly irregular pigmentation - images taken - see media section - pt will watch closely - nted 8/20/2024 02/15/2024     Priority: Medium    Multiple nevi 02/15/2024     Priority: Medium    Cherry angioma 02/15/2024     Priority: Medium    Lentigines 02/15/2024     Priority: Medium    Mild intermittent asthma with (acute) exacerbation- allergies and cold temperature triggers 09/20/2023     Priority: Medium    Encounter for completion of form with patient- [facility] paperwork for melanoma left leg 09/20/2023     Priority: Medium    Melanoma of skin (H) 09/14/2023     Priority: Medium    Numbness and tingling of hand- fingertips - ? caused by lisinopril vs. other - consider changing to amlodipine  03/20/2023     Priority: Medium    Intractable migraine without aura and without status migrainosus 02/02/2023     Priority: Medium    Hypertension goal BP (blood pressure) < 140/90 - new diagnosis fall 2023 - much improved with  weight loss and regular CV exercise 02/02/2023     Priority: Medium    Morbid obesity (H) 02/02/2023     Priority: Medium    Recurrent cough 12/01/2022     Priority: Medium    Attention deficit - symptoms - pt desires testing and possible treatment 12/01/2022     Priority: Medium    Menorrhagia with regular cycle- ? cause of iron deficiency anemia- pt would like to see ob/gyn specialists- didn't go in 2021 - better than previous 05/02/2021     Priority: Medium    Intractable episodic tension-type headache 05/02/2021     Priority: Medium    Strain of neck muscle, initial encounter 05/02/2021     Priority: Medium    Sun-damaged skin 05/02/2021     Priority: Medium    Cough due to bronchospasm- needs refills on meds  05/02/2021     Priority: Medium    Family history of colon cancer- father dx'd in mid 40's - pt's first colonoscopy in 4/24/2017 - repeat in 2022 04/14/2021     Priority: Medium    Multiple fractures of left foot with routine healing 02/25/2021     Priority: Medium    Anxiety      Priority: Medium     paxil 10mg= mild bladder retention, increased headaches       Mild anxiety 06/01/2018     Priority: Medium    Foster care child 06/01/2018     Priority: Medium    Hot flashes 11/16/2016     Priority: Medium    Family history of breast cancer- mother's identical twin, maternal aunt, paternal GM and dad with breast lumps - had genetic counseling 09/11/2014     Priority: Medium    Lactose intolerance      Priority: Medium    Kidney stones      Priority: Medium     Dr. Blank Steele - [facility] - Urology       Disturbance in sleep behavior      Priority: Medium    Other iron deficiency anemia - Anemia - ? 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 Provider to review and confirm      Chronic sinusitis 01/03/2013     Priority: Medium      Past Medical History:   Diagnosis Date    Anemia     Anxiety     paxil 10mg= mild bladder retention, at 30mg = ravenously hungry/weight gain increased headaches; lexapro = bad sweating    Contraception     ocp's some = mood swings ; seasonale: frequent vaginal bleeding and weight gain    Hot flashes 11/16/2016    Hypertension goal BP (blood pressure) < 140/90  - new diagnosis 02/02/2023    Kidney stones     [facility]     Lactose intolerance     Malignant melanoma (H)     Malignant melanoma of left lower extremity including hip (H) 09/06/2023    Maxillary bone loss 01/2014    left - secondary to severe oral/sinus infection and bleeding  - hosp [facility]/p multiple surgeries     Multiple fractures of left foot with routine healing 02/25/2021    Night sweats     Sleep problems     Uncomplicated asthma 20s     Past Surgical History:   Procedure Laterality Date    ABDOMEN SURGERY  2001    APPENDECTOMY      ARTHROSCOPY, ARTHROPLASTY TEMPOROMANDIBULAR JOINT, COMBINED  age 14     TMJ surgery as an 9th grader    BIOPSY NODE SENTINEL Left 10/09/2023    Procedure: Left Femoral [place] Lymph Node Mapping and Biopsy;  Surgeon: Tracie Higginbotham MD;  Location: [facility] OR    COLONOSCOPY  2017    Approximately    COMPLEX WOUND CLOSURE (UPDATE) N/A 10/09/2023    Procedure: COMPLEX CLOSURE SOFT TISSUE DEFECT LEFT THIGH 14 cm Length;  Surgeon: Pj Aguirre MD;  Location: [facility] OR    CYSTOSCOPY,URETEROSCOPY,STONE REMV  09/11/2002    with stent placements - since removed     ENDOSCOPIC BALLOON SINUPLASTY, OPTICAL TRACKING SYSTEM ENDOSCOPIC SINUS SURGERY, COMBINED  01/08/2013    Procedure: COMBINED ENDOSCOPIC BALLOON SINUPLASTY, OPTICAL TRACKING SYSTEM ENDOSCOPIC SINUS SURGERY;  Left Endo  Sinus  Surgery  with Septoplasty      ENDOSCOPIC SINUS SURGERY  01/15/2013    Procedure: ENDOSCOPIC SINUS SURGERY;  removal of nasal packing with endocopic debridement of left paranasal sinuses;  Surgeon: Fabian Driver MD;  Location:  OR    ENDOSCOPIC SINUS SURGERY  04/22/2013    Procedure: ENDOSCOPIC SINUS SURGERY;  Functional Endoscopic Sinus Surgery With Biopsy ;  Surgeon: Jcarlos Hogan MD;  Location: [facility] OR    ENT SURGERY  2013    Deviated septum. Complications. Resulted in 3 more    EXCISE MASS TRUNK Right 7/3/2024    Procedure: Wide Local Excision of RIGHT Groin Skin Lesion;  Surgeon: Tracie Higginbotham MD;  Location: [facility] OR    EXCISE SOFT TISSUE TUMOR THIGH Left 10/09/2023    Procedure: Wide Local Excision of LEFT thigh melanoma;  Surgeon: Tracie Higginbotham MD;  Location: [facility] OR     Current Outpatient Medications   Medication Sig Dispense Refill    albuterol (PROAIR HFA/PROVENTIL HFA/VENTOLIN HFA) 108 (90 Base) MCG/ACT inhaler INHALE 2 PUFFS BY MOUTH EVERY 6 HOURS AS NEEDED FOR WHEEZE OR FOR SHORTNESS OF BREATH 18 g 11    buPROPion (WELLBUTRIN XL) 150 MG 24 hr tablet Take 3 tablets (450 mg) by mouth every morning. 270 tablet 1    cetirizine (ZYRTEC) 10 MG tablet Take 1 tablet (10 mg) by mouth every evening 30 tablet 1    cyclobenzaprine (FLEXERIL) 5 MG tablet Take 1-2 tablets (5-10 mg) by mouth 2 times daily as needed for muscle spasms 30 tablet 3    estradiol (VIVELLE-DOT) 0.05 MG/24HR bi-weekly patch Place 1 patch over 96 hours onto the skin twice a week. 9 patch 11    fluticasone-vilanterol (BREO ELLIPTA) 200-25 MCG/ACT inhaler Inhale 1 puff into the lungs daily.      ibuprofen (ADVIL/MOTRIN) 200 MG tablet Take 200 mg by mouth every 6 hours as needed.      methocarbamol (ROBAXIN) 500 MG tablet Take 1 tablet (500 mg) by mouth 4 times daily as needed for muscle spasms 20 tablet 3    montelukast (SINGULAIR) 10 MG tablet Take 1 tablet (10 mg) by mouth at bedtime.      progesterone  (PROMETRIUM) 100 MG capsule Take 1 capsule (100 mg) by mouth daily. 90 capsule 3    telmisartan (MICARDIS) 40 MG tablet Take 1 tablet (40 mg) by mouth daily. 90 tablet 1    ZOLMitriptan (ZOMIG) 5 MG tablet TAKE 1 TABLET BY MOUTH AT ONSET OF HEADACHE FOR MIGRAINE MAY REPEAT IN 2 HOURS. MAX 2 TABS/24 HOURS 10 tablet 11       Allergies   Allergen Reactions    Adhesive Tape Blisters    Melon      Mouth gets itchy     Seasonal Allergies     Lisinopril Cough     Annoying dry cough      Topiramate Visual Disturbance     Blurred vision         Social History     Tobacco Use    Smoking status: Former     Current packs/day: 0.10     Average packs/day: 0.1 packs/day for 5.0 years (0.5 ttl pk-yrs)     Types: Cigarettes     Passive exposure: Past    Smokeless tobacco: Never    Tobacco comments:     Tried smoking in high school   Substance Use Topics    Alcohol use: Not Currently     Comment: Rarely     Family History   Problem Relation Age of Onset    Asthma Mother     Depression Mother     Thyroid Disease Mother     Blood Disease Mother         Bleeding disorder    Bipolar Disorder Mother     Parkinsonism Mother     Dementia Mother     Anxiety Disorder Mother     Mental Illness Mother     Obesity Mother     Heart Disease Father         s/p pacemakers x 2     Diabetes Father     Depression Father     Skin Cancer Father     Colon Cancer Father 55    Prostate Cancer Father 79    Anesthesia Reaction Father         Very hard to come out of anesthesia    Substance Abuse Maternal Grandmother     Substance Abuse Maternal Grandfather     Breast Cancer Paternal Grandmother         Possible    Osteoporosis Paternal Grandmother     Skin Cancer Paternal Grandfather         non-melanoma    Allergy (Severe) Son         Dairy, Eggs, and Oatmeal    Asthma Child     Breast Cancer Maternal Aunt 55    Leukemia Maternal Aunt 60    Breast Cancer Maternal Aunt 65        mother's identical twin     Prostate Cancer Paternal Uncle 84    Alcohol/Drug  "Other         Sibling     History   Drug Use No     Comment: no herbal meds except for acidophilus              Review of Systems  Constitutional, HEENT, cardiovascular, pulmonary, GI, , musculoskeletal, neuro, skin, endocrine and psych systems are negative, except as otherwise noted.    Objective    /68   Pulse 79   Temp 97.9  F (36.6  C) (Tympanic)   Resp 18   Ht 1.702 m (5' 7.01\")   Wt 78.7 kg (173 lb 8 oz)   LMP 02/25/2025   SpO2 99%   BMI 27.17 kg/m     Estimated body mass index is 27.17 kg/m  as calculated from the following:    Height as of this encounter: 1.702 m (5' 7.01\").    Weight as of this encounter: 78.7 kg (173 lb 8 oz).  Physical Exam  GENERAL: alert and no distress  EYES: Eyes grossly normal to inspection, PERRL and conjunctivae and sclerae normal  HENT: ear canals and TM's normal, nose and mouth without ulcers or lesions  NECK: no adenopathy, no asymmetry, masses, or scars  RESP: lungs clear to auscultation - no rales, rhonchi or wheezes  CV: regular rate and rhythm, normal S1 S2, no S3 or S4, no murmur, click or rub, no peripheral edema  ABDOMEN: soft, nontender, no hepatosplenomegaly, no masses and bowel sounds normal  MS: no gross musculoskeletal defects noted, no edema  SKIN: no suspicious lesions or rashes  NEURO: Normal strength and tone, mentation intact and speech normal  PSYCH: mentation appears normal, affect normal/bright    Recent Labs   Lab Test 06/26/24  1801   HGB 13.8         POTASSIUM 5.0   CR 0.78        Diagnostics  Labs pending at this time.  Results will be reviewed when available.   No EKG required for low risk surgery (cataract, skin procedure, breast biopsy, etc).    Revised Cardiac Risk Index (RCRI)  The patient has the following serious cardiovascular risks for perioperative complications:   - No serious cardiac risks = 0 points     RCRI Interpretation: 0 points: Class I (very low risk - 0.4% complication rate)       Signed Electronically by: " Linda Soto MD    A copy of this evaluation report is provided to the requesting physician.

## 2025-02-26 NOTE — PROGRESS NOTES
Preoperative Evaluation  [address] [address]  Phone: [phone]  Primary Provider: Linda Soto MD  Pre-op Performing Provider: Linda Soto MD  Feb 26, 2025   {Provider  Link to PREOP SmartSet  REQUIRED to apply standard patient instructions and medication directions to the AVS :445077}  {ROOMER review and update patient entered surgical information if needed :935849}  { After Pre-op is completed, use lists to pull documentation into note Link to complete Pre-Op    :494470}  {Pull Surgical Information into note after flowsheet completed:315228}  Fax number for surgical facility: {:296409}    {Provider Charting Preference for Preop :983986}    Marbella Liriano is a 49 year old, presenting for the following:  No chief complaint on file.      {(!) Visit Details have not yet been documented.  Please enter Visit Details and then use this list to pull in documentation. (Optional):796122}  HPI related to upcoming procedure: ***  {Pull Pre-Op Questionnaire into note after flowsheet completed:790450}  Health Care Directive  Patient does not have a Health Care Directive: {ADVANCE_DIRECTIVE_STATUS:664258}    Preoperative Review of   {Mnpmpreport:257075}  {Review MNPMP for all patients per ICSI MNPMP Profile:509757}    {Chronic problem details (Optional) :962758}    Patient Active Problem List    Diagnosis Date Noted    Malignant melanoma of left lower extremity including hip (H)-left anterior thigh Stage 1B (cT2a, cN0, cM0) 09/06/2023     Priority: High    Generalized hyperhidrosis- since earliest recollections - soaks clothing multiple times/week - very disruptive to life 01/04/2017     Priority: High     Familial - mother and daughter as well.       Melanoma in situ of right lower extremity including hip (H) 02/20/2025     Priority: Medium    Lentiginous compound nevus of thigh, left - inguinal crease - melanocytic - s/p  shave biopsy = benign findings, clear margins 11/21/2024 12/02/2024     Priority: Medium    Weight loss- 40 lbs with better nutrition and running over the last 6 months 08/20/2024     Priority: Medium    Need for hepatitis B vaccination- pt will check with her insurance on this 08/20/2024     Priority: Medium    Counseling for birth control, intrauterine device- pt is thinking about HRT for perimenopausal symptoms, possibly Mirena IUD and oral/transdermal estrogen 08/20/2024     Priority: Medium    Abnormality of cervix- inflamed , sligthly cystic and hypervascular cervix from 4:00-8;00 inferior to os 08/20/2024     Priority: Medium    CARDIOVASCULAR SCREENING; LDL GOAL LESS THAN 130 08/20/2024     Priority: Medium    Mild episode of recurrent major depressive disorder 06/26/2024     Priority: Medium    Medication side effects- lisinopril 5mg annoying dry cough, and topiramate 25mg = blurred vision 06/26/2024     Priority: Medium    Melanoma in situ of torso excluding breast (H) - right groin area - 2nd melanoma - first was left anterior thigh 06/04/2024     Priority: Medium    History of malignant melanoma of skin 02/15/2024     Priority: Medium    Neoplasm of uncertain behavior of skin- left pubic area and left breast inferior areola - slightly irregular pigmentation - images taken - see media section - pt will watch closely - nted 8/20/2024 02/15/2024     Priority: Medium    Multiple nevi 02/15/2024     Priority: Medium    Cherry angioma 02/15/2024     Priority: Medium    Lentigines 02/15/2024     Priority: Medium    Mild intermittent asthma with (acute) exacerbation- allergies and cold temperature triggers 09/20/2023     Priority: Medium    Encounter for completion of form with patient- [facility] paperwork for melanoma left leg 09/20/2023     Priority: Medium    Melanoma of skin (H) 09/14/2023     Priority: Medium    Numbness and tingling of hand- fingertips - ? caused by lisinopril vs. other - consider changing to  amlodipine  03/20/2023     Priority: Medium    Intractable migraine without aura and without status migrainosus 02/02/2023     Priority: Medium    Hypertension goal BP (blood pressure) < 140/90 - new diagnosis fall 2023 - much improved with weight loss and regular CV exercise 02/02/2023     Priority: Medium    Morbid obesity (H) 02/02/2023     Priority: Medium    Recurrent cough 12/01/2022     Priority: Medium    Attention deficit - symptoms - pt desires testing and possible treatment 12/01/2022     Priority: Medium    Menorrhagia with regular cycle- ? cause of iron deficiency anemia- pt would like to see ob/gyn specialists- didn't go in 2021 - better than previous 05/02/2021     Priority: Medium    Intractable episodic tension-type headache 05/02/2021     Priority: Medium    Strain of neck muscle, initial encounter 05/02/2021     Priority: Medium    Sun-damaged skin 05/02/2021     Priority: Medium    Cough due to bronchospasm- needs refills on meds  05/02/2021     Priority: Medium    Family history of colon cancer- father dx'd in mid 40's - pt's first colonoscopy in 4/24/2017 - repeat in 2022 04/14/2021     Priority: Medium    Multiple fractures of left foot with routine healing 02/25/2021     Priority: Medium    Anxiety      Priority: Medium     paxil 10mg= mild bladder retention, increased headaches       Mild anxiety 06/01/2018     Priority: Medium    Foster care child 06/01/2018     Priority: Medium    Hot flashes 11/16/2016     Priority: Medium    Family history of breast cancer- mother's identical twin, maternal aunt, paternal GM and dad with breast lumps - had genetic counseling 09/11/2014     Priority: Medium    Lactose intolerance      Priority: Medium    Kidney stones      Priority: Medium     Dr. Blank Steele - [facility] - Urology       Disturbance in sleep behavior      Priority: Medium    Other iron deficiency anemia - Anemia - ? 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 Provider to review and confirm      Chronic sinusitis 01/03/2013     Priority: Medium      Past Medical History:   Diagnosis Date    Anemia     Anxiety     paxil 10mg= mild bladder retention, at 30mg = ravenously hungry/weight gain increased headaches; lexapro = bad sweating    Contraception     ocp's some = mood swings ; seasonale: frequent vaginal bleeding and weight gain    Hot flashes 11/16/2016    Hypertension goal BP (blood pressure) < 140/90  - new diagnosis 02/02/2023    Kidney stones     Dr. Blank Steele - [facility] - Urology     Lactose intolerance     Malignant melanoma (H)     Malignant melanoma of left lower extremity including hip (H) 09/06/2023    Maxillary bone loss 01/2014    left - secondary to severe oral/sinus infection and bleeding  - [facility] [place] [facility]/p multiple surgeries     Multiple fractures of left foot with routine healing 02/25/2021    Night sweats     Sleep problems     Uncomplicated asthma 20s     Past Surgical History:   Procedure Laterality Date    ABDOMEN SURGERY  2001    APPENDECTOMY      ARTHROSCOPY, ARTHROPLASTY TEMPOROMANDIBULAR JOINT, COMBINED  age 14     TMJ surgery as an 7th grader    BIOPSY NODE SENTINEL Left 10/09/2023    Procedure: Left Femoral [place] Lymph Node Mapping and Biopsy;  Surgeon: Tracie Higginbotham MD;  Location: [facility] OR    COLONOSCOPY  2017    Approximately    COMPLEX WOUND CLOSURE (UPDATE) N/A 10/09/2023    Procedure: COMPLEX CLOSURE SOFT TISSUE DEFECT LEFT THIGH 14 cm Length;  Surgeon: Pj Aguirre MD;  Location: [facility] OR    CYSTOSCOPY,URETEROSCOPY,STONE REMV  09/11/2002    with stent  placements - since removed     ENDOSCOPIC BALLOON SINUPLASTY, OPTICAL TRACKING SYSTEM ENDOSCOPIC SINUS SURGERY, COMBINED  01/08/2013    Procedure: COMBINED ENDOSCOPIC BALLOON SINUPLASTY, OPTICAL TRACKING SYSTEM ENDOSCOPIC SINUS SURGERY;  Left Endo Sinus  Surgery  with Septoplasty      ENDOSCOPIC SINUS SURGERY  01/15/2013    Procedure: ENDOSCOPIC SINUS SURGERY;  removal of nasal packing with endocopic debridement of left paranasal sinuses;  Surgeon: Fabian Driver MD;  Location:  OR    ENDOSCOPIC SINUS SURGERY  04/22/2013    Procedure: ENDOSCOPIC SINUS SURGERY;  Functional Endoscopic Sinus Surgery With Biopsy ;  Surgeon: Jcarlos Hogan MD;  Location:  OR    ENT SURGERY  2013    Deviated septum. Complications. Resulted in 3 more    EXCISE MASS TRUNK Right 7/3/2024    Procedure: Wide Local Excision of RIGHT Groin Skin Lesion;  Surgeon: Tracie Higginbotham MD;  Location: [facility] OR    EXCISE SOFT TISSUE TUMOR THIGH Left 10/09/2023    Procedure: Wide Local Excision of LEFT thigh melanoma;  Surgeon: Tracie Higginbotham MD;  Location:  OR     Current Outpatient Medications   Medication Sig Dispense Refill    albuterol (PROAIR HFA/PROVENTIL HFA/VENTOLIN HFA) 108 (90 Base) MCG/ACT inhaler INHALE 2 PUFFS BY MOUTH EVERY 6 HOURS AS NEEDED FOR WHEEZE OR FOR SHORTNESS OF BREATH 18 g 11    buPROPion (WELLBUTRIN XL) 150 MG 24 hr tablet Take 3 tablets (450 mg) by mouth every morning. 270 tablet 1    cetirizine (ZYRTEC) 10 MG tablet Take 1 tablet (10 mg) by mouth every evening 30 tablet 1    cyclobenzaprine (FLEXERIL) 5 MG tablet Take 1-2 tablets (5-10 mg) by mouth 2 times daily as needed for muscle spasms 30 tablet 3    estradiol (VIVELLE-DOT) 0.025 MG/24HR bi-weekly patch Place 1 patch over 96 hours onto the skin twice a week. 9 patch 11    estradiol (VIVELLE-DOT) 0.05 MG/24HR bi-weekly patch Place 1 patch over 96 hours onto the skin twice a week. 9 patch 11    fluticasone-vilanterol (BREO ELLIPTA) 200-25  "MCG/ACT inhaler Inhale 1 puff into the lungs daily (Patient not taking: Reported on 2025) 60 each 11    ibuprofen (ADVIL/MOTRIN) 200 MG tablet Take 200 mg by mouth every 6 hours as needed.      methocarbamol (ROBAXIN) 500 MG tablet Take 1 tablet (500 mg) by mouth 4 times daily as needed for muscle spasms 20 tablet 3    montelukast (SINGULAIR) 10 MG tablet Take 1 tablet (10 mg) by mouth at bedtime (Patient not taking: Reported on 2025) 90 tablet 3    progesterone (PROMETRIUM) 100 MG capsule Take 1 capsule (100 mg) by mouth daily. 90 capsule 3    telmisartan (MICARDIS) 40 MG tablet Take 1 tablet (40 mg) by mouth daily. 90 tablet 1    ZOLMitriptan (ZOMIG) 5 MG tablet TAKE 1 TABLET BY MOUTH AT ONSET OF HEADACHE FOR MIGRAINE MAY REPEAT IN 2 HOURS. MAX 2 TABS/24 HOURS 10 tablet 11       Allergies   Allergen Reactions    Adhesive Tape Blisters    Melon      Mouth gets itchy     Seasonal Allergies     Lisinopril Cough     Annoying dry cough      Topiramate Visual Disturbance     Blurred vision         Social History     Tobacco Use    Smoking status: Former     Current packs/day: 0.10     Average packs/day: 0.1 packs/day for 5.0 years (0.5 ttl pk-yrs)     Types: Cigarettes     Passive exposure: Past    Smokeless tobacco: Never    Tobacco comments:     Tried smoking in high school   Substance Use Topics    Alcohol use: Not Currently     Comment: Rarely     {FAMILY HISTORY (Optional):592297463}  History   Drug Use No     Comment: no herbal meds except for acidophilus            {ROS Picklists (Optional):343942}    Objective    LMP 2025 (Approximate)    Estimated body mass index is 28.82 kg/m  as calculated from the following:    Height as of 10/22/24: 1.702 m (5' 7\").    Weight as of 10/22/24: 83.5 kg (184 lb).  Physical Exam  {Exam List :297418}    Recent Labs   Lab Test 24  1801   HGB 13.8         POTASSIUM 5.0   CR 0.78        Diagnostics  {LABS:746719}   {EK}    Revised Cardiac " Risk Index (RCRI)  The patient has the following serious cardiovascular risks for perioperative complications:  {PREOP REVISED CARDIAC RISK INDEX (RCRI) :672990}     RCRI Interpretation: {REVISED CARDIAC RISK INTERPRETATION :250734}         Signed Electronically by: Linda Soto MD  A copy of this evaluation report is provided to the requesting physician.    {Provider Resources  Preop [facility] Preop Guidelines  Revised Cardiac Risk Index :843818}   {Email feedback regarding this note to [email or web address]   :502971}

## 2025-02-26 NOTE — PATIENT INSTRUCTIONS
How to Take Your Medication Before Surgery  Preoperative Medication Instructions   Additional Medication Instructions   - Herbal medications and vitamins: DO NOT TAKE 14 days prior to surgery.   - ACE/ARB/ARNI (telmisartan) : DO NOT TAKE on day of surgery (minimum 11 hours for general anesthesia).   - SSRIs, SNRIs, TCAs, Antipsychotics: Your Wellbutrin - Continue without modification. Take with sip of water morning of surgery.          Patient Education   Preparing for Your Surgery  For Adults  Getting started  In most cases, a nurse will call to review your health history and instructions. They will give you an arrival time based on your scheduled surgery time. Please be ready to share:  Your doctor's clinic name and phone number  Your medical, surgical, and anesthesia history  A list of allergies and sensitivities  A list of medicines, including herbal treatments and over-the-counter drugs  Whether the patient has a legal guardian (ask how to send us the papers in advance)  Note: You may not receive a call if you were seen at our PAC (Preoperative Assessment Center).  Please tell us if you're pregnant--or if there's any chance you might be pregnant. Some surgeries may injure a fetus (unborn baby), so they require a pregnancy test. Surgeries that are safe for a fetus don't always need a test, and you can choose whether to have one.   Preparing for surgery  Within 10 to 30 days of surgery: Have a pre-op exam (sometimes called an H&P, or History and Physical). This can be done at a clinic or pre-operative center.  If you're having a , you may not need this exam. Talk to your care team.  At your pre-op exam, talk to your care team about all medicines you take. (This includes CBD oil and any drugs, such as THC, marijuana, and other forms of cannabis.) If you need to stop any medicine before surgery, ask when to start taking it again.  This is for your safety. Many medicines and drugs can make you bleed too  much during surgery. Some change how well surgery (anesthesia) drugs work.  Call your insurance company to let them know you're having surgery. (If you don't have insurance, call [phone].)  Call your clinic if there's any change in your health. This includes a scrape or scratch near the surgery site, or any signs of a cold (sore throat, runny nose, cough, rash, fever).  Eating and drinking guidelines  For your safety: Unless your surgeon tells you otherwise, follow the guidelines below.  Eat and drink as normal until 8 hours before you arrive for surgery. After that, no food or milk. You can spit out gum when you arrive.  Drink clear liquids until 2 hours before you arrive. These are liquids you can see through, like water, Gatorade, and Propel Water. They also include plain black coffee and tea (no cream or milk).  No alcohol for 24 hours before you arrive. The night before surgery, stop any drinks that contain THC.  If your care team tells you to take medicine on the morning of surgery, it's okay to take it with a sip of water. No other medicines or drugs are allowed (including CBD oil)--follow your care team's instructions.  If you have questions the day of surgery, call your hospital or surgery center.   Preventing infection  Shower or bathe the night before and the morning of surgery. Follow the instructions your clinic gave you. (If no instructions, use regular soap.)  Don't shave or clip hair near your surgery site. We'll remove the hair if needed.  Don't smoke or vape the morning of surgery. No chewing tobacco for 6 hours before you arrive. A nicotine patch is okay. You may spit out nicotine gum when you arrive.  For some surgeries, the surgeon will tell you to fully quit smoking and nicotine.  We will make every effort to keep you safe from infection. We will:  Clean our hands often with soap and water (or an alcohol-based hand rub).  Clean the skin at your surgery site with a special soap that kills  germs.  Give you a special gown to keep you warm. (Cold raises the risk of infection.)  Wear hair covers, masks, gowns, and gloves during surgery.  Give antibiotic medicine, if prescribed. Not all surgeries need this medicine.  What to bring on the day of surgery  Photo ID and insurance card  Copy of your health care directive, if you have one  Glasses and hearing aids (bring cases)  You can't wear contacts during surgery  Inhaler and eye drops, if you use them (tell us about these when you arrive)  CPAP machine or breathing device, if you use them  A few personal items, if spending the night  If you have . . .  A pacemaker, ICD (cardiac defibrillator), or other implant: Bring the ID card.  An implanted stimulator: Bring the remote control.  A legal guardian: Bring a copy of the certified (court-stamped) guardianship papers.  Please remove any jewelry, including body piercings. Leave jewelry and other valuables at home.  If you're going home the day of surgery  You must have a responsible adult drive you home. They should stay with you overnight as well.  If you don't have someone to stay with you, and you aren't safe to go home alone, we may keep you overnight. Insurance often won't pay for this.  After surgery  If it's hard to control your pain or you need more pain medicine, please call your surgeon's office.  Questions?   If you have any questions for your care team, list them here:   ____________________________________________________________________________________________________________________________________________________________________________________________________________________________________________________________  For informational purposes only. Not to replace the advice of your health care provider. Copyright   2003, 2019 [facility]. All rights reserved. Clinically reviewed by Les Barclay MD. CTQuan [id number] - REV 08/24.

## 2025-03-04 ENCOUNTER — ANESTHESIA EVENT (OUTPATIENT)
Dept: SURGERY | Facility: AMBULATORY SURGERY CENTER | Age: 50
End: 2025-03-04
Payer: COMMERCIAL

## 2025-03-05 ENCOUNTER — TELEPHONE (OUTPATIENT)
Dept: ONCOLOGY | Facility: CLINIC | Age: 50
End: 2025-03-05

## 2025-03-05 ENCOUNTER — HOSPITAL ENCOUNTER (OUTPATIENT)
Facility: AMBULATORY SURGERY CENTER | Age: 50
Discharge: HOME | End: 2025-03-05
Attending: SURGERY | Admitting: SURGERY
Payer: COMMERCIAL

## 2025-03-05 ENCOUNTER — ANESTHESIA (OUTPATIENT)
Dept: SURGERY | Facility: AMBULATORY SURGERY CENTER | Age: 50
End: 2025-03-05
Payer: COMMERCIAL

## 2025-03-05 VITALS
WEIGHT: 173.5 LBS | BODY MASS INDEX: 27.17 KG/M2 | RESPIRATION RATE: 16 BRPM | OXYGEN SATURATION: 98 % | DIASTOLIC BLOOD PRESSURE: 78 MMHG | HEART RATE: 84 BPM | SYSTOLIC BLOOD PRESSURE: 111 MMHG | TEMPERATURE: 97.8 F

## 2025-03-05 RX ORDER — LIDOCAINE 40 MG/G
CREAM TOPICAL
Status: DISCONTINUED | OUTPATIENT
Start: 2025-03-05 | End: 2025-03-06 | Stop reason: HOSPADM

## 2025-03-05 RX ORDER — SODIUM CHLORIDE, SODIUM LACTATE, POTASSIUM CHLORIDE, CALCIUM CHLORIDE 600; 310; 30; 20 MG/100ML; MG/100ML; MG/100ML; MG/100ML
INJECTION, SOLUTION INTRAVENOUS CONTINUOUS
Status: DISCONTINUED | OUTPATIENT
Start: 2025-03-05 | End: 2025-03-06 | Stop reason: HOSPADM

## 2025-03-05 RX ORDER — FENTANYL CITRATE 50 UG/ML
50 INJECTION, SOLUTION INTRAMUSCULAR; INTRAVENOUS EVERY 5 MIN PRN
Status: DISCONTINUED | OUTPATIENT
Start: 2025-03-05 | End: 2025-03-06 | Stop reason: HOSPADM

## 2025-03-05 RX ORDER — FENTANYL CITRATE 50 UG/ML
INJECTION, SOLUTION INTRAMUSCULAR; INTRAVENOUS PRN
Status: DISCONTINUED | OUTPATIENT
Start: 2025-03-05 | End: 2025-03-05

## 2025-03-05 RX ORDER — CEFAZOLIN SODIUM 2 G/50ML
2 SOLUTION INTRAVENOUS SEE ADMIN INSTRUCTIONS
Status: DISCONTINUED | OUTPATIENT
Start: 2025-03-05 | End: 2025-03-06 | Stop reason: HOSPADM

## 2025-03-05 RX ORDER — ONDANSETRON 4 MG/1
4 TABLET, ORALLY DISINTEGRATING ORAL EVERY 30 MIN PRN
Status: DISCONTINUED | OUTPATIENT
Start: 2025-03-05 | End: 2025-03-06 | Stop reason: HOSPADM

## 2025-03-05 RX ORDER — PROPOFOL 10 MG/ML
INJECTION, EMULSION INTRAVENOUS CONTINUOUS PRN
Status: DISCONTINUED | OUTPATIENT
Start: 2025-03-05 | End: 2025-03-05

## 2025-03-05 RX ORDER — FENTANYL CITRATE 50 UG/ML
25 INJECTION, SOLUTION INTRAMUSCULAR; INTRAVENOUS EVERY 5 MIN PRN
Status: DISCONTINUED | OUTPATIENT
Start: 2025-03-05 | End: 2025-03-06 | Stop reason: HOSPADM

## 2025-03-05 RX ORDER — NALOXONE HYDROCHLORIDE 0.4 MG/ML
0.1 INJECTION, SOLUTION INTRAMUSCULAR; INTRAVENOUS; SUBCUTANEOUS
Status: DISCONTINUED | OUTPATIENT
Start: 2025-03-05 | End: 2025-03-06 | Stop reason: HOSPADM

## 2025-03-05 RX ORDER — ACETAMINOPHEN 325 MG/1
975 TABLET ORAL ONCE
Status: COMPLETED | OUTPATIENT
Start: 2025-03-05 | End: 2025-03-05

## 2025-03-05 RX ORDER — DEXAMETHASONE SODIUM PHOSPHATE 4 MG/ML
4 INJECTION, SOLUTION INTRA-ARTICULAR; INTRALESIONAL; INTRAMUSCULAR; INTRAVENOUS; SOFT TISSUE
Status: DISCONTINUED | OUTPATIENT
Start: 2025-03-05 | End: 2025-03-06 | Stop reason: HOSPADM

## 2025-03-05 RX ORDER — CEFAZOLIN SODIUM 2 G/50ML
2 SOLUTION INTRAVENOUS
Status: COMPLETED | OUTPATIENT
Start: 2025-03-05 | End: 2025-03-05

## 2025-03-05 RX ORDER — LIDOCAINE HYDROCHLORIDE 20 MG/ML
INJECTION, SOLUTION INFILTRATION; PERINEURAL PRN
Status: DISCONTINUED | OUTPATIENT
Start: 2025-03-05 | End: 2025-03-05

## 2025-03-05 RX ORDER — ACETAMINOPHEN 325 MG/1
975 TABLET ORAL ONCE
Status: DISCONTINUED | OUTPATIENT
Start: 2025-03-05 | End: 2025-03-06 | Stop reason: HOSPADM

## 2025-03-05 RX ORDER — ONDANSETRON 2 MG/ML
4 INJECTION INTRAMUSCULAR; INTRAVENOUS EVERY 30 MIN PRN
Status: DISCONTINUED | OUTPATIENT
Start: 2025-03-05 | End: 2025-03-06 | Stop reason: HOSPADM

## 2025-03-05 RX ORDER — PROPOFOL 10 MG/ML
INJECTION, EMULSION INTRAVENOUS PRN
Status: DISCONTINUED | OUTPATIENT
Start: 2025-03-05 | End: 2025-03-05

## 2025-03-05 RX ADMIN — FENTANYL CITRATE 25 MCG: 50 INJECTION, SOLUTION INTRAMUSCULAR; INTRAVENOUS at 10:01

## 2025-03-05 RX ADMIN — CEFAZOLIN SODIUM 2 G: 2 SOLUTION INTRAVENOUS at 10:00

## 2025-03-05 RX ADMIN — SODIUM CHLORIDE, SODIUM LACTATE, POTASSIUM CHLORIDE, CALCIUM CHLORIDE: 600; 310; 30; 20 INJECTION, SOLUTION INTRAVENOUS at 08:49

## 2025-03-05 RX ADMIN — LIDOCAINE HYDROCHLORIDE 60 MG: 20 INJECTION, SOLUTION INFILTRATION; PERINEURAL at 10:04

## 2025-03-05 RX ADMIN — PROPOFOL 70 MG: 10 INJECTION, EMULSION INTRAVENOUS at 10:04

## 2025-03-05 RX ADMIN — PROPOFOL 150 MCG/KG/MIN: 10 INJECTION, EMULSION INTRAVENOUS at 10:04

## 2025-03-05 RX ADMIN — ACETAMINOPHEN 975 MG: 325 TABLET ORAL at 08:40

## 2025-03-05 NOTE — NURSING NOTE
Called pt to advise per  pt can come in in two weeks on the nurse schedule for a suture removal or she can come in 3 weeks post surgery as  is out of office two weeks post surgery.  Pt states she is okay coming in on the nurse schedule in two weeks and keeping appt on 4/1/25 with . Appt scheduled. No further questions per pt at this time.Annamarie Kerr RN

## 2025-03-05 NOTE — TELEPHONE ENCOUNTER
Health Call Center    Phone Message    May a detailed message be left on voicemail: yes     Reason for Call: Other: Deandra is calling in asking for a call back. She states that there is conflicting information on when she should be coming in for removal of her stitches, as her after visit summary states to return in 2 weeks but she was scheduled 4/1. Please call back as soon as possible to discuss.     Action Taken: Message routed to:  [facility] ([facility] Gen Surg    Travel Screening: Not Applicable     Date of Service:

## 2025-03-05 NOTE — ANESTHESIA CARE TRANSFER NOTE
Patient: Deandra Lewis    Procedure: Procedure(s):  Wide local excision of RIGHT calf lesion       Diagnosis: Melanoma in situ of right lower extremity including hip (H) [D03.71]  Diagnosis Additional Information: No value filed.    Anesthesia Type:   MAC     Note:      Level of Consciousness: awake  Oxygen Supplementation: room air    Independent Airway: airway patency satisfactory and stable  Dentition: dentition unchanged  Vital Signs Stable: post-procedure vital signs reviewed and stable  Report to RN Given: handoff report given  Patient transferred to: Phase II    Handoff Report: Identifed the Patient, Identified the Reponsible Provider, Reviewed the pertinent medical history, Discussed the surgical course, Reviewed Intra-OP anesthesia mangement and issues during anesthesia, Set expectations for post-procedure period and Allowed opportunity for questions and acknowledgement of understanding      Vitals:  Vitals Value Taken Time   BP     Temp 98    Pulse 75    Resp 16    SpO2 98        Electronically Signed By: ANEESH Montano CRNA  March 5, 2025  10:55 AM

## 2025-03-05 NOTE — ANESTHESIA PREPROCEDURE EVALUATION
Anesthesia Pre-Procedure Evaluation    Patient: Deandra Lewis   MRN: [id number] : 1975        Procedure : Procedure(s):  Wide local excision of RIGHT calf lesion          Past Medical History:   Diagnosis Date    Anemia     Anxiety     paxil 10mg= mild bladder retention, at 30mg = ravenously hungry/weight gain increased headaches; lexapro = bad sweating    Contraception     ocp's some = mood swings ; seasonale: frequent vaginal bleeding and weight gain    Hot flashes 2016    Hypertension goal BP (blood pressure) < 140/90  - new diagnosis 2023    Kidney stones     Dr. Blank Steele - [facility] - Urology     Lactose intolerance     Malignant melanoma (H)     Malignant melanoma of left lower extremity including hip (H) 2023    Maxillary bone loss 2014    left - secondary to severe oral/sinus infection and bleeding  - [facility] [place] s/p multiple surgeries     Multiple fractures of left foot with routine healing 2021    Night sweats     Sleep problems     Uncomplicated asthma 20s      Past Surgical History:   Procedure Laterality Date    ABDOMEN SURGERY      APPENDECTOMY      ARTHROSCOPY, ARTHROPLASTY TEMPOROMANDIBULAR JOINT, COMBINED  age 14     TMJ surgery as an 9th grader    BIOPSY NODE SENTINEL Left 10/09/2023    Procedure: Left Femoral Pall Mall Lymph Node Mapping and Biopsy;  Surgeon: Tracie Higginbotham MD;  Location: [facility] OR    COLONOSCOPY      Approximately    COMPLEX WOUND CLOSURE (UPDATE) N/A 10/09/2023    Procedure: COMPLEX CLOSURE SOFT TISSUE DEFECT LEFT THIGH 14 cm Length;  Surgeon: Pj Aguirre MD;  Location: [facility] OR    CYSTOSCOPY,URETEROSCOPY,STONE REMV  2002    with stent placements - since removed     ENDOSCOPIC BALLOON SINUPLASTY, OPTICAL TRACKING SYSTEM ENDOSCOPIC SINUS SURGERY, COMBINED  2013    Procedure: COMBINED ENDOSCOPIC BALLOON SINUPLASTY, OPTICAL TRACKING SYSTEM ENDOSCOPIC SINUS SURGERY;  Left Endo Sinus  Surgery  with  Septoplasty      ENDOSCOPIC SINUS SURGERY  01/15/2013    Procedure: ENDOSCOPIC SINUS SURGERY;  removal of nasal packing with endocopic debridement of left paranasal sinuses;  Surgeon: Fabian Driver MD;  Location: [facility] OR    ENDOSCOPIC SINUS SURGERY  04/22/2013    Procedure: ENDOSCOPIC SINUS SURGERY;  Functional Endoscopic Sinus Surgery With Biopsy ;  Surgeon: Jcarlos Hogan MD;  Location: [facility] OR    ENT SURGERY  2013    Deviated septum. Complications. Resulted in 3 more    EXCISE MASS TRUNK Right 7/3/2024    Procedure: Wide Local Excision of RIGHT Groin Skin Lesion;  Surgeon: Tracie Higginbotham MD;  Location: [facility] OR    EXCISE SOFT TISSUE TUMOR THIGH Left 10/09/2023    Procedure: Wide Local Excision of LEFT thigh melanoma;  Surgeon: Tracie Higginbotham MD;  Location: [facility] OR      Allergies   Allergen Reactions    Adhesive Tape Blisters    Melon      Mouth gets itchy     Seasonal Allergies     Lisinopril Cough     Annoying dry cough      Topiramate Visual Disturbance     Blurred vision       Social History     Tobacco Use    Smoking status: Former     Current packs/day: 0.10     Average packs/day: 0.1 packs/day for 5.0 years (0.5 ttl pk-yrs)     Types: Cigarettes     Passive exposure: Past    Smokeless tobacco: Never    Tobacco comments:     Tried smoking in high school   Substance Use Topics    Alcohol use: Not Currently     Comment: Rarely      Wt Readings from Last 1 Encounters:   02/26/25 78.7 kg (173 lb 8 oz)        Anesthesia Evaluation   Pt has had prior anesthetic.         ROS/MED HX  ENT/Pulmonary:     (+)                     Mild Persistent, asthma                  Neurologic:       Cardiovascular:     (+)  hypertension- -   -  - -                                      METS/Exercise Tolerance: >4 METS    Hematologic:       Musculoskeletal:       GI/Hepatic:       Renal/Genitourinary:       Endo:       Psychiatric/Substance Use:       Infectious Disease:       Malignancy:   (+) Malignancy,  History of Skin.Skin CA Active status post.      Other:            Physical Exam    Airway  airway exam normal           Respiratory Devices and Support         Dental       (+) Minor Abnormalities - some fillings, tiny chips      Cardiovascular   cardiovascular exam normal          Pulmonary   pulmonary exam normal                OUTSIDE LABS:  CBC:   Lab Results   Component Value Date    WBC 5.5 02/26/2025    WBC 6.4 06/26/2024    HGB 13.9 02/26/2025    HGB 13.8 06/26/2024    HCT 40.8 02/26/2025    HCT 41.1 06/26/2024     02/26/2025     06/26/2024     BMP:   Lab Results   Component Value Date     02/26/2025     06/26/2024    POTASSIUM 4.4 02/26/2025    POTASSIUM 5.0 06/26/2024    CHLORIDE 105 02/26/2025    CHLORIDE 106 06/26/2024    CO2 24 02/26/2025    CO2 24 06/26/2024    BUN 7.6 02/26/2025    BUN 13.9 06/26/2024    CR 0.80 02/26/2025    CR 0.78 06/26/2024    GLC 81 02/26/2025    GLC 93 06/26/2024     COAGS:   Lab Results   Component Value Date    PTT 32 03/25/2013    INR 1.08 03/25/2013     POC:   Lab Results   Component Value Date    HCG Negative 09/20/2023     HEPATIC:   Lab Results   Component Value Date    ALBUMIN 4.4 02/26/2025    PROTTOTAL 6.6 02/26/2025    ALT 8 02/26/2025    AST 17 02/26/2025    ALKPHOS 89 02/26/2025    BILITOTAL 0.7 02/26/2025     OTHER:   Lab Results   Component Value Date    CHLOE 9.5 02/26/2025    TSH 1.35 02/26/2025    T4 1.20 11/16/2016       Anesthesia Plan    ASA Status:  2    NPO Status:  NPO Appropriate    Anesthesia Type: MAC.     - Reason for MAC: straight local not clinically adequate   Induction: Intravenous.   Maintenance: TIVA.        Consents            Postoperative Care    Pain management: IV analgesics, Oral pain medications, Multi-modal analgesia.   PONV prophylaxis: Dexamethasone or Solumedrol, Background Propofol Infusion     Comments:               Pravin Laurent MD    I have reviewed the pertinent notes and labs in the chart from the  "past 30 days and (re)examined the patient.  Any updates or changes from those notes are reflected in this note.    Clinically Significant Risk Factors Present on Admission                   # Hypertension: Noted on problem list           # Overweight: Estimated body mass index is 27.17 kg/m  as calculated from the following:    Height as of 2/26/25: 1.702 m (5' 7.01\").    Weight as of 2/26/25: 78.7 kg (173 lb 8 oz).       # Asthma: noted on problem list          "

## 2025-03-05 NOTE — DISCHARGE INSTRUCTIONS
Tylenol 975 mg was given at 8:40 AM. You can take another dose, if needed, after 2:40 PM.  You should not take more then 4,000 mg of tylenol/acetaminophen in a 24 hour period.

## 2025-03-05 NOTE — ANESTHESIA POSTPROCEDURE EVALUATION
Patient: Deandra Lewis    Procedure: Procedure(s):  Wide local excision of RIGHT calf lesion       Anesthesia Type:  MAC    Note:  Disposition: Outpatient   Postop Pain Control: Uneventful            Sign Out: Well controlled pain   PONV: No   Neuro/Psych: Uneventful            Sign Out: Acceptable/Baseline neuro status   Airway/Respiratory: Uneventful            Sign Out: Acceptable/Baseline resp. status   CV/Hemodynamics: Uneventful            Sign Out: Acceptable CV status; No obvious hypovolemia; No obvious fluid overload   Other NRE: NONE   DID A NON-ROUTINE EVENT OCCUR?            Last vitals:  Vitals Value Taken Time   /78 03/05/25 1138   Temp 97.8  F (36.6  C) 03/05/25 1138   Pulse     Resp 16 03/05/25 1138   SpO2 98 % 03/05/25 1138       Electronically Signed By: Pravin Laurent MD  March 5, 2025  12:11 PM

## 2025-03-05 NOTE — OP NOTE
PATIENT NAME:  Deandra Lewis  PATIENT YOB: 1975    DATE OF SURGERY: March 5, 2025     SURGEON: Tracie Higginbotham MD  ASSISTANT(S): None    PREOPERATIVE DIAGNOSIS: Melanoma in situ, right medial calf  POSTOPERATIVE DIAGNOSIS: same    PROCEDURE(S): Wide local excision of right medial calf melanoma in situ with 0.5 cm margins    ANESTHESIA: MAC    CLINICAL NOTE:  Deandra Lewis is a 49 year old woman with MIS of the right medial calf.  The risks and benefits of a wide local excision were discussed with the patient and she elected to proceed with informed consent.    OPERATIVE FINDINGS:  1. Prior biopsy site excised with 0.5 cm margins    OPERATIVE PROCEDURE:  The tumor site was verified with the patient and marked on the skin in the preoperative holding area.  The patient was brought to the operating room and placed in the supine position with appropriate padding for all of the pressure points.  MAC was administered by the Anesthesia team.  A surgical safety checklist was performed to confirm the patient's identity, the site and laterality of the procedure. Perioperative antibiotics (Ancef) was provided.  VTE prophylaxis was provided with serial compression devices. The lower extremities were positioned in a frog-legged position with padding for the pressure points. The right  lower extremity was then prepped and draped in the usual sterile fashion using Chloraprep.     0.5 cm margins were marked out circumferentially around the tumor site (where the biopsy previously took place) on the right medial calf.  The tumor site itself measured 9 mm x 9 mm.  I then designed an ellipse around the marked out margin, orienting the ellipse along the longitudinal axis of the leg.  15 mL of 1:1 mix of 1% lidocaine with epinephrine and 0.25% marcaine without epinephrine was infiltrated into the surgical site. The ellipse was then excised, taking with it the subcutaneous fat.  The dissection was carried out down to the  underlying subcutaneous fat.  The specimen was then marked using a marking suture; short for proximal 12:00, and long for posterior 3:00.  The specimen measured 4.0 cm x 1.2 cm.  The specimen was then sent to pathology.  The cavity at this point measured 4.9 cm x 2.3 cm and was 1.5 cm deep.  Anterior and posterior flaps were then raised to facilitate primary closure of the incision without undue tension.  The wound was irrigated and hemostasis was achieved. The incision was closed in two layers with deep interrupted 3-0 vicryl and interrupted 3-0 Nylon vertical mattress sutures.  Adaptic and a dressing were applied.  The patient tolerated the procedure well. The sponge, needle, instrument counts were correct.  The patient was then awakened and taken to recovery in stable fashion.     I was present and scrubbed for the entire above procedure.    EBL: nil    SPECIMEN(S):  A. Right medial calf skin lesion; short suture = proximal (12:00), long suture = posterior (3:00)    POSTOPERATIVE PLANS:  Deandra Lewis will be discharged home today with wound care instructions and no prescription for analgesics.  She will follow-up with me in 2 weeks.     Tracie Higginbotham MD MSc FRC FACS  Associate Professor of Surgery  Division of Surgical Oncology  [facility]

## 2025-03-19 ENCOUNTER — ALLIED HEALTH/NURSE VISIT (OUTPATIENT)
Dept: NURSING | Facility: CLINIC | Age: 50
End: 2025-03-19
Payer: COMMERCIAL

## 2025-03-19 DIAGNOSIS — Z48.02 VISIT FOR SUTURE REMOVAL: Primary | ICD-10-CM

## 2025-03-19 NOTE — NURSING NOTE
Deandra Lewis comes into clinic today at the request of  Ordering Provider for Suture removal .    Deandra Lewis presents to the clinic today for  removal of sutures.  The patient has had the sutures in place for 14 days.    There has been no history of infection or drainage.    O:  sutures are seen located on the right medial calf.  The wound is healing well with no signs of infection.      A: Suture removal.    P:  All sutures were easily removed today.  Routine wound care discussed.  The patient will follow up as needed.     This service provided today was under the supervising provider of the day Dr. Wei, who was available if needed.    Annamarie Kerr RN

## 2025-04-01 ENCOUNTER — OFFICE VISIT (OUTPATIENT)
Dept: SURGERY | Facility: CLINIC | Age: 50
End: 2025-04-01
Payer: COMMERCIAL

## 2025-04-01 DIAGNOSIS — C43.72 MALIGNANT MELANOMA OF LEFT LOWER EXTREMITY INCLUDING HIP (H): ICD-10-CM

## 2025-04-01 DIAGNOSIS — D03.59 MELANOMA IN SITU OF TORSO EXCLUDING BREAST (H): Primary | ICD-10-CM

## 2025-04-01 DIAGNOSIS — D03.71 MELANOMA IN SITU OF RIGHT LOWER EXTREMITY INCLUDING HIP (H): ICD-10-CM

## 2025-04-01 NOTE — LETTER
4/1/2025      Deandra Lewis  [address] [place] [address]      Dear Colleague,    Thank you for referring your patient, Deandra Lewis, to the [facility]. Please see a copy of my visit note below.      [facility]  [address] [address] [place] [id number]  Phone: [phone]    PATIENT NAME:  Deandra Lewis  PATIENT YOB: 1975    FOLLOW-UP  Apr 1, 2025    Deandra Lewis is a 49 year old female who returns for her 1st post-operative follow-up visit.     Cancer Staging   Malignant melanoma of left lower extremity including hip (H)-left anterior thigh Stage 1B (cT2a, cN0, cM0)  Staging form: Melanoma of the Skin, AJCC 8th Edition  - Clinical: Stage IB (cT2a, cN0, cM0) - Unsigned  - Pathologic: Stage IB (pT2a, pN0, cM0) - Signed by Tracie Higginbotham MD on 10/25/2023    Treatment to date:  Wide local excision of left lower anterior thigh melanoma with 2 cm margins, left femoral sentinel lymph node mapping and biopsy (10/9/2023)  Wide local excision of right groin melanoma in situ with 0.5 cm margins (7/3/2024)  Wide local excision of right calf melanoma in situ with 0.5 cm margins (3/5/2025)    HPI:    She underwent a wide local excision of right calf MIS on 3/5/2025.  She is currently 1 month(s) post-op.  Final surgical pathology showed no residual lesion.    Since the procedure, she has been doing well. No issues with healing. Her sutures were removed on 3/19/2025.    LMP 02/26/2025 (Approximate)    Physical Exam  Constitutional:       Appearance: She is well-developed.   Pulmonary:      Effort: No respiratory distress.   Skin:     General: Skin is warm and dry.                INVESTIGATIONS:    Surgical Pathology (3/5/2025):  Final Diagnosis   Right calf:  - Scar from the prior surgical procedure, with no evidence of residual lesion       ASSESSMENT:    Deandra Lewis is a 49 year old female with truncal and right lower extremity MIS  and left lower extremity cutaneous melanoma, 1.5 years s/p surgery.    She is healing well.  I recommended she start moisturizing and massaging the scar with Vaseline.     We reviewed the pathology today and a copy of the report was provided. No further treatment is indicated. I will see her in 6 months for follow up of the stage I melanoma of the left thigh.    All of the above was discussed with the patient and all questions were answered.     PLAN:  Follow up with me in 6 months  Continue skin checks per dermatology    Tracie Higginbotham MD MS [facility] FACS  Associate Professor of Surgery  Division of Surgical Oncology  [facility]       Again, thank you for allowing me to participate in the care of your patient.        Sincerely,        Tracie Higginbotham MD    Electronically signed

## 2025-04-01 NOTE — PROGRESS NOTES
[facility]  [address] [address] [place] [address]  Phone: [phone]    PATIENT NAME:  Deandra Lewis  PATIENT YOB: 1975    FOLLOW-UP  Apr 1, 2025    Deandra Lewis is a 49 year old female who returns for her 1st post-operative follow-up visit.     Cancer Staging   Malignant melanoma of left lower extremity including hip (H)-left anterior thigh Stage 1B (cT2a, cN0, cM0)  Staging form: Melanoma of the Skin, AJCC 8th Edition  - Clinical: Stage IB (cT2a, cN0, cM0) - Unsigned  - Pathologic: Stage IB (pT2a, pN0, cM0) - Signed by Tracie Higginbotham MD on 10/25/2023    Treatment to date:  Wide local excision of left lower anterior thigh melanoma with 2 cm margins, left femoral sentinel lymph node mapping and biopsy (10/9/2023)  Wide local excision of right groin melanoma in situ with 0.5 cm margins (7/3/2024)  Wide local excision of right calf melanoma in situ with 0.5 cm margins (3/5/2025)    HPI:    She underwent a wide local excision of right calf MIS on 3/5/2025.  She is currently 1 month(s) post-op.  Final surgical pathology showed no residual lesion.    Since the procedure, she has been doing well. No issues with healing. Her sutures were removed on 3/19/2025.    LMP 02/26/2025 (Approximate)    Physical Exam  Constitutional:       Appearance: She is well-developed.   Pulmonary:      Effort: No respiratory distress.   Skin:     General: Skin is warm and dry.                INVESTIGATIONS:    Surgical Pathology (3/5/2025):  Final Diagnosis   Right calf:  - Scar from the prior surgical procedure, with no evidence of residual lesion       ASSESSMENT:    Deandra Lewis is a 49 year old female with truncal and right lower extremity MIS and left lower extremity cutaneous melanoma, 1.5 years s/p surgery.    She is healing well.  I recommended she start moisturizing and massaging the scar with Vaseline.     We reviewed the pathology today and a copy of the report was  provided. No further treatment is indicated. I will see her in 6 months for follow up of the stage I melanoma of the left thigh.    All of the above was discussed with the patient and all questions were answered.     PLAN:  Follow up with me in 6 months  Continue skin checks per dermatology    Tracie Higginbotham MD MS [facility] FACS  Associate Professor of Surgery  Division of Surgical Oncology  [facility]

## 2025-04-01 NOTE — NURSING NOTE
Deandra Lewis's goals for this visit include:   Chief Complaint   Patient presents with    Surgical Followup     3 week post op melanoma       She requests these members of her care team be copied on today's visit information: no    PCP: Linda Soto    Referring Provider:  Referred Self, MD  No address on file    LMP 02/26/2025 (Approximate)     Do you need any medication refills at today's visit? No    Sudha Dwyer LPN    .

## 2025-04-28 ENCOUNTER — OFFICE VISIT (OUTPATIENT)
Dept: DERMATOLOGY | Facility: CLINIC | Age: 50
End: 2025-04-28
Payer: COMMERCIAL

## 2025-04-28 DIAGNOSIS — D22.9 BENIGN NEVUS OF SKIN: Primary | ICD-10-CM

## 2025-04-28 NOTE — PROGRESS NOTES
[facility] Dermatology Note  Encounter Date: Apr 28, 2025  Office Visit      Dermatology Problem List:  FBSE 2/13/25    1. Melanoma  - L calf - MIS (atypical melanocytic proliferation), S/p WLE on 3/5/25 with Dr. Higginbotham   - L thigh - Malignant melanoma, BD 1.2mm, pT2a - L thigh - bx 8/28/23 - S/p WLE Dr. Higginbotham/Carla 10/9/23  - R superior inguinal crease- Atypical junctional melanocytic proliferation, consistent with early MIS, Bx 5/16/24, S/p WLE on 7/3/24 Dr. Higginbotham  2. Benign biopsies:  - L inguinal crease, Lentigo, Bx 11/21/24  - L anterior shoulder - bx 8/28/23 - DF  - L inguinal crease, Lentigo, Bx 11/21/24  3. Dermatofibroma - L calf, L thigh  4. Hx of Atypical nevi  - L posterior shoulder, Compound melanocytic nevus with mild atypia, Bx proven on 2/15/24   5. 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  ____________________________________________    Assessment & Plan:    # LTM - most consistent with a benign nevus -  R of midline, mid frontal scalp - 4mm reddish brown/tan macule with numerous hairs growing from within it  - Discussed the natural history and benign nature of this lesion. Reassurance provided that no additional treatment is necessary.     - Photograph was obtained for clinical monitoring and inclusion in medical record.    Procedures Performed:   None      Follow-up: 3-6 month(s) in-person, or earlier for new or changing lesions    Staff and scribe:    Scribe Disclosure:   I, MAXX SRIVASTAVA, am serving as a scribe; to document services personally performed by Lia Savage PA-C -based on data collection and the provider's statements to me.     Provider Disclosure:  I agree with above History, Review of Systems, Physical exam and Plan.  I have reviewed the content of the documentation and have edited it as needed. I have personally performed the services documented here and the documentation accurately represents those services and the  decisions I have made.      Electronically signed by:    All risks, benefits and alternatives were discussed with patient.  Patient is in agreement and understands the assessment and plan.  All questions were answered.    Lia Savage PA-C, MPAS  [facility]: Phone: [phone], Fax: [phone]  [facility]: Phone: [phone],  Fax: [phone]  [facility]: Phone: [phone], Fax: [phone]  ____________________________________________    CC: Derm Problem (Spot on scalp)      Reviewed patients past medical history and pertinent chart review prior to patient's visit today.     HPI:  Ms. Deandra Lewis is a 49 year old female who presents today as a return patient for a spot check. Today patient reported a spot of concern on her scalp.    Has a hx of MM on the L thigh and MIS on the R superior inguinal crease.     Patient is otherwise feeling well, without additional concerns.    Labs:  N/A    Physical Exam:  Vitals: LMP 02/26/2025 (Approximate)   SKIN: Focused examination of the areas noted below  was performed.   - R of midline, mid frontal scalp there is a tan redish brown macule, hair growing out of it, measuring 4 mm in size.   - No other lesions of concern on areas examined.     R of midline, mid frontal scalp        Medications:  Current Outpatient Medications   Medication Sig Dispense Refill    albuterol (PROAIR HFA/PROVENTIL HFA/VENTOLIN HFA) 108 (90 Base) MCG/ACT inhaler INHALE 2 PUFFS BY MOUTH EVERY 6 HOURS AS NEEDED FOR WHEEZE OR FOR SHORTNESS OF BREATH 18 g 11    buPROPion (WELLBUTRIN XL) 150 MG 24 hr tablet Take 3 tablets (450 mg) by mouth every morning. 270 tablet 1    cetirizine (ZYRTEC) 10 MG tablet Take 1 tablet (10 mg) by mouth every evening 30 tablet 1    cyclobenzaprine (FLEXERIL) 5 MG tablet Take 1-2 tablets (5-10 mg) by mouth 2 times daily as needed for muscle spasms 30  tablet 3    estradiol (VIVELLE-DOT) 0.05 MG/24HR bi-weekly patch Place 1 patch over 96 hours onto the skin twice a week. 9 patch 11    fluticasone-vilanterol (BREO ELLIPTA) 200-25 MCG/ACT inhaler Inhale 1 puff into the lungs daily.      ibuprofen (ADVIL/MOTRIN) 200 MG tablet Take 200 mg by mouth every 6 hours as needed.      methocarbamol (ROBAXIN) 500 MG tablet Take 1 tablet (500 mg) by mouth 4 times daily as needed for muscle spasms 20 tablet 3    montelukast (SINGULAIR) 10 MG tablet Take 1 tablet (10 mg) by mouth at bedtime.      progesterone (PROMETRIUM) 100 MG capsule Take 1 capsule (100 mg) by mouth daily. 90 capsule 3    telmisartan (MICARDIS) 40 MG tablet Take 1 tablet (40 mg) by mouth daily. 90 tablet 1    ZOLMitriptan (ZOMIG) 5 MG tablet TAKE 1 TABLET BY MOUTH AT ONSET OF HEADACHE FOR MIGRAINE MAY REPEAT IN 2 HOURS. MAX 2 TABS/24 HOURS 10 tablet 11     No current facility-administered medications for this visit.      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 related to previous surgeries - 4 in 3 months for sinus issues/infection    Family history of breast cancer- mother's identical twin, maternal aunt, paternal GM and dad with breast lumps - had genetic counseling    Hot flashes    Generalized hyperhidrosis- since earliest recollections - soaks clothing multiple times/week - very disruptive to life    Mild anxiety    Foster care child    Anxiety    Multiple fractures of left foot with routine healing    Family history of colon cancer- father dx'd in mid 40's - pt's first colonoscopy in 4/24/2017 - repeat in 2022     Menorrhagia with regular cycle- ? cause of iron deficiency anemia- pt would like to see ob/gyn specialists- didn't go in 2021 - better than previous    Intractable  episodic tension-type headache    Strain of neck muscle, initial encounter    Sun-damaged skin    Cough due to bronchospasm- needs refills on meds     Recurrent cough    Attention deficit - symptoms - pt desires testing and possible treatment    Intractable migraine without aura and without status migrainosus    Hypertension goal BP (blood pressure) < 140/90 - new diagnosis fall 2023 - much improved with weight loss and regular CV exercise    Morbid obesity (H)    Numbness and tingling of hand- fingertips - ? caused by lisinopril vs. 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 (cT2a, cN0, cM0)    Melanoma of skin (H)    Mild intermittent asthma with (acute) exacerbation- allergies and cold temperature triggers    Encounter for completion of form with patient- [place] paperwork for melanoma left leg    History of malignant melanoma of skin    Neoplasm of uncertain behavior of skin- left pubic area and left breast inferior areola - slightly irregular pigmentation - images taken - see media section - pt will watch closely - nted 8/20/2024    Multiple nevi    Cherry angioma    Lentigines    Melanoma in situ of torso excluding breast (H) - right groin area - 2nd melanoma - first was left anterior thigh    Mild episode of recurrent major depressive disorder    Medication side effects- lisinopril 5mg annoying dry cough, and topiramate 25mg = blurred vision    Weight loss- 40 lbs with better nutrition and running over the last 6 months    Need for hepatitis B vaccination- pt will check with her insurance on this    Counseling for birth control, intrauterine device- pt is thinking about HRT for perimenopausal symptoms, possibly Mirena IUD and oral/transdermal estrogen    Abnormality of cervix- inflamed , sligthly cystic and hypervascular cervix from 4:00-8;00 inferior to os    CARDIOVASCULAR SCREENING; LDL GOAL LESS THAN 130    Lentiginous  compound nevus of thigh, left - inguinal crease - melanocytic - s/p shave biopsy = benign findings, clear margins 11/21/2024    Melanoma in situ of right lower extremity including hip (H)    Acute bronchitis, unspecified organism - needs refills on meds - not exacerbated right now      Past Medical History:   Diagnosis Date    Anemia     Anxiety     paxil 10mg= mild bladder retention, at 30mg = ravenously hungry/weight gain increased headaches; lexapro = bad sweating    Contraception     ocp's some = mood swings ; seasonale: frequent vaginal bleeding and weight gain    Hot flashes 11/16/2016    Hypertension goal BP (blood pressure) < 140/90  - new diagnosis 02/02/2023    Kidney stones     Dr. 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

## 2025-04-28 NOTE — LETTER
4/28/2025      Deandra Lewis  [address] [place] [address]      Dear Colleague,    Thank you for referring your patient, Deandra Lewis, to the [facility]. Please see a copy of my visit note below.    [facility] Dermatology Note  Encounter Date: Apr 28, 2025  Office Visit      Dermatology Problem List:  FBSE 2/13/25    1. Melanoma  - L calf - MIS (atypical melanocytic proliferation), S/p WLE on 3/5/25 with Dr. Higginbotham   - JAYY thigh - Malignant melanoma, BD 1.2mm, pT2a - L thigh - bx 8/28/23 - S/p WLE Dr. Higginbotham/Carla 10/9/23  - R superior inguinal crease- Atypical junctional melanocytic proliferation, consistent with early MIS, Bx 5/16/24, S/p WLE on 7/3/24 Dr. Higginbotham  2. Benign biopsies:  - L inguinal crease, Lentigo, Bx 11/21/24  - L anterior shoulder - bx 8/28/23 - DF  - L inguinal crease, Lentigo, Bx 11/21/24  3. Dermatofibroma - L calf, L thigh  4. Hx of Atypical nevi  - L posterior shoulder, Compound melanocytic nevus with mild atypia, Bx proven on 2/15/24   5. 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  ____________________________________________    Assessment & Plan:    # LTM - most consistent with a benign nevus -  R of midline, mid frontal scalp - 4mm reddish brown/tan macule with numerous hairs growing from within it  - Discussed the natural history and benign nature of this lesion. Reassurance provided that no additional treatment is necessary.     - Photograph was obtained for clinical monitoring and inclusion in medical record.    Procedures Performed:   None      Follow-up: 3-6 month(s) in-person, or earlier for new or changing lesions    Staff and scribe:    Scribe Disclosure:   I, MAXX SRIVASTAVA, am serving as a scribe; to document services personally performed by Lia Savage PA-C -based on data collection and the provider's statements to me.     Provider Disclosure:  I agree with above History, Review of  Systems, Physical exam and Plan.  I have reviewed the content of the documentation and have edited it as needed. I have personally performed the services documented here and the documentation accurately represents those services and the decisions I have made.      Electronically signed by:    All risks, benefits and alternatives were discussed with patient.  Patient is in agreement and understands the assessment and plan.  All questions were answered.    Lia Savage PA-C, MPAS  [facility]: Phone: [phone], Fax: [phone]  [facility]: Phone: [phone],  Fax: [phone]  [facility]: Phone: [phone], Fax: [phone]  ____________________________________________    CC: Derm Problem (Spot on scalp)      Reviewed patients past medical history and pertinent chart review prior to patient's visit today.     HPI:  Ms. Deandra Lewis is a 49 year old female who presents today as a return patient for a spot check. Today patient reported a spot of concern on her scalp.    Has a hx of MM on the L thigh and MIS on the R superior inguinal crease.     Patient is otherwise feeling well, without additional concerns.    Labs:  N/A    Physical Exam:  Vitals: LMP 02/26/2025 (Approximate)   SKIN: Focused examination of the areas noted below  was performed.   - R of midline, mid frontal scalp there is a tan redish brown macule, hair growing out of it, measuring 4 mm in size.   - No other lesions of concern on areas examined.     R of midline, mid frontal scalp        Medications:  Current Outpatient Medications   Medication Sig Dispense Refill     albuterol (PROAIR HFA/PROVENTIL HFA/VENTOLIN HFA) 108 (90 Base) MCG/ACT inhaler INHALE 2 PUFFS BY MOUTH EVERY 6 HOURS AS NEEDED FOR WHEEZE OR FOR SHORTNESS OF BREATH 18 g 11     buPROPion (WELLBUTRIN XL) 150 MG 24 hr tablet Take 3 tablets (450 mg) by mouth every  morning. 270 tablet 1     cetirizine (ZYRTEC) 10 MG tablet Take 1 tablet (10 mg) by mouth every evening 30 tablet 1     cyclobenzaprine (FLEXERIL) 5 MG tablet Take 1-2 tablets (5-10 mg) by mouth 2 times daily as needed for muscle spasms 30 tablet 3     estradiol (VIVELLE-DOT) 0.05 MG/24HR bi-weekly patch Place 1 patch over 96 hours onto the skin twice a week. 9 patch 11     fluticasone-vilanterol (BREO ELLIPTA) 200-25 MCG/ACT inhaler Inhale 1 puff into the lungs daily.       ibuprofen (ADVIL/MOTRIN) 200 MG tablet Take 200 mg by mouth every 6 hours as needed.       methocarbamol (ROBAXIN) 500 MG tablet Take 1 tablet (500 mg) by mouth 4 times daily as needed for muscle spasms 20 tablet 3     montelukast (SINGULAIR) 10 MG tablet Take 1 tablet (10 mg) by mouth at bedtime.       progesterone (PROMETRIUM) 100 MG capsule Take 1 capsule (100 mg) by mouth daily. 90 capsule 3     telmisartan (MICARDIS) 40 MG tablet Take 1 tablet (40 mg) by mouth daily. 90 tablet 1     ZOLMitriptan (ZOMIG) 5 MG tablet TAKE 1 TABLET BY MOUTH AT ONSET OF HEADACHE FOR MIGRAINE MAY REPEAT IN 2 HOURS. MAX 2 TABS/24 HOURS 10 tablet 11     No current facility-administered medications for this visit.      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 related to previous surgeries - 4 in 3 months for sinus issues/infection     Family history of breast cancer- mother's identical twin, maternal aunt, paternal GM and dad with breast lumps - had genetic counseling     Hot flashes     Generalized hyperhidrosis- since earliest recollections - soaks clothing multiple times/week - very disruptive to life     Mild anxiety     Foster care child     Anxiety     Multiple fractures of left foot with routine healing     Family history of  colon cancer- father dx'd in mid 40's - pt's first colonoscopy in 4/24/2017 - repeat in 2022      Menorrhagia with regular cycle- ? cause of iron deficiency anemia- pt would like to see ob/gyn specialists- didn't go in 2021 - better than previous     Intractable episodic tension-type headache     Strain of neck muscle, initial encounter     Sun-damaged skin     Cough due to bronchospasm- needs refills on meds      Recurrent cough     Attention deficit - symptoms - pt desires testing and possible treatment     Intractable migraine without aura and without status migrainosus     Hypertension goal BP (blood pressure) < 140/90 - new diagnosis fall 2023 - much improved with weight loss and regular CV exercise     Morbid obesity (H)     Numbness and tingling of hand- fingertips - ? caused by lisinopril vs. other - consider changing to amlodipine      Malignant melanoma of left lower extremity including hip (H)-left anterior thigh Stage 1B (cT2a, cN0, cM0)     Melanoma of skin (H)     Mild intermittent asthma with (acute) exacerbation- allergies and cold temperature triggers     Encounter for completion of form with patient- [place] paperwork for melanoma left leg     History of malignant melanoma of skin     Neoplasm of uncertain behavior of skin- left pubic area and left breast inferior areola - slightly irregular pigmentation - images taken - see media section - pt will watch closely - nted 8/20/2024     Multiple nevi     Cherry angioma     Lentigines     Melanoma in situ of torso excluding breast (H) - right groin area - 2nd melanoma - first was left anterior thigh     Mild episode of recurrent major depressive disorder     Medication side effects- lisinopril 5mg annoying dry cough, and topiramate 25mg = blurred vision     Weight loss- 40 lbs with better nutrition and running over the last 6 months     Need for hepatitis B vaccination- pt will check with her insurance on this     Counseling for birth control, intrauterine  device- pt is thinking about HRT for perimenopausal symptoms, possibly Mirena IUD and oral/transdermal estrogen     Abnormality of cervix- inflamed , sligthly cystic and hypervascular cervix from 4:00-8;00 inferior to os     CARDIOVASCULAR SCREENING; LDL GOAL LESS THAN 130     Lentiginous compound nevus of thigh, left - inguinal crease - melanocytic - s/p shave biopsy = benign findings, clear margins 11/21/2024     Melanoma in situ of right lower extremity including hip (H)     Acute bronchitis, unspecified organism - needs refills on meds - not exacerbated right now      Past Medical History:   Diagnosis Date     Anemia      Anxiety     paxil 10mg= mild bladder retention, at 30mg = ravenously hungry/weight gain increased headaches; lexapro = bad sweating     Contraception     ocp's some = mood swings ; seasonale: frequent vaginal bleeding and weight gain     Hot flashes 11/16/2016     Hypertension goal BP (blood pressure) < 140/90  - new diagnosis 02/02/2023     Kidney stones     Dr. 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        Sincerely,        Lia Savage PA-C    Electronically signed

## 2025-04-28 NOTE — PATIENT INSTRUCTIONS
Proper skin care from [facility]:    -Eliminate harsh soaps as they strip the natural oils from the skin, often resulting in dry itchy skin ( i.e. Dial, Zest, [place] Spring)  -Use mild soaps such as Cetaphil or Dove Sensitive Skin in the shower. You do not need to use soap on arms, legs, and trunk every time you shower unless visibly soiled.   -Avoid hot or cold showers.  -After showering, lightly dry off and apply moisturizing within 2-3 minutes. This will help trap moisture in the skin.   -Aggressive use of a moisturizer at least 1-2 times a day to the entire body (including -Vanicream, Cetaphil, Aquaphor or Cerave) and moisturize hands after every washing.  -We recommend using moisturizers that come in a tub that needs to be scooped out, not a pump. This has more of an oil base. It will hold moisture in your skin much better than a water base moisturizer. The above recommended are non-pore clogging.      Wear a sunscreen with at least SPF 30 on your face, ears, neck and V of the chest daily. Wear sunscreen on other areas of the body if those areas are exposed to the sun throughout the day. Sunscreens can contain physical and/or chemical blockers. Physical blockers are less likely to clog pores, these include zinc oxide and titanium dioxide. Reapply every two hour and after swimming.     Sunscreen examples: https://www.ewg.org/sunscreen/    UV radiation  UVA radiation remains constant throughout the day and throughout the year. It is a longer wavelength than UVB and therefore penetrates deeper into the skin leading to immediate and delayed tanning, photoaging, and skin cancer. 70-80% of UVA and UVB radiation occurs between the hours of 10am-2pm.  UVB radiation  UVB radiation causes the most harmful effects and is more significant during the summer months. However, snow and ice can reflect UVB radiation leading to skin damage during the winter months as well. UVB radiation is responsible for tanning,  burning, inflammation, delayed erythema (pinkness), pigmentation (brown spots), and skin cancer.     I recommend self monthly full body exams and yearly full body exams with a dermatology provider. If you develop a new or changing lesion please follow up for examination. Most skin cancers are pink and scaly or pink and pearly. However, we do see blue/brown/black skin cancers.  Consider the ABCDEs of melanoma when giving yourself your monthly full body exam ( don't forget the groin, buttocks, feet, toes, etc). A-asymmetry, B-borders, C-color, D-diameter, E-elevation or evolving. If you see any of these changes please follow up in clinic. If you cannot see your back I recommend purchasing a hand held mirror to use with a larger wall mirror.       Checking for Skin Cancer  You can find cancer early by checking your skin each month. There are 3 kinds of skin cancer. They are melanoma, basal cell carcinoma, and squamous cell carcinoma. Doing monthly skin checks is the best way to find new marks or skin changes. Follow the instructions below for checking your skin.   The ABCDEs of checking moles for melanoma   Check your moles or growths for signs of melanoma using ABCDE:   Asymmetry: the sides of the mole or growth don t match  Border: the edges are ragged, notched, or blurred  Color: the color within the mole or growth varies  Diameter: the mole or growth is larger than 6 mm (size of a pencil eraser)  Evolving: the size, shape, or color of the mole or growth is changing (evolving is not shown in the images below)    Checking for other types of skin cancer  Basal cell carcinoma or squamous cell carcinoma have symptoms such as:     A spot or mole that looks different from all other marks on your skin  Changes in how an area feels, such as itching, tenderness, or pain  Changes in the skin's surface, such as oozing, bleeding, or scaliness  A sore that does not heal  New swelling or redness beyond the border of a  mole    Who s at risk?  Anyone can get skin cancer. But you are at greater risk if you have:   Fair skin, light-colored hair, or light-colored eyes  Many moles or abnormal moles on your skin  A history of sunburns from sunlight or tanning beds  A family history of skin cancer  A history of exposure to radiation or chemicals  A weakened immune system  If you have had skin cancer in the past, you are at risk for recurring skin cancer.   How to check your skin  Do your monthly skin checkups in front of a full-length mirror. Check all parts of your body, including your:   Head (ears, face, neck, and scalp)  Torso (front, back, and sides)  Arms (tops, undersides, upper, and lower armpits)  Hands (palms, backs, and fingers, including under the nails)  Buttocks and genitals  Legs (front, back, and sides)  Feet (tops, soles, toes, including under the nails, and between toes)  If you have a lot of moles, take digital photos of them each month. Make sure to take photos both up close and from a distance. These can help you see if any moles change over time.   Most skin changes are not cancer. But if you see any changes in your skin, call your doctor right away. Only he or she can diagnose a problem. If you have skin cancer, seeing your doctor can be the first step toward getting the treatment that could save your life.   [employer] last reviewed this educational content on 4/1/2019 2000-2020 [employer]. [address] [place] [address], [place] [address]. All rights reserved. This information is not intended as a substitute for professional medical care. Always follow your healthcare professional's instructions.       When should I call my doctor?  If you are worsening or not improving, please, contact us or seek urgent care as noted below.     Who should I call with questions (adults)?    [facility] [phone]  For urgent needs outside of business hours call the [facility] at  [phone] and ask for the dermatology resident on call to be paged  If this is a medical emergency and you are unable to reach an ER, Call [phone]      If you need a prescription refill, please contact your pharmacy. Refills are approved or denied by our Physicians during normal business hours, Monday through Friday.  Per office policy, refills will not be granted if you have not been seen within the past year (or sooner depending on the condition).

## 2025-04-29 NOTE — TELEPHONE ENCOUNTER
"Requested Prescriptions   Pending Prescriptions Disp Refills     FLOVENT HFA 44 MCG/ACT inhaler [Pharmacy Med Name: FLOVENT HFA 44 MCG INHALER]    Last Written Prescription Date:  11/5/19  Last Fill Quantity: 1 inhaler,  # refills: 0   Last office visit: 11/5/2019 with prescribing provider:  Sandi     Future Office Visit:     10.6 Inhaler 0     Sig: TAKE 1 PUFF BY MOUTH TWICE A DAY       Inhaled Steroids Protocol Passed - 1/5/2020 12:44 AM        Passed - Patient is age 12 or older        Passed - Recent (12 mo) or future (30 days) visit within the authorizing provider's specialty     Patient has had an office visit with the authorizing provider or a provider within the authorizing providers department within the previous 12 mos or has a future within next 30 days. See \"Patient Info\" tab in inbasket, or \"Choose Columns\" in Meds & Orders section of the refill encounter.              Passed - Medication is active on med list        " Pap guidelines reviewed. Pap with HPV done today.   Reviewed recommendation to start a prenatal vitamin with folic acid 400-800mcg 1-3 months prior to conception to decrease risk of neural tube defects such as spina bifida.   If trying for 6 months without success recommend follow up with reproductive endocrinology.   Return to office for annual or as needed.

## 2025-05-06 ENCOUNTER — PATIENT OUTREACH (OUTPATIENT)
Dept: CARE COORDINATION | Facility: CLINIC | Age: 50
End: 2025-05-06
Payer: COMMERCIAL

## 2025-05-12 ENCOUNTER — TELEPHONE (OUTPATIENT)
Dept: ONCOLOGY | Facility: CLINIC | Age: 50
End: 2025-05-12

## 2025-05-12 NOTE — TELEPHONE ENCOUNTER
Left message for patient following up with cancelled [facility] visit. Provided call back details if patient would like to get rescheduled. -Chanell PURCELL

## 2025-05-15 DIAGNOSIS — I10 HYPERTENSION GOAL BP (BLOOD PRESSURE) < 140/90: ICD-10-CM

## 2025-05-15 DIAGNOSIS — F33.0 MILD EPISODE OF RECURRENT MAJOR DEPRESSIVE DISORDER: ICD-10-CM

## 2025-05-15 DIAGNOSIS — F41.9 ANXIETY: ICD-10-CM

## 2025-05-15 RX ORDER — BUPROPION HYDROCHLORIDE 150 MG/1
450 TABLET ORAL EVERY MORNING
Qty: 270 TABLET | Refills: 1 | Status: SHIPPED | OUTPATIENT
Start: 2025-05-15

## 2025-05-15 RX ORDER — TELMISARTAN 40 MG/1
40 TABLET ORAL DAILY
Qty: 90 TABLET | Refills: 2 | Status: SHIPPED | OUTPATIENT
Start: 2025-05-15

## 2025-05-23 DIAGNOSIS — G43.019 INTRACTABLE MIGRAINE WITHOUT AURA AND WITHOUT STATUS MIGRAINOSUS: ICD-10-CM

## 2025-05-26 ENCOUNTER — MYC REFILL (OUTPATIENT)
Dept: FAMILY MEDICINE | Facility: CLINIC | Age: 50
End: 2025-05-26
Payer: COMMERCIAL

## 2025-05-26 DIAGNOSIS — F41.9 ANXIETY: ICD-10-CM

## 2025-05-26 DIAGNOSIS — F33.0 MILD EPISODE OF RECURRENT MAJOR DEPRESSIVE DISORDER: ICD-10-CM

## 2025-05-28 RX ORDER — BUPROPION HYDROCHLORIDE 150 MG/1
450 TABLET ORAL EVERY MORNING
Qty: 270 TABLET | Refills: 1 | Status: SHIPPED | OUTPATIENT
Start: 2025-05-28

## 2025-05-28 RX ORDER — ZOLMITRIPTAN 5 MG/1
TABLET, FILM COATED ORAL
Qty: 10 TABLET | Refills: 3 | Status: SHIPPED | OUTPATIENT
Start: 2025-05-28

## 2025-07-12 ENCOUNTER — HEALTH MAINTENANCE LETTER (OUTPATIENT)
Age: 50
End: 2025-07-12

## 2025-07-31 ENCOUNTER — OFFICE VISIT (OUTPATIENT)
Dept: DERMATOLOGY | Facility: CLINIC | Age: 50
End: 2025-07-31
Payer: COMMERCIAL

## 2025-07-31 DIAGNOSIS — Z85.820 HISTORY OF MELANOMA: ICD-10-CM

## 2025-07-31 DIAGNOSIS — Z86.018 HISTORY OF DYSPLASTIC NEVUS: ICD-10-CM

## 2025-07-31 DIAGNOSIS — D22.9 MULTIPLE BENIGN NEVI: Primary | ICD-10-CM

## 2025-07-31 DIAGNOSIS — L91.0 KELOID: ICD-10-CM

## 2025-07-31 DIAGNOSIS — L82.1 SK (SEBORRHEIC KERATOSIS): ICD-10-CM

## 2025-07-31 DIAGNOSIS — D18.01 CHERRY ANGIOMA: ICD-10-CM

## 2025-07-31 DIAGNOSIS — Z12.83 SCREENING EXAM FOR SKIN CANCER: ICD-10-CM

## 2025-07-31 DIAGNOSIS — L81.4 LENTIGINES: ICD-10-CM

## 2025-07-31 RX ORDER — TRIAMCINOLONE ACETONIDE 40 MG/ML
40 INJECTION, SUSPENSION INTRA-ARTICULAR; INTRAMUSCULAR ONCE
Status: COMPLETED | OUTPATIENT
Start: 2025-07-31 | End: 2025-07-31

## 2025-07-31 RX ADMIN — TRIAMCINOLONE ACETONIDE 40 MG: 40 INJECTION, SUSPENSION INTRA-ARTICULAR; INTRAMUSCULAR at 08:05

## 2025-07-31 NOTE — PATIENT INSTRUCTIONS
Proper skin care from [facility]:    -Eliminate harsh soaps as they strip the natural oils from the skin, often resulting in dry itchy skin ( i.e. Dial, Zest, [place] Spring)  -Use mild soaps such as Cetaphil or Dove Sensitive Skin in the shower. You do not need to use soap on arms, legs, and trunk every time you shower unless visibly soiled.   -Avoid hot or cold showers.  -After showering, lightly (pat) dry off and apply moisturizing within 2-3 minutes. This will help trap moisture in the skin.   -Aggressive use of a moisturizer at least 1-2 times a day to the entire body (including -Vanicream, Cetaphil, Aquaphor or Cerave) and moisturize hands after every washing.  -We recommend using moisturizers that come in a tub that needs to be scooped out, not a pump. This has more of an oil base. It will hold moisture in your skin much better than a water base moisturizer. The above recommended are non-pore clogging.      Wear a sunscreen with at least SPF 30 on your face, ears, neck and V of the chest daily. Wear sunscreen on other areas of the body if those areas are exposed to the sun throughout the day. Sunscreens can contain physical and/or chemical blockers. Physical blockers are less likely to clog pores, these include zinc oxide and titanium dioxide. Reapply every two hour and after swimming.     Sunscreen examples: https://www.ewg.org/sunscreen/    UV radiation  UVA radiation remains constant throughout the day and throughout the year. It is a longer wavelength than UVB and therefore penetrates deeper into the skin leading to immediate and delayed tanning, photoaging, and skin cancer. 70-80% of UVA and UVB radiation occurs between the hours of 10am-2pm.  UVB radiation  UVB radiation causes the most harmful effects and is more significant during the summer months. However, snow and ice can reflect UVB radiation leading to skin damage during the winter months as well. UVB radiation is responsible for  tanning, burning, inflammation, delayed erythema (pinkness), pigmentation (brown spots), and skin cancer.     I recommend self monthly full body exams and yearly full body exams with a dermatology provider. If you develop a new or changing lesion please follow up for examination. Most skin cancers are pink and scaly or pink and pearly. However, we do see blue/brown/black skin cancers.  Consider the ABCDEs of melanoma when giving yourself your monthly full body exam ( don't forget the groin, buttocks, feet, toes, etc). A-asymmetry, B-borders, C-color, D-diameter, E-elevation or evolving. If you see any of these changes please follow up in clinic. If you cannot see your back I recommend purchasing a hand held mirror to use with a larger wall mirror.       Checking for Skin Cancer  You can find cancer early by checking your skin each month. There are 3 kinds of skin cancer. They are melanoma, basal cell carcinoma, and squamous cell carcinoma. Doing monthly skin checks is the best way to find new marks or skin changes. Follow the instructions below for checking your skin.   The ABCDEs of checking moles for melanoma   Check your moles or growths for signs of melanoma using ABCDE:   Asymmetry: the sides of the mole or growth don t match  Border: the edges are ragged, notched, or blurred  Color: the color within the mole or growth varies  Diameter: the mole or growth is larger than 6 mm (size of a pencil eraser)  Evolving: the size, shape, or color of the mole or growth is changing (evolving is not shown in the images below)    Checking for other types of skin cancer  Basal cell carcinoma or squamous cell carcinoma have symptoms such as:     A spot or mole that looks different from all other marks on your skin  Changes in how an area feels, such as itching, tenderness, or pain  Changes in the skin's surface, such as oozing, bleeding, or scaliness  A sore that does not heal  New swelling or redness beyond the border of a  mole    Who s at risk?  Anyone can get skin cancer. But you are at greater risk if you have:   Fair skin, light-colored hair, or light-colored eyes  Many moles or abnormal moles on your skin  A history of sunburns from sunlight or tanning beds  A family history of skin cancer  A history of exposure to radiation or chemicals  A weakened immune system  If you have had skin cancer in the past, you are at risk for recurring skin cancer.   How to check your skin  Do your monthly skin checkups in front of a full-length mirror. Check all parts of your body, including your:   Head (ears, face, neck, and scalp)  Torso (front, back, and sides)  Arms (tops, undersides, upper, and lower armpits)  Hands (palms, backs, and fingers, including under the nails)  Buttocks and genitals  Legs (front, back, and sides)  Feet (tops, soles, toes, including under the nails, and between toes)  If you have a lot of moles, take digital photos of them each month. Make sure to take photos both up close and from a distance. These can help you see if any moles change over time.   Most skin changes are not cancer. But if you see any changes in your skin, call your doctor right away. Only he or she can diagnose a problem. If you have skin cancer, seeing your doctor can be the first step toward getting the treatment that could save your life.   [employer] last reviewed this educational content on 4/1/2019 2000-2020 [employer]. [address] [place] [address], [place] [address]. All rights reserved. This information is not intended as a substitute for professional medical care. Always follow your healthcare professional's instructions.       When should I call my doctor?  If you are worsening or not improving, please, contact us or seek urgent care as noted below.     Who should I call with questions (adults)?    [facility] [phone]  For urgent needs outside of business hours call the [facility] at  [phone] and ask for the dermatology resident on call to be paged  If this is a medical emergency and you are unable to reach an ER, Call 911      If you need a prescription refill, please contact your pharmacy. Refills are approved or denied by our Physicians during normal business hours, Monday through Friday.  Per office policy, refills will not be granted if you have not been seen within the past year (or sooner depending on the condition).

## 2025-07-31 NOTE — LETTER
7/31/2025      Deandra Lewis  [address] [place] [address]      Dear Colleague,    Thank you for referring your patient, Deandra Lewis, to the [facility] [place]. Please see a copy of my visit note below.    [facility] Dermatology Note  Encounter Date: Jul 31, 2025  Office Visit      Dermatology Problem List:  FBSE 7/31/25    1. Hx of Melanoma x3  - L calf - MIS (atypical melanocytic proliferation), S/p WLE on 3/5/25 with Dr. Anahy VALADEZ superior inguinal crease- Atypical junctional melanocytic proliferation, consistent with early MIS, Bx 5/16/24, S/p WLE on 7/3/24 Dr. Anahy HOPE thigh - Malignant melanoma, BD 1.2mm, pT2a - L thigh - bx 8/28/23 - S/p WLE Dr. Higginbotham/Carla 10/9/23  2. Hx of DN  - L posterior shoulder, Compound melanocytic nevus with mild atypia, Bx proven on 2/15/24   3. Keloid scar - L posterior shoulder   - Tx: ILK 40 mg/ml 7/31/25  4. Benign biopsies:  - L inguinal crease, Lentigo, Bx 11/21/24  - L anterior shoulder - bx 8/28/23 - DF  - L inguinal crease, Lentigo, Bx 11/21/24  5. 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 3 children.  ____________________________________________    Assessment & Plan:    # LTM - most consistent with a benign nevus -  R of midline, mid frontal scalp - 4mm reddish brown/tan macule with numerous hairs growing from within it  - Discussed the natural history and benign nature of this lesion. Reassurance provided that no additional treatment is necessary.     - Photograph was obtained for clinical monitoring and inclusion in medical record    # Keloid scar - L posterior shoulder  - ILK today - see below  - edu on etiology, reassured    # Hx of MM x3, most recent: March 2025  - ABCDEs: Counseled ABCDEs of melanoma: Asymmetry, Border (irregularity), Color (not uniform, changes in color), Diameter (greater than 6 mm which is about the size of a pencil eraser), and Evolving (any changes  in preexisting moles).  - Sun protection: Counseled SPF30+ sunscreen, UPF clothing, sun avoidance, tanning bed avoidance.   - Recommended yearly dental, ophthalmology, and gynecology exams.  - Recommended skin exams for all first-degree relatives.  - Recommended follow up is 3-6 months    # Hx of DN  - Sunscreen: Apply 20 minutes prior to going outdoors and reapply every two hours, when wet or sweating. We recommend using an SPF 30 or higher, and to use one that is water resistant.     - Advised to monitor for changing, non-healing, bleeding, painful, changing, or otherwise symptomatic lesions  - Continue annual skin exams    # Benign findings: multiple benign nevi, lentigines, cherry angiomas, SKs  - edu on benign etiology  - Signs and Symptoms of non-melanoma skin cancer and ABCDEs of melanoma reviewed with patient. Patient encouraged to perform monthly self skin exams and educated on how to perform them. UV precautions reviewed with patient. Patient was asked about new or changing moles/lesions on body.   - Sunscreen: Apply 20 minutes prior to going outdoors and reapply every two hours, when wet or sweating. We recommend using an SPF 30 or higher, and to use one that is water resistant.     - RTC for changes    Procedures Performed:   - Intra-lesional triamcinolone procedure note. After verbal consent and review of risk of pain and skin thinning/atrophy, positioning and cleansing with isopropyl alcohol, 0.1 total mL of triamcinolone 40 mg/mL was injected into 1 lesion(s) on the L posterior shoulder. The patient tolerated the procedure well and left the dermatology clinic in good condition.     Follow-up: 3 month(s) in-person, or earlier for new or changing lesions    Staff and scribe:    Scribe Disclosure:   I, MAXX SRIVASTAVA, am serving as a scribe; to document services personally performed by Lia Savage PA-C -based on data collection and the provider's statements to me.     Provider Disclosure:  I agree  with above History, Review of Systems, Physical exam and Plan.  I have reviewed the content of the documentation and have edited it as needed. I have personally performed the services documented here and the documentation accurately represents those services and the decisions I have made.      Electronically signed by:    All risks, benefits and alternatives were discussed with patient.  Patient is in agreement and understands the assessment and plan.  All questions were answered.    Lia Savage PA-C, MPAS  [facility]: Phone: [phone], Fax: [phone]  [facility]: Phone: [phone],  Fax: [phone]  [facility]: Phone: [phone], Fax: [phone]  ____________________________________________    CC: Skin Check (3 month FBSC/Concerns: Recheck scalp lesion.)      Reviewed patients past medical history and pertinent chart review prior to patient's visit today.     HPI:  Ms. Deandra Lewis is a 49 year old female who presents today as a return patient for FBSE. Has a hx of MM and DN.     Last visit a lesion was photographed to monitor on her scalp. Patient has not noticed any changes. There is a raised, itchy bump on her back her daughter pointed out. Patient unsure of what was there previously. No unintended weight loss/gain, no night sweats or fevers    Patient is otherwise feeling well, without additional concerns.    Labs:  N/A    Physical Exam:  Vitals: There were no vitals taken for this visit.  SKIN: Full skin, which includes the head/face, both arms, chest, back, abdomen,both legs, genitalia and/or groin buttocks, digits and/or nails, was examined.   - Araiza's skin type II, has <100 nevi  - There are dome shaped bright red papules on the trunk.   - Multiple regular brown pigmented macules and papules are identified on the trunk and extremities.   - Scattered brown macules on sun  exposed areas.  - There are waxy stuck on tan to brown papules on the trunk.   -There are well healed surgical scars without erythema, nodularity or telangiectasias on the prior MM sites, NERD  LYMPH: Examination of the pre/post auricular, occipital, cervical, clavicular, axillary and inguinal lymph nodes was negative.   - R of midline, mid frontal scalp there is a tan redish brown macule, hair growing out of it, measuring 4 mm in size.  - L posterior shoulder 6-7mm indurated scar, pink  - No other lesions of concern on areas examined.     Medications:  Current Outpatient Medications   Medication Sig Dispense Refill     albuterol (PROAIR HFA/PROVENTIL HFA/VENTOLIN HFA) 108 (90 Base) MCG/ACT inhaler INHALE 2 PUFFS BY MOUTH EVERY 6 HOURS AS NEEDED FOR WHEEZE OR FOR SHORTNESS OF BREATH 18 g 11     buPROPion (WELLBUTRIN XL) 150 MG 24 hr tablet Take 3 tablets (450 mg) by mouth every morning. 270 tablet 1     cetirizine (ZYRTEC) 10 MG tablet Take 1 tablet (10 mg) by mouth every evening 30 tablet 1     cyclobenzaprine (FLEXERIL) 5 MG tablet Take 1-2 tablets (5-10 mg) by mouth 2 times daily as needed for muscle spasms 30 tablet 3     estradiol (VIVELLE-DOT) 0.05 MG/24HR bi-weekly patch Place 1 patch over 96 hours onto the skin twice a week. 9 patch 11     fluticasone-vilanterol (BREO ELLIPTA) 200-25 MCG/ACT inhaler Inhale 1 puff into the lungs daily.       ibuprofen (ADVIL/MOTRIN) 200 MG tablet Take 200 mg by mouth every 6 hours as needed.       methocarbamol (ROBAXIN) 500 MG tablet Take 1 tablet (500 mg) by mouth 4 times daily as needed for muscle spasms 20 tablet 3     montelukast (SINGULAIR) 10 MG tablet Take 1 tablet (10 mg) by mouth at bedtime.       progesterone (PROMETRIUM) 100 MG capsule Take 1 capsule (100 mg) by mouth daily. 90 capsule 3     telmisartan (MICARDIS) 40 MG tablet TAKE 1 TABLET BY MOUTH EVERY DAY 90 tablet 2     ZOLMitriptan (ZOMIG) 5 MG tablet TAKE 1 TABLET BY MOUTH AT ONSET OF HEADACHE FOR MIGRAINE  MAY REPEAT IN 2 HOURS. MAX 2 TABS/24 HOURS 10 tablet 3     No current facility-administered medications for this visit.      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 related to previous surgeries - 4 in 3 months for sinus issues/infection     Family history of breast cancer- mother's identical twin, maternal aunt, paternal GM and dad with breast lumps - had genetic counseling     Hot flashes     Generalized hyperhidrosis- since earliest recollections - soaks clothing multiple times/week - very disruptive to life     Mild anxiety     Foster care child     Anxiety     Multiple fractures of left foot with routine healing     Family history of colon cancer- father dx'd in mid 40's - pt's first colonoscopy in 4/24/2017 - repeat in 2022      Menorrhagia with regular cycle- ? cause of iron deficiency anemia- pt would like to see ob/gyn specialists- didn't go in 2021 - better than previous     Intractable episodic tension-type headache     Strain of neck muscle, initial encounter     Sun-damaged skin     Cough due to bronchospasm- needs refills on meds      Recurrent cough     Attention deficit - symptoms - pt desires testing and possible treatment     Intractable migraine without aura and without status migrainosus     Hypertension goal BP (blood pressure) < 140/90 - new diagnosis fall 2023 - much improved with weight loss and regular CV exercise     Morbid obesity (H)     Numbness and tingling of hand- fingertips - ? caused by lisinopril vs. other - consider changing to amlodipine      Malignant melanoma of left lower extremity including hip (H)-left anterior thigh Stage 1B (cT2a, cN0, cM0)     Melanoma of skin (H)     Mild intermittent asthma with (acute) exacerbation- allergies and cold  temperature triggers     Encounter for completion of form with patient- DESTIN paperwork for melanoma left leg     History of malignant melanoma of skin     Neoplasm of uncertain behavior of skin- left pubic area and left breast inferior areola - slightly irregular pigmentation - images taken - see media section - pt will watch closely - nted 8/20/2024     Multiple nevi     Cherry angioma     Lentigines     Melanoma in situ of torso excluding breast (H) - right groin area - 2nd melanoma - first was left anterior thigh     Mild episode of recurrent major depressive disorder     Medication side effects- lisinopril 5mg annoying dry cough, and topiramate 25mg = blurred vision     Weight loss- 40 lbs with better nutrition and running over the last 6 months     Need for hepatitis B vaccination- pt will check with her insurance on this     Counseling for birth control, intrauterine device- pt is thinking about HRT for perimenopausal symptoms, possibly Mirena IUD and oral/transdermal estrogen     Abnormality of cervix- inflamed , sligthly cystic and hypervascular cervix from 4:00-8;00 inferior to os     CARDIOVASCULAR SCREENING; LDL GOAL LESS THAN 130     Lentiginous compound nevus of thigh, left - inguinal crease - melanocytic - s/p shave biopsy = benign findings, clear margins 11/21/2024     Melanoma in situ of right lower extremity including hip (H)     Acute bronchitis, unspecified organism - needs refills on meds - not exacerbated right now      Past Medical History:   Diagnosis Date     Anemia      Anxiety     paxil 10mg= mild bladder retention, at 30mg = ravenously hungry/weight gain increased headaches; lexapro = bad sweating     Contraception     ocp's some = mood swings ; seasonale: frequent vaginal bleeding and weight gain     Hot flashes 11/16/2016     Hypertension goal BP (blood pressure) < 140/90  - new diagnosis 02/02/2023     Kidney stones     Dr. 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 Prior to medication administration, verified patient's identity using patient s name and date of birth. Please see MAR and medication order for additional information. Patient instructed to remain in clinic for 15 minutes and report any adverse reaction to staff immediately.    Vial/Syringe: Multi dose vial    Beatriz OLMOS RN  [facility]  [phone]        Again, thank you for allowing me to participate in the care of your patient.        Sincerely,        Lia Savage PA-C    Electronically signed

## 2025-07-31 NOTE — PROGRESS NOTES
[facility] Note  Encounter Date: Jul 31, 2025  Office Visit      Dermatology Problem List:  FBSE 7/31/25    1. Hx of Melanoma x3  - L calf - MIS (atypical melanocytic proliferation), S/p WLE on 3/5/25 with Dr. Higginbotham   - R superior inguinal crease- Atypical junctional melanocytic proliferation, consistent with early MIS, Bx 5/16/24, S/p WLE on 7/3/24 Dr. Higginbotham   - JAYY thigh - Malignant melanoma, BD 1.2mm, pT2a - L thigh - bx 8/28/23 - S/p WLE Dr. Higginbotham/Carla 10/9/23  2. Hx of DN  - L posterior shoulder, Compound melanocytic nevus with mild atypia, Bx proven on 2/15/24   3. Keloid scar - L posterior shoulder   - Tx: ILK 40 mg/ml 7/31/25  4. Benign biopsies:  - L inguinal crease, Lentigo, Bx 11/21/24  - L anterior shoulder - bx 8/28/23 - DF  - L inguinal crease, Lentigo, Bx 11/21/24  5. 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 3 children.  ____________________________________________    Assessment & Plan:    # LTM - most consistent with a benign nevus -  R of midline, mid frontal scalp - 4mm reddish brown/tan macule with numerous hairs growing from within it  - Discussed the natural history and benign nature of this lesion. Reassurance provided that no additional treatment is necessary.     - Photograph was obtained for clinical monitoring and inclusion in medical record    # Keloid scar - L posterior shoulder  - ILK today - see below  - edu on etiology, reassured    # Hx of MM x3, most recent: March 2025  - ABCDEs: Counseled ABCDEs of melanoma: Asymmetry, Border (irregularity), Color (not uniform, changes in color), Diameter (greater than 6 mm which is about the size of a pencil eraser), and Evolving (any changes in preexisting moles).  - Sun protection: Counseled SPF30+ sunscreen, UPF clothing, sun avoidance, tanning bed avoidance.   - Recommended yearly dental, ophthalmology, and gynecology exams.  - Recommended skin exams for all first-degree  relatives.  - Recommended follow up is 3-6 months    # Hx of DN  - Sunscreen: Apply 20 minutes prior to going outdoors and reapply every two hours, when wet or sweating. We recommend using an SPF 30 or higher, and to use one that is water resistant.     - Advised to monitor for changing, non-healing, bleeding, painful, changing, or otherwise symptomatic lesions  - Continue annual skin exams    # Benign findings: multiple benign nevi, lentigines, cherry angiomas, SKs  - edu on benign etiology  - Signs and Symptoms of non-melanoma skin cancer and ABCDEs of melanoma reviewed with patient. Patient encouraged to perform monthly self skin exams and educated on how to perform them. UV precautions reviewed with patient. Patient was asked about new or changing moles/lesions on body.   - Sunscreen: Apply 20 minutes prior to going outdoors and reapply every two hours, when wet or sweating. We recommend using an SPF 30 or higher, and to use one that is water resistant.     - RTC for changes    Procedures Performed:   - Intra-lesional triamcinolone procedure note. After verbal consent and review of risk of pain and skin thinning/atrophy, positioning and cleansing with isopropyl alcohol, 0.1 total mL of triamcinolone 40 mg/mL was injected into 1 lesion(s) on the L posterior shoulder. The patient tolerated the procedure well and left the dermatology clinic in good condition.     Follow-up: 3 month(s) in-person, or earlier for new or changing lesions    Staff and scribe:    Scribe Disclosure:   I, MAXX SRIVASTAVA, am serving as a scribe; to document services personally performed by Lia Savage PA-C -based on data collection and the provider's statements to me.     Provider Disclosure:  I agree with above History, Review of Systems, Physical exam and Plan.  I have reviewed the content of the documentation and have edited it as needed. I have personally performed the services documented here and the documentation accurately  represents those services and the decisions I have made.      Electronically signed by:    All risks, benefits and alternatives were discussed with patient.  Patient is in agreement and understands the assessment and plan.  All questions were answered.    Lia Savage PA-C, MPAS  [facility]: Phone: [phone], Fax: [phone]  [facility]: Phone: [phone],  Fax: [phone]  [facility]: Phone: [phone], Fax: [phone]  ____________________________________________    CC: Skin Check (3 month FBSC/Concerns: Recheck scalp lesion.)      Reviewed patients past medical history and pertinent chart review prior to patient's visit today.     HPI:  Ms. Deandra Lewis is a 49 year old female who presents today as a return patient for FBSE. Has a hx of MM and DN.     Last visit a lesion was photographed to monitor on her scalp. Patient has not noticed any changes. There is a raised, itchy bump on her back her daughter pointed out. Patient unsure of what was there previously. No unintended weight loss/gain, no night sweats or fevers    Patient is otherwise feeling well, without additional concerns.    Labs:  N/A    Physical Exam:  Vitals: There were no vitals taken for this visit.  SKIN: Full skin, which includes the head/face, both arms, chest, back, abdomen,both legs, genitalia and/or groin buttocks, digits and/or nails, was examined.   - Araiza's skin type II, has <100 nevi  - There are dome shaped bright red papules on the trunk.   - Multiple regular brown pigmented macules and papules are identified on the trunk and extremities.   - Scattered brown macules on sun exposed areas.  - There are waxy stuck on tan to brown papules on the trunk.   -There are well healed surgical scars without erythema, nodularity or telangiectasias on the prior MM sites, NERD  LYMPH: Examination of the pre/post  auricular, occipital, cervical, clavicular, axillary and inguinal lymph nodes was negative.   - R of midline, mid frontal scalp there is a tan redish brown macule, hair growing out of it, measuring 4 mm in size.  - L posterior shoulder 6-7mm indurated scar, pink  - No other lesions of concern on areas examined.     Medications:  Current Outpatient Medications   Medication Sig Dispense Refill    albuterol (PROAIR HFA/PROVENTIL HFA/VENTOLIN HFA) 108 (90 Base) MCG/ACT inhaler INHALE 2 PUFFS BY MOUTH EVERY 6 HOURS AS NEEDED FOR WHEEZE OR FOR SHORTNESS OF BREATH 18 g 11    buPROPion (WELLBUTRIN XL) 150 MG 24 hr tablet Take 3 tablets (450 mg) by mouth every morning. 270 tablet 1    cetirizine (ZYRTEC) 10 MG tablet Take 1 tablet (10 mg) by mouth every evening 30 tablet 1    cyclobenzaprine (FLEXERIL) 5 MG tablet Take 1-2 tablets (5-10 mg) by mouth 2 times daily as needed for muscle spasms 30 tablet 3    estradiol (VIVELLE-DOT) 0.05 MG/24HR bi-weekly patch Place 1 patch over 96 hours onto the skin twice a week. 9 patch 11    fluticasone-vilanterol (BREO ELLIPTA) 200-25 MCG/ACT inhaler Inhale 1 puff into the lungs daily.      ibuprofen (ADVIL/MOTRIN) 200 MG tablet Take 200 mg by mouth every 6 hours as needed.      methocarbamol (ROBAXIN) 500 MG tablet Take 1 tablet (500 mg) by mouth 4 times daily as needed for muscle spasms 20 tablet 3    montelukast (SINGULAIR) 10 MG tablet Take 1 tablet (10 mg) by mouth at bedtime.      progesterone (PROMETRIUM) 100 MG capsule Take 1 capsule (100 mg) by mouth daily. 90 capsule 3    telmisartan (MICARDIS) 40 MG tablet TAKE 1 TABLET BY MOUTH EVERY DAY 90 tablet 2    ZOLMitriptan (ZOMIG) 5 MG tablet TAKE 1 TABLET BY MOUTH AT ONSET OF HEADACHE FOR MIGRAINE MAY REPEAT IN 2 HOURS. MAX 2 TABS/24 HOURS 10 tablet 3     No current facility-administered medications for this visit.      Past Medical/Surgical History:   Patient Active Problem List   Diagnosis    Chronic sinusitis    Bleeding, nose     Sinusitis    Lactose intolerance    Kidney stones    Maxillary bone loss    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 related to previous surgeries - 4 in 3 months for sinus issues/infection    Family history of breast cancer- mother's identical twin, maternal aunt, paternal GM and dad with breast lumps - had genetic counseling    Hot flashes    Generalized hyperhidrosis- since earliest recollections - soaks clothing multiple times/week - very disruptive to life    Mild anxiety    Foster care child    Anxiety    Multiple fractures of left foot with routine healing    Family history of colon cancer- father dx'd in mid 40's - pt's first colonoscopy in 4/24/2017 - repeat in 2022     Menorrhagia with regular cycle- ? cause of iron deficiency anemia- pt would like to see ob/gyn specialists- didn't go in 2021 - better than previous    Intractable episodic tension-type headache    Strain of neck muscle, initial encounter    Sun-damaged skin    Cough due to bronchospasm- needs refills on meds     Recurrent cough    Attention deficit - symptoms - pt desires testing and possible treatment    Intractable migraine without aura and without status migrainosus    Hypertension goal BP (blood pressure) < 140/90 - new diagnosis fall 2023 - much improved with weight loss and regular CV exercise    Morbid obesity (H)    Numbness and tingling of hand- fingertips - ? caused by lisinopril vs. 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 (cT2a, cN0, cM0)    Melanoma of skin (H)    Mild intermittent asthma with (acute) exacerbation- allergies and cold temperature triggers    Encounter for completion of form with patient- FMLA paperwork for melanoma left leg    History of malignant melanoma of skin    Neoplasm of uncertain behavior of skin- left pubic area and left breast inferior areola - slightly irregular pigmentation  - images taken - see media section - pt will watch closely - nted 8/20/2024    Multiple nevi    Cherry angioma    Lentigines    Melanoma in situ of torso excluding breast (H) - right groin area - 2nd melanoma - first was left anterior thigh    Mild episode of recurrent major depressive disorder    Medication side effects- lisinopril 5mg annoying dry cough, and topiramate 25mg = blurred vision    Weight loss- 40 lbs with better nutrition and running over the last 6 months    Need for hepatitis B vaccination- pt will check with her insurance on this    Counseling for birth control, intrauterine device- pt is thinking about HRT for perimenopausal symptoms, possibly Mirena IUD and oral/transdermal estrogen    Abnormality of cervix- inflamed , sligthly cystic and hypervascular cervix from 4:00-8;00 inferior to os    CARDIOVASCULAR SCREENING; LDL GOAL LESS THAN 130    Lentiginous compound nevus of thigh, left - inguinal crease - melanocytic - s/p shave biopsy = benign findings, clear margins 11/21/2024    Melanoma in situ of right lower extremity including hip (H)    Acute bronchitis, unspecified organism - needs refills on meds - not exacerbated right now      Past Medical History:   Diagnosis Date    Anemia     Anxiety     paxil 10mg= mild bladder retention, at 30mg = ravenously hungry/weight gain increased headaches; lexapro = bad sweating    Contraception     ocp's some = mood swings ; seasonale: frequent vaginal bleeding and weight gain    Hot flashes 11/16/2016    Hypertension goal BP (blood pressure) < 140/90  - new diagnosis 02/02/2023    Kidney stones     Dr. 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

## 2025-07-31 NOTE — PROGRESS NOTES
Clinic Administered Medication Documentation    Patient was given kenalog 40 mg/ml. Prior to medication administration, verified patient's identity using patient s name and date of birth. Please see MAR and medication order for additional information. Patient instructed to remain in clinic for 15 minutes and report any adverse reaction to staff immediately.    Vial/Syringe: Multi dose vial    Beatriz OLMOS RN  [facility]  [phone]

## 2025-08-06 ENCOUNTER — HOSPITAL ENCOUNTER (OUTPATIENT)
Dept: MRI IMAGING | Facility: HOSPITAL | Age: 50
Discharge: HOME | End: 2025-08-06
Payer: COMMERCIAL

## 2025-08-06 ENCOUNTER — ONCOLOGY VISIT (OUTPATIENT)
Dept: RADIATION ONCOLOGY | Facility: HOSPITAL | Age: 50
End: 2025-08-06
Payer: COMMERCIAL

## 2025-08-06 VITALS
DIASTOLIC BLOOD PRESSURE: 85 MMHG | RESPIRATION RATE: 16 BRPM | BODY MASS INDEX: 26.62 KG/M2 | WEIGHT: 170 LBS | OXYGEN SATURATION: 100 % | SYSTOLIC BLOOD PRESSURE: 148 MMHG

## 2025-08-06 DIAGNOSIS — Z12.39 BREAST CANCER SCREENING, HIGH RISK PATIENT: Primary | ICD-10-CM

## 2025-08-06 DIAGNOSIS — R92.8 ABNORMAL MRI, BREAST: ICD-10-CM

## 2025-08-06 DIAGNOSIS — Z12.39 BREAST CANCER SCREENING, HIGH RISK PATIENT: ICD-10-CM

## 2025-08-06 PROCEDURE — 99214 OFFICE O/P EST MOD 30 MIN: CPT

## 2025-08-06 PROCEDURE — 77049 MRI BREAST C-+ W/CAD BI: CPT

## 2025-08-06 PROCEDURE — A9585 GADOBUTROL INJECTION: HCPCS

## 2025-08-06 PROCEDURE — 255N000002 HC RX 255 OP 636

## 2025-08-06 RX ORDER — GADOBUTROL 604.72 MG/ML
0.1 INJECTION INTRAVENOUS ONCE
Status: COMPLETED | OUTPATIENT
Start: 2025-08-06 | End: 2025-08-06

## 2025-08-06 RX ADMIN — GADOBUTROL 8 ML: 604.72 INJECTION INTRAVENOUS at 16:29

## 2025-08-06 ASSESSMENT — PAIN SCALES - GENERAL: PAINLEVEL_OUTOF10: NO PAIN (0)

## 2025-08-23 SDOH — HEALTH STABILITY: PHYSICAL HEALTH: ON AVERAGE, HOW MANY DAYS PER WEEK DO YOU ENGAGE IN MODERATE TO STRENUOUS EXERCISE (LIKE A BRISK WALK)?: 4 DAYS

## 2025-08-23 SDOH — HEALTH STABILITY: PHYSICAL HEALTH: ON AVERAGE, HOW MANY MINUTES DO YOU ENGAGE IN EXERCISE AT THIS LEVEL?: 40 MIN

## 2025-08-23 ASSESSMENT — ASTHMA QUESTIONNAIRES
ACT_TOTALSCORE: 24
QUESTION_5 LAST FOUR WEEKS HOW WOULD YOU RATE YOUR ASTHMA CONTROL: WELL CONTROLLED
QUESTION_4 LAST FOUR WEEKS HOW OFTEN HAVE YOU USED YOUR RESCUE INHALER OR NEBULIZER MEDICATION (SUCH AS ALBUTEROL): NOT AT ALL
QUESTION_3 LAST FOUR WEEKS HOW OFTEN DID YOUR ASTHMA SYMPTOMS (WHEEZING, COUGHING, SHORTNESS OF BREATH, CHEST TIGHTNESS OR PAIN) WAKE YOU UP AT NIGHT OR EARLIER THAN USUAL IN THE MORNING: NOT AT ALL
QUESTION_1 LAST FOUR WEEKS HOW MUCH OF THE TIME DID YOUR ASTHMA KEEP YOU FROM GETTING AS MUCH DONE AT WORK, SCHOOL OR AT HOME: NONE OF THE TIME
QUESTION_2 LAST FOUR WEEKS HOW OFTEN HAVE YOU HAD SHORTNESS OF BREATH: NOT AT ALL

## 2025-08-25 ENCOUNTER — OFFICE VISIT (OUTPATIENT)
Dept: FAMILY MEDICINE | Facility: CLINIC | Age: 50
End: 2025-08-25
Payer: COMMERCIAL

## 2025-08-25 VITALS
DIASTOLIC BLOOD PRESSURE: 64 MMHG | SYSTOLIC BLOOD PRESSURE: 110 MMHG | OXYGEN SATURATION: 99 % | HEIGHT: 67 IN | HEART RATE: 89 BPM | RESPIRATION RATE: 16 BRPM | BODY MASS INDEX: 27.09 KG/M2 | WEIGHT: 172.6 LBS | TEMPERATURE: 96.6 F

## 2025-08-25 DIAGNOSIS — G89.18 POSTOPERATIVE PAIN: ICD-10-CM

## 2025-08-25 DIAGNOSIS — M54.9 UPPER BACK PAIN: ICD-10-CM

## 2025-08-25 DIAGNOSIS — Z80.3 FAMILY HISTORY OF BREAST CANCER: ICD-10-CM

## 2025-08-25 DIAGNOSIS — M54.2 NECK PAIN: ICD-10-CM

## 2025-08-25 DIAGNOSIS — R41.840 ATTENTION DEFICIT: ICD-10-CM

## 2025-08-25 DIAGNOSIS — M54.50 BILATERAL LOW BACK PAIN WITHOUT SCIATICA, UNSPECIFIED CHRONICITY: ICD-10-CM

## 2025-08-25 DIAGNOSIS — Z80.0 FAMILY HISTORY OF COLON CANCER: ICD-10-CM

## 2025-08-25 DIAGNOSIS — Z00.00 ROUTINE GENERAL MEDICAL EXAMINATION AT A HEALTH CARE FACILITY: Primary | ICD-10-CM

## 2025-08-25 DIAGNOSIS — C43.72 MALIGNANT MELANOMA OF LEFT LOWER EXTREMITY INCLUDING HIP (H): ICD-10-CM

## 2025-08-25 DIAGNOSIS — Z13.6 CARDIOVASCULAR SCREENING; LDL GOAL LESS THAN 130: ICD-10-CM

## 2025-08-25 DIAGNOSIS — Z12.11 SCREEN FOR COLON CANCER: ICD-10-CM

## 2025-08-25 DIAGNOSIS — J98.01 COUGH DUE TO BRONCHOSPASM: ICD-10-CM

## 2025-08-25 DIAGNOSIS — R63.4 WEIGHT LOSS: ICD-10-CM

## 2025-08-25 DIAGNOSIS — J20.9 ACUTE BRONCHITIS, UNSPECIFIED ORGANISM: ICD-10-CM

## 2025-08-25 DIAGNOSIS — I10 HYPERTENSION GOAL BP (BLOOD PRESSURE) < 140/90: ICD-10-CM

## 2025-08-25 DIAGNOSIS — D22.72: ICD-10-CM

## 2025-08-25 DIAGNOSIS — J45.21 MILD INTERMITTENT ASTHMA WITH (ACUTE) EXACERBATION: ICD-10-CM

## 2025-08-25 DIAGNOSIS — Z79.899 CONTROLLED SUBSTANCE AGREEMENT SIGNED: ICD-10-CM

## 2025-08-25 DIAGNOSIS — F90.2 ADHD (ATTENTION DEFICIT HYPERACTIVITY DISORDER), COMBINED TYPE: ICD-10-CM

## 2025-08-25 DIAGNOSIS — E66.3 OVERWEIGHT (BMI 25.0-29.9): ICD-10-CM

## 2025-08-25 DIAGNOSIS — Z23 NEED FOR HEPATITIS B VACCINATION: ICD-10-CM

## 2025-08-25 LAB
ALBUMIN SERPL BCG-MCNC: 4.3 G/DL (ref 3.5–5.2)
ALP SERPL-CCNC: 78 U/L (ref 40–150)
ALT SERPL W P-5'-P-CCNC: 18 U/L (ref 0–50)
AMPHETAMINES UR QL: NOT DETECTED
ANION GAP SERPL CALCULATED.3IONS-SCNC: 6 MMOL/L (ref 7–15)
AST SERPL W P-5'-P-CCNC: 20 U/L (ref 0–45)
BARBITURATES UR QL SCN: NOT DETECTED
BENZODIAZ UR QL SCN: NOT DETECTED
BILIRUB SERPL-MCNC: 0.5 MG/DL
BUN SERPL-MCNC: 17 MG/DL (ref 6–20)
BUPRENORPHINE UR QL: NOT DETECTED
CALCIUM SERPL-MCNC: 9.4 MG/DL (ref 8.8–10.4)
CANNABINOIDS UR QL: NOT DETECTED
CHLORIDE SERPL-SCNC: 108 MMOL/L (ref 98–107)
CHOLEST SERPL-MCNC: 159 MG/DL
COCAINE UR QL SCN: NOT DETECTED
CREAT SERPL-MCNC: 0.78 MG/DL (ref 0.51–0.95)
CREAT UR-MCNC: 102 MG/DL
D-METHAMPHET UR QL: NOT DETECTED
EGFRCR SERPLBLD CKD-EPI 2021: >90 ML/MIN/1.73M2
ERYTHROCYTE [DISTWIDTH] IN BLOOD BY AUTOMATED COUNT: 12.5 % (ref 10–15)
FASTING STATUS PATIENT QL REPORTED: YES
FASTING STATUS PATIENT QL REPORTED: YES
GLUCOSE SERPL-MCNC: 88 MG/DL (ref 70–99)
HCO3 SERPL-SCNC: 26 MMOL/L (ref 22–29)
HCT VFR BLD AUTO: 39.2 % (ref 35–47)
HDLC SERPL-MCNC: 63 MG/DL
HGB BLD-MCNC: 13.2 G/DL (ref 11.7–15.7)
LDLC SERPL CALC-MCNC: 86 MG/DL
MCH RBC QN AUTO: 29.3 PG (ref 26.5–33)
MCHC RBC AUTO-ENTMCNC: 33.7 G/DL (ref 31.5–36.5)
MCV RBC AUTO: 86.9 FL (ref 78–100)
METHADONE UR QL SCN: NOT DETECTED
MICROALBUMIN UR-MCNC: <12 MG/L
MICROALBUMIN/CREAT UR: NORMAL MG/G{CREAT}
NONHDLC SERPL-MCNC: 96 MG/DL
OPIATES UR QL SCN: NOT DETECTED
OXYCODONE UR QL SCN: NOT DETECTED
PCP UR QL SCN: NOT DETECTED
PLATELET # BLD AUTO: 280 10E3/UL (ref 150–450)
POTASSIUM SERPL-SCNC: 4.7 MMOL/L (ref 3.4–5.3)
PROT SERPL-MCNC: 6.2 G/DL (ref 6.4–8.3)
RBC # BLD AUTO: 4.51 10E6/UL (ref 3.8–5.2)
SODIUM SERPL-SCNC: 140 MMOL/L (ref 135–145)
TRICYCLICS UR QL SCN: NOT DETECTED
TRIGL SERPL-MCNC: 52 MG/DL
TSH SERPL DL<=0.005 MIU/L-ACNC: 1.49 UIU/ML (ref 0.3–4.2)
WBC # BLD AUTO: 5.36 10E3/UL (ref 4–11)

## 2025-08-25 PROCEDURE — 36415 COLL VENOUS BLD VENIPUNCTURE: CPT | Performed by: FAMILY MEDICINE

## 2025-08-25 PROCEDURE — 80306 DRUG TEST PRSMV INSTRMNT: CPT | Performed by: FAMILY MEDICINE

## 2025-08-25 PROCEDURE — 90471 IMMUNIZATION ADMIN: CPT | Performed by: FAMILY MEDICINE

## 2025-08-25 PROCEDURE — 90746 HEPB VACCINE 3 DOSE ADULT IM: CPT | Performed by: FAMILY MEDICINE

## 2025-08-25 PROCEDURE — 99396 PREV VISIT EST AGE 40-64: CPT | Mod: 25 | Performed by: FAMILY MEDICINE

## 2025-08-25 PROCEDURE — 84443 ASSAY THYROID STIM HORMONE: CPT | Performed by: FAMILY MEDICINE

## 2025-08-25 PROCEDURE — 3078F DIAST BP <80 MM HG: CPT | Performed by: FAMILY MEDICINE

## 2025-08-25 PROCEDURE — 82570 ASSAY OF URINE CREATININE: CPT | Mod: 59 | Performed by: FAMILY MEDICINE

## 2025-08-25 PROCEDURE — 80053 COMPREHEN METABOLIC PANEL: CPT | Performed by: FAMILY MEDICINE

## 2025-08-25 PROCEDURE — 82043 UR ALBUMIN QUANTITATIVE: CPT | Performed by: FAMILY MEDICINE

## 2025-08-25 PROCEDURE — 99214 OFFICE O/P EST MOD 30 MIN: CPT | Mod: 25 | Performed by: FAMILY MEDICINE

## 2025-08-25 PROCEDURE — 85027 COMPLETE CBC AUTOMATED: CPT | Performed by: FAMILY MEDICINE

## 2025-08-25 PROCEDURE — 80061 LIPID PANEL: CPT | Performed by: FAMILY MEDICINE

## 2025-08-25 PROCEDURE — G2211 COMPLEX E/M VISIT ADD ON: HCPCS | Performed by: FAMILY MEDICINE

## 2025-08-25 PROCEDURE — 3074F SYST BP LT 130 MM HG: CPT | Performed by: FAMILY MEDICINE

## 2025-08-25 RX ORDER — METHOCARBAMOL 500 MG/1
500 TABLET, FILM COATED ORAL 4 TIMES DAILY PRN
Qty: 20 TABLET | Refills: 3 | Status: SHIPPED | OUTPATIENT
Start: 2025-08-25

## 2025-08-25 RX ORDER — CYCLOBENZAPRINE HCL 5 MG
5-10 TABLET ORAL 2 TIMES DAILY PRN
Qty: 30 TABLET | Refills: 3 | Status: SHIPPED | OUTPATIENT
Start: 2025-08-25

## 2025-08-25 RX ORDER — DEXTROAMPHETAMINE SACCHARATE, AMPHETAMINE ASPARTATE MONOHYDRATE, DEXTROAMPHETAMINE SULFATE AND AMPHETAMINE SULFATE 2.5; 2.5; 2.5; 2.5 MG/1; MG/1; MG/1; MG/1
10 CAPSULE, EXTENDED RELEASE ORAL DAILY
Qty: 30 CAPSULE | Refills: 0 | Status: SHIPPED | OUTPATIENT
Start: 2025-08-25 | End: 2025-09-24

## 2025-08-25 RX ORDER — ALBUTEROL SULFATE 90 UG/1
INHALANT RESPIRATORY (INHALATION)
Qty: 18 G | Refills: 11 | Status: SHIPPED | OUTPATIENT
Start: 2025-08-25

## 2025-08-25 RX ORDER — DEXTROAMPHETAMINE SACCHARATE, AMPHETAMINE ASPARTATE MONOHYDRATE, DEXTROAMPHETAMINE SULFATE AND AMPHETAMINE SULFATE 2.5; 2.5; 2.5; 2.5 MG/1; MG/1; MG/1; MG/1
10 CAPSULE, EXTENDED RELEASE ORAL DAILY
Qty: 30 CAPSULE | Refills: 0 | Status: SHIPPED | OUTPATIENT
Start: 2025-09-24 | End: 2025-10-24

## 2025-08-25 RX ORDER — DEXTROAMPHETAMINE SACCHARATE, AMPHETAMINE ASPARTATE MONOHYDRATE, DEXTROAMPHETAMINE SULFATE AND AMPHETAMINE SULFATE 2.5; 2.5; 2.5; 2.5 MG/1; MG/1; MG/1; MG/1
10 CAPSULE, EXTENDED RELEASE ORAL DAILY
Qty: 30 CAPSULE | Refills: 0 | Status: SHIPPED | OUTPATIENT
Start: 2025-10-24 | End: 2025-11-23

## 2025-08-25 RX ORDER — FLUTICASONE FUROATE AND VILANTEROL 200; 25 UG/1; UG/1
1 POWDER RESPIRATORY (INHALATION) DAILY
Qty: 60 EACH | Refills: 11 | Status: SHIPPED | OUTPATIENT
Start: 2025-08-25

## 2025-08-25 RX ORDER — MONTELUKAST SODIUM 10 MG/1
1 TABLET ORAL AT BEDTIME
Qty: 30 TABLET | Refills: 11 | Status: SHIPPED | OUTPATIENT
Start: 2025-08-25

## 2025-08-25 ASSESSMENT — ANXIETY QUESTIONNAIRES
8. IF YOU CHECKED OFF ANY PROBLEMS, HOW DIFFICULT HAVE THESE MADE IT FOR YOU TO DO YOUR WORK, TAKE CARE OF THINGS AT HOME, OR GET ALONG WITH OTHER PEOPLE?: NOT DIFFICULT AT ALL
IF YOU CHECKED OFF ANY PROBLEMS ON THIS QUESTIONNAIRE, HOW DIFFICULT HAVE THESE PROBLEMS MADE IT FOR YOU TO DO YOUR WORK, TAKE CARE OF THINGS AT HOME, OR GET ALONG WITH OTHER PEOPLE: NOT DIFFICULT AT ALL
3. WORRYING TOO MUCH ABOUT DIFFERENT THINGS: NOT AT ALL
7. FEELING AFRAID AS IF SOMETHING AWFUL MIGHT HAPPEN: NOT AT ALL
2. NOT BEING ABLE TO STOP OR CONTROL WORRYING: NOT AT ALL
GAD7 TOTAL SCORE: 1
GAD7 TOTAL SCORE: 1
1. FEELING NERVOUS, ANXIOUS, OR ON EDGE: SEVERAL DAYS
4. TROUBLE RELAXING: NOT AT ALL
7. FEELING AFRAID AS IF SOMETHING AWFUL MIGHT HAPPEN: NOT AT ALL
GAD7 TOTAL SCORE: 1
6. BECOMING EASILY ANNOYED OR IRRITABLE: NOT AT ALL
5. BEING SO RESTLESS THAT IT IS HARD TO SIT STILL: NOT AT ALL

## 2025-08-25 ASSESSMENT — PATIENT HEALTH QUESTIONNAIRE - PHQ9
10. IF YOU CHECKED OFF ANY PROBLEMS, HOW DIFFICULT HAVE THESE PROBLEMS MADE IT FOR YOU TO DO YOUR WORK, TAKE CARE OF THINGS AT HOME, OR GET ALONG WITH OTHER PEOPLE: NOT DIFFICULT AT ALL
SUM OF ALL RESPONSES TO PHQ QUESTIONS 1-9: 1
SUM OF ALL RESPONSES TO PHQ QUESTIONS 1-9: 1

## (undated) DEVICE — PREP CHLORAPREP 26ML TINTED ORANGE  260815

## (undated) DEVICE — DRAPE STOCKINETTE IMPERVIOUS 10" 21048

## (undated) DEVICE — SUCTION MANIFOLD NEPTUNE 2 SYS 4 PORT 0702-020-000

## (undated) DEVICE — COVER NEOPROBE SOFTFLEX 5X96" W/BANDS 20-PC596

## (undated) DEVICE — BLADE KNIFE SURG 15 371115

## (undated) DEVICE — SU MONOCRYL 4-0 P-3 18" UND Y494G

## (undated) DEVICE — GOWN IMPERVIOUS BREATHABLE SMART LG 89015

## (undated) DEVICE — DRAPE IOBAN INCISE 23X17" 6650EZ

## (undated) DEVICE — GLOVE BIOGEL PI MICRO INDICATOR UNDERGLOVE SZ 6.0 48960

## (undated) DEVICE — Device

## (undated) DEVICE — SU DERMABOND PRINEO 22CM CLR222US

## (undated) DEVICE — CLIP HORIZON MED BLUE 002200

## (undated) DEVICE — SU MONOCRYL 3-0 PS-1 27" Y936H

## (undated) DEVICE — DRSG TEGADERM 2 3/8X2 3/4" 1624W

## (undated) DEVICE — SU STRATAFIX MONOCRYL 4-0 SPIRAL 30CM PS-2 SXMP1B117

## (undated) DEVICE — GLOVE BIOGEL PI ULTRATOUCH G SZ 6.0 42160

## (undated) DEVICE — LABEL MEDICATION SYSTEM 3303-P

## (undated) DEVICE — GLOVE BIOGEL PI MICRO SZ 6.0 48560

## (undated) DEVICE — SOL NACL 0.9% IRRIG 1000ML BOTTLE 2F7124

## (undated) DEVICE — CLIP HORIZON SM RED WIDE SLOT 001201

## (undated) DEVICE — BNDG ELASTIC 6" DBL LENGTH UNSTERILE 6611-16

## (undated) DEVICE — NDL BLUNT 18GA 1" W/O FILTER 305181

## (undated) DEVICE — DRSG STERI STRIP 1/2X4" R1547

## (undated) DEVICE — SOL WATER IRRIG 1000ML BOTTLE 07139-09

## (undated) DEVICE — PAD CHUX UNDERPAD 23X24" 7136

## (undated) DEVICE — SU MONOCRYL 4-0 PS-2 18" UND Y496G

## (undated) DEVICE — SU PDS II 2-0 CT-1 27" Z339H

## (undated) DEVICE — ESU ELEC BLADE 2.75" COATED/INSULATED E1455

## (undated) DEVICE — PREP CHLORAPREP 26ML TINTED HI-LITE ORANGE 930815

## (undated) DEVICE — DRSG XEROFORM 5X9" CUR253590W

## (undated) DEVICE — SU VICRYL 3-0 SH 27" UND J416H

## (undated) DEVICE — ESU PENCIL SMOKE EVAC W/ROCKER SWITCH 0703-047-000

## (undated) DEVICE — DRAPE U SPLIT 74X120" 29440

## (undated) DEVICE — DRAPE SHEET MED 44X70" 9355

## (undated) DEVICE — ESU GROUND PAD ADULT W/CORD E7507

## (undated) DEVICE — SU DERMABOND ADVANCED .7ML DNX12

## (undated) DEVICE — SOL WATER IRRIG 1000ML BOTTLE 2F7114

## (undated) DEVICE — DRSG PRIMAPORE 03 1/8X6" 66000318

## (undated) DEVICE — TUBING SUCTION 10'X3/16" N510

## (undated) DEVICE — LINEN TOWEL PACK X5 5464

## (undated) DEVICE — SU SILK 3-0 SH 30" K832H

## (undated) DEVICE — PACK MINOR SBA15MIFSE

## (undated) DEVICE — STPL SKIN 35W ROTATING HEAD PRW35

## (undated) DEVICE — LINEN TOWEL PACK X6 WHITE 5487

## (undated) DEVICE — NDL 25GA 2"  8881200441

## (undated) DEVICE — DRSG KERLIX 4 1/2"X4YDS ROLL 6715

## (undated) DEVICE — SYR BULB IRRIG DOVER 60 ML LATEX FREE 67000

## (undated) DEVICE — SU SILK 3-0 FS-1 18" 684G

## (undated) DEVICE — SYR 01ML 27GA 0.5" NDL TBC 309623

## (undated) RX ORDER — HYDROMORPHONE HCL IN WATER/PF 6 MG/30 ML
PATIENT CONTROLLED ANALGESIA SYRINGE INTRAVENOUS
Status: DISPENSED
Start: 2023-10-09

## (undated) RX ORDER — LIDOCAINE HYDROCHLORIDE AND EPINEPHRINE 10; 10 MG/ML; UG/ML
INJECTION, SOLUTION INFILTRATION; PERINEURAL
Status: DISPENSED
Start: 2023-10-09

## (undated) RX ORDER — CEFAZOLIN SODIUM 2 G/50ML
SOLUTION INTRAVENOUS
Status: DISPENSED
Start: 2024-07-03

## (undated) RX ORDER — BUPIVACAINE HYDROCHLORIDE 2.5 MG/ML
INJECTION, SOLUTION INFILTRATION; PERINEURAL
Status: DISPENSED
Start: 2024-07-03

## (undated) RX ORDER — DEXAMETHASONE SODIUM PHOSPHATE 4 MG/ML
INJECTION, SOLUTION INTRA-ARTICULAR; INTRALESIONAL; INTRAMUSCULAR; INTRAVENOUS; SOFT TISSUE
Status: DISPENSED
Start: 2024-07-03

## (undated) RX ORDER — OXYCODONE HYDROCHLORIDE 10 MG/1
TABLET ORAL
Status: DISPENSED
Start: 2023-10-09

## (undated) RX ORDER — HYDROMORPHONE HYDROCHLORIDE 1 MG/ML
INJECTION, SOLUTION INTRAMUSCULAR; INTRAVENOUS; SUBCUTANEOUS
Status: DISPENSED
Start: 2023-10-09

## (undated) RX ORDER — PROPOFOL 10 MG/ML
INJECTION, EMULSION INTRAVENOUS
Status: DISPENSED
Start: 2024-07-03

## (undated) RX ORDER — PROPOFOL 10 MG/ML
INJECTION, EMULSION INTRAVENOUS
Status: DISPENSED
Start: 2025-03-05

## (undated) RX ORDER — FENTANYL CITRATE 50 UG/ML
INJECTION, SOLUTION INTRAMUSCULAR; INTRAVENOUS
Status: DISPENSED
Start: 2023-10-09

## (undated) RX ORDER — CEFAZOLIN SODIUM 2 G/50ML
SOLUTION INTRAVENOUS
Status: DISPENSED
Start: 2025-03-05

## (undated) RX ORDER — CEFAZOLIN SODIUM/WATER 2 G/20 ML
SYRINGE (ML) INTRAVENOUS
Status: DISPENSED
Start: 2023-10-09

## (undated) RX ORDER — ACETAMINOPHEN 325 MG/1
TABLET ORAL
Status: DISPENSED
Start: 2025-03-05

## (undated) RX ORDER — CEFAZOLIN SODIUM 1 G/3ML
INJECTION, POWDER, FOR SOLUTION INTRAMUSCULAR; INTRAVENOUS
Status: DISPENSED
Start: 2023-10-09

## (undated) RX ORDER — ONDANSETRON 2 MG/ML
INJECTION INTRAMUSCULAR; INTRAVENOUS
Status: DISPENSED
Start: 2023-10-09

## (undated) RX ORDER — ONDANSETRON 2 MG/ML
INJECTION INTRAMUSCULAR; INTRAVENOUS
Status: DISPENSED
Start: 2024-07-03

## (undated) RX ORDER — ACETAMINOPHEN 325 MG/1
TABLET ORAL
Status: DISPENSED
Start: 2023-10-09

## (undated) RX ORDER — FENTANYL CITRATE 50 UG/ML
INJECTION, SOLUTION INTRAMUSCULAR; INTRAVENOUS
Status: DISPENSED
Start: 2024-07-03

## (undated) RX ORDER — ISOSULFAN BLUE 50 MG/5ML
INJECTION, SOLUTION SUBCUTANEOUS
Status: DISPENSED
Start: 2023-10-09

## (undated) RX ORDER — ACETAMINOPHEN 325 MG/1
TABLET ORAL
Status: DISPENSED
Start: 2024-07-03

## (undated) RX ORDER — FENTANYL CITRATE 50 UG/ML
INJECTION, SOLUTION INTRAMUSCULAR; INTRAVENOUS
Status: DISPENSED
Start: 2025-03-05

## (undated) RX ORDER — BUPIVACAINE HYDROCHLORIDE 2.5 MG/ML
INJECTION, SOLUTION EPIDURAL; INFILTRATION; INTRACAUDAL
Status: DISPENSED
Start: 2023-10-09

## (undated) RX ORDER — PROPOFOL 10 MG/ML
INJECTION, EMULSION INTRAVENOUS
Status: DISPENSED
Start: 2023-10-09

## (undated) RX ORDER — LIDOCAINE HYDROCHLORIDE AND EPINEPHRINE 10; 10 MG/ML; UG/ML
INJECTION, SOLUTION INFILTRATION; PERINEURAL
Status: DISPENSED
Start: 2024-07-03

## (undated) RX ORDER — FENTANYL CITRATE-0.9 % NACL/PF 10 MCG/ML
PLASTIC BAG, INJECTION (ML) INTRAVENOUS
Status: DISPENSED
Start: 2023-10-09